# Patient Record
Sex: FEMALE | Race: WHITE | NOT HISPANIC OR LATINO | Employment: OTHER | URBAN - METROPOLITAN AREA
[De-identification: names, ages, dates, MRNs, and addresses within clinical notes are randomized per-mention and may not be internally consistent; named-entity substitution may affect disease eponyms.]

---

## 2017-06-20 ENCOUNTER — HOSPITAL ENCOUNTER (OUTPATIENT)
Dept: GENERAL RADIOLOGY | Facility: HOSPITAL | Age: 77
Discharge: HOME | End: 2017-06-20
Attending: INTERNAL MEDICINE | Admitting: INTERNAL MEDICINE

## 2017-06-20 LAB
ANION GAP SERPL CALC-SCNC: 11.9 MMOL/L (ref 10–20)
BASOPHILS # BLD AUTO: 0.1 10*3/UL (ref 0–0.2)
BASOPHILS NFR BLD AUTO: 1 % (ref 0–2)
BUN SERPL-MCNC: 7 MG/DL (ref 8–20)
BUN/CREAT SERPL: 8.8 (ref 5.4–26.2)
CALCIUM SERPL-MCNC: 8.9 MG/DL (ref 8.9–10.3)
CHLORIDE SERPL-SCNC: 108 MMOL/L (ref 101–111)
CONV CO2: 29 MMOL/L (ref 22–32)
CREAT UR-MCNC: 0.8 MG/DL (ref 0.4–1)
DIFFERENTIAL METHOD BLD: (no result)
EOSINOPHIL # BLD AUTO: 0.3 10*3/UL (ref 0–0.3)
EOSINOPHIL # BLD AUTO: 4 % (ref 0–3)
ERYTHROCYTE [DISTWIDTH] IN BLOOD BY AUTOMATED COUNT: 13.6 % (ref 11.5–14.5)
GLUCOSE SERPL-MCNC: 100 MG/DL (ref 65–99)
HCT VFR BLD AUTO: 42.7 % (ref 35–49)
HGB BLD-MCNC: 14.3 G/DL (ref 12–15)
INR PPP: 0.9
LYMPHOCYTES # BLD AUTO: 2 10*3/UL (ref 0.8–4.8)
LYMPHOCYTES NFR BLD AUTO: 28 % (ref 18–42)
MCH RBC QN AUTO: 31.1 PG (ref 26–32)
MCHC RBC AUTO-ENTMCNC: 33.5 G/DL (ref 32–36)
MCV RBC AUTO: 92.9 FL (ref 80–94)
MONOCYTES # BLD AUTO: 0.7 10*3/UL (ref 0.1–1.3)
MONOCYTES NFR BLD AUTO: 10 % (ref 2–11)
NEUTROPHILS # BLD AUTO: 4.2 10*3/UL (ref 2.3–8.6)
NEUTROPHILS NFR BLD AUTO: 57 % (ref 50–75)
NRBC BLD AUTO-RTO: 0 /100{WBCS}
NRBC/RBC NFR BLD MANUAL: 0 10*3/UL
PLATELET # BLD AUTO: 273 10*3/UL (ref 150–450)
PMV BLD AUTO: 7.9 FL (ref 7.4–10.4)
POTASSIUM SERPL-SCNC: 3.9 MMOL/L (ref 3.6–5.1)
PROTHROMBIN TIME: 10.6 SEC (ref 9.6–11.7)
RBC # BLD AUTO: 4.59 10*6/UL (ref 4–5.4)
SODIUM SERPL-SCNC: 145 MMOL/L (ref 136–144)
WBC # BLD AUTO: 7.3 10*3/UL (ref 4.5–11.5)

## 2017-06-21 LAB
CHOLEST SERPL-MCNC: 197 MG/DL
CHOLEST/HDLC SERPL: 5.3 {RATIO}
CONV LDL CHOLESTEROL DIRECT: 132 MG/DL (ref 0–100)
HDLC SERPL-MCNC: 37 MG/DL
LDLC/HDLC SERPL: 3.5 {RATIO}
LIPID INTERPRETATION: ABNORMAL
TRIGL SERPL-MCNC: 175 MG/DL
VLDLC SERPL CALC-MCNC: 28 MG/DL

## 2017-12-06 ENCOUNTER — HOSPITAL ENCOUNTER (OUTPATIENT)
Dept: MAMMOGRAPHY | Facility: HOSPITAL | Age: 77
Discharge: HOME | End: 2017-12-06
Attending: NURSE PRACTITIONER | Admitting: NURSE PRACTITIONER

## 2017-12-15 ENCOUNTER — HOSPITAL ENCOUNTER (OUTPATIENT)
Dept: URGENT CARE | Facility: CLINIC | Age: 77
Discharge: HOME | End: 2017-12-15
Attending: FAMILY MEDICINE | Admitting: FAMILY MEDICINE

## 2018-01-29 ENCOUNTER — HOSPITAL ENCOUNTER (OUTPATIENT)
Dept: LAB | Facility: HOSPITAL | Age: 78
Discharge: HOME | End: 2018-01-29
Attending: INTERNAL MEDICINE | Admitting: INTERNAL MEDICINE

## 2018-01-29 LAB
ALBUMIN SERPL-MCNC: 3.6 G/DL (ref 3.5–4.8)
ALBUMIN/GLOB SERPL: 1.2 {RATIO} (ref 1–1.7)
ALP SERPL-CCNC: 64 IU/L (ref 32–91)
ALT SERPL-CCNC: 26 IU/L (ref 14–54)
ANION GAP SERPL CALC-SCNC: 9.7 MMOL/L (ref 10–20)
AST SERPL-CCNC: 30 IU/L (ref 15–41)
BILIRUB SERPL-MCNC: 1.3 MG/DL (ref 0.3–1.2)
BUN SERPL-MCNC: 9 MG/DL (ref 8–20)
BUN/CREAT SERPL: 10 (ref 5.4–26.2)
CALCIUM SERPL-MCNC: 9.1 MG/DL (ref 8.9–10.3)
CHLORIDE SERPL-SCNC: 107 MMOL/L (ref 101–111)
CHOLEST SERPL-MCNC: 210 MG/DL
CHOLEST/HDLC SERPL: 5.6 {RATIO}
CK SERPL-CCNC: 92 IU/L (ref 38–234)
CONV CO2: 30 MMOL/L (ref 22–32)
CONV LDL CHOLESTEROL DIRECT: 134 MG/DL (ref 0–100)
CONV TOTAL PROTEIN: 6.7 G/DL (ref 6.1–7.9)
CREAT UR-MCNC: 0.9 MG/DL (ref 0.4–1)
GLOBULIN UR ELPH-MCNC: 3.1 G/DL (ref 2.5–3.8)
GLUCOSE SERPL-MCNC: 89 MG/DL (ref 65–99)
HDLC SERPL-MCNC: 38 MG/DL
LDLC/HDLC SERPL: 3.5 {RATIO}
LIPID INTERPRETATION: ABNORMAL
POTASSIUM SERPL-SCNC: 3.7 MMOL/L (ref 3.6–5.1)
SODIUM SERPL-SCNC: 143 MMOL/L (ref 136–144)
TRIGL SERPL-MCNC: 230 MG/DL
VLDLC SERPL CALC-MCNC: 38.7 MG/DL

## 2018-04-16 ENCOUNTER — HOSPITAL ENCOUNTER (OUTPATIENT)
Dept: URGENT CARE | Facility: CLINIC | Age: 78
Discharge: HOME | End: 2018-04-16
Attending: FAMILY MEDICINE | Admitting: FAMILY MEDICINE

## 2019-01-15 ENCOUNTER — HOSPITAL ENCOUNTER (OUTPATIENT)
Dept: LAB | Facility: HOSPITAL | Age: 79
Discharge: HOME | End: 2019-01-15
Attending: INTERNAL MEDICINE | Admitting: INTERNAL MEDICINE

## 2019-01-15 LAB
ANION GAP SERPL CALC-SCNC: 13.8 MMOL/L (ref 10–20)
BUN SERPL-MCNC: 8 MG/DL (ref 8–20)
BUN/CREAT SERPL: 8 (ref 5.4–26.2)
CALCIUM SERPL-MCNC: 9 MG/DL (ref 8.9–10.3)
CHLORIDE SERPL-SCNC: 100 MMOL/L (ref 101–111)
CONV CO2: 29 MMOL/L (ref 22–32)
CREAT UR-MCNC: 1 MG/DL (ref 0.4–1)
GLUCOSE SERPL-MCNC: 79 MG/DL (ref 65–99)
POTASSIUM SERPL-SCNC: 3.8 MMOL/L (ref 3.6–5.1)
SODIUM SERPL-SCNC: 139 MMOL/L (ref 136–144)

## 2019-01-21 ENCOUNTER — HOSPITAL ENCOUNTER (OUTPATIENT)
Dept: PHYSICAL THERAPY | Facility: HOSPITAL | Age: 79
Setting detail: RECURRING SERIES
Discharge: HOME | End: 2019-03-05
Attending: ORTHOPAEDIC SURGERY | Admitting: ORTHOPAEDIC SURGERY

## 2019-02-08 ENCOUNTER — HOSPITAL ENCOUNTER (OUTPATIENT)
Dept: FAMILY MEDICINE CLINIC | Facility: CLINIC | Age: 79
Setting detail: SPECIMEN
Discharge: HOME | End: 2019-02-08
Attending: INTERNAL MEDICINE | Admitting: INTERNAL MEDICINE

## 2019-02-08 LAB
ALBUMIN SERPL-MCNC: 3.9 G/DL (ref 3.5–4.8)
ALBUMIN/GLOB SERPL: 1.3 {RATIO} (ref 1–1.7)
ALP SERPL-CCNC: 74 IU/L (ref 32–91)
ALT SERPL-CCNC: 18 IU/L (ref 14–54)
ANION GAP SERPL CALC-SCNC: 12.6 MMOL/L (ref 10–20)
AST SERPL-CCNC: 26 IU/L (ref 15–41)
BASOPHILS # BLD AUTO: 0.1 10*3/UL (ref 0–0.2)
BASOPHILS NFR BLD AUTO: 1 % (ref 0–2)
BILIRUB SERPL-MCNC: 1.4 MG/DL (ref 0.3–1.2)
BUN SERPL-MCNC: 9 MG/DL (ref 8–20)
BUN/CREAT SERPL: 11.3 (ref 5.4–26.2)
CALCIUM SERPL-MCNC: 9.5 MG/DL (ref 8.9–10.3)
CHLORIDE SERPL-SCNC: 104 MMOL/L (ref 101–111)
CHOLEST SERPL-MCNC: 143 MG/DL
CHOLEST/HDLC SERPL: 4 {RATIO}
CONV CO2: 30 MMOL/L (ref 22–32)
CONV LDL CHOLESTEROL DIRECT: 71 MG/DL (ref 0–100)
CONV TOTAL PROTEIN: 6.9 G/DL (ref 6.1–7.9)
CREAT UR-MCNC: 0.8 MG/DL (ref 0.4–1)
DIFFERENTIAL METHOD BLD: (no result)
EOSINOPHIL # BLD AUTO: 0.4 10*3/UL (ref 0–0.3)
EOSINOPHIL # BLD AUTO: 5 % (ref 0–3)
ERYTHROCYTE [DISTWIDTH] IN BLOOD BY AUTOMATED COUNT: 13.8 % (ref 11.5–14.5)
GLOBULIN UR ELPH-MCNC: 3 G/DL (ref 2.5–3.8)
GLUCOSE SERPL-MCNC: 81 MG/DL (ref 65–99)
HCT VFR BLD AUTO: 44.8 % (ref 35–49)
HDLC SERPL-MCNC: 36 MG/DL
HGB BLD-MCNC: 15.3 G/DL (ref 12–15)
LDLC/HDLC SERPL: 2 {RATIO}
LIPID INTERPRETATION: ABNORMAL
LYMPHOCYTES # BLD AUTO: 2.1 10*3/UL (ref 0.8–4.8)
LYMPHOCYTES NFR BLD AUTO: 26 % (ref 18–42)
MCH RBC QN AUTO: 32.2 PG (ref 26–32)
MCHC RBC AUTO-ENTMCNC: 34.2 G/DL (ref 32–36)
MCV RBC AUTO: 94.1 FL (ref 80–94)
MONOCYTES # BLD AUTO: 1 10*3/UL (ref 0.1–1.3)
MONOCYTES NFR BLD AUTO: 13 % (ref 2–11)
NEUTROPHILS # BLD AUTO: 4.6 10*3/UL (ref 2.3–8.6)
NEUTROPHILS NFR BLD AUTO: 55 % (ref 50–75)
NRBC BLD AUTO-RTO: 0 /100{WBCS}
NRBC/RBC NFR BLD MANUAL: 0 10*3/UL
PLATELET # BLD AUTO: 316 10*3/UL (ref 150–450)
PMV BLD AUTO: 8 FL (ref 7.4–10.4)
POTASSIUM SERPL-SCNC: 4.6 MMOL/L (ref 3.6–5.1)
RBC # BLD AUTO: 4.75 10*6/UL (ref 4–5.4)
SODIUM SERPL-SCNC: 142 MMOL/L (ref 136–144)
TRIGL SERPL-MCNC: 291 MG/DL
VLDLC SERPL CALC-MCNC: 36.6 MG/DL
WBC # BLD AUTO: 8.2 10*3/UL (ref 4.5–11.5)

## 2019-03-22 ENCOUNTER — HOSPITAL ENCOUNTER (OUTPATIENT)
Dept: CARDIOLOGY | Facility: HOSPITAL | Age: 79
Discharge: HOME | End: 2019-03-22
Attending: INTERNAL MEDICINE | Admitting: INTERNAL MEDICINE

## 2019-06-07 ENCOUNTER — HOSPITAL ENCOUNTER (OUTPATIENT)
Dept: CARDIOLOGY | Facility: HOSPITAL | Age: 79
Discharge: HOME | End: 2019-06-07
Attending: INTERNAL MEDICINE | Admitting: INTERNAL MEDICINE

## 2019-07-08 RX ORDER — MULTIVIT WITH MINERALS/LUTEIN
500 TABLET ORAL 2 TIMES DAILY
COMMUNITY
Start: 2014-05-14

## 2019-07-08 RX ORDER — ISOSORBIDE MONONITRATE 30 MG/1
TABLET, EXTENDED RELEASE ORAL
COMMUNITY
Start: 2018-08-14 | End: 2020-07-20 | Stop reason: SDUPTHER

## 2019-07-08 RX ORDER — CHOLECALCIFEROL (VITAMIN D3) 50 MCG
1 CAPSULE ORAL DAILY
COMMUNITY
Start: 2019-02-08

## 2019-07-08 RX ORDER — CARVEDILOL 6.25 MG/1
TABLET ORAL EVERY 12 HOURS
COMMUNITY
Start: 2018-04-27 | End: 2020-01-21 | Stop reason: SDUPTHER

## 2019-07-08 RX ORDER — LISINOPRIL 20 MG/1
TABLET ORAL EVERY 12 HOURS
COMMUNITY
Start: 2018-04-27 | End: 2019-12-23

## 2019-07-08 RX ORDER — ATORVASTATIN CALCIUM 20 MG/1
TABLET, FILM COATED ORAL
COMMUNITY
Start: 2018-09-21 | End: 2019-12-30

## 2019-07-08 RX ORDER — ASPIRIN 81 MG/1
81 TABLET ORAL EVERY 24 HOURS
COMMUNITY
Start: 2019-01-14

## 2019-07-15 ENCOUNTER — OFFICE VISIT (OUTPATIENT)
Dept: CARDIOLOGY | Facility: CLINIC | Age: 79
End: 2019-07-15

## 2019-07-15 VITALS
WEIGHT: 146 LBS | HEIGHT: 63 IN | HEART RATE: 68 BPM | SYSTOLIC BLOOD PRESSURE: 161 MMHG | BODY MASS INDEX: 25.87 KG/M2 | DIASTOLIC BLOOD PRESSURE: 93 MMHG | OXYGEN SATURATION: 94 %

## 2019-07-15 DIAGNOSIS — I10 ESSENTIAL HYPERTENSION: ICD-10-CM

## 2019-07-15 DIAGNOSIS — E11.9 TYPE II DIABETES MELLITUS WITH GOAL OF SYMPTOM MANAGEMENT (HCC): ICD-10-CM

## 2019-07-15 DIAGNOSIS — E78.5 DYSLIPIDEMIA: ICD-10-CM

## 2019-07-15 DIAGNOSIS — I25.118 CORONARY ARTERY DISEASE OF NATIVE ARTERY OF NATIVE HEART WITH STABLE ANGINA PECTORIS (HCC): ICD-10-CM

## 2019-07-15 DIAGNOSIS — I20.9 ANGINA PECTORIS (HCC): Primary | ICD-10-CM

## 2019-07-15 PROCEDURE — 99214 OFFICE O/P EST MOD 30 MIN: CPT | Performed by: INTERNAL MEDICINE

## 2019-07-15 PROCEDURE — 93000 ELECTROCARDIOGRAM COMPLETE: CPT | Performed by: INTERNAL MEDICINE

## 2019-07-15 RX ORDER — AMLODIPINE BESYLATE 5 MG/1
5 TABLET ORAL DAILY
Qty: 90 TABLET | Refills: 3 | Status: SHIPPED | OUTPATIENT
Start: 2019-07-15 | End: 2020-07-20

## 2019-07-15 NOTE — PROGRESS NOTES
Subjective:     Encounter Date:07/15/2019      Patient ID: Maria D Fountain is a 79 y.o. female.    Chief Complaint: Follow-up for CAD, hypertension, to sleep team  History of Present Illness: see below      Past Medical History:  Past Medical History:   Diagnosis Date   • Arthritis    • Diabetes mellitus (CMS/HCC)    • DJD (degenerative joint disease)    • Gallstones    • Heart murmur    • History of stress test 07/2011    Stress myoview- neg    • Hypertension    • Lumbar disc disease    • Mitral regurgitation    • Multiple lipomas    • Osteoporosis    • Pleurisy      Past Surgical History:  Past Surgical History:   Procedure Laterality Date   • BREAST BIOPSY Right 1990   • CARDIAC CATHETERIZATION  06/21/2017   • HEMANGIOMA EXCISION Left 2010    left forearm    • HX OVARIAN CYSTECTOMY  1962   • HYSTERECTOMY  2004      Allergies:  Allergies   Allergen Reactions   • Pravastatin Dizziness     Home Meds:  Current Meds:     Current Outpatient Medications:   •  Acetaminophen 325 MG capsule, TYLENOL CAPS, Disp: , Rfl:   •  amLODIPine (NORVASC) 5 MG tablet, Take 1 tablet by mouth Daily., Disp: 90 tablet, Rfl: 3  •  ascorbic acid (VITAMIN C) 1000 MG tablet, VITAMIN C 1000 MG TABS, Disp: , Rfl:   •  aspirin (ASPIR-LOW) 81 MG EC tablet, Daily., Disp: , Rfl:   •  atorvastatin (LIPITOR) 20 MG tablet, ATORVASTATIN CALCIUM 20 MG TABS, Disp: , Rfl:   •  Calcium Carb-Cholecalciferol 1000-800 MG-UNIT tablet, CALCIUM 600-D TABS, Disp: , Rfl:   •  carvedilol (COREG) 6.25 MG tablet, Every 12 (Twelve) Hours., Disp: , Rfl:   •  HM OMEGA-3-6-9 FATTY ACIDS capsule, OMEGA-3 PLUS CAPS, Disp: , Rfl:   •  isosorbide mononitrate (IMDUR) 30 MG 24 hr tablet, ISOSORBIDE MONONITRATE ER 30 MG XR24H-TAB, Disp: , Rfl:   •  lisinopril (PRINIVIL,ZESTRIL) 20 MG tablet, Every 12 (Twelve) Hours., Disp: , Rfl:   •  MISC NATURAL PRODUCTS ER PO, OSTEO BI-FLEX ADV DOUBLE ST ORAL CAPSULE, Disp: , Rfl:   •  multivitamin-minerals (CENTRUM) tablet, CENTRUM  TABS, Disp: , Rfl:   Social History:   Social History     Tobacco Use   • Smoking status: Never Smoker   • Smokeless tobacco: Never Used   Substance Use Topics   • Alcohol use: No     Frequency: Never      Family History:  Family History   Problem Relation Age of Onset   • Heart disease Father         MI   • Arthritis Other    • Hypertension Other    • Colon cancer Other         The following portions of the patient's history were reviewed and updated as appropriate: allergies, current medications, past family history, past medical history, past social history, past surgical history and problem list.    Review of Systems   Constitution: Positive for malaise/fatigue. Negative for fever.   HENT: Negative for congestion and hearing loss.    Eyes: Negative for double vision and visual disturbance.   Cardiovascular: Positive for chest pain (Comes and goes) and palpitations (Sometimes). Negative for claudication, dyspnea on exertion, leg swelling and syncope.   Respiratory: Negative for cough and shortness of breath.    Endocrine: Negative for cold intolerance.   Skin: Negative for color change and rash.   Musculoskeletal: Negative for arthritis and joint pain.   Gastrointestinal: Negative for abdominal pain and heartburn.   Genitourinary: Negative for hematuria.   Neurological: Positive for dizziness (Usually in AM). Negative for excessive daytime sleepiness.   Psychiatric/Behavioral: Negative for depression. The patient is not nervous/anxious.    All other systems reviewed and are negative.        ECG 12 Lead  Date/Time: 7/15/2019 2:08 PM  Performed by: Matti Luna MD  Authorized by: Matti Luna MD   Comparison: compared with previous ECG   Comparison to previous ECG: EKG done today reviewed by me shows sinus rhythm at the rate of 65 bpm with nonspecific anterior T wave inversions                 Objective:     Physical Exam   /93 (BP Location: Left arm, Patient Position: Sitting, Cuff  "Size: Adult)   Pulse 68   Ht 160 cm (63\")   Wt 66.2 kg (146 lb)   SpO2 94%   BMI 25.86 kg/m²   General:  Appears in no acute distress  Eyes: Sclera is anicteric,  conjunctiva is clear   HEENT:  No JVD. Thyroid not visibly enlarged. No mucosal pallor or cyanosis  Respiratory: Respirations regular and unlabored at rest.  Bilaterally good breath sounds, with good air entry in all fields. No crackles, rubs or wheezes auscultated  Cardiovascular: S1,S2 Regular rate and rhythm. No murmur, rub or gallop auscultated. No carotid bruits. DP/PT pulses    . No pretibial pitting edema  Gastrointestinal: Abdomen soft, flat, non tender. Bowel sounds present. No hepatosplenomegaly. No ascites  Musculoskeletal:  No abnormal movements  Extremities: No digital clubbing or cyanosis  Skin: Color pink. Skin warm and dry to touch. No rashes  No xanthoma  Neuro: Alert and awake, no lateralizing deficits appreciated    Lab Review:       Assessment:          Diagnosis Plan   1. Angina pectoris (CMS/HCC)  Stress Test With Myocardial Perfusion One Day    ECG 12 Lead   2. Coronary artery disease of native artery of native heart with stable angina pectoris (CMS/HCC)  Stress Test With Myocardial Perfusion One Day    ECG 12 Lead   3. Essential hypertension  Stress Test With Myocardial Perfusion One Day    ECG 12 Lead   4. Type II diabetes mellitus with goal of symptom management (CMS/HCC)  Stress Test With Myocardial Perfusion One Day    ECG 12 Lead   5. Dyslipidemia  Stress Test With Myocardial Perfusion One Day    ECG 12 Lead       This is a 79-year-old  female with PMH of    # CAD cardiac cath 06/21/2017 showing 50% OM1 disease and small-vessel disease distal LAD  #  mitral regurgitation, mild 9/2016  #  palpitations  #  hypertension  # LAE  #  diabetes  #?  ACE-inhibitor cough, now improved  # allergy/intolerance to pravastatin  # ALYSA       patient is here for follow-up.  Patient is complaining of intermittent substernal chest " pain with stressful situation relieved with relaxation.  He is under a lot of stress patient's  passed away recently( lung ca), son is having multiple surgery after motor vehicle accident for orthopedic surgery.  Patient's arterial blood pressure is elevated at 161/93.  denies any , palpitations, loss of consciousness.  Patient's arterial blood pressure is 161/93.  Has dizziness denies any loss of consciousness , headaches.  Patient does not check her sugars regularly. patient's labs from 01/29/2018 revealed cholesterol 210 triglycerides 230 HDL low at 38 LDL high at 134 CMP is okay CK is 92     ASSESSMENT:  #Recurrent chest pain  #. hypertension,   #  dyslipidemia  # CAD  #  mitral regurgitation  #. diabetes     PLAN:  He reviewed EKG with patient  We will add amlodipine for better blood pressure control  Schedule stress test   continue beta-blockers and Lipitor and aspirin   counseled on walking exercise for low HDL  Continue risk factor modification and medical management  Reviewed risk benefits alternatives of different medications.   follow up with PMD for  diabetes   continue medical management and risk factor modification       Plan:

## 2019-07-17 DIAGNOSIS — I10 HYPERTENSION, UNSPECIFIED TYPE: Primary | ICD-10-CM

## 2019-07-17 DIAGNOSIS — E11.9 TYPE 2 DIABETES MELLITUS WITHOUT COMPLICATION, UNSPECIFIED WHETHER LONG TERM INSULIN USE (HCC): ICD-10-CM

## 2019-07-17 DIAGNOSIS — E55.9 VITAMIN D DEFICIENCY: ICD-10-CM

## 2019-07-17 DIAGNOSIS — E78.5 HYPERLIPIDEMIA, UNSPECIFIED HYPERLIPIDEMIA TYPE: ICD-10-CM

## 2019-07-18 ENCOUNTER — HOSPITAL ENCOUNTER (OUTPATIENT)
Dept: CARDIOLOGY | Facility: HOSPITAL | Age: 79
Discharge: HOME | End: 2019-07-18

## 2019-07-18 ENCOUNTER — LAB (OUTPATIENT)
Dept: FAMILY MEDICINE CLINIC | Facility: CLINIC | Age: 79
End: 2019-07-18

## 2019-07-18 ENCOUNTER — OFFICE VISIT (OUTPATIENT)
Dept: CARDIOLOGY | Facility: CLINIC | Age: 79
End: 2019-07-18

## 2019-07-18 VITALS
OXYGEN SATURATION: 95 % | WEIGHT: 146 LBS | BODY MASS INDEX: 25.86 KG/M2 | HEART RATE: 61 BPM | SYSTOLIC BLOOD PRESSURE: 159 MMHG | DIASTOLIC BLOOD PRESSURE: 86 MMHG

## 2019-07-18 DIAGNOSIS — E11.9 TYPE II DIABETES MELLITUS WITH GOAL OF SYMPTOM MANAGEMENT (HCC): ICD-10-CM

## 2019-07-18 DIAGNOSIS — E11.9 TYPE 2 DIABETES MELLITUS WITHOUT COMPLICATION, UNSPECIFIED WHETHER LONG TERM INSULIN USE (HCC): ICD-10-CM

## 2019-07-18 DIAGNOSIS — E78.5 DYSLIPIDEMIA: ICD-10-CM

## 2019-07-18 DIAGNOSIS — R09.89 LABILE HYPERTENSION: Primary | ICD-10-CM

## 2019-07-18 DIAGNOSIS — I10 ESSENTIAL HYPERTENSION: ICD-10-CM

## 2019-07-18 DIAGNOSIS — I25.118 CORONARY ARTERY DISEASE OF NATIVE ARTERY OF NATIVE HEART WITH STABLE ANGINA PECTORIS (HCC): ICD-10-CM

## 2019-07-18 DIAGNOSIS — I20.9 ANGINA PECTORIS (HCC): ICD-10-CM

## 2019-07-18 DIAGNOSIS — E78.5 HYPERLIPIDEMIA, UNSPECIFIED HYPERLIPIDEMIA TYPE: ICD-10-CM

## 2019-07-18 DIAGNOSIS — E55.9 VITAMIN D DEFICIENCY: ICD-10-CM

## 2019-07-18 LAB
25(OH)D3 SERPL-MCNC: 26.9 NG/ML (ref 30–100)
ALBUMIN SERPL-MCNC: 3.6 G/DL (ref 3.5–4.8)
ALBUMIN/GLOB SERPL: 1.4 G/DL (ref 1–1.7)
ALP SERPL-CCNC: 69 U/L (ref 32–91)
ALT SERPL W P-5'-P-CCNC: 17 U/L (ref 14–54)
ANION GAP SERPL CALCULATED.3IONS-SCNC: 13.4 MMOL/L (ref 5–15)
ARTICHOKE IGE QN: 71 MG/DL (ref 0–100)
AST SERPL-CCNC: 23 U/L (ref 15–41)
BH CV STRESS COMMENTS STAGE 1: NORMAL
BH CV STRESS DOSE REGADENOSON STAGE 1: 0.4
BH CV STRESS DURATION MIN STAGE 1: 0
BH CV STRESS DURATION SEC STAGE 1: 10
BH CV STRESS PROTOCOL 1: NORMAL
BH CV STRESS RECOVERY BP: NORMAL MMHG
BH CV STRESS RECOVERY HR: 90 BPM
BH CV STRESS STAGE 1: 1
BILIRUB SERPL-MCNC: 1.1 MG/DL (ref 0.3–1.2)
BUN BLD-MCNC: 9 MG/DL (ref 8–20)
BUN/CREAT SERPL: 10 (ref 5.4–26.2)
CALCIUM SPEC-SCNC: 9.1 MG/DL (ref 8.9–10.3)
CHLORIDE SERPL-SCNC: 106 MMOL/L (ref 101–111)
CHOLEST SERPL-MCNC: 137 MG/DL
CO2 SERPL-SCNC: 27 MMOL/L (ref 22–32)
CREAT BLD-MCNC: 0.9 MG/DL (ref 0.4–1)
GFR SERPL CREATININE-BSD FRML MDRD: 60 ML/MIN/1.73
GLOBULIN UR ELPH-MCNC: 2.5 GM/DL (ref 2.5–3.8)
GLUCOSE BLD-MCNC: 97 MG/DL (ref 65–99)
HBA1C MFR BLD: 5.7 % (ref 3.5–5.6)
HDLC SERPL QL: 3.7
HDLC SERPL-MCNC: 37 MG/DL
LDLC/HDLC SERPL: 1.5 {RATIO}
LV EF NUC BP: 70 %
MAXIMAL PREDICTED HEART RATE: 141 BPM
POTASSIUM BLD-SCNC: 4.4 MMOL/L (ref 3.6–5.1)
PROT SERPL-MCNC: 6.1 G/DL (ref 6.1–7.9)
SODIUM BLD-SCNC: 142 MMOL/L (ref 136–144)
STRESS BASELINE BP: NORMAL MMHG
STRESS BASELINE HR: 57 BPM
STRESS TARGET HR: 120 BPM
TRIGL SERPL-MCNC: 223 MG/DL
VLDLC SERPL-MCNC: 44.6 MG/DL

## 2019-07-18 PROCEDURE — 80061 LIPID PANEL: CPT | Performed by: INTERNAL MEDICINE

## 2019-07-18 PROCEDURE — 99213 OFFICE O/P EST LOW 20 MIN: CPT | Performed by: INTERNAL MEDICINE

## 2019-07-18 PROCEDURE — 93016 CV STRESS TEST SUPVJ ONLY: CPT | Performed by: INTERNAL MEDICINE

## 2019-07-18 PROCEDURE — 82306 VITAMIN D 25 HYDROXY: CPT | Performed by: INTERNAL MEDICINE

## 2019-07-18 PROCEDURE — 78452 HT MUSCLE IMAGE SPECT MULT: CPT

## 2019-07-18 PROCEDURE — 83036 HEMOGLOBIN GLYCOSYLATED A1C: CPT | Performed by: INTERNAL MEDICINE

## 2019-07-18 PROCEDURE — 36415 COLL VENOUS BLD VENIPUNCTURE: CPT | Performed by: INTERNAL MEDICINE

## 2019-07-18 PROCEDURE — 0 TECHNETIUM SESTAMIBI: Performed by: INTERNAL MEDICINE

## 2019-07-18 PROCEDURE — 78452 HT MUSCLE IMAGE SPECT MULT: CPT | Performed by: INTERNAL MEDICINE

## 2019-07-18 PROCEDURE — 25010000002 REGADENOSON 0.4 MG/5ML SOLUTION: Performed by: INTERNAL MEDICINE

## 2019-07-18 PROCEDURE — 93017 CV STRESS TEST TRACING ONLY: CPT

## 2019-07-18 PROCEDURE — A9500 TC99M SESTAMIBI: HCPCS | Performed by: INTERNAL MEDICINE

## 2019-07-18 PROCEDURE — 80053 COMPREHEN METABOLIC PANEL: CPT | Performed by: INTERNAL MEDICINE

## 2019-07-18 PROCEDURE — 93018 CV STRESS TEST I&R ONLY: CPT | Performed by: INTERNAL MEDICINE

## 2019-07-18 RX ADMIN — REGADENOSON 0.4 MG: 0.08 INJECTION, SOLUTION INTRAVENOUS at 15:15

## 2019-07-18 RX ADMIN — TECHNETIUM TC 99M SESTAMIBI 1 DOSE: 1 INJECTION INTRAVENOUS at 15:15

## 2019-07-18 RX ADMIN — TECHNETIUM TC 99M SESTAMIBI 1 DOSE: 1 INJECTION INTRAVENOUS at 14:00

## 2019-07-18 NOTE — PROGRESS NOTES
Subjective:     Encounter Date:07/18/2019      Patient ID: Maria D Fountain is a 79 y.o. female.    Chief Complaint: Lexiscan Cardiolite follow-up  History of Present Illness see below      Past Medical History:  Past Medical History:   Diagnosis Date   • Arthritis    • Diabetes mellitus (CMS/HCC)    • DJD (degenerative joint disease)    • Gallstones    • Heart murmur    • History of stress test 07/2011    Stress myoview- neg    • Hypertension    • Lumbar disc disease    • Mitral regurgitation    • Multiple lipomas    • Osteoporosis    • Pleurisy      Past Surgical History:  Past Surgical History:   Procedure Laterality Date   • BREAST BIOPSY Right 1990   • CARDIAC CATHETERIZATION  06/21/2017   • HEMANGIOMA EXCISION Left 2010    left forearm    • HX OVARIAN CYSTECTOMY  1962   • HYSTERECTOMY  2004      Allergies:  Allergies   Allergen Reactions   • Pravastatin Dizziness     Home Meds:  Current Meds:     Current Outpatient Medications:   •  Acetaminophen 325 MG capsule, TYLENOL CAPS, Disp: , Rfl:   •  amLODIPine (NORVASC) 5 MG tablet, Take 1 tablet by mouth Daily., Disp: 90 tablet, Rfl: 3  •  ascorbic acid (VITAMIN C) 1000 MG tablet, VITAMIN C 1000 MG TABS, Disp: , Rfl:   •  aspirin (ASPIR-LOW) 81 MG EC tablet, Daily., Disp: , Rfl:   •  atorvastatin (LIPITOR) 20 MG tablet, ATORVASTATIN CALCIUM 20 MG TABS, Disp: , Rfl:   •  Calcium Carb-Cholecalciferol 1000-800 MG-UNIT tablet, CALCIUM 600-D TABS, Disp: , Rfl:   •  carvedilol (COREG) 6.25 MG tablet, Every 12 (Twelve) Hours., Disp: , Rfl:   •  HM OMEGA-3-6-9 FATTY ACIDS capsule, OMEGA-3 PLUS CAPS, Disp: , Rfl:   •  isosorbide mononitrate (IMDUR) 30 MG 24 hr tablet, ISOSORBIDE MONONITRATE ER 30 MG XR24H-TAB, Disp: , Rfl:   •  lisinopril (PRINIVIL,ZESTRIL) 20 MG tablet, Every 12 (Twelve) Hours., Disp: , Rfl:   •  MISC NATURAL PRODUCTS ER PO, OSTEO BI-FLEX ADV DOUBLE ST ORAL CAPSULE, Disp: , Rfl:   •  multivitamin-minerals (CENTRUM) tablet, CENTRUM TABS, Disp: , Rfl:    No current facility-administered medications for this visit.   Social History:   Social History     Tobacco Use   • Smoking status: Never Smoker   • Smokeless tobacco: Never Used   Substance Use Topics   • Alcohol use: No     Frequency: Never      Family History:  Family History   Problem Relation Age of Onset   • Heart disease Father         MI   • Arthritis Other    • Hypertension Other    • Colon cancer Other         The following portions of the patient's history were reviewed and updated as appropriate: allergies, current medications, past family history, past medical history, past social history, past surgical history and problem list.    Review of Systems   Cardiovascular: Negative for chest pain, leg swelling and palpitations.   Respiratory: Negative for shortness of breath.    Neurological: Positive for dizziness and light-headedness. Negative for numbness.       Procedures       Objective:     Physical Exam   /86 (BP Location: Left arm, Patient Position: Sitting, Cuff Size: Adult)   Pulse 61   Wt 66.2 kg (146 lb)   SpO2 95%   BMI 25.86 kg/m²   General:  Appears in no acute distress  Eyes: Sclera is anicteric,  conjunctiva is clear   HEENT:  No JVD. Thyroid not visibly enlarged. No mucosal pallor or cyanosis  Respiratory: Respirations regular and unlabored at rest.  Bilaterally good breath sounds, with good air entry in all fields. No crackles, rubs or wheezes auscultated  Cardiovascular: S1,S2 Regular rate and rhythm. No murmur, rub or gallop auscultated. No carotid bruits. DP/PT pulses    . No pretibial pitting edema  Gastrointestinal: Abdomen soft, flat, non tender. Bowel sounds present. No hepatosplenomegaly. No ascites  Musculoskeletal:  No abnormal movements  Extremities: No digital clubbing or cyanosis  Skin: Color pink. Skin warm and dry to touch. No rashes  No xanthoma  Neuro: Alert and awake, no lateralizing deficits appreciated    Lab Review:       Assessment:          Diagnosis Plan    1. Labile hypertension     2. Angina pectoris (CMS/HCC)     3. Type II diabetes mellitus with goal of symptom management (CMS/[facility])       HPI:    This is a 79-year-old  female with PMH of    # CAD cardiac cath 06/21/2017 showing 50% OM1 disease and small-vessel disease distal LAD  #  mitral regurgitation, mild 9/2016  #  palpitations  #  hypertension  # LAE  #  diabetes  #?  ACE-inhibitor cough, now improved  # allergy/intolerance to pravastatin  # ALYSA       patient is here for stress test follow-up.  Patient is complaining of intermittent substernal chest pain with stressful situation relieved with relaxation.  He is under a lot of stress patient's  passed away recently( lung ca), son is having multiple surgery after motor vehicle accident for orthopedic surgery.  Patient's arterial blood pressure is elevated at 159/86.  States that her blood pressure is better on amlodipine that was added recently was 120s in the morning  denies any , palpitations, loss of consciousness.  Patient does not check her sugars regularly. patient's labs from 01/29/2018 revealed cholesterol 210 triglycerides 230 HDL low at 38 LDL high at 134 CMP is okay CK is 92     ASSESSMENT:  #. hypertension,   #  dyslipidemia  # CAD  #  mitral regurgitation  #. diabetes     PLAN:  Advised patient to check blood pressure at home every Sunday morning  he reviewed stress test with patient  We will continue amlodipine for better blood pressure control   continue beta-blockers and Lipitor and aspirin   counseled on walking exercise for low HDL  Continue risk factor modification and medical management  Reviewed risk benefits alternatives of different medications.   follow up with PMD for  diabetes   continue medical management and risk factor modification         Plan:

## 2019-07-25 ENCOUNTER — OFFICE VISIT (OUTPATIENT)
Dept: FAMILY MEDICINE CLINIC | Facility: CLINIC | Age: 79
End: 2019-07-25

## 2019-07-25 VITALS
RESPIRATION RATE: 16 BRPM | BODY MASS INDEX: 25.87 KG/M2 | SYSTOLIC BLOOD PRESSURE: 120 MMHG | TEMPERATURE: 97.8 F | WEIGHT: 146 LBS | HEART RATE: 76 BPM | HEIGHT: 63 IN | DIASTOLIC BLOOD PRESSURE: 70 MMHG

## 2019-07-25 DIAGNOSIS — E78.5 DYSLIPIDEMIA: ICD-10-CM

## 2019-07-25 DIAGNOSIS — I83.812 VARICOSE VEINS OF LEFT LOWER EXTREMITY WITH PAIN: ICD-10-CM

## 2019-07-25 DIAGNOSIS — M54.16 LUMBAR RADICULOPATHY: ICD-10-CM

## 2019-07-25 DIAGNOSIS — L98.9 SKIN LESION: ICD-10-CM

## 2019-07-25 DIAGNOSIS — I10 ESSENTIAL HYPERTENSION: Primary | ICD-10-CM

## 2019-07-25 PROBLEM — I25.10 CHRONIC CORONARY ARTERY DISEASE: Status: ACTIVE | Noted: 2017-08-07

## 2019-07-25 PROBLEM — R07.81 CHEST PAIN, PLEURITIC: Status: ACTIVE | Noted: 2017-12-15

## 2019-07-25 PROBLEM — I20.9 ANGINA PECTORIS: Status: ACTIVE | Noted: 2017-05-08

## 2019-07-25 PROBLEM — M85.80 OSTEOPENIA: Status: ACTIVE | Noted: 2019-02-08

## 2019-07-25 PROBLEM — R73.03 PREDIABETES: Status: ACTIVE | Noted: 2019-02-08

## 2019-07-25 PROCEDURE — 99214 OFFICE O/P EST MOD 30 MIN: CPT | Performed by: INTERNAL MEDICINE

## 2019-07-25 NOTE — PROGRESS NOTES
Subjective   Maria D Fountain is a 79 y.o. female.     Sciatica and left leg pain improved with steroids  But now has pain behind knee - has varicose veins  Not bad enough to want consult with vascular surgery, however    Also here for med check htn, hpld  And f/u lab results  bp is well controlled at home since adding amlodipine  No adverse effects  No myalgias, chest pain, headaches, dizziness    Does have a little swelling at left temple, just above where her glasses rest on L ear  Started hurting about a week ago, noticed redness  Redness better but  and swollen         The following portions of the patient's history were reviewed and updated as appropriate: allergies, current medications, past family history, past medical history, past social history, past surgical history and problem list.    Review of Systems   Constitutional: Negative for fatigue and fever.   HENT: Negative for congestion, ear pain, rhinorrhea and sore throat.    Eyes: Negative for blurred vision and itching.   Respiratory: Negative for cough and shortness of breath.    Cardiovascular: Negative for chest pain and palpitations.   Gastrointestinal: Negative for abdominal pain, diarrhea and vomiting.   Endocrine: Negative for polydipsia and polyuria.   Genitourinary: Negative for dysuria, frequency, hematuria and urgency.   Musculoskeletal: Positive for arthralgias. Negative for joint swelling and myalgias.   Skin: Positive for skin lesions. Negative for rash.   Neurological: Negative for dizziness, numbness and headache.   Psychiatric/Behavioral: Negative for depressed mood. The patient is not nervous/anxious.          Current Outpatient Medications:   •  Acetaminophen 325 MG capsule, TYLENOL CAPS, Disp: , Rfl:   •  amLODIPine (NORVASC) 5 MG tablet, Take 1 tablet by mouth Daily., Disp: 90 tablet, Rfl: 3  •  ascorbic acid (VITAMIN C) 1000 MG tablet, VITAMIN C 1000 MG TABS, Disp: , Rfl:   •  aspirin (ASPIR-LOW) 81 MG EC tablet,  "Daily., Disp: , Rfl:   •  atorvastatin (LIPITOR) 20 MG tablet, ATORVASTATIN CALCIUM 20 MG TABS, Disp: , Rfl:   •  Calcium Carb-Cholecalciferol 1000-800 MG-UNIT tablet, CALCIUM 600-D TABS, Disp: , Rfl:   •  carvedilol (COREG) 6.25 MG tablet, Every 12 (Twelve) Hours., Disp: , Rfl:   •  HM OMEGA-3-6-9 FATTY ACIDS capsule, OMEGA-3 PLUS CAPS, Disp: , Rfl:   •  isosorbide mononitrate (IMDUR) 30 MG 24 hr tablet, ISOSORBIDE MONONITRATE ER 30 MG XR24H-TAB, Disp: , Rfl:   •  lisinopril (PRINIVIL,ZESTRIL) 20 MG tablet, Every 12 (Twelve) Hours., Disp: , Rfl:   •  MISC NATURAL PRODUCTS ER PO, OSTEO BI-FLEX ADV DOUBLE ST ORAL CAPSULE, Disp: , Rfl:   •  multivitamin-minerals (CENTRUM) tablet, CENTRUM TABS, Disp: , Rfl:     Objective   /70 (BP Location: Right arm, Patient Position: Sitting, Cuff Size: Adult)   Pulse 76   Temp 97.8 °F (36.6 °C) (Oral)   Resp 16   Ht 160 cm (63\")   Wt 66.2 kg (146 lb)   BMI 25.86 kg/m²   Physical Exam   Constitutional: She is oriented to person, place, and time. She appears well-developed and well-nourished. No distress.   HENT:   Head: Normocephalic and atraumatic.   Right Ear: External ear normal.   Left Ear: External ear normal.   Mouth/Throat: Oropharynx is clear and moist. No oropharyngeal exudate.   Eyes: Conjunctivae and EOM are normal. Right eye exhibits no discharge. Left eye exhibits no discharge. No scleral icterus.   Neck: Normal range of motion. Neck supple. No thyromegaly present.   Cardiovascular: Normal rate, regular rhythm and normal heart sounds. Exam reveals no gallop and no friction rub.   No murmur heard.  Pulmonary/Chest: Effort normal and breath sounds normal. No respiratory distress. She has no wheezes. She has no rales.   Abdominal: Soft. Bowel sounds are normal. She exhibits no distension. There is no tenderness. There is no guarding.   Musculoskeletal: Normal range of motion. She exhibits no edema or deformity.   Lymphadenopathy:     She has no cervical " adenopathy.   Neurological: She is alert and oriented to person, place, and time. No cranial nerve deficit.   Skin: Skin is warm and dry. No rash noted. She is not diaphoretic. No erythema.   Psychiatric: She has a normal mood and affect. Her behavior is normal. Thought content normal.   Vitals reviewed.        Assessment/Plan   Problems Addressed this Visit        Cardiovascular and Mediastinum    Hypertension - Primary    Varicose veins of left lower extremity with pain       Nervous and Auditory    Lumbar radiculopathy       Other    Dyslipidemia      Other Visit Diagnoses     Skin lesion            bp controlled  Lipid panel acceptable  Advised pt to increase vit d by 2000 I.u daily  Monitor skin lesion - appearance more c/w insect bite - if worse or not better by another week, try abx for infection  If varicose veins worsen, will refer to vascular  Back pain resolved - restart water aerobics at [facility]         Procedures

## 2019-08-14 RX ORDER — CARVEDILOL 12.5 MG/1
TABLET ORAL
Qty: 180 TABLET | Refills: 2 | Status: SHIPPED | OUTPATIENT
Start: 2019-08-14 | End: 2020-05-29

## 2019-09-03 ENCOUNTER — TELEPHONE (OUTPATIENT)
Dept: FAMILY MEDICINE CLINIC | Facility: CLINIC | Age: 79
End: 2019-09-03

## 2019-09-03 NOTE — TELEPHONE ENCOUNTER
Patient called stating that she is having leg issues again, and left knee is swollen. Patient states that she is having a hard time walking. Patient states that she is scheduled to leave town on 9/17 for family vacation and is asking if she can be seen asap. Please advise

## 2019-09-04 ENCOUNTER — LAB (OUTPATIENT)
Dept: LAB | Facility: HOSPITAL | Age: 79
End: 2019-09-04

## 2019-09-04 ENCOUNTER — TELEPHONE (OUTPATIENT)
Dept: FAMILY MEDICINE CLINIC | Facility: CLINIC | Age: 79
End: 2019-09-04

## 2019-09-04 ENCOUNTER — HOSPITAL ENCOUNTER (OUTPATIENT)
Dept: CT IMAGING | Facility: HOSPITAL | Age: 79
Discharge: HOME | End: 2019-09-04
Admitting: INTERNAL MEDICINE

## 2019-09-04 ENCOUNTER — OFFICE VISIT (OUTPATIENT)
Dept: FAMILY MEDICINE CLINIC | Facility: CLINIC | Age: 79
End: 2019-09-04

## 2019-09-04 VITALS
WEIGHT: 146 LBS | SYSTOLIC BLOOD PRESSURE: 112 MMHG | HEART RATE: 76 BPM | BODY MASS INDEX: 25.87 KG/M2 | DIASTOLIC BLOOD PRESSURE: 64 MMHG | HEIGHT: 63 IN | RESPIRATION RATE: 16 BRPM | TEMPERATURE: 98.3 F

## 2019-09-04 DIAGNOSIS — R10.32 ABDOMINAL PAIN, LEFT LOWER QUADRANT: Primary | ICD-10-CM

## 2019-09-04 DIAGNOSIS — R10.32 ABDOMINAL PAIN, LEFT LOWER QUADRANT: ICD-10-CM

## 2019-09-04 LAB
ALBUMIN SERPL-MCNC: 3.8 G/DL (ref 3.5–4.8)
ALBUMIN/GLOB SERPL: 1.2 G/DL (ref 1–1.7)
ALP SERPL-CCNC: 70 U/L (ref 32–91)
ALT SERPL W P-5'-P-CCNC: 19 U/L (ref 14–54)
ANION GAP SERPL CALCULATED.3IONS-SCNC: 16 MMOL/L (ref 5–15)
AST SERPL-CCNC: 23 U/L (ref 15–41)
BASOPHILS # BLD AUTO: 0 10*3/MM3 (ref 0–0.2)
BASOPHILS NFR BLD AUTO: 0.5 % (ref 0–1.5)
BILIRUB SERPL-MCNC: 1.8 MG/DL (ref 0.3–1.2)
BUN BLD-MCNC: 7 MG/DL (ref 8–20)
BUN/CREAT SERPL: 7.8 (ref 5.4–26.2)
CALCIUM SPEC-SCNC: 9.2 MG/DL (ref 8.9–10.3)
CHLORIDE SERPL-SCNC: 104 MMOL/L (ref 101–111)
CO2 SERPL-SCNC: 25 MMOL/L (ref 22–32)
CREAT BLD-MCNC: 0.9 MG/DL (ref 0.4–1)
CRP SERPL-MCNC: 0.68 MG/DL (ref 0–0.7)
D-LACTATE SERPL-SCNC: 0.9 MMOL/L (ref 0.5–2.2)
DEPRECATED RDW RBC AUTO: 45.5 FL (ref 37–54)
EOSINOPHIL # BLD AUTO: 0.2 10*3/MM3 (ref 0–0.4)
EOSINOPHIL NFR BLD AUTO: 2.5 % (ref 0.3–6.2)
ERYTHROCYTE [DISTWIDTH] IN BLOOD BY AUTOMATED COUNT: 13.8 % (ref 12.3–15.4)
ERYTHROCYTE [SEDIMENTATION RATE] IN BLOOD: 12 MM/HR (ref 0–30)
GFR SERPL CREATININE-BSD FRML MDRD: 60 ML/MIN/1.73
GLOBULIN UR ELPH-MCNC: 3.1 GM/DL (ref 2.5–3.8)
GLUCOSE BLD-MCNC: 98 MG/DL (ref 65–99)
HCT VFR BLD AUTO: 43.4 % (ref 34–46.6)
HGB BLD-MCNC: 14.5 G/DL (ref 12–15.9)
LYMPHOCYTES # BLD AUTO: 2.2 10*3/MM3 (ref 0.7–3.1)
LYMPHOCYTES NFR BLD AUTO: 29.8 % (ref 19.6–45.3)
MCH RBC QN AUTO: 31.7 PG (ref 26.6–33)
MCHC RBC AUTO-ENTMCNC: 33.5 G/DL (ref 31.5–35.7)
MCV RBC AUTO: 94.8 FL (ref 79–97)
MONOCYTES # BLD AUTO: 0.8 10*3/MM3 (ref 0.1–0.9)
MONOCYTES NFR BLD AUTO: 10.6 % (ref 5–12)
NEUTROPHILS # BLD AUTO: 4.2 10*3/MM3 (ref 1.7–7)
NEUTROPHILS NFR BLD AUTO: 56.6 % (ref 42.7–76)
NRBC BLD AUTO-RTO: 0 /100 WBC (ref 0–0.2)
PLATELET # BLD AUTO: 294 10*3/MM3 (ref 140–450)
PMV BLD AUTO: 7.8 FL (ref 6–12)
POTASSIUM BLD-SCNC: 4 MMOL/L (ref 3.6–5.1)
PROT SERPL-MCNC: 6.9 G/DL (ref 6.1–7.9)
RBC # BLD AUTO: 4.58 10*6/MM3 (ref 3.77–5.28)
SODIUM BLD-SCNC: 141 MMOL/L (ref 136–144)
WBC NRBC COR # BLD: 7.4 10*3/MM3 (ref 3.4–10.8)

## 2019-09-04 PROCEDURE — 74177 CT ABD & PELVIS W/CONTRAST: CPT

## 2019-09-04 PROCEDURE — 85652 RBC SED RATE AUTOMATED: CPT

## 2019-09-04 PROCEDURE — 86140 C-REACTIVE PROTEIN: CPT

## 2019-09-04 PROCEDURE — 83605 ASSAY OF LACTIC ACID: CPT

## 2019-09-04 PROCEDURE — 99213 OFFICE O/P EST LOW 20 MIN: CPT | Performed by: INTERNAL MEDICINE

## 2019-09-04 PROCEDURE — 80053 COMPREHEN METABOLIC PANEL: CPT

## 2019-09-04 PROCEDURE — 85025 COMPLETE CBC W/AUTO DIFF WBC: CPT

## 2019-09-04 PROCEDURE — 36415 COLL VENOUS BLD VENIPUNCTURE: CPT

## 2019-09-04 PROCEDURE — 0 IOPAMIDOL PER 1 ML: Performed by: INTERNAL MEDICINE

## 2019-09-04 RX ORDER — METRONIDAZOLE 500 MG/1
500 TABLET ORAL 3 TIMES DAILY
Qty: 30 TABLET | Refills: 0 | Status: SHIPPED | OUTPATIENT
Start: 2019-09-04 | End: 2020-01-21

## 2019-09-04 RX ORDER — CIPROFLOXACIN 500 MG/1
500 TABLET, FILM COATED ORAL 2 TIMES DAILY
Qty: 20 TABLET | Refills: 0 | Status: SHIPPED | OUTPATIENT
Start: 2019-09-04 | End: 2020-01-21

## 2019-09-04 RX ADMIN — IOPAMIDOL 100 ML: 755 INJECTION, SOLUTION INTRAVENOUS at 17:00

## 2019-09-04 NOTE — TELEPHONE ENCOUNTER
MARTY CALLED WITH STAT CT RESULTS. I PAGED DR. POE TO GIVE RESULTS AND, AS OF THIS WRITING, SHE HAS NOT CALLED. I S/W TIM, DR. POE'S MEDICAL ASSISTANT, AND GAVE HER RESULTS AND SHE AUTHORIZED PATIENT TO BE RELEASED HOME. I CALLED [phone] AND COULD NOT GET A LIVE PERSON BUT LEFT MESSAGE THAT PATIENT COULD BE RELEASED HOME.    CT RESULTS WERE:    1) CHOLELITHIASIS    2) UTERUS SURGICALLY ABSENT. APPENDIX NOT VISUALIZED, PRESUMED TO BE SURGICALLY ABSENT.    3)  FATTY INFILTRATION OF LIVER    4) A FEW GAS BUBBLES ARE SEEN IN URINARY BLADDER PRESUMABLY SECONDARY TO CATHETERIZATION PROCEDURE    5) NO ACUTE FINDINGS

## 2019-09-05 ENCOUNTER — TELEPHONE (OUTPATIENT)
Dept: FAMILY MEDICINE CLINIC | Facility: CLINIC | Age: 79
End: 2019-09-05

## 2019-09-05 NOTE — TELEPHONE ENCOUNTER
Rickie manzanares, do you remember what number you used to try to reach me?  I didn't receive a text message about this until Gina sent me one

## 2019-09-11 NOTE — PROGRESS NOTES
Subjective   Maria D Fountain is a 79 y.o. female.     Pt c/o left hip and lower left abd pain for several days  Ignored it, thinking it would go away  But has only gotten worse  Ibuprofen and tylenol help make pain tolerable  Pain is worse with walking  No fever  A little nausea, no vomiting  No diarrhea but has been having more frequent BMs than usual  Has a trip coming up, and so worried about going on it  Non refundable  And is sure to have a lot of walking         The following portions of the patient's history were reviewed and updated as appropriate: allergies, current medications, past family history, past medical history, past social history, past surgical history and problem list.    Review of Systems   Constitutional: Negative for fatigue and fever.   HENT: Negative for congestion, ear pain, rhinorrhea and sore throat.    Eyes: Negative for blurred vision and itching.   Respiratory: Negative for cough and shortness of breath.    Cardiovascular: Negative for chest pain and palpitations.   Gastrointestinal: Positive for abdominal pain and nausea. Negative for diarrhea and vomiting.   Endocrine: Negative for polydipsia and polyuria.   Genitourinary: Negative for dysuria, frequency, hematuria and urgency.   Musculoskeletal: Negative for joint swelling and myalgias.   Skin: Negative for rash and skin lesions.   Neurological: Negative for dizziness, numbness and headache.   Psychiatric/Behavioral: Negative for depressed mood. The patient is not nervous/anxious.          Current Outpatient Medications:   •  Acetaminophen 325 MG capsule, TYLENOL CAPS, Disp: , Rfl:   •  amLODIPine (NORVASC) 5 MG tablet, Take 1 tablet by mouth Daily., Disp: 90 tablet, Rfl: 3  •  ascorbic acid (VITAMIN C) 1000 MG tablet, VITAMIN C 1000 MG TABS, Disp: , Rfl:   •  aspirin (ASPIR-LOW) 81 MG EC tablet, Daily., Disp: , Rfl:   •  atorvastatin (LIPITOR) 20 MG tablet, ATORVASTATIN CALCIUM 20 MG TABS, Disp: , Rfl:   •  Calcium  "Carb-Cholecalciferol 1000-800 MG-UNIT tablet, CALCIUM 600-D TABS, Disp: , Rfl:   •  carvedilol (COREG) 12.5 MG tablet, TAKE ONE TABLET BY MOUTH TWICE A DAY, Disp: 180 tablet, Rfl: 2  •  carvedilol (COREG) 6.25 MG tablet, Every 12 (Twelve) Hours., Disp: , Rfl:   •  HM OMEGA-3-6-9 FATTY ACIDS capsule, OMEGA-3 PLUS CAPS, Disp: , Rfl:   •  isosorbide mononitrate (IMDUR) 30 MG 24 hr tablet, ISOSORBIDE MONONITRATE ER 30 MG XR24H-TAB, Disp: , Rfl:   •  lisinopril (PRINIVIL,ZESTRIL) 20 MG tablet, Every 12 (Twelve) Hours., Disp: , Rfl:   •  MISC NATURAL PRODUCTS ER PO, OSTEO BI-FLEX ADV DOUBLE ST ORAL CAPSULE, Disp: , Rfl:   •  multivitamin-minerals (CENTRUM) tablet, CENTRUM TABS, Disp: , Rfl:   •  ciprofloxacin (CIPRO) 500 MG tablet, Take 1 tablet by mouth 2 (Two) Times a Day., Disp: 20 tablet, Rfl: 0  •  metroNIDAZOLE (FLAGYL) 500 MG tablet, Take 1 tablet by mouth 3 (Three) Times a Day., Disp: 30 tablet, Rfl: 0    Objective   /64 (BP Location: Left arm, Patient Position: Sitting, Cuff Size: Adult)   Pulse 76   Temp 98.3 °F (36.8 °C) (Oral)   Resp 16   Ht 160 cm (63\")   Wt 66.2 kg (146 lb)   BMI 25.86 kg/m²   Physical Exam   Constitutional: She is oriented to person, place, and time. She appears well-developed and well-nourished. No distress.   HENT:   Head: Normocephalic and atraumatic.   Right Ear: External ear normal.   Left Ear: External ear normal.   Mouth/Throat: Oropharynx is clear and moist. No oropharyngeal exudate.   Eyes: Conjunctivae and EOM are normal. Right eye exhibits no discharge. Left eye exhibits no discharge. No scleral icterus.   Neck: Normal range of motion. Neck supple. No thyromegaly present.   Cardiovascular: Normal rate, regular rhythm and normal heart sounds. Exam reveals no gallop and no friction rub.   No murmur heard.  Pulmonary/Chest: Effort normal and breath sounds normal. No respiratory distress. She has no wheezes. She has no rales.   Abdominal: Soft. Bowel sounds are normal. " She exhibits no distension. There is tenderness. There is no guarding.   LLQ pain   Musculoskeletal: Normal range of motion. She exhibits no edema or deformity.   Lymphadenopathy:     She has no cervical adenopathy.   Neurological: She is alert and oriented to person, place, and time. No cranial nerve deficit.   Skin: Skin is warm and dry. No rash noted. She is not diaphoretic. No erythema.   Psychiatric: She has a normal mood and affect. Her behavior is normal. Thought content normal.   Vitals reviewed.        Assessment/Plan   Problems Addressed this Visit     None      Visit Diagnoses     Abdominal pain, left lower quadrant    -  Primary    Relevant Orders    CT Abdomen Pelvis With Contrast (Completed)    CBC w AUTO Differential (Completed)    Comprehensive metabolic panel (Completed)    Sedimentation rate, automated (Completed)    C-reactive protein (Completed)    Lactic Acid, Plasma (Completed)        Based on location of pain, suspect she has diverticulitis or colitis  Will check ct abd/pelvis  Will start pt on abx  Will check labs today         Procedures

## 2019-12-23 RX ORDER — LISINOPRIL 20 MG/1
TABLET ORAL
Qty: 180 TABLET | Refills: 1 | Status: SHIPPED | OUTPATIENT
Start: 2019-12-23 | End: 2020-06-29

## 2019-12-30 RX ORDER — ATORVASTATIN CALCIUM 20 MG/1
TABLET, FILM COATED ORAL
Qty: 90 TABLET | Refills: 1 | Status: SHIPPED | OUTPATIENT
Start: 2019-12-30 | End: 2020-07-06

## 2020-01-14 DIAGNOSIS — E55.9 VITAMIN D DEFICIENCY: ICD-10-CM

## 2020-01-14 DIAGNOSIS — E78.5 DYSLIPIDEMIA: Primary | ICD-10-CM

## 2020-01-14 DIAGNOSIS — E11.9 TYPE 2 DIABETES MELLITUS WITHOUT COMPLICATION, UNSPECIFIED WHETHER LONG TERM INSULIN USE (HCC): ICD-10-CM

## 2020-01-14 DIAGNOSIS — I10 HYPERTENSION, UNSPECIFIED TYPE: ICD-10-CM

## 2020-01-14 LAB
25(OH)D3 SERPL-MCNC: 41 NG/ML (ref 30–100)
ALBUMIN SERPL-MCNC: 3.9 G/DL (ref 3.5–5.2)
ALBUMIN/GLOB SERPL: 1.4 G/DL
ALP SERPL-CCNC: 76 U/L (ref 39–117)
ALT SERPL W P-5'-P-CCNC: 15 U/L (ref 1–33)
ANION GAP SERPL CALCULATED.3IONS-SCNC: 8.8 MMOL/L (ref 5–15)
AST SERPL-CCNC: 21 U/L (ref 1–32)
BASOPHILS # BLD AUTO: 0.06 10*3/MM3 (ref 0–0.2)
BASOPHILS NFR BLD AUTO: 0.9 % (ref 0–1.5)
BILIRUB SERPL-MCNC: 1.4 MG/DL (ref 0.2–1.2)
BUN BLD-MCNC: 11 MG/DL (ref 8–23)
BUN/CREAT SERPL: 11.6 (ref 7–25)
CALCIUM SPEC-SCNC: 9.4 MG/DL (ref 8.6–10.5)
CHLORIDE SERPL-SCNC: 105 MMOL/L (ref 98–107)
CHOLEST SERPL-MCNC: 113 MG/DL (ref 0–200)
CO2 SERPL-SCNC: 29.2 MMOL/L (ref 22–29)
CREAT BLD-MCNC: 0.95 MG/DL (ref 0.57–1)
DEPRECATED RDW RBC AUTO: 44.9 FL (ref 37–54)
EOSINOPHIL # BLD AUTO: 0.5 10*3/MM3 (ref 0–0.4)
EOSINOPHIL NFR BLD AUTO: 7.1 % (ref 0.3–6.2)
ERYTHROCYTE [DISTWIDTH] IN BLOOD BY AUTOMATED COUNT: 13.2 % (ref 12.3–15.4)
GFR SERPL CREATININE-BSD FRML MDRD: 57 ML/MIN/1.73
GLOBULIN UR ELPH-MCNC: 2.8 GM/DL
GLUCOSE BLD-MCNC: 102 MG/DL (ref 65–99)
HBA1C MFR BLD: 5.6 % (ref 3.5–5.6)
HCT VFR BLD AUTO: 43.7 % (ref 34–46.6)
HDLC SERPL-MCNC: 38 MG/DL (ref 40–60)
HGB BLD-MCNC: 15 G/DL (ref 12–15.9)
IMM GRANULOCYTES # BLD AUTO: 0.01 10*3/MM3 (ref 0–0.05)
IMM GRANULOCYTES NFR BLD AUTO: 0.1 % (ref 0–0.5)
LDLC SERPL CALC-MCNC: 47 MG/DL (ref 0–100)
LDLC/HDLC SERPL: 1.24 {RATIO}
LYMPHOCYTES # BLD AUTO: 2.18 10*3/MM3 (ref 0.7–3.1)
LYMPHOCYTES NFR BLD AUTO: 31.1 % (ref 19.6–45.3)
MCH RBC QN AUTO: 31.3 PG (ref 26.6–33)
MCHC RBC AUTO-ENTMCNC: 34.3 G/DL (ref 31.5–35.7)
MCV RBC AUTO: 91 FL (ref 79–97)
MONOCYTES # BLD AUTO: 0.78 10*3/MM3 (ref 0.1–0.9)
MONOCYTES NFR BLD AUTO: 11.1 % (ref 5–12)
NEUTROPHILS # BLD AUTO: 3.49 10*3/MM3 (ref 1.7–7)
NEUTROPHILS NFR BLD AUTO: 49.7 % (ref 42.7–76)
NRBC BLD AUTO-RTO: 0 /100 WBC (ref 0–0.2)
PLATELET # BLD AUTO: 277 10*3/MM3 (ref 140–450)
PMV BLD AUTO: 10.6 FL (ref 6–12)
POTASSIUM BLD-SCNC: 4.5 MMOL/L (ref 3.5–5.2)
PROT SERPL-MCNC: 6.7 G/DL (ref 6–8.5)
RBC # BLD AUTO: 4.8 10*6/MM3 (ref 3.77–5.28)
SODIUM BLD-SCNC: 143 MMOL/L (ref 136–145)
TRIGL SERPL-MCNC: 139 MG/DL (ref 0–150)
TSH SERPL DL<=0.05 MIU/L-ACNC: 2.88 UIU/ML (ref 0.27–4.2)
VLDLC SERPL-MCNC: 27.8 MG/DL (ref 5–40)
WBC NRBC COR # BLD: 7.02 10*3/MM3 (ref 3.4–10.8)

## 2020-01-14 PROCEDURE — 80061 LIPID PANEL: CPT | Performed by: INTERNAL MEDICINE

## 2020-01-14 PROCEDURE — 82306 VITAMIN D 25 HYDROXY: CPT | Performed by: INTERNAL MEDICINE

## 2020-01-14 PROCEDURE — 83036 HEMOGLOBIN GLYCOSYLATED A1C: CPT | Performed by: INTERNAL MEDICINE

## 2020-01-14 PROCEDURE — 84443 ASSAY THYROID STIM HORMONE: CPT | Performed by: INTERNAL MEDICINE

## 2020-01-14 PROCEDURE — 36415 COLL VENOUS BLD VENIPUNCTURE: CPT

## 2020-01-14 PROCEDURE — 85025 COMPLETE CBC W/AUTO DIFF WBC: CPT | Performed by: INTERNAL MEDICINE

## 2020-01-14 PROCEDURE — 80053 COMPREHEN METABOLIC PANEL: CPT | Performed by: INTERNAL MEDICINE

## 2020-01-21 ENCOUNTER — OFFICE VISIT (OUTPATIENT)
Dept: FAMILY MEDICINE CLINIC | Facility: CLINIC | Age: 80
End: 2020-01-21

## 2020-01-21 VITALS
DIASTOLIC BLOOD PRESSURE: 64 MMHG | WEIGHT: 147.8 LBS | HEART RATE: 75 BPM | RESPIRATION RATE: 16 BRPM | SYSTOLIC BLOOD PRESSURE: 110 MMHG | BODY MASS INDEX: 26.19 KG/M2 | TEMPERATURE: 97.6 F | OXYGEN SATURATION: 95 % | HEIGHT: 63 IN

## 2020-01-21 DIAGNOSIS — M25.552 LEFT HIP PAIN: ICD-10-CM

## 2020-01-21 DIAGNOSIS — Z00.00 PREVENTATIVE HEALTH CARE: Primary | ICD-10-CM

## 2020-01-21 DIAGNOSIS — R10.32 LEFT INGUINAL PAIN: ICD-10-CM

## 2020-01-21 PROCEDURE — G0439 PPPS, SUBSEQ VISIT: HCPCS | Performed by: INTERNAL MEDICINE

## 2020-01-22 DIAGNOSIS — R10.32 LEFT INGUINAL PAIN: ICD-10-CM

## 2020-01-22 DIAGNOSIS — M25.552 LEFT HIP PAIN: ICD-10-CM

## 2020-02-03 ENCOUNTER — TREATMENT (OUTPATIENT)
Dept: PHYSICAL THERAPY | Facility: CLINIC | Age: 80
End: 2020-02-03

## 2020-02-03 DIAGNOSIS — M25.552 LEFT HIP PAIN: ICD-10-CM

## 2020-02-03 DIAGNOSIS — R10.32 LEFT INGUINAL PAIN: Primary | ICD-10-CM

## 2020-02-03 PROCEDURE — 97162 PT EVAL MOD COMPLEX 30 MIN: CPT | Performed by: PHYSICAL THERAPIST

## 2020-02-03 PROCEDURE — 97110 THERAPEUTIC EXERCISES: CPT | Performed by: PHYSICAL THERAPIST

## 2020-02-03 NOTE — PROGRESS NOTES
Physical Therapy Initial Evaluation and Plan of Care    Patient: Maria D Fountain   : 1940  Diagnosis/ICD-10 Code:  Left inguinal pain [R10.32]  Referring practitioner: Neena Garza MD  Date of Initial Visit: 2/3/2020  Today's Date: 2020  Patient seen for 1 sessions           Subjective Questionnaire: oxford hip     Subjective Evaluation    History of Present Illness  Mechanism of injury: Patient complains of pain at multiple joints back, hips, L knee. MD sent her with orders for L hip pain. Pain in groin, also lateral hip and ITB. Insidious issue over many years, intermittent. Had aquatic PT previously, continued at [facility] for a while, then insurance changed. She also stopped doing her HEP exercises.  She reports worst with initial standing after prolonged sitting. Worse in AM and in evening by end of the day after activities. She is independent with driving and ADLs and shopping with leaning on cart. She walks about 1/4 mile in neighborhood a few times a week; tired but tolerates well. She has difficulty with don/dof shoes on L side. Some household chores bother her, prolonged standing such as dishes. Prolonged sitting, especially in recliner bothers her. She uses lumbar support in car. No issues sleeping. Heat helps, rest helps, warm bath. Wears brace. Denies N/T. Back gets stiff with activity and cold weather. She reports her L knee is worse recently; she has bakers cyst (took steroid pack last summer helped knee and hip, has since returned) and considering following up with MD. Also had difficulty kneeling on L knee at [facility]. Pt reports being fearful she has arthritis. Lives alone, no stairs. She would like to start volunteer work.     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  Standing: L knee mildly flexed mild thoracic kyphosis, mild posterior lean    Ambulation: independent without AD. Genu varum L>R, lack of hip ext terminal stance, limited hip ext and push off, no significant antalgia.  Bed mobility: independent supine/sit. Rolling/scooting in bed with difficulty, independent weak bridge. Able to tolerate supine and sidelying. Pt is back sleeper.    Palpation   Left   Tenderness of the iliopsoas, piriformis and TFL.     Additional Palpation Details  L supralateral knee and posterior knee, bakers cyst present bilat. Mild at patella L      Tenderness     Left Hip   Tenderness in the greater trochanter.     Lumbar Screen  Lumbar range of motion within normal limits with the following exceptions:Stiff but no reproduction of LE symptoms.    Neurological Testing     Sensation     Hip   Left Hip   Intact: light touch    Right Hip   Intact: light touch    Active Range of Motion     Additional Active Range of Motion Details  Functional hip ER sitting: L ER is tight and with discomfort, independent bilat, painfree R.    Passive Range of Motion   Left Hip   Flexion: 105 degrees with pain  External rotation (90/90): 25 degrees with pain  Internal rotation (90/90): 15 degrees     Right Hip   Flexion: 105 degrees with pain  External rotation (90/90): 20 degrees with pain  Internal rotation  (90/90): 25 degrees     Additional Passive Range of Motion Details  L knee AROM: Knee 135 flex painful, lacking 2 ext pulling. Mild crepitus with LAQ, no significant patellar alignment issue. Gastroc with mild restriction, soleus not tested.    Hip PROM: pain groin and lateral hip. Hip ext lacking from neutral bilat.         Joint Play     Additional Joint Play Details  Not tested this date.    Strength/Myotome Testing     Left Hip   Planes of Motion   Flexion: 4  Left hip extension strength: weak bridge.  Abduction: 4- (sidelying and sitting, discomfort lateral hip)  Adduction: 4- (discomfort lateral hip)  External rotation: 4-  Internal rotation: 4-    Right Hip   Planes of Motion   Flexion: 4  Right hip extension strength: weak bridge.  Abduction: 4 (sidelying and sitting)  Adduction: 4-  External rotation: 4-  Internal rotation: 4-    Additional Strength Details  LE strength sitting: knee flex L 4-/5, R 4/5, knee ext 4/5 bilat. Ankles strong painfree all directions.            Tests     Left Hip   Positive MARIANA, Mundo and scour.   SLR: Negative.     Additional Tests Details  Distraction helps decrease L hip pain, + quadrant L hip.         Assessment & Plan     Assessment  Impairments: abnormal gait, abnormal or restricted ROM, activity intolerance, impaired physical strength, lacks appropriate home exercise program and pain with function  Assessment details: Pt with L hip ROM, strength, joint mobility, flexibility restrictions and pain that interfere with her daily function and positional tolerance. She would benefit from skilled PT combination of land and aquatic based treatments to address impairments and maximize function.   Prognosis: good  Functional Limitations: lifting, walking, pushing, uncomfortable because of pain, moving in bed, standing and stooping  Goals  Plan Goals: ST. Pt independent with HEP in 3 visits.  2. Pt to demonstrate L hip PROM extension 5 degrees in 3 weeks  3. Pt to demonstrate  improve LE flexibility, especially L LE, in 3 weeks  4. Pt with increased hip abd and ext strength in 3 weeks  5. Pt to report 25% less hip discomfort with initial standing after sitting 30 mins.    LT. Improved oxford hip score for increased functional tolerance by D/C  2. Pt to demonstrate increase L hip AROM for don/dof shoes with less pain by D/C  3. Pt to report decreased L hip pain 50% with ADLs.  4. Pt to voice readiness for D/C to independent HEP and self management of symptoms.    Plan  Therapy options: will be seen for skilled physical therapy services  Planned modality interventions: ultrasound, thermotherapy (hydrocollator packs), cryotherapy and electrical stimulation/Russian stimulation  Planned therapy interventions: abdominal trunk stabilization, balance/weight-bearing training, body mechanics training, flexibility, functional ROM exercises, gait training, home exercise program, joint mobilization, manual therapy, postural training, soft tissue mobilization, strengthening and stretching  Other planned therapy interventions: aquatic therapy as needed  Frequency: 2x week  Duration in visits: 8  Treatment plan discussed with: patient          History # of Personal Factors and/or Comorbidities: MODERATE (1-2)  Examination of Body System(s): # of elements: MODERATE (3)  Clinical Presentation: EVOLVING  Clinical Decision Making: MODERATE    Timed:         Manual Therapy:         mins  32495;     Therapeutic Exercise:    12     mins  36339;     Neuromuscular Helen:        mins  08676;    Therapeutic Activity:          mins  70746;     Gait Training:           mins  54751;     Ultrasound:          mins  45985;    Ionto                                   mins   76760  Self Care                            mins   91799  Aquatic                               mins 04017      Un-Timed:  Electrical Stimulation:         mins  29011 ( );  Dry Needling          mins self-pay  Traction          mins  07096  Low Eval          Mins  09763  Mod Eval     28     Mins  60498  High Eval                            Mins  99072  Re-Eval                               mins  96913        Timed Treatment:  12    mins   Total Treatment:     40   mins    PT SIGNATURE: Shweta Hagan, PT   DATE TREATMENT INITIATED: 2/5/2020    Medicare Initial Certification  Certification Period: 5/5/2020  I certify that the therapy services are furnished while this patient is under my care.  The services outlined above are required by this patient, and will be reviewed every 90 days.     PHYSICIAN: Neena Garza MD      DATE:     Please sign and return via fax to  .. Thank you, [facility].

## 2020-02-10 ENCOUNTER — TREATMENT (OUTPATIENT)
Dept: PHYSICAL THERAPY | Facility: CLINIC | Age: 80
End: 2020-02-10

## 2020-02-10 DIAGNOSIS — M25.552 LEFT HIP PAIN: ICD-10-CM

## 2020-02-10 DIAGNOSIS — R10.32 LEFT INGUINAL PAIN: Primary | ICD-10-CM

## 2020-02-10 PROCEDURE — 97140 MANUAL THERAPY 1/> REGIONS: CPT | Performed by: PHYSICAL THERAPIST

## 2020-02-10 PROCEDURE — 97110 THERAPEUTIC EXERCISES: CPT | Performed by: PHYSICAL THERAPIST

## 2020-02-10 NOTE — PROGRESS NOTES
Physical Therapy Daily Progress Note    VISIT#: 2/8    Subjective   Maria D Fountain reports: HEP makes sore the next day (mostly calves), but doing more and stretching more intensely than she should. Pillow between knees helps when in bed.     Objective     See Exercise, Manual, and Modality Logs for complete treatment.     Patient Education: reviewed HEP.    Assessment/Plan Pt with less antalgia today. Pt reports hip looser post treatment.      Progress per Plan of Care Monitor and progress as tolerated.            Timed:         Manual Therapy:   10      mins  48140;     Therapeutic Exercise:     20    mins  81895;     Neuromuscular Helen:        mins  83914;    Therapeutic Activity:          mins  63573;     Gait Training:           mins  79558;     Ultrasound:          mins  59798;    Ionto                                   mins   67267  Self Care                            mins   50362  Canalith Repos                   mins  4209  Aquatic                               mins 52743    Un-Timed:  Electrical Stimulation:         mins  30011 ( );  Dry Needling          mins self-pay  Traction          mins 78986  Low Eval          Mins  82346  Mod Eval          Mins  52134  High Eval                            Mins  93921  Re-Eval                               mins  35057    Timed Treatment:   30   mins   Total Treatment:     30   mins    Shweta Hagan, PT

## 2020-02-12 ENCOUNTER — TREATMENT (OUTPATIENT)
Dept: PHYSICAL THERAPY | Facility: CLINIC | Age: 80
End: 2020-02-12

## 2020-02-12 DIAGNOSIS — R10.32 LEFT INGUINAL PAIN: Primary | ICD-10-CM

## 2020-02-12 DIAGNOSIS — M25.552 LEFT HIP PAIN: ICD-10-CM

## 2020-02-12 PROCEDURE — 97140 MANUAL THERAPY 1/> REGIONS: CPT | Performed by: PHYSICAL THERAPIST

## 2020-02-12 PROCEDURE — 97110 THERAPEUTIC EXERCISES: CPT | Performed by: PHYSICAL THERAPIST

## 2020-02-12 NOTE — PROGRESS NOTES
Physical Therapy Daily Progress Note    VISIT#: 3    Subjective   Maria D Fountain reports: last visit helped, still sore first 30 mins in AM, cold/rainy weather today bothering her some.    Objective     See Exercise, Manual, and Modality Logs for complete treatment.     Patient Education: progressed HEP    Assessment/Plan Pt continues to have pain relief with long axis distraction. Able to tolerate mild progression of strengthening, mild hip discomfort with 1st rep of bridge but resolved, will monitor. Pt reports looser post treatment      Progress per Plan of Care Consider belt hip mobs. Monitor and review HEP as needed            Timed:         Manual Therapy:    15     mins  65636;     Therapeutic Exercise:    15     mins  30655;     Neuromuscular Helen:        mins  17182;    Therapeutic Activity:          mins  61764;     Gait Training:           mins  16977;     Ultrasound:          mins  95048;    Ionto                                   mins   74957  Self Care                            mins   69793  Canalith Repos                   mins  4209  Aquatic                               mins 68113    Un-Timed:  Electrical Stimulation:         mins  91818 ( );  Dry Needling          mins self-pay  Traction          mins 63384  Low Eval          Mins  80720  Mod Eval          Mins  76943  High Eval                            Mins  33556  Re-Eval                               mins  10806    Timed Treatment:   30   mins   Total Treatment:     30   mins    Shweta Hagan, PT

## 2020-02-17 ENCOUNTER — TREATMENT (OUTPATIENT)
Dept: PHYSICAL THERAPY | Facility: CLINIC | Age: 80
End: 2020-02-17

## 2020-02-17 DIAGNOSIS — M25.552 LEFT HIP PAIN: ICD-10-CM

## 2020-02-17 DIAGNOSIS — R10.32 LEFT INGUINAL PAIN: Primary | ICD-10-CM

## 2020-02-17 PROCEDURE — 97140 MANUAL THERAPY 1/> REGIONS: CPT | Performed by: PHYSICAL THERAPIST

## 2020-02-17 PROCEDURE — 97110 THERAPEUTIC EXERCISES: CPT | Performed by: PHYSICAL THERAPIST

## 2020-02-17 NOTE — PROGRESS NOTES
Physical Therapy Daily Progress Note    VISIT#: 4    Subjective   Maria D Fountain reports: therapy helping. Walked 3 laps around neighborhood with friend, longer and faster than she is used to. Back and knee sore today not sure due to walking or sweeping      Objective     See Exercise, Manual, and Modality Logs for complete treatment.     Assessment/Plan Hip ROM continues to slowly improve, good tolerance to exercise and manual.       Progress per Plan of Care pt would benefit from body mechanics training.             Timed:         Manual Therapy:    15     mins  48662;     Therapeutic Exercise:    15     mins  25983;     Neuromuscular Helen:        mins  14679;    Therapeutic Activity:          mins  36925;     Gait Training:           mins  43933;     Ultrasound:          mins  32820;    Ionto                                   mins   21552  Self Care                            mins   59977  Canalith Repos                   mins  4209  Aquatic                               mins 85877    Un-Timed:  Electrical Stimulation:         mins  10977 ( );  Dry Needling          mins self-pay  Traction          mins 11826  Low Eval          Mins  04204  Mod Eval          Mins  45167  High Eval                            Mins  26579  Re-Eval                               mins  95761    Timed Treatment:   30   mins   Total Treatment:     30   mins    Shweta Hagan, PT

## 2020-02-19 ENCOUNTER — TREATMENT (OUTPATIENT)
Dept: PHYSICAL THERAPY | Facility: CLINIC | Age: 80
End: 2020-02-19

## 2020-02-19 DIAGNOSIS — M25.552 LEFT HIP PAIN: ICD-10-CM

## 2020-02-19 DIAGNOSIS — R10.32 LEFT INGUINAL PAIN: Primary | ICD-10-CM

## 2020-02-19 PROCEDURE — 97110 THERAPEUTIC EXERCISES: CPT | Performed by: PHYSICAL THERAPIST

## 2020-02-19 PROCEDURE — 97140 MANUAL THERAPY 1/> REGIONS: CPT | Performed by: PHYSICAL THERAPIST

## 2020-02-19 NOTE — PROGRESS NOTES
Physical Therapy Daily Progress Note    VISIT#: 5    Subjective   Maria D Foutnain reports: feeling pretty good today. Less hip pain when she gets up now.      Objective     See Exercise, Manual, and Modality Logs for complete treatment.     Assessment/Plan Good tolerance to exercise with fatigue but all painfree. Continues to benefit from manual therapy. No groin pain.      Progress per Plan of Care Resume nustep next visit.            Timed:         Manual Therapy:    15     mins  35662;     Therapeutic Exercise:    17     mins  82384;     Neuromuscular Helen:        mins  16068;    Therapeutic Activity:          mins  76141;     Gait Training:           mins  69773;     Ultrasound:          mins  94921;    Ionto                                   mins   39679  Self Care                            mins   44939  Canalith Repos                   mins  4209  Aquatic                               mins 47377    Un-Timed:  Electrical Stimulation:         mins  09761 ( );  Dry Needling          mins self-pay  Traction          mins 36598  Low Eval          Mins  44637  Mod Eval          Mins  28392  High Eval                            Mins  71480  Re-Eval                               mins  03475    Timed Treatment:   32   mins   Total Treatment:     32   mins    Shweta Hagan, PT

## 2020-02-24 ENCOUNTER — TREATMENT (OUTPATIENT)
Dept: PHYSICAL THERAPY | Facility: CLINIC | Age: 80
End: 2020-02-24

## 2020-02-24 DIAGNOSIS — M25.552 LEFT HIP PAIN: ICD-10-CM

## 2020-02-24 DIAGNOSIS — R10.32 LEFT INGUINAL PAIN: Primary | ICD-10-CM

## 2020-02-24 PROCEDURE — 97140 MANUAL THERAPY 1/> REGIONS: CPT | Performed by: PHYSICAL THERAPIST

## 2020-02-24 PROCEDURE — 97110 THERAPEUTIC EXERCISES: CPT | Performed by: PHYSICAL THERAPIST

## 2020-02-24 NOTE — PROGRESS NOTES
Physical Therapy Daily Progress Note    VISIT#: 6    Subjective   Maria D Fountain reports: didn't do exercises much this weekend and went to  this AM, so pretty stiff.       Objective     See Exercise, Manual, and Modality Logs for complete treatment.     Assessment/Plan Pt reports tired but less stiffness post treatment. Able to tolerate light ER stretching without groin pain today.       Progress per Plan of Care Monitor and progress as tolerated.            Timed:         Manual Therapy:    15     mins  55731;     Therapeutic Exercise:    15     mins  84791;     Neuromuscular Helen:        mins  52365;    Therapeutic Activity:          mins  25697;     Gait Training:           mins  90956;     Ultrasound:          mins  48607;    Ionto                                   mins   39786  Self Care                            mins   18266  Canalith Repos                   mins  4209  Aquatic                               mins 05214    Un-Timed:  Electrical Stimulation:         mins  80135 ( );  Dry Needling          mins self-pay  Traction          mins 51288  Low Eval          Mins  47130  Mod Eval          Mins  13379  High Eval                            Mins  22965  Re-Eval                               mins  87342    Timed Treatment:   30   mins   Total Treatment:     30   mins    Shweta Hagan, PT

## 2020-02-26 ENCOUNTER — TREATMENT (OUTPATIENT)
Dept: PHYSICAL THERAPY | Facility: CLINIC | Age: 80
End: 2020-02-26

## 2020-02-26 DIAGNOSIS — R10.32 LEFT INGUINAL PAIN: ICD-10-CM

## 2020-02-26 DIAGNOSIS — M25.552 LEFT HIP PAIN: Primary | ICD-10-CM

## 2020-02-26 PROCEDURE — 97140 MANUAL THERAPY 1/> REGIONS: CPT | Performed by: PHYSICAL THERAPIST

## 2020-02-26 PROCEDURE — 97110 THERAPEUTIC EXERCISES: CPT | Performed by: PHYSICAL THERAPIST

## 2020-02-26 NOTE — PROGRESS NOTES
Physical Therapy Daily Progress Note    VISIT#: 7    Subjective   Maria D Fountain reports: felt a lot better after last visit. Back a little sore today due to weather. Stretches seem to help knee too.    Objective     See Exercise, Manual, and Modality Logs for complete treatment.       Assessment/Plan  Modified long axis distraction due to disomfort ankle. Good tolerance to treatment without aggravation of symptoms.     Progress per Plan of Care reassess next visit.            Timed:         Manual Therapy:    15     mins  74996;     Therapeutic Exercise:    15     mins  98054;     Neuromuscular Helen:        mins  25677;    Therapeutic Activity:          mins  89285;     Gait Training:           mins  08865;     Ultrasound:          mins  87699;    Ionto                                   mins   85334  Self Care                            mins   32755  Canalith Repos                   mins  4209  Aquatic                               mins 32117    Un-Timed:  Electrical Stimulation:         mins  08521 ( );  Dry Needling          mins self-pay  Traction          mins 28919  Low Eval          Mins  38113  Mod Eval          Mins  31467  High Eval                            Mins  13123  Re-Eval                               mins  37117    Timed Treatment:   30   mins   Total Treatment:     30   mins    Shweta Hagan, PT

## 2020-03-02 ENCOUNTER — TREATMENT (OUTPATIENT)
Dept: PHYSICAL THERAPY | Facility: CLINIC | Age: 80
End: 2020-03-02

## 2020-03-02 DIAGNOSIS — M25.552 LEFT HIP PAIN: Primary | ICD-10-CM

## 2020-03-02 DIAGNOSIS — R10.32 LEFT INGUINAL PAIN: ICD-10-CM

## 2020-03-02 PROCEDURE — 97110 THERAPEUTIC EXERCISES: CPT | Performed by: PHYSICAL THERAPIST

## 2020-03-02 NOTE — PROGRESS NOTES
Discharge      Patient: Maria D Fountain   : 1940  Diagnosis/ICD-10 Code:  Left hip pain [M25.552]  Referring practitioner: Neena Garza MD  Date of Initial Visit: Type: THERAPY  Noted: 2/3/2020  Today's Date: 3/2/2020  Patient seen for 8 sessions      Subjective:   Maria D Fountain reports: pt walked 2 laps in neighborhood yesterday. She reports less L sided groin pain overall, still some stiffness with cold rainy weather and first getting up in AM, same as back. She continues to have issues with bakers cyst and arthritis L knee. She feels therapy has helped a lot and feels that her motion in her hip is better and more limber. She will continue exercises at home and follow up with MD re: knee. Asks to be instructed in quad/hip flexor stretch, tried to do on own over the weekend, but pulled rib muscle due to twisting, better today.   Subjective Questionnaire: Oxford  Hip 34/48  Clinical Progress: improved  Home Program Compliance: Yes  Treatment has included: therapeutic exercise, manual therapy and cryotherapy    Subjective   Objective       Observations     Additional Observation Details  Sit to stand: independent without UE assist.  Standing: = WB, mild thoracic kyphosis, mild posterior lean    Ambulation: independent without AD. Genu varum L>R, improved hip ext terminal stance, no significant antalgia, equal stride length.  Bed mobility: independent supine/sit. Rolling/scooting independent independent painfree bridge. Able to tolerate supine and sidelying. Pt is back sleeper.    Palpation   Left   Tenderness of the piriformis and TFL.     Additional Palpation Details  L posterior knee, bakers cyst present. Mild at patella L      Tenderness     Left Hip   Tenderness in the greater trochanter.     Additional Tenderness Details  Pt still with tenderness L hip bursa and ITB, but reports significantly less than initial.    Lumbar Screen  Lumbar range of motion within normal limits with the following  exceptions:Stiff but no reproduction of LE symptoms.    Neurological Testing     Sensation     Hip   Left Hip   Intact: light touch    Right Hip   Intact: light touch    Active Range of Motion     Additional Active Range of Motion Details  Functional hip ER sitting: L ER is tight and with discomfort lateral knee, independent bilat, painfree R.    Passive Range of Motion   Left Hip   Flexion: 115 (pulling lateral hip ) degrees   Extension: 5 degrees   External rotation (90/90): 45 degrees   Internal rotation (90/90): 15 degrees     Right Hip   Flexion: 105 degrees with pain  External rotation (90/90): 20 degrees with pain  Internal rotation (90/90): 25 degrees     Additional Passive Range of Motion Details  L knee AROM: Knee 135 flex painful, lacking 1 ext pulling. Mild crepitus with LAQ, no significant patellar alignment issue. Gastroc with mild restriction, soleus not tested.    Hip PROM:no groin pain, mild pulling lateral hip noted. Hip ext to neutral L.        Joint Play     Additional Joint Play Details  Not tested this date.    Strength/Myotome Testing     Left Hip   Planes of Motion   Flexion: 4  Left hip extension strength: indpendent bridge.  Abduction: 4 (sidelying and sitting, painfree)  Adduction: 4  External rotation: 4  Internal rotation: 4    Right Hip   Planes of Motion   Flexion: 4  Right hip extension strength: independent  bridge.  Abduction: 4 (sidelying and sitting)  Adduction: 4  External rotation: 4  Internal rotation: 4    Additional Strength Details  LE strength sitting: knee flex L 4-/5, R 4/5, knee ext 4/5 bilat. Ankles strong painfree all directions.            Tests     Left Hip   Negative Mundo.   SLR: Negative.     Additional Tests Details  Distraction helps decrease L hip pain, MARIANA and quadrant and scour not tested this date.     Assessment & Plan     Assessment  Assessment details: Pt has improved ROM and accessory motion L hip, less groin pain. She is independent with [facility] to address  remaining flexibility, strength, and balance impairments. Recommend MD follow up re: knee, which is her main functional complaint at this time.     Goals  Plan Goals: ST. Pt independent with HEP in 3 visits. - MET  2. Pt to demonstrate L hip PROM extension 5 degrees in 3 weeks- MET  3. Pt to demonstrate improve LE flexibility, especially L LE, in 3 weeks -PARTIALLY MET  4. Pt with increased hip abd and ext strength in 3 weeks - MET  5. Pt to report 25% less hip discomfort with initial standing after sitting 30 mins. - MET    LT. Improved oxford hip score for increased functional tolerance by D/C - PARTIALLY MET  2. Pt to demonstrate increase L hip AROM for don/dof shoes with less pain by D/C - MET  3. Pt to report decreased L hip pain 50% with ADLs. - PARTIALLY MET  4. Pt to voice readiness for D/C to independent HEP and self management of symptoms. - MET      Progress toward previous goals: Partially Met      PT Signature: Shweta Hagan, PT      Based upon review of the patient's progress and continued therapy plan, it is my medical opinion that Maria D Fountain should continue physical therapy treatment at [facility]  [address] [place] [address] [phone].    Signature: __________________________________  Neena Garza MD    Timed:         Manual Therapy:    5     mins  93277;     Therapeutic Exercise:    30     mins  76681;     Neuromuscular Helen:        mins  02096;    Therapeutic Activity:          mins  45209;     Gait Training:           mins  22596;     Ultrasound:          mins  54186;    Ionto                                   mins   21197  Self Care                            mins   42650  Aquatic                               mins 55883      Un-Timed:  Electrical Stimulation:         mins  43729 ( );  Dry Needling          mins self-pay  Traction          mins 19260  Low Eval          Mins  68962  Mod Eval           Mins  48040  High Eval                            Mins  57969  Re-Eval                               mins  17977      Timed Treatment:   35   mins   Total Treatment:     35   mins

## 2020-03-23 ENCOUNTER — TELEPHONE (OUTPATIENT)
Dept: FAMILY MEDICINE CLINIC | Facility: CLINIC | Age: 80
End: 2020-03-23

## 2020-03-23 DIAGNOSIS — R07.9 CHEST PAIN, UNSPECIFIED TYPE: Primary | ICD-10-CM

## 2020-03-23 NOTE — TELEPHONE ENCOUNTER
PATIENT CALLED IN STATED SHE IS HAVING PAIN THAT STARTED ON  03/01/2020. PATIENT STATED THE PAIN IS A SHOOTING PAIN ON HER LEFT SIDE UNDER HER BREAST AND IT HURTS TO TALK . PATIENT STATED SHE WAS DOING PT AT [facility] AND THOUGHT SHE PULLED A MUSCLE AND WAS REASSURED SHE DID NOT . SHE SUSPECTS SHE MIGHT NEED AN X-RAY OF HER LUNGS. [facility] [address] [phone]. PLEASE CALL PATIENT BACK AND ADVISE   .   PATIENT CALL BACK   [phone].

## 2020-03-23 NOTE — TELEPHONE ENCOUNTER
Chest XR sent to priority per pts request. She is getting ready now and will be there in an hour-hour and a half.

## 2020-03-24 DIAGNOSIS — R07.9 CHEST PAIN, UNSPECIFIED TYPE: ICD-10-CM

## 2020-05-11 ENCOUNTER — TELEPHONE (OUTPATIENT)
Dept: FAMILY MEDICINE CLINIC | Facility: CLINIC | Age: 80
End: 2020-05-11

## 2020-05-11 PROCEDURE — U0003 INFECTIOUS AGENT DETECTION BY NUCLEIC ACID (DNA OR RNA); SEVERE ACUTE RESPIRATORY SYNDROME CORONAVIRUS 2 (SARS-COV-2) (CORONAVIRUS DISEASE [COVID-19]), AMPLIFIED PROBE TECHNIQUE, MAKING USE OF HIGH THROUGHPUT TECHNOLOGIES AS DESCRIBED BY CMS-2020-01-R: HCPCS | Performed by: NURSE PRACTITIONER

## 2020-05-11 NOTE — TELEPHONE ENCOUNTER
PATIENT CALLED STATING HAS BEEN FEELING THIS PAIN WHILE TRYING TO TAKE DEEP BREATHS, ALSO A PRODUCTIVE COUGH, NO FEVER.  PATIENT REQUESTED A SAME DAY APPT OR SOMETHING SOONER, BUT I COULD NOT FIND ANYTHING UNTIL 05/14  PLEASE ADVISE.    CELL: [phone]    HOME: [phone]

## 2020-05-11 NOTE — TELEPHONE ENCOUNTER
[place] [facility] [place] coronavirus [place].

## 2020-05-13 ENCOUNTER — TELEPHONE (OUTPATIENT)
Dept: FAMILY MEDICINE CLINIC | Facility: CLINIC | Age: 80
End: 2020-05-13

## 2020-05-13 ENCOUNTER — TELEPHONE (OUTPATIENT)
Dept: URGENT CARE | Facility: CLINIC | Age: 80
End: 2020-05-13

## 2020-05-13 NOTE — TELEPHONE ENCOUNTER
PATIENT CALLED IN AND STATED SHE WAS TESTED FOR COVID-19 AND WENT TO THE URGENT CARE AND GOT RESTED AND STILL HASEN'T GOTTEN THE RESULTS AND WAS TOLD THE RESULTS WOULD BE AVAILABLE ON Wednesday . PLEASE CALL PATIENT AND ADVISE [phone].

## 2020-05-13 NOTE — TELEPHONE ENCOUNTER
Patient called for Covid and xray report. Informed her Covid was negative and xray was also negative. Patient states still with pain in her ribs, concerned about her blood pressure and concerned with losing weight. I informed her that she would need to follow up with Dr. Garza about these complaints because they could be caused by any number of things and we don't do any testing here.  Patient was instructed to go to ER if she is concerned about rising BP and having dizziness, headache etc. Patient verbalized understanding and stated she will call Dr. Garza tomorrow morning.

## 2020-05-14 NOTE — TELEPHONE ENCOUNTER
It was negative. I believe she got a call from [facility] yesterday about the result

## 2020-05-21 ENCOUNTER — TELEPHONE (OUTPATIENT)
Dept: FAMILY MEDICINE CLINIC | Facility: CLINIC | Age: 80
End: 2020-05-21

## 2020-05-21 NOTE — TELEPHONE ENCOUNTER
PATIENT CALLED STATING THAT SHE IS HAVING A LOT OF PAIN ON HER SIDE AND BACK. SHE STATES THAT SHE WAS SEEN AT THE URGENT CARE FOR A SORE THROAT, SLIGHT FEVER, AND HEADACHE AND WAS TOLD TO F/U WITH HER PCP IN ABOUT A WEEK AND TO ALSO ASK ABOUT GETTING AN XRAY DONE. I INFORMED PATIENT OF NEXT AVAILABLE AND SHE IS REQUESTING A SOONER APPT DUE TO THE PAIN AND EXCESSIVE WEIGHT LOSS THAT SHE HAS BEEN EXPERIENCING. PATIENT CAN BE REACHED [phone].

## 2020-05-21 NOTE — TELEPHONE ENCOUNTER
Gave message to patient at 3:35pm and scheduled an telephone visit with Dr Barrett tomorrow at 2pm.

## 2020-05-21 NOTE — TELEPHONE ENCOUNTER
Please see if she would make a telehealth appt with dr mabry tomorrow  He can at least get started on some orders and work up for her

## 2020-05-22 ENCOUNTER — OFFICE VISIT (OUTPATIENT)
Dept: FAMILY MEDICINE CLINIC | Facility: CLINIC | Age: 80
End: 2020-05-22

## 2020-05-22 DIAGNOSIS — R63.4 WEIGHT LOSS: ICD-10-CM
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
## 2020-05-22 DIAGNOSIS — R07.81 CHEST PAIN, PLEURITIC: ICD-10-CM

## 2020-05-22 DIAGNOSIS — I10 ESSENTIAL HYPERTENSION: Primary | ICD-10-CM

## 2020-05-22 PROCEDURE — 99443 PR PHYS/QHP TELEPHONE EVALUATION 21-30 MIN: CPT | Performed by: FAMILY MEDICINE

## 2020-05-22 RX ORDER — METHYLPREDNISOLONE 4 MG/1
TABLET ORAL
Qty: 21 TABLET | Refills: 0 | Status: SHIPPED | OUTPATIENT
Start: 2020-05-22 | End: 2020-06-03

## 2020-05-22 NOTE — PROGRESS NOTES
CC: back pain and weight loss    HPI  Maria D Fountain is a 80 y.o. female that presents for back pain and weight loss    Patient reports that she was ill back in February. She was having back/sciatica pain in the L back/leg. XRs revealed degenerative changes of lumbar spine. Patient went for rehab at that time. She had 8 sessions and was improving. In March, patient developed dry cough, nose bleeds, and pain in her side. Pain was in L ribs, worse w/ deep breathing. She did not seek medical attention. Patient stayed at home and took ibuprofen/ASA. Patient further reports 12lbs weight loss during this time. Chart review w/ only 5-6lbs weight loss. Patient had had enough of these persistent symptoms and reported to urgent care in early May. XR was negative. She received azithro and flonase, which helped her symptoms markedly. Today she wants to check in on these symptoms and ensure there is nothing else she needs to do. She is also concerned because she was told that she has diabetes. Today she states that symptoms have essentially resolved except w/ some mild rib pain w/ deep breathing but even this is improving.     HTN: BP generally runs 130s/70s but has jumped up to 180/100 at times. Taking lisinopril 20 BID and amlodipine 5 daily.    Tick bite: dime sized. Occurred a few weeks ago. No redness or drainage. Improving. Evaluated by derm and they ordered labs. Results will be back tomorrow    Review of Systems   Constitutional: Positive for unexpected weight loss. Negative for chills and fever.   HENT: Negative for congestion and rhinorrhea.    Eyes: Negative for visual disturbance.   Respiratory: Positive for cough and shortness of breath.    Cardiovascular: Positive for chest pain.   Gastrointestinal: Negative for abdominal pain and vomiting.   Genitourinary: Negative for dysuria.   Musculoskeletal: Positive for arthralgias.   Skin: Positive for skin lesions. Negative for rash.   Neurological: Negative for  confusion.   Psychiatric/Behavioral: Positive for stress. Negative for depressed mood.     The following portions of the patient's history were reviewed and updated as appropriate: problem list, past medical history, past surgical history, allergies, current medications    Problem List Tab  Patient History Tab  Immunizations Tab  Medications Tab  Chart Review Tab  Care Everywhere Tab  Synopsis Tab    PE  **Exam limited by telephone visit  There were no vitals filed for this visit.  There is no height or weight on file to calculate BMI.  General: Well nourished, NAD  Resp: No dyspnea  Neuro: Alert and oriented. No focal deficits  Psych: Appropriate mood and affect    Imaging  Xr Chest 2 View    Result Date: 5/11/2020  Stable chest, no active disease.  Electronically Signed By-Jayce Del Valle On:5/11/2020 3:28 PM This report was finalized on [id number] by  Jayce Del Valle, .      Assessment/Plan   Maria D Fountain is a 80 y.o. female that presents for No chief complaint on file.    Diagnoses and all orders for this visit:    Essential hypertension: pt reports BP usually 130s but has jumped up to 180-190s at time   - Increase amlodipine 5mg to BID   - Cont lisinopril 20mg BID, Coreg 12.5mg BID, Imdure 30mg daily    Chest pain, pleuritic: likely 2/2 viral illness based on history. Pleurisy is improving but will provide short course of steroids in attempt to improve symtpoms   - Medrol dose pack ordered    Tick bite, subsequent encounter: described as benign tick bite. Counseled regarding concerning features/development   - Monitor    Weight loss: I am concerned about the reported 12lbs weight loss. Only 5-6lb weight loss supported in EMR. This could certainly be due to the viral illness she suffered earlier this year and feeling poorly. However, she should not have continued weight loss now that symptoms improving/resolved. I counseled her to increase PO intake and call if weight loss continues over the coming weeks. Low  threshold to image if weight loss continues. Patient did have reassuring CXR and labs on 5/11 that were reviewed today    F/U PRN      You have chosen to receive care through a telephone visit. Do you consent to use a telephone visit for your medical care today? Yes    This visit has been rescheduled as a phone visit to comply with patient safety concerns in accordance with CDC recommendations. Total time of discussion was 28:14 minutes.

## 2020-05-29 RX ORDER — CARVEDILOL 12.5 MG/1
TABLET ORAL
Qty: 180 TABLET | Refills: 1 | Status: SHIPPED | OUTPATIENT
Start: 2020-05-29 | End: 2020-07-20 | Stop reason: SDUPTHER

## 2020-06-01 ENCOUNTER — TELEPHONE (OUTPATIENT)
Dept: FAMILY MEDICINE CLINIC | Facility: CLINIC | Age: 80
End: 2020-06-01

## 2020-06-01 NOTE — TELEPHONE ENCOUNTER
PT ADVISED THAT SHE HAD AN APPOINTMENT WITH DR. OTERO ON 05/22 AND HE TOLD THE PATIENT TO CALL BACK AND SCHEDULE A FOLLOW UP VISIT WITH DR. POE IF SHE WAS NOT FEELING BETTER. DR. POE HAS NOTHING AVAILABLE UNTIL 06/11 AND PATIENT ALSO BELIEVES SHE HAS THE CORONAVIRUS.     PT HAS PAIN IN SIDE AND BACK, HURTS TO BREATH IN.     PT ALSO STATES THAT ON HER CHART IT STATES THAT SHE HAS DIABETES AND SHE DID NOT KNOW SHE HAD IT AND WAS NEVER INFORMED.

## 2020-06-02 NOTE — TELEPHONE ENCOUNTER
Spoke with patient at 9:38am and she said she doesn't think she has the Coronavirus now.  She thinks she had it in March.  What she is more concerned with is her weight loss and side pain.  She has scheduled a telephone visit with Dr Garza tomorrow at 10:30am to discuss.

## 2020-06-02 NOTE — TELEPHONE ENCOUNTER
We can either 1) do a video/telephone visit this Wednesday, and then I can order a coronovirus test to be done at an [facility], or 2) she can go to [facility]

## 2020-06-03 ENCOUNTER — OFFICE VISIT (OUTPATIENT)
Dept: FAMILY MEDICINE CLINIC | Facility: CLINIC | Age: 80
End: 2020-06-03

## 2020-06-03 DIAGNOSIS — M53.3 PAIN IN THE COCCYX: ICD-10-CM

## 2020-06-03 DIAGNOSIS — R63.4 UNINTENTIONAL WEIGHT LOSS: ICD-10-CM

## 2020-06-03 DIAGNOSIS — I10 ESSENTIAL HYPERTENSION: ICD-10-CM

## 2020-06-03 DIAGNOSIS — R52 PAIN: Primary | ICD-10-CM

## 2020-06-03 DIAGNOSIS — M54.9 UPPER BACK PAIN: ICD-10-CM

## 2020-06-03 PROCEDURE — 99443 PR PHYS/QHP TELEPHONE EVALUATION 21-30 MIN: CPT | Performed by: INTERNAL MEDICINE

## 2020-06-03 NOTE — PROGRESS NOTES
"Subjective   Maria D Fountain is a 80 y.o. female.     Pt has several concerns for today:  1) htn - [place] [facility] [place], so was advised to increase to bid at last visit. However, sbp dropped down \"low,\" <120. She went back to daily and sbp has been mostly 140s.  2) chest pain with breathing - last visit, was given steroids, and that greatly improved. Still has a little discomfort with deep breaths  3) upper back pain, tailbone pain - this started around the time that she was ill. While the rest of her symptoms are improving, and this did improve with steroids, she is still in quite a bit of discomfort. Has bruising over her sacrum, but doesn't recall any kind of fall/trauma  4) weight loss - while she was ill with URI sxs, weight dropped around 10 or so pounds. At her lowest, was 139 lbs. Has since regained weight, but has been worried about etiology. She says she ate pretty well during the time she was ill, despite not having much of an appetite.        The following portions of the patient's history were reviewed and updated as appropriate: allergies, current medications, past family history, past medical history, past social history, past surgical history and problem list.    Review of Systems   Constitutional: Positive for unexpected weight loss. Negative for fatigue and fever.   HENT: Negative for congestion, ear pain, rhinorrhea and sore throat.    Eyes: Negative for blurred vision and itching.   Respiratory: Negative for cough and shortness of breath.    Cardiovascular: Positive for chest pain. Negative for palpitations.   Gastrointestinal: Negative for abdominal pain, diarrhea and vomiting.   Endocrine: Negative for polydipsia and polyuria.   Genitourinary: Negative for dysuria, frequency, hematuria and urgency.   Musculoskeletal: Positive for back pain. Negative for joint swelling and myalgias.   Skin: Positive for bruise. Negative for rash and skin lesions.   Neurological: " Negative for dizziness, numbness and headache.   Psychiatric/Behavioral: Negative for depressed mood. The patient is not nervous/anxious.          Current Outpatient Medications:   •  Acetaminophen 325 MG capsule, TYLENOL CAPS, Disp: , Rfl:   •  amLODIPine (NORVASC) 5 MG tablet, Take 1 tablet by mouth Daily., Disp: 90 tablet, Rfl: 3  •  ascorbic acid (VITAMIN C) 1000 MG tablet, VITAMIN C 1000 MG TABS, Disp: , Rfl:   •  aspirin (ASPIR-LOW) 81 MG EC tablet, Daily., Disp: , Rfl:   •  atorvastatin (LIPITOR) 20 MG tablet, TAKE ONE TABLET BY MOUTH EVERY NIGHT AT BEDTIME, Disp: 90 tablet, Rfl: 1  •  Calcium Carb-Cholecalciferol 1000-800 MG-UNIT tablet, CALCIUM 600-D TABS, Disp: , Rfl:   •  carvedilol (COREG) 12.5 MG tablet, TAKE ONE TABLET BY MOUTH TWICE A DAY, Disp: 180 tablet, Rfl: 1  •  fluticasone (FLONASE) 50 MCG/ACT nasal spray, 2 sprays into the nostril(s) as directed by provider Daily for 30 days., Disp: 1 bottle, Rfl: 0  •  HM OMEGA-3-6-9 FATTY ACIDS capsule, OMEGA-3 PLUS CAPS, Disp: , Rfl:   •  isosorbide mononitrate (IMDUR) 30 MG 24 hr tablet, ISOSORBIDE MONONITRATE ER 30 MG XR24H-TAB, Disp: , Rfl:   •  lisinopril (PRINIVIL,ZESTRIL) 20 MG tablet, TAKE ONE TABLET BY MOUTH TWICE A DAY, Disp: 180 tablet, Rfl: 1  •  methylPREDNISolone (MEDROL, JARROD,) 4 MG tablet, Take as directed on package instructions. Take 6 tablets on first day and one fewer tablet on each subsequent day, Disp: 21 tablet, Rfl: 0  •  MISC NATURAL PRODUCTS ER PO, OSTEO BI-FLEX ADV DOUBLE ST ORAL CAPSULE, Disp: , Rfl:   •  montelukast (SINGULAIR) 10 MG tablet, Take 1 tablet by mouth Every Night., Disp: 15 tablet, Rfl: 0  •  multivitamin-minerals (CENTRUM) tablet, CENTRUM TABS, Disp: , Rfl:     Objective   There were no vitals taken for this visit.  Physical Exam   Constitutional: She is oriented to person, place, and time.   Neurological: She is alert and oriented to person, place, and time.   Psychiatric: She has a normal mood and affect. Her  behavior is normal. Thought content normal.         Assessment/Plan       Monitor weight - suspect weight loss was 2/2 acute illness since pt is regaining weight, but if pt does not continue to regain weight or continues to lose, will re-address issue  Check xrays  Pt would rather wait and monitor upper back and tailbone pain vs trying PT again  Ok to continue norvasc 5 daily instead of bid    You have chosen to receive care through a telephone visit. Do you consent to use a telephone visit for your medical care today? Yes    I spent 30 total minutes caring for Maria D today.  Greater than 50% of this time involved counseling and/or coordination of care as documented in the note above         Procedures

## 2020-06-08 DIAGNOSIS — R52 PAIN: ICD-10-CM

## 2020-06-09 RX ORDER — METHYLPREDNISOLONE 4 MG/1
TABLET ORAL
Qty: 21 EACH | Refills: 0 | Status: SHIPPED | OUTPATIENT
Start: 2020-06-09 | End: 2020-07-20

## 2020-06-09 NOTE — PROGRESS NOTES
Pt given results. She states she got a call from Dr. Barrett recently about her pain in her chest/collar bone. She states he said it could be from pleurisy but to see what these images showed. She would like to know if she can be put on steroids to treat the pain or if she needs another chest x-ray.

## 2020-06-29 RX ORDER — LISINOPRIL 20 MG/1
TABLET ORAL
Qty: 180 TABLET | Refills: 0 | Status: SHIPPED | OUTPATIENT
Start: 2020-06-29 | End: 2020-07-20 | Stop reason: SDUPTHER

## 2020-07-06 RX ORDER — ATORVASTATIN CALCIUM 20 MG/1
TABLET, FILM COATED ORAL
Qty: 90 TABLET | Refills: 0 | Status: SHIPPED | OUTPATIENT
Start: 2020-07-06 | End: 2020-07-20 | Stop reason: SDUPTHER

## 2020-07-20 ENCOUNTER — OFFICE VISIT (OUTPATIENT)
Dept: CARDIOLOGY | Facility: CLINIC | Age: 80
End: 2020-07-20

## 2020-07-20 VITALS
WEIGHT: 146 LBS | SYSTOLIC BLOOD PRESSURE: 154 MMHG | DIASTOLIC BLOOD PRESSURE: 81 MMHG | BODY MASS INDEX: 25.87 KG/M2 | HEIGHT: 63 IN

## 2020-07-20 DIAGNOSIS — E78.5 DYSLIPIDEMIA: ICD-10-CM

## 2020-07-20 DIAGNOSIS — R09.89 LABILE HYPERTENSION: Primary | ICD-10-CM

## 2020-07-20 DIAGNOSIS — I20.9 ANGINA PECTORIS (HCC): ICD-10-CM

## 2020-07-20 DIAGNOSIS — E11.9 TYPE II DIABETES MELLITUS WITH GOAL OF SYMPTOM MANAGEMENT (HCC): ICD-10-CM

## 2020-07-20 DIAGNOSIS — I25.118 CORONARY ARTERY DISEASE OF NATIVE ARTERY OF NATIVE HEART WITH STABLE ANGINA PECTORIS (HCC): ICD-10-CM

## 2020-07-20 PROCEDURE — 93000 ELECTROCARDIOGRAM COMPLETE: CPT | Performed by: INTERNAL MEDICINE

## 2020-07-20 PROCEDURE — 99214 OFFICE O/P EST MOD 30 MIN: CPT | Performed by: INTERNAL MEDICINE

## 2020-07-20 RX ORDER — ATORVASTATIN CALCIUM 20 MG/1
20 TABLET, FILM COATED ORAL
Qty: 90 TABLET | Refills: 3 | Status: SHIPPED | OUTPATIENT
Start: 2020-07-20 | End: 2020-09-29 | Stop reason: SDUPTHER

## 2020-07-20 RX ORDER — ISOSORBIDE MONONITRATE 30 MG/1
30 TABLET, EXTENDED RELEASE ORAL DAILY
Qty: 90 TABLET | Refills: 3 | Status: SHIPPED | OUTPATIENT
Start: 2020-07-20 | End: 2020-10-14 | Stop reason: SINTOL

## 2020-07-20 RX ORDER — CARVEDILOL 12.5 MG/1
12.5 TABLET ORAL 2 TIMES DAILY
Qty: 180 TABLET | Refills: 3 | Status: SHIPPED | OUTPATIENT
Start: 2020-07-20 | End: 2021-09-08

## 2020-07-20 RX ORDER — LISINOPRIL 20 MG/1
20 TABLET ORAL 2 TIMES DAILY
Qty: 180 TABLET | Refills: 3 | Status: SHIPPED | OUTPATIENT
Start: 2020-07-20 | End: 2020-09-29 | Stop reason: SDUPTHER

## 2020-07-20 NOTE — PROGRESS NOTES
Subjective:     Encounter Date:07/20/2020      Patient ID: Maria D Fountain is a 80 y.o. female.    Chief Complaint : Complaining of left shoulder pain back pain shortness of breath palpitations.  Here for follow-up for labile hypertension, CAD  History of Present Illness       This is a 80-year-old  female with PMH of     # CAD cardiac cath 06/21/2017 showing 50% OM1 disease and small-vessel disease distal LAD  #  mitral regurgitation, mild 9/2016  #  palpitations  #  hypertension  # LAE  #  diabetes  #?  ACE-inhibitor cough, now improved  # allergy/intolerance to pravastatin  # ALYSA         patient is here for follow-up.  Patient is complaining of intermittent substernal chest pain with stressful situation relieved with relaxation.  He is under a lot of stress patient's  passed away recently( lung ca), son is having multiple surgery after motor vehicle accident for orthopedic surgery.    Patient was started on amlodipine and reportedly developed low blood pressure and stopped it on her own.  Patient did not take her lisinopril this morning yet.  Patient's arterial blood pressure is elevated at 154/81.  Patient says her blood pressure at home is doing better and actually developed hypotension with amlodipine.  denies any , palpitations, loss of consciousness.  Patient does not check her sugars regularly. patient's labs from 01/29/2018 revealed cholesterol 210 triglycerides 230 HDL low at 38 LDL high at 134 CMP is okay CK is 92.  Labs from 1/14/2020 reveal cholesterol 113, triglycerides 139 HDL 38 LDL 47, A1c of 5.6, CMP with total bilirubin 1.4, normal CBC and TSH.      ASSESSMENT:  #Chronic stable angina  #. hypertension,   #  dyslipidemia  # CAD  #  mitral regurgitation  #. diabetes      PLAN:  Reviewed EKG results with patient  Advised patient to check blood pressure at home   We will continue  medical management with beta-blockers aspirin amlodipine isosorbide lisinopril    counseled on  walking exercise for low HDL  Continue risk factor modification and medical management  Reviewed risk benefits alternatives of different medications.   follow up with PMD for  diabetes   continue medical management and risk factor modification  We will check lipid profile CMP and CK before next visit.    Assessment:          Diagnosis Plan   1. Labile hypertension  CK    Comprehensive Metabolic Panel    Lipid Panel   2. Angina pectoris (CMS/HCC)  CK    Comprehensive Metabolic Panel    Lipid Panel   3. Type II diabetes mellitus with goal of symptom management (CMS/HCC)  CK    Comprehensive Metabolic Panel    Lipid Panel   4. Coronary artery disease of native artery of native heart with stable angina pectoris (CMS/HCC)  CK    Comprehensive Metabolic Panel    Lipid Panel   5. Dyslipidemia  CK    Comprehensive Metabolic Panel    Lipid Panel          Plan:         Past Medical History:  Past Medical History:   Diagnosis Date   • Arthritis    • Diabetes mellitus (CMS/HCC)    • DJD (degenerative joint disease)    • Gallstones    • Heart murmur    • History of stress test 07/2011    Stress myoview- neg    • Hypertension    • Lumbar disc disease    • Mitral regurgitation    • Multiple lipomas    • Osteoporosis    • Pleurisy      Past Surgical History:  Past Surgical History:   Procedure Laterality Date   • BREAST BIOPSY Right 1990   • CARDIAC CATHETERIZATION  06/21/2017   • HEMANGIOMA EXCISION Left 2010    left forearm    • HX OVARIAN CYSTECTOMY  1962   • HYSTERECTOMY  2004      Allergies:  Allergies   Allergen Reactions   • Pravastatin Dizziness     Home Meds:  Current Meds:     Current Outpatient Medications:   •  ascorbic acid (VITAMIN C) 1000 MG tablet, VITAMIN C 1000 MG TABS, Disp: , Rfl:   •  aspirin (ASPIR-LOW) 81 MG EC tablet, Daily., Disp: , Rfl:   •  atorvastatin (LIPITOR) 20 MG tablet, Take 1 tablet by mouth every night at bedtime., Disp: 90 tablet, Rfl: 3  •  Calcium Carb-Cholecalciferol 1000-800 MG-UNIT tablet,  CALCIUM 600-D TABS, Disp: , Rfl:   •  carvedilol (COREG) 12.5 MG tablet, Take 1 tablet by mouth 2 (Two) Times a Day., Disp: 180 tablet, Rfl: 3  •  HM OMEGA-3-6-9 FATTY ACIDS capsule, OMEGA-3 PLUS CAPS, Disp: , Rfl:   •  isosorbide mononitrate (IMDUR) 30 MG 24 hr tablet, Take 1 tablet by mouth Daily., Disp: 90 tablet, Rfl: 3  •  lisinopril (PRINIVIL,ZESTRIL) 20 MG tablet, Take 1 tablet by mouth 2 (Two) Times a Day., Disp: 180 tablet, Rfl: 3  •  montelukast (SINGULAIR) 10 MG tablet, Take 1 tablet by mouth Every Night., Disp: 15 tablet, Rfl: 0  •  multivitamin-minerals (CENTRUM) tablet, CENTRUM TABS, Disp: , Rfl:   Social History:   Social History     Tobacco Use   • Smoking status: Never Smoker   • Smokeless tobacco: Never Used   Substance Use Topics   • Alcohol use: No     Frequency: Never      Family History:  Family History   Problem Relation Age of Onset   • Heart disease Father         MI   • Arthritis Other    • Hypertension Other    • Colon cancer Other         The following portions of the patient's history were reviewed and updated as appropriate: allergies, current medications, past family history, past medical history, past social history, past surgical history and problem list.      Review of Systems   Cardiovascular: Positive for palpitations. Negative for chest pain and leg swelling.   Respiratory: Negative for shortness of breath.    Neurological: Negative for dizziness and numbness.     Comprehensive review of systems were reviewed and all others review of systems were found to be negative other than HPI      ECG 12 Lead  Date/Time: 7/20/2020 4:08 PM  Performed by: Matti Luna MD  Authorized by: Matti Luna MD   Comparison: compared with previous ECG from 7/16/2019  Comparison to previous ECG: EKG done today reviewed by me shows sinus rhythm at the rate of 66 bpm, no acute ST-T changes.,  No new change compared to EKG from 7/16/2019                 Objective:     Physical  "Exam  /81 (BP Location: Left arm, Patient Position: Sitting, Cuff Size: Large Adult)   Ht 160 cm (63\")   Wt 66.2 kg (146 lb)   BMI 25.86 kg/m²   General:  Appears in no acute distress  Eyes: Sclera is anicteric,  conjunctiva is clear   HEENT:  No JVD. Thyroid not visibly enlarged. No mucosal pallor or cyanosis  Respiratory: Respirations regular and unlabored at rest.  Bilaterally good breath sounds, with good air entry in all fields. No crackles, rubs or wheezes auscultated  Cardiovascular: S1,S2 Regular rate and rhythm. No murmur, rub or gallop auscultated. No pretibial pitting edema  Gastrointestinal: Abdomen soft, flat, non tender. Bowel sounds present.   Musculoskeletal:  No abnormal movements  Extremities: No digital clubbing or cyanosis  Skin: Color pink. Skin warm and dry to touch. No rashes  No xanthoma  Neuro: Alert and awake, no lateralizing deficits appreciated    Lab Reviewed:                  "

## 2020-09-29 ENCOUNTER — TELEPHONE (OUTPATIENT)
Dept: CARDIOLOGY | Facility: CLINIC | Age: 80
End: 2020-09-29

## 2020-09-29 RX ORDER — ATORVASTATIN CALCIUM 20 MG/1
20 TABLET, FILM COATED ORAL
Qty: 90 TABLET | Refills: 1 | Status: SHIPPED | OUTPATIENT
Start: 2020-09-29 | End: 2022-01-03

## 2020-09-29 RX ORDER — LISINOPRIL 20 MG/1
20 TABLET ORAL 2 TIMES DAILY
Qty: 180 TABLET | Refills: 1 | Status: SHIPPED | OUTPATIENT
Start: 2020-09-29 | End: 2021-10-13

## 2020-09-29 NOTE — TELEPHONE ENCOUNTER
PATIENT IS OUT OF REFILLS. PHARMACY SAID THEY WILL SEND A REQUEST, BUT PATIENT IS NEEDING THEM ASAP. SHE SAID THAT SHE WILL BE OUT AFTER TOMORROW.     LISINOPRIL 20MG AND ATORVASTATIN 20MG    90 DAY SUPPLY    [facility]

## 2020-10-14 ENCOUNTER — APPOINTMENT (OUTPATIENT)
Dept: GENERAL RADIOLOGY | Facility: HOSPITAL | Age: 80
End: 2020-10-14

## 2020-10-14 ENCOUNTER — HOSPITAL ENCOUNTER (OUTPATIENT)
Facility: HOSPITAL | Age: 80
Setting detail: OBSERVATION
Discharge: HOME | End: 2020-10-15
Attending: EMERGENCY MEDICINE | Admitting: HOSPITALIST

## 2020-10-14 ENCOUNTER — TELEPHONE (OUTPATIENT)
Dept: CARDIOLOGY | Facility: CLINIC | Age: 80
End: 2020-10-14

## 2020-10-14 DIAGNOSIS — J06.9 UPPER RESPIRATORY TRACT INFECTION, UNSPECIFIED TYPE: ICD-10-CM

## 2020-10-14 DIAGNOSIS — R05.9 COUGH: ICD-10-CM

## 2020-10-14 DIAGNOSIS — Z20.822 SUSPECTED COVID-19 VIRUS INFECTION: ICD-10-CM

## 2020-10-14 DIAGNOSIS — R07.81 RIB PAIN ON LEFT SIDE: ICD-10-CM

## 2020-10-14 DIAGNOSIS — R07.9 CHEST PAIN, UNSPECIFIED TYPE: Primary | ICD-10-CM

## 2020-10-14 DIAGNOSIS — I10 UNCONTROLLED HYPERTENSION: ICD-10-CM

## 2020-10-14 LAB
ALBUMIN SERPL-MCNC: 4 G/DL (ref 3.5–5.2)
ALBUMIN/GLOB SERPL: 1.5 G/DL
ALP SERPL-CCNC: 84 U/L (ref 39–117)
ALT SERPL W P-5'-P-CCNC: 15 U/L (ref 1–33)
ANION GAP SERPL CALCULATED.3IONS-SCNC: 9 MMOL/L (ref 5–15)
ANION GAP SERPL CALCULATED.3IONS-SCNC: 9 MMOL/L (ref 5–15)
AST SERPL-CCNC: 21 U/L (ref 1–32)
BASOPHILS # BLD AUTO: 0.1 10*3/MM3 (ref 0–0.2)
BASOPHILS NFR BLD AUTO: 0.9 % (ref 0–1.5)
BILIRUB SERPL-MCNC: 1.2 MG/DL (ref 0–1.2)
BUN SERPL-MCNC: 8 MG/DL (ref 8–23)
BUN SERPL-MCNC: ABNORMAL MG/DL
BUN SERPL-MCNC: ABNORMAL MG/DL
BUN/CREAT SERPL: ABNORMAL
BUN/CREAT SERPL: ABNORMAL
CALCIUM SPEC-SCNC: 8.9 MG/DL (ref 8.6–10.5)
CALCIUM SPEC-SCNC: 9.2 MG/DL (ref 8.6–10.5)
CHLORIDE SERPL-SCNC: 106 MMOL/L (ref 98–107)
CHLORIDE SERPL-SCNC: 106 MMOL/L (ref 98–107)
CO2 SERPL-SCNC: 28 MMOL/L (ref 22–29)
CO2 SERPL-SCNC: 30 MMOL/L (ref 22–29)
CREAT SERPL-MCNC: 0.83 MG/DL (ref 0.57–1)
CREAT SERPL-MCNC: 1.15 MG/DL (ref 0.57–1)
DEPRECATED RDW RBC AUTO: 40.7 FL (ref 37–54)
EOSINOPHIL # BLD AUTO: 0.2 10*3/MM3 (ref 0–0.4)
EOSINOPHIL NFR BLD AUTO: 3.1 % (ref 0.3–6.2)
ERYTHROCYTE [DISTWIDTH] IN BLOOD BY AUTOMATED COUNT: 12.6 % (ref 12.3–15.4)
GFR SERPL CREATININE-BSD FRML MDRD: 45 ML/MIN/1.73
GFR SERPL CREATININE-BSD FRML MDRD: 66 ML/MIN/1.73
GLOBULIN UR ELPH-MCNC: 2.6 GM/DL
GLUCOSE SERPL-MCNC: 122 MG/DL (ref 65–99)
GLUCOSE SERPL-MCNC: 97 MG/DL (ref 65–99)
HCT VFR BLD AUTO: 44.9 % (ref 34–46.6)
HGB BLD-MCNC: 14.9 G/DL (ref 12–15.9)
HOLD SPECIMEN: NORMAL
HOLD SPECIMEN: NORMAL
LYMPHOCYTES # BLD AUTO: 2 10*3/MM3 (ref 0.7–3.1)
LYMPHOCYTES NFR BLD AUTO: 27.1 % (ref 19.6–45.3)
MCH RBC QN AUTO: 30.8 PG (ref 26.6–33)
MCHC RBC AUTO-ENTMCNC: 33.2 G/DL (ref 31.5–35.7)
MCV RBC AUTO: 92.8 FL (ref 79–97)
MONOCYTES # BLD AUTO: 0.7 10*3/MM3 (ref 0.1–0.9)
MONOCYTES NFR BLD AUTO: 10.4 % (ref 5–12)
NEUTROPHILS NFR BLD AUTO: 4.2 10*3/MM3 (ref 1.7–7)
NEUTROPHILS NFR BLD AUTO: 58.5 % (ref 42.7–76)
NRBC BLD AUTO-RTO: 0 /100 WBC (ref 0–0.2)
PLATELET # BLD AUTO: 272 10*3/MM3 (ref 140–450)
PMV BLD AUTO: 7.7 FL (ref 6–12)
POTASSIUM SERPL-SCNC: 3.8 MMOL/L (ref 3.5–5.2)
POTASSIUM SERPL-SCNC: 3.9 MMOL/L (ref 3.5–5.2)
PROT SERPL-MCNC: 6.6 G/DL (ref 6–8.5)
RBC # BLD AUTO: 4.84 10*6/MM3 (ref 3.77–5.28)
SODIUM SERPL-SCNC: 143 MMOL/L (ref 136–145)
SODIUM SERPL-SCNC: 145 MMOL/L (ref 136–145)
TROPONIN T SERPL-MCNC: <0.01 NG/ML (ref 0–0.03)
TROPONIN T SERPL-MCNC: <0.01 NG/ML (ref 0–0.03)
WBC # BLD AUTO: 7.2 10*3/MM3 (ref 3.4–10.8)
WHOLE BLOOD HOLD SPECIMEN: NORMAL

## 2020-10-14 PROCEDURE — 84484 ASSAY OF TROPONIN QUANT: CPT | Performed by: EMERGENCY MEDICINE

## 2020-10-14 PROCEDURE — 96372 THER/PROPH/DIAG INJ SC/IM: CPT

## 2020-10-14 PROCEDURE — 71045 X-RAY EXAM CHEST 1 VIEW: CPT

## 2020-10-14 PROCEDURE — 99219 PR INITIAL OBSERVATION CARE/DAY 50 MINUTES: CPT | Performed by: HOSPITALIST

## 2020-10-14 PROCEDURE — 25010000002 HEPARIN (PORCINE) PER 1000 UNITS: Performed by: HOSPITALIST

## 2020-10-14 PROCEDURE — 80053 COMPREHEN METABOLIC PANEL: CPT | Performed by: EMERGENCY MEDICINE

## 2020-10-14 PROCEDURE — 84484 ASSAY OF TROPONIN QUANT: CPT | Performed by: HOSPITALIST

## 2020-10-14 PROCEDURE — 93005 ELECTROCARDIOGRAM TRACING: CPT | Performed by: EMERGENCY MEDICINE

## 2020-10-14 PROCEDURE — G0378 HOSPITAL OBSERVATION PER HR: HCPCS

## 2020-10-14 PROCEDURE — 99285 EMERGENCY DEPT VISIT HI MDM: CPT

## 2020-10-14 PROCEDURE — 85025 COMPLETE CBC W/AUTO DIFF WBC: CPT | Performed by: EMERGENCY MEDICINE

## 2020-10-14 PROCEDURE — 84520 ASSAY OF UREA NITROGEN: CPT | Performed by: HOSPITALIST

## 2020-10-14 RX ORDER — DOCUSATE SODIUM 100 MG/1
100 CAPSULE, LIQUID FILLED ORAL 2 TIMES DAILY PRN
Status: DISCONTINUED | OUTPATIENT
Start: 2020-10-14 | End: 2020-10-15 | Stop reason: HOSPADM

## 2020-10-14 RX ORDER — MORPHINE SULFATE 4 MG/ML
2 INJECTION, SOLUTION INTRAMUSCULAR; INTRAVENOUS
Status: DISCONTINUED | OUTPATIENT
Start: 2020-10-14 | End: 2020-10-15 | Stop reason: HOSPADM

## 2020-10-14 RX ORDER — ASPIRIN 81 MG/1
81 TABLET ORAL DAILY
Status: DISCONTINUED | OUTPATIENT
Start: 2020-10-14 | End: 2020-10-15 | Stop reason: HOSPADM

## 2020-10-14 RX ORDER — ONDANSETRON 2 MG/ML
4 INJECTION INTRAMUSCULAR; INTRAVENOUS EVERY 6 HOURS PRN
Status: DISCONTINUED | OUTPATIENT
Start: 2020-10-14 | End: 2020-10-15 | Stop reason: HOSPADM

## 2020-10-14 RX ORDER — ISOSORBIDE MONONITRATE 30 MG/1
30 TABLET, EXTENDED RELEASE ORAL DAILY
Status: DISCONTINUED | OUTPATIENT
Start: 2020-10-14 | End: 2020-10-15 | Stop reason: HOSPADM

## 2020-10-14 RX ORDER — HEPARIN SODIUM 5000 [USP'U]/ML
5000 INJECTION, SOLUTION INTRAVENOUS; SUBCUTANEOUS EVERY 8 HOURS SCHEDULED
Status: DISCONTINUED | OUTPATIENT
Start: 2020-10-14 | End: 2020-10-15 | Stop reason: HOSPADM

## 2020-10-14 RX ORDER — LISINOPRIL 20 MG/1
20 TABLET ORAL 2 TIMES DAILY
Status: DISCONTINUED | OUTPATIENT
Start: 2020-10-14 | End: 2020-10-15 | Stop reason: HOSPADM

## 2020-10-14 RX ORDER — CARVEDILOL 6.25 MG/1
12.5 TABLET ORAL 2 TIMES DAILY
Status: DISCONTINUED | OUTPATIENT
Start: 2020-10-14 | End: 2020-10-15 | Stop reason: HOSPADM

## 2020-10-14 RX ORDER — LISINOPRIL 20 MG/1
20 TABLET ORAL ONCE
Status: COMPLETED | OUTPATIENT
Start: 2020-10-14 | End: 2020-10-14

## 2020-10-14 RX ORDER — ASPIRIN 81 MG/1
324 TABLET, CHEWABLE ORAL ONCE
Status: COMPLETED | OUTPATIENT
Start: 2020-10-14 | End: 2020-10-14

## 2020-10-14 RX ORDER — LANOLIN ALCOHOL/MO/W.PET/CERES
1000 CREAM (GRAM) TOPICAL DAILY
COMMUNITY

## 2020-10-14 RX ORDER — SODIUM CHLORIDE 0.9 % (FLUSH) 0.9 %
10 SYRINGE (ML) INJECTION AS NEEDED
Status: DISCONTINUED | OUTPATIENT
Start: 2020-10-14 | End: 2020-10-15 | Stop reason: HOSPADM

## 2020-10-14 RX ORDER — SODIUM CHLORIDE 0.9 % (FLUSH) 0.9 %
10 SYRINGE (ML) INJECTION EVERY 12 HOURS SCHEDULED
Status: DISCONTINUED | OUTPATIENT
Start: 2020-10-14 | End: 2020-10-15 | Stop reason: HOSPADM

## 2020-10-14 RX ORDER — MONTELUKAST SODIUM 10 MG/1
10 TABLET ORAL NIGHTLY
Status: DISCONTINUED | OUTPATIENT
Start: 2020-10-14 | End: 2020-10-15 | Stop reason: HOSPADM

## 2020-10-14 RX ORDER — ACETAMINOPHEN 500 MG
1000 TABLET ORAL ONCE
Status: COMPLETED | OUTPATIENT
Start: 2020-10-14 | End: 2020-10-14

## 2020-10-14 RX ORDER — ONDANSETRON 4 MG/1
4 TABLET, FILM COATED ORAL EVERY 6 HOURS PRN
Status: DISCONTINUED | OUTPATIENT
Start: 2020-10-14 | End: 2020-10-15 | Stop reason: HOSPADM

## 2020-10-14 RX ORDER — ATORVASTATIN CALCIUM 20 MG/1
20 TABLET, FILM COATED ORAL DAILY
Status: DISCONTINUED | OUTPATIENT
Start: 2020-10-14 | End: 2020-10-15 | Stop reason: HOSPADM

## 2020-10-14 RX ORDER — ACETAMINOPHEN 325 MG/1
650 TABLET ORAL EVERY 4 HOURS PRN
Status: DISCONTINUED | OUTPATIENT
Start: 2020-10-14 | End: 2020-10-15 | Stop reason: HOSPADM

## 2020-10-14 RX ORDER — CALCIUM CARBONATE 200(500)MG
1 TABLET,CHEWABLE ORAL 2 TIMES DAILY PRN
Status: DISCONTINUED | OUTPATIENT
Start: 2020-10-14 | End: 2020-10-15 | Stop reason: HOSPADM

## 2020-10-14 RX ORDER — LABETALOL HYDROCHLORIDE 5 MG/ML
20 INJECTION, SOLUTION INTRAVENOUS EVERY 4 HOURS PRN
Status: DISCONTINUED | OUTPATIENT
Start: 2020-10-14 | End: 2020-10-15 | Stop reason: HOSPADM

## 2020-10-14 RX ORDER — HYDROCODONE BITARTRATE AND ACETAMINOPHEN 5; 325 MG/1; MG/1
1 TABLET ORAL EVERY 4 HOURS PRN
Status: DISCONTINUED | OUTPATIENT
Start: 2020-10-14 | End: 2020-10-15 | Stop reason: HOSPADM

## 2020-10-14 RX ADMIN — ACETAMINOPHEN 1000 MG: 500 TABLET, FILM COATED ORAL at 17:15

## 2020-10-14 RX ADMIN — LISINOPRIL 20 MG: 20 TABLET ORAL at 21:19

## 2020-10-14 RX ADMIN — ATORVASTATIN CALCIUM 20 MG: 20 TABLET, FILM COATED ORAL at 21:19

## 2020-10-14 RX ADMIN — ASPIRIN 324 MG: 81 TABLET, CHEWABLE ORAL at 14:54

## 2020-10-14 RX ADMIN — CARVEDILOL 12.5 MG: 6.25 TABLET, FILM COATED ORAL at 21:19

## 2020-10-14 RX ADMIN — NITROGLYCERIN 1 INCH: 20 OINTMENT TOPICAL at 14:54

## 2020-10-14 RX ADMIN — HEPARIN SODIUM 5000 UNITS: 5000 INJECTION INTRAVENOUS; SUBCUTANEOUS at 21:21

## 2020-10-14 RX ADMIN — LISINOPRIL 20 MG: 20 TABLET ORAL at 17:15

## 2020-10-14 RX ADMIN — Medication 10 ML: at 21:22

## 2020-10-14 RX ADMIN — ASPIRIN 81 MG: 81 TABLET, COATED ORAL at 21:19

## 2020-10-14 RX ADMIN — ISOSORBIDE MONONITRATE 30 MG: 30 TABLET, EXTENDED RELEASE ORAL at 21:19

## 2020-10-14 NOTE — H&P
[facility] HOSPITALIST     Neena Garza MD    CHIEF COMPLAINT: Chest pain    HISTORY OF PRESENT ILLNESS:    Patient is a 80-year-old female with past medical history as outlined below.  She is presenting to the ER today for evaluation due to chest pain.  She said she had an episode of chest pain last night about 10 PM while watching TV.  She said it was a left-sided chest pain radiated to her left shoulder.  Lasted for a few seconds and went away.  She then went to sleep woke up this morning and while eating breakfast again started feeling same type of left-sided chest pain.  She describes it as a sharp feeling rating to her left shoulder.  Associated with lightheadedness mild shortness of breath and diaphoresis.  She says she took her 's nitroglycerin x2 and the chest pain subsided.  Otherwise she has been kind of feeling malaise lately and weak.  This lasted for the past few days.  She is had no fevers no chills no cough no dyspnea on exertion no PND no orthopnea no lower extremity edema no headache no focal weakness no focal numbness no palpitations no rashes.    ER: Chest x-ray negative, heart rate with sinus bradycardia no ST-T changes, troponin 0 0.01 blood pressure elevated 171/75 with pulse of 62 and pulse ox of 94 on room air 98.1 temperature otherwise WBC count 7.2 hemoglobin 14.9 and platelet count 272 creatinine 0.83 sodium 145 potassium 3.8 LFTs normal.    Review of systems negative apart for HPI      Past Medical History:   Diagnosis Date   • Arthritis    • Diabetes mellitus (CMS/HCC)    • DJD (degenerative joint disease)    • Gallstones    • Heart murmur    • History of stress test 07/2011    Stress myoview- neg    • Hypertension    • Lumbar disc disease    • Mitral regurgitation    • Multiple lipomas    • Osteoporosis    • Pleurisy      Past Surgical History:   Procedure Laterality Date   • BREAST BIOPSY Right 1990   • CARDIAC CATHETERIZATION  06/21/2017   •  "HEMANGIOMA EXCISION Left 2010    left forearm    • HX OVARIAN CYSTECTOMY  1962   • HYSTERECTOMY  2004     Family History   Problem Relation Age of Onset   • Heart disease Father         MI   • Arthritis Other    • Hypertension Other    • Colon cancer Other      Social History     Tobacco Use   • Smoking status: Never Smoker   • Smokeless tobacco: Never Used   Substance Use Topics   • Alcohol use: No     Frequency: Never   • Drug use: No     (Not in a hospital admission)    Allergies:  Pravastatin      There is no immunization history on file for this patient.        REVIEW OF SYSTEMS:  Please see the above history of present illness for pertinent positives and negatives.  The remainder of the patient's systems have been reviewed and are negative.    Vital Signs  Visit Vitals  /75   Pulse 62   Temp 98.1 °F (36.7 °C) (Oral)   Resp 18   Ht 157.5 cm (62\")   Wt 64.8 kg (142 lb 13.7 oz)   SpO2 94%   BMI 26.13 kg/m²       Physical Exam  Physical Exam   Constitutional:  oriented to person, place, and time. No distress.   HENT:   Head: Normocephalic and atraumatic.   Eyes: Conjunctivae and EOM are normal. Pupils are equal, round, and reactive to light.   Neck: No JVD present. No thyromegaly present.   Cardiovascular: Normal rate, regular rhythm, normal heart sounds and intact distal pulses. Exam reveals no gallop and no friction rub.   No murmur heard.  Pulmonary/Chest: Effort normal and breath sounds normal. No stridor. No respiratory distress.  has no wheezes.  has no rales.  exhibits no tenderness.   Abdominal: Soft. Bowel sounds are normal.  no distension and no mass. There is no tenderness. There is no rebound and no guarding. No hernia.   Musculoskeletal: Normal range of motion.   Lymphadenopathy:     no cervical adenopathy.   Neurological:  alert and oriented to person, place, and time. No cranial nerve deficit or sensory deficit. exhibits normal muscle tone.   Skin: No rash noted.  not diaphoretic.   "   Psychiatric:  normal mood and affect.   Vitals reviewed.         Results Review:    I reviewed the patient's new clinical results.  Lab Results (last 24 hours)     Procedure Component Value Units Date/Time    [facility] Draw [[id number]] Collected: 10/14/20 1454    Specimen: Blood Updated: 10/14/20 1600    Narrative:      The following orders were created for panel order [facility] Draw.  Procedure                               Abnormality         Status                     ---------                               -----------         ------                     Light Blue Top[[id number]]                                   Final result               Green Top (Gel)[[id number]]                                  Final result               Lavender Top[[id number]]                                                                Gold Top - SST[[id number]]                                   Final result                 Please view results for these tests on the individual orders.    Light Blue Top [[id number]] Collected: 10/14/20 1454    Specimen: Blood Updated: 10/14/20 1600     Extra Tube hold for add-on     Comment: Auto resulted       Gold Top - SST [[id number]] Collected: 10/14/20 1454    Specimen: Blood Updated: 10/14/20 1600     Extra Tube Hold for add-ons.     Comment: Auto resulted.       Green Top (Gel) [[id number]] Collected: 10/14/20 1454    Specimen: Blood Updated: 10/14/20 1538     Extra Tube HOLD    Comprehensive Metabolic Panel [[id number]]  (Abnormal) Collected: 10/14/20 1454    Specimen: Blood Updated: 10/14/20 1529     Glucose 97 mg/dL      BUN --     Comment: Testing performed by alternate method        Creatinine 0.83 mg/dL      Sodium 145 mmol/L      Potassium 3.8 mmol/L      Chloride 106 mmol/L      CO2 30.0 mmol/L      Calcium 9.2 mg/dL      Total Protein 6.6 g/dL      Albumin 4.00 g/dL      ALT (SGPT) 15 U/L      AST (SGOT) 21 U/L      Alkaline Phosphatase 84 U/L      Total Bilirubin 1.2 mg/dL      eGFR Non   Amer 66 mL/min/1.73      Globulin 2.6 gm/dL      A/G Ratio 1.5 g/dL      BUN/Creatinine Ratio --     Comment: Testing not performed        Anion Gap 9.0 mmol/L     Narrative:      GFR Normal >60  Chronic Kidney Disease <60  Kidney Failure <15      Troponin [[id number]]  (Normal) Collected: 10/14/20 1454    Specimen: Blood Updated: 10/14/20 1529     Troponin T <0.010 ng/mL     Narrative:      Troponin T Reference Range:  <= 0.03 ng/mL-   Negative for AMI  >0.03 ng/mL-     Abnormal for myocardial necrosis.  Clinicians would have to utilize clinical acumen, EKG, Troponin and serial changes to determine if it is an Acute Myocardial Infarction or myocardial injury due to an underlying chronic condition.       Results may be falsely decreased if patient taking Biotin.      BUN [[id number]]  (Normal) Collected: 10/14/20 1454    Specimen: Blood Updated: 10/14/20 1529     BUN 8 mg/dL     CBC & Differential [[id number]]  (Normal) Collected: 10/14/20 1454    Specimen: Blood Updated: 10/14/20 1507    Narrative:      The following orders were created for panel order CBC & Differential.  Procedure                               Abnormality         Status                     ---------                               -----------         ------                     CBC Auto Differential[[id number]]        Normal              Final result                 Please view results for these tests on the individual orders.    CBC Auto Differential [[id number]]  (Normal) Collected: 10/14/20 1454    Specimen: Blood Updated: 10/14/20 1507     WBC 7.20 10*3/mm3      RBC 4.84 10*6/mm3      Hemoglobin 14.9 g/dL      Hematocrit 44.9 %      MCV 92.8 fL      MCH 30.8 pg      MCHC 33.2 g/dL      RDW 12.6 %      RDW-SD 40.7 fl      MPV 7.7 fL      Platelets 272 10*3/mm3      Neutrophil % 58.5 %      Lymphocyte % 27.1 %      Monocyte % 10.4 %      Eosinophil % 3.1 %      Basophil % 0.9 %      Neutrophils, Absolute 4.20 10*3/mm3      Lymphocytes, Absolute 2.00  10*3/mm3      Monocytes, Absolute 0.70 10*3/mm3      Eosinophils, Absolute 0.20 10*3/mm3      Basophils, Absolute 0.10 10*3/mm3      nRBC 0.0 /100 WBC               Assessment/Plan   Chest pain  Rule out ACS  Consult cardiology   Aspirin, nitro, morphine, O2  Beta-blocker  Chest x-ray negative    Hypertension  Uncontrolled  Restart home meds lisinopril, carvedilol,  As needed's    CAD  Restart Imdur, restart carvedilol aspirin statin    ckd   Stage III  Stable     DVT PUD prophylaxis    Plan as above    Scotty Araya MD  10/14/20  16:15 EDT

## 2020-10-14 NOTE — ED PROVIDER NOTES
Subjective   History of Present Illness  Chest pain  80-year-old female complains of a moderate intensity substernal chest tightness rating to her bilateral arms and posterior neck describes a pressure sensation that started this morning with associated some lightheadedness.  She reports no focal numbness or weakness.  She reports no diaphoresis or palpitation she did get relief with nitroglycerin prior to arrival.  Review of Systems    Past Medical History:   Diagnosis Date   • Arthritis    • Diabetes mellitus (CMS/HCC)    • DJD (degenerative joint disease)    • Gallstones    • Heart murmur    • History of stress test 07/2011    Stress myoview- neg    • Hypertension    • Lumbar disc disease    • Mitral regurgitation    • Multiple lipomas    • Osteoporosis    • Pleurisy        Allergies   Allergen Reactions   • Pravastatin Dizziness       Past Surgical History:   Procedure Laterality Date   • BREAST BIOPSY Right 1990   • CARDIAC CATHETERIZATION  06/21/2017   • HEMANGIOMA EXCISION Left 2010    left forearm    • HX OVARIAN CYSTECTOMY  1962   • HYSTERECTOMY  2004       Family History   Problem Relation Age of Onset   • Heart disease Father         MI   • Arthritis Other    • Hypertension Other    • Colon cancer Other        Social History     Socioeconomic History   • Marital status:      Spouse name: Not on file   • Number of children: Not on file   • Years of education: Not on file   • Highest education level: Not on file   Tobacco Use   • Smoking status: Never Smoker   • Smokeless tobacco: Never Used   Substance and Sexual Activity   • Alcohol use: No     Frequency: Never   • Drug use: No   • Sexual activity: Defer     Prior to Admission medications    Medication Sig Start Date End Date Taking? Authorizing Provider   ascorbic acid (VITAMIN C) 1000 MG tablet VITAMIN C 1000 MG TABS 5/14/14   Shimon Carrillo MD   aspirin (ASPIR-LOW) 81 MG EC tablet Daily. 1/14/19   Shimon Carrillo MD   atorvastatin  "(LIPITOR) 20 MG tablet Take 1 tablet by mouth every night at bedtime. 9/29/20   Matti Luna MD   Calcium Carb-Cholecalciferol 1000-800 MG-UNIT tablet CALCIUM 600-D TABS 2/8/19   Shimon Carrillo MD   carvedilol (COREG) 12.5 MG tablet Take 1 tablet by mouth 2 (Two) Times a Day. 7/20/20   Matti Luna MD   HM OMEGA-3-6-9 FATTY ACIDS capsule OMEGA-3 PLUS CAPS 2/8/19   Shimon Carrillo MD   isosorbide mononitrate (IMDUR) 30 MG 24 hr tablet Take 1 tablet by mouth Daily. 7/20/20   Matti Luna MD   lisinopril (PRINIVIL,ZESTRIL) 20 MG tablet Take 1 tablet by mouth 2 (Two) Times a Day. 9/29/20   Matti Luna MD   montelukast (SINGULAIR) 10 MG tablet Take 1 tablet by mouth Every Night. 5/11/20   Matt Rocha APRN   multivitamin-minerals (CENTRUM) tablet CENTRUM TABS 5/14/14   Provider, MD Shimon     /75   Pulse 62   Temp 98.1 °F (36.7 °C) (Oral)   Resp 18   Ht 157.5 cm (62\")   Wt 64.8 kg (142 lb 13.7 oz)   SpO2 94%   BMI 26.13 kg/m²   I examined the patient using the appropriate personal protective equipment.          Objective   Physical Exam  General: Well-developed well-appearing, no acute distress, alert and appropriate  Eyes: Pupils round and equal, sclera nonicteric  HEENT: Mucous membranes moist, no mucosal swelling  Neck: Supple, no nuchal rigidity, no lymphadenopathy  Respirations: Respirations nonlabored, equal breath sounds bilaterally, clear lungs  Heart regular rate and rhythm, no murmurs rubs or gallops,   Abdomen soft nontender nondistended, no hepatosplenomegaly, no hernia, no mass, normal bowel sounds, no CVA tenderness  Extremities no clubbing cyanosis or edema, calves are symmetric and nontender  Neuro cranial nerves grossly intact, no focal limb deficits  Psych oriented, pleasant affect  Skin no rash, brisk cap refill  Procedures           ED Course      My EKG interpretation sinus rhythm rate of 59, no acute ST or T " wave abnormalities     Results for orders placed or performed during the hospital encounter of 10/14/20   Comprehensive Metabolic Panel    Specimen: Blood   Result Value Ref Range    Glucose 97 65 - 99 mg/dL    BUN      Creatinine 0.83 0.57 - 1.00 mg/dL    Sodium 145 136 - 145 mmol/L    Potassium 3.8 3.5 - 5.2 mmol/L    Chloride 106 98 - 107 mmol/L    CO2 30.0 (H) 22.0 - 29.0 mmol/L    Calcium 9.2 8.6 - 10.5 mg/dL    Total Protein 6.6 6.0 - 8.5 g/dL    Albumin 4.00 3.50 - 5.20 g/dL    ALT (SGPT) 15 1 - 33 U/L    AST (SGOT) 21 1 - 32 U/L    Alkaline Phosphatase 84 39 - 117 U/L    Total Bilirubin 1.2 0.0 - 1.2 mg/dL    eGFR Non African Amer 66 >60 mL/min/1.73    Globulin 2.6 gm/dL    A/G Ratio 1.5 g/dL    BUN/Creatinine Ratio      Anion Gap 9.0 5.0 - 15.0 mmol/L   Troponin    Specimen: Blood   Result Value Ref Range    Troponin T <0.010 0.000 - 0.030 ng/mL   CBC Auto Differential    Specimen: Blood   Result Value Ref Range    WBC 7.20 3.40 - 10.80 10*3/mm3    RBC 4.84 3.77 - 5.28 10*6/mm3    Hemoglobin 14.9 12.0 - 15.9 g/dL    Hematocrit 44.9 34.0 - 46.6 %    MCV 92.8 79.0 - 97.0 fL    MCH 30.8 26.6 - 33.0 pg    MCHC 33.2 31.5 - 35.7 g/dL    RDW 12.6 12.3 - 15.4 %    RDW-SD 40.7 37.0 - 54.0 fl    MPV 7.7 6.0 - 12.0 fL    Platelets 272 140 - 450 10*3/mm3    Neutrophil % 58.5 42.7 - 76.0 %    Lymphocyte % 27.1 19.6 - 45.3 %    Monocyte % 10.4 5.0 - 12.0 %    Eosinophil % 3.1 0.3 - 6.2 %    Basophil % 0.9 0.0 - 1.5 %    Neutrophils, Absolute 4.20 1.70 - 7.00 10*3/mm3    Lymphocytes, Absolute 2.00 0.70 - 3.10 10*3/mm3    Monocytes, Absolute 0.70 0.10 - 0.90 10*3/mm3    Eosinophils, Absolute 0.20 0.00 - 0.40 10*3/mm3    Basophils, Absolute 0.10 0.00 - 0.20 10*3/mm3    nRBC 0.0 0.0 - 0.2 /100 WBC   BUN    Specimen: Blood   Result Value Ref Range    BUN 8 8 - 23 mg/dL   Green Top (Gel)   Result Value Ref Range    Extra Tube HOLD      Xr Chest 1 View    Result Date: 10/14/2020  No acute cardiopulmonary abnormality.   Electronically Signed By-Shalini Goodwin MD On:10/14/2020 1:49 PM This report was finalized on [id number] by  Shalini Goodwin MD.                                    MDM  Patient presents with chest pain.  She has been getting relief with nitroglycerin.  She was resting comfortably on reexamination.  She was given nitroglycerin paste she does have some uncontrolled hypertension and states she did not take her morning lisinopril.  Her lisinopril 20 mg oral dose was ordered.  The hospital service was paged and patient will be placed asp observation for further chest pain evaluation.  She does have risk factors for coronary artery disease.  She is not scribed symptoms of dissection or pneumonia or DVT.  Patient was agreeable to plan  Final diagnoses:   Chest pain, unspecified type   Uncontrolled hypertension            John Culver MD  10/14/20 [id number]

## 2020-10-14 NOTE — ED NOTES
Pt c/o sharp pain in her upper back between her shoulder blades since last night and pain down her right arm, strong family history of heart disease     Jeanne Hancock, RN  10/14/20 [id number]

## 2020-10-15 ENCOUNTER — READMISSION MANAGEMENT (OUTPATIENT)
Dept: CALL CENTER | Facility: HOSPITAL | Age: 80
End: 2020-10-15

## 2020-10-15 ENCOUNTER — APPOINTMENT (OUTPATIENT)
Dept: NUCLEAR MEDICINE | Facility: HOSPITAL | Age: 80
End: 2020-10-15

## 2020-10-15 ENCOUNTER — APPOINTMENT (OUTPATIENT)
Dept: CARDIOLOGY | Facility: HOSPITAL | Age: 80
End: 2020-10-15

## 2020-10-15 VITALS
WEIGHT: 148.9 LBS | TEMPERATURE: 96.6 F | HEART RATE: 65 BPM | SYSTOLIC BLOOD PRESSURE: 147 MMHG | OXYGEN SATURATION: 93 % | RESPIRATION RATE: 16 BRPM | HEIGHT: 62 IN | DIASTOLIC BLOOD PRESSURE: 72 MMHG | BODY MASS INDEX: 27.4 KG/M2

## 2020-10-15 LAB
ANION GAP SERPL CALCULATED.3IONS-SCNC: 10 MMOL/L (ref 5–15)
BASOPHILS # BLD AUTO: 0.1 10*3/MM3 (ref 0–0.2)
BASOPHILS NFR BLD AUTO: 0.8 % (ref 0–1.5)
BH CV ECHO MEAS - % IVS THICK: 39.1 %
BH CV ECHO MEAS - % LVPW THICK: 23.6 %
BH CV ECHO MEAS - ACS: 1.8 CM
BH CV ECHO MEAS - AO MAX PG (FULL): -1.2 MMHG
BH CV ECHO MEAS - AO MAX PG: 3.9 MMHG
BH CV ECHO MEAS - AO MEAN PG (FULL): -0.26 MMHG
BH CV ECHO MEAS - AO MEAN PG: 2.3 MMHG
BH CV ECHO MEAS - AO ROOT AREA (BSA CORRECTED): 1.6
BH CV ECHO MEAS - AO ROOT AREA: 5.5 CM^2
BH CV ECHO MEAS - AO ROOT DIAM: 2.6 CM
BH CV ECHO MEAS - AO V2 MAX: 99.1 CM/SEC
BH CV ECHO MEAS - AO V2 MEAN: 72.1 CM/SEC
BH CV ECHO MEAS - AO V2 VTI: 19.5 CM
BH CV ECHO MEAS - AVA(I,A): 2.8 CM^2
BH CV ECHO MEAS - AVA(I,D): 2.8 CM^2
BH CV ECHO MEAS - AVA(V,A): 2.7 CM^2
BH CV ECHO MEAS - AVA(V,D): 2.7 CM^2
BH CV ECHO MEAS - BSA(HAYCOCK): 1.7 M^2
BH CV ECHO MEAS - BSA: 1.7 M^2
BH CV ECHO MEAS - BZI_BMI: 27.1 KILOGRAMS/M^2
BH CV ECHO MEAS - BZI_METRIC_HEIGHT: 157.5 CM
BH CV ECHO MEAS - BZI_METRIC_WEIGHT: 67.1 KG
BH CV ECHO MEAS - EDV(CUBED): 52.8 ML
BH CV ECHO MEAS - EDV(MOD-SP4): 36.1 ML
BH CV ECHO MEAS - EDV(TEICH): 60 ML
BH CV ECHO MEAS - EF(CUBED): 81.5 %
BH CV ECHO MEAS - EF(MOD-BP): 75 %
BH CV ECHO MEAS - EF(MOD-SP4): 75.4 %
BH CV ECHO MEAS - EF(TEICH): 74.9 %
BH CV ECHO MEAS - ESV(CUBED): 9.7 ML
BH CV ECHO MEAS - ESV(MOD-SP4): 8.9 ML
BH CV ECHO MEAS - ESV(TEICH): 15 ML
BH CV ECHO MEAS - FS: 43 %
BH CV ECHO MEAS - IVS/LVPW: 1.2
BH CV ECHO MEAS - IVSD: 1.2 CM
BH CV ECHO MEAS - IVSS: 1.6 CM
BH CV ECHO MEAS - LA DIMENSION(2D): 2.7 CM
BH CV ECHO MEAS - LV DIASTOLIC VOL/BSA (35-75): 21.4 ML/M^2
BH CV ECHO MEAS - LV MASS(C)D: 128.7 GRAMS
BH CV ECHO MEAS - LV MASS(C)DI: 76.5 GRAMS/M^2
BH CV ECHO MEAS - LV MASS(C)S: 95.8 GRAMS
BH CV ECHO MEAS - LV MASS(C)SI: 57 GRAMS/M^2
BH CV ECHO MEAS - LV MAX PG: 5.2 MMHG
BH CV ECHO MEAS - LV MEAN PG: 2.5 MMHG
BH CV ECHO MEAS - LV SYSTOLIC VOL/BSA (12-30): 5.3 ML/M^2
BH CV ECHO MEAS - LV V1 MAX: 113.7 CM/SEC
BH CV ECHO MEAS - LV V1 MEAN: 74.4 CM/SEC
BH CV ECHO MEAS - LV V1 VTI: 22.9 CM
BH CV ECHO MEAS - LVIDD: 3.8 CM
BH CV ECHO MEAS - LVIDS: 2.1 CM
BH CV ECHO MEAS - LVOT AREA: 2.4 CM^2
BH CV ECHO MEAS - LVOT DIAM: 1.7 CM
BH CV ECHO MEAS - LVPWD: 0.99 CM
BH CV ECHO MEAS - LVPWS: 1.2 CM
BH CV ECHO MEAS - MV A MAX VEL: 102.5 CM/SEC
BH CV ECHO MEAS - MV DEC SLOPE: 201.3 CM/SEC^2
BH CV ECHO MEAS - MV DEC TIME: 0.31 SEC
BH CV ECHO MEAS - MV E MAX VEL: 62.1 CM/SEC
BH CV ECHO MEAS - MV E/A: 0.61
BH CV ECHO MEAS - MV MAX PG: 4.4 MMHG
BH CV ECHO MEAS - MV MEAN PG: 1.1 MMHG
BH CV ECHO MEAS - MV V2 MAX: 105.4 CM/SEC
BH CV ECHO MEAS - MV V2 MEAN: 47.2 CM/SEC
BH CV ECHO MEAS - MV V2 VTI: 21.2 CM
BH CV ECHO MEAS - MVA(VTI): 2.5 CM^2
BH CV ECHO MEAS - PA ACC TIME: 0.07 SEC
BH CV ECHO MEAS - PA MAX PG (FULL): 0.63 MMHG
BH CV ECHO MEAS - PA MAX PG: 2.3 MMHG
BH CV ECHO MEAS - PA MEAN PG (FULL): 0.38 MMHG
BH CV ECHO MEAS - PA MEAN PG: 1.2 MMHG
BH CV ECHO MEAS - PA PR(ACCEL): 49.4 MMHG
BH CV ECHO MEAS - PA V2 MAX: 75.8 CM/SEC
BH CV ECHO MEAS - PA V2 MEAN: 52.9 CM/SEC
BH CV ECHO MEAS - PA V2 VTI: 12.1 CM
BH CV ECHO MEAS - PULM A REVS DUR: 0.1 SEC
BH CV ECHO MEAS - PULM A REVS VEL: 34.4 CM/SEC
BH CV ECHO MEAS - PULM DIAS VEL: 24.6 CM/SEC
BH CV ECHO MEAS - PULM S/D: 2.3
BH CV ECHO MEAS - PULM SYS VEL: 56.6 CM/SEC
BH CV ECHO MEAS - RAP SYSTOLE: 3 MMHG
BH CV ECHO MEAS - RV MAX PG: 1.7 MMHG
BH CV ECHO MEAS - RV MEAN PG: 0.86 MMHG
BH CV ECHO MEAS - RV V1 MAX: 64.6 CM/SEC
BH CV ECHO MEAS - RV V1 MEAN: 43 CM/SEC
BH CV ECHO MEAS - RV V1 VTI: 9.2 CM
BH CV ECHO MEAS - RVDD: 2.3 CM
BH CV ECHO MEAS - RVSP: 17.6 MMHG
BH CV ECHO MEAS - SI(AO): 63.3 ML/M^2
BH CV ECHO MEAS - SI(CUBED): 25.6 ML/M^2
BH CV ECHO MEAS - SI(LVOT): 32.1 ML/M^2
BH CV ECHO MEAS - SI(MOD-SP4): 16.2 ML/M^2
BH CV ECHO MEAS - SI(TEICH): 26.8 ML/M^2
BH CV ECHO MEAS - SV(AO): 106.4 ML
BH CV ECHO MEAS - SV(CUBED): 43 ML
BH CV ECHO MEAS - SV(LVOT): 53.9 ML
BH CV ECHO MEAS - SV(MOD-SP4): 27.2 ML
BH CV ECHO MEAS - SV(TEICH): 45 ML
BH CV ECHO MEAS - TR MAX VEL: 191.4 CM/SEC
BH CV NUCLEAR PRIOR STUDY: 3
BH CV STRESS BP STAGE 1: NORMAL
BH CV STRESS BP STAGE 2: NORMAL
BH CV STRESS BP STAGE 3: NORMAL
BH CV STRESS COMMENTS STAGE 1: NORMAL
BH CV STRESS COMMENTS STAGE 2: NORMAL
BH CV STRESS DOSE REGADENOSON STAGE 1: 0.4
BH CV STRESS DURATION MIN STAGE 1: 0
BH CV STRESS DURATION MIN STAGE 2: 4
BH CV STRESS DURATION SEC STAGE 1: 10
BH CV STRESS DURATION SEC STAGE 2: 0
BH CV STRESS GRADE STAGE 1: 10
BH CV STRESS HR STAGE 1: 65
BH CV STRESS HR STAGE 2: 87
BH CV STRESS HR STAGE 3: 82
BH CV STRESS METS STAGE 1: 5
BH CV STRESS PROTOCOL 1: NORMAL
BH CV STRESS RECOVERY BP: NORMAL MMHG
BH CV STRESS RECOVERY HR: 82 BPM
BH CV STRESS SPEED STAGE 1: 1.7
BH CV STRESS STAGE 1: 1
BH CV STRESS STAGE 2: 2
BH CV STRESS STAGE 3: 3
BUN SERPL-MCNC: 10 MG/DL (ref 8–23)
BUN SERPL-MCNC: 15 MG/DL (ref 8–23)
BUN SERPL-MCNC: ABNORMAL MG/DL
BUN/CREAT SERPL: ABNORMAL
CALCIUM SPEC-SCNC: 8.5 MG/DL (ref 8.6–10.5)
CHLORIDE SERPL-SCNC: 103 MMOL/L (ref 98–107)
CO2 SERPL-SCNC: 29 MMOL/L (ref 22–29)
CREAT SERPL-MCNC: 1.16 MG/DL (ref 0.57–1)
DEPRECATED RDW RBC AUTO: 40.7 FL (ref 37–54)
EOSINOPHIL # BLD AUTO: 0.3 10*3/MM3 (ref 0–0.4)
EOSINOPHIL NFR BLD AUTO: 4 % (ref 0.3–6.2)
ERYTHROCYTE [DISTWIDTH] IN BLOOD BY AUTOMATED COUNT: 12.6 % (ref 12.3–15.4)
GFR SERPL CREATININE-BSD FRML MDRD: 45 ML/MIN/1.73
GLUCOSE SERPL-MCNC: 98 MG/DL (ref 65–99)
HCT VFR BLD AUTO: 41 % (ref 34–46.6)
HGB BLD-MCNC: 13.8 G/DL (ref 12–15.9)
LV EF 2D ECHO EST: 85 %
LYMPHOCYTES # BLD AUTO: 3.1 10*3/MM3 (ref 0.7–3.1)
LYMPHOCYTES NFR BLD AUTO: 36.7 % (ref 19.6–45.3)
MAXIMAL PREDICTED HEART RATE: 140 BPM
MCH RBC QN AUTO: 30.8 PG (ref 26.6–33)
MCHC RBC AUTO-ENTMCNC: 33.6 G/DL (ref 31.5–35.7)
MCV RBC AUTO: 91.6 FL (ref 79–97)
MONOCYTES # BLD AUTO: 1 10*3/MM3 (ref 0.1–0.9)
MONOCYTES NFR BLD AUTO: 11.4 % (ref 5–12)
NEUTROPHILS NFR BLD AUTO: 3.9 10*3/MM3 (ref 1.7–7)
NEUTROPHILS NFR BLD AUTO: 47.1 % (ref 42.7–76)
NRBC BLD AUTO-RTO: 0.1 /100 WBC (ref 0–0.2)
PERCENT MAX PREDICTED HR: 62.14 %
PLATELET # BLD AUTO: 265 10*3/MM3 (ref 140–450)
PMV BLD AUTO: 7.9 FL (ref 6–12)
POTASSIUM SERPL-SCNC: 3.8 MMOL/L (ref 3.5–5.2)
RBC # BLD AUTO: 4.48 10*6/MM3 (ref 3.77–5.28)
SODIUM SERPL-SCNC: 142 MMOL/L (ref 136–145)
STRESS BASELINE BP: NORMAL MMHG
STRESS BASELINE HR: 60 BPM
STRESS PERCENT HR: 73 %
STRESS POST PEAK BP: NORMAL MMHG
STRESS POST PEAK HR: 87 BPM
STRESS TARGET HR: 119 BPM
WBC # BLD AUTO: 8.4 10*3/MM3 (ref 3.4–10.8)

## 2020-10-15 PROCEDURE — 78452 HT MUSCLE IMAGE SPECT MULT: CPT | Performed by: INTERNAL MEDICINE

## 2020-10-15 PROCEDURE — 78452 HT MUSCLE IMAGE SPECT MULT: CPT

## 2020-10-15 PROCEDURE — 80048 BASIC METABOLIC PNL TOTAL CA: CPT | Performed by: HOSPITALIST

## 2020-10-15 PROCEDURE — 93306 TTE W/DOPPLER COMPLETE: CPT | Performed by: INTERNAL MEDICINE

## 2020-10-15 PROCEDURE — 99214 OFFICE O/P EST MOD 30 MIN: CPT | Performed by: INTERNAL MEDICINE

## 2020-10-15 PROCEDURE — 25010000002 REGADENOSON 0.4 MG/5ML SOLUTION: Performed by: HOSPITALIST

## 2020-10-15 PROCEDURE — A9500 TC99M SESTAMIBI: HCPCS | Performed by: HOSPITALIST

## 2020-10-15 PROCEDURE — G0378 HOSPITAL OBSERVATION PER HR: HCPCS

## 2020-10-15 PROCEDURE — 0 TECHNETIUM SESTAMIBI: Performed by: HOSPITALIST

## 2020-10-15 PROCEDURE — 93306 TTE W/DOPPLER COMPLETE: CPT

## 2020-10-15 PROCEDURE — 93017 CV STRESS TEST TRACING ONLY: CPT

## 2020-10-15 PROCEDURE — 93018 CV STRESS TEST I&R ONLY: CPT | Performed by: INTERNAL MEDICINE

## 2020-10-15 PROCEDURE — 96372 THER/PROPH/DIAG INJ SC/IM: CPT

## 2020-10-15 PROCEDURE — 93016 CV STRESS TEST SUPVJ ONLY: CPT | Performed by: INTERNAL MEDICINE

## 2020-10-15 PROCEDURE — 25010000002 HEPARIN (PORCINE) PER 1000 UNITS: Performed by: HOSPITALIST

## 2020-10-15 PROCEDURE — 99217 PR OBSERVATION CARE DISCHARGE MANAGEMENT: CPT | Performed by: HOSPITALIST

## 2020-10-15 PROCEDURE — 85025 COMPLETE CBC W/AUTO DIFF WBC: CPT | Performed by: HOSPITALIST

## 2020-10-15 RX ORDER — MONTELUKAST SODIUM 10 MG/1
10 TABLET ORAL NIGHTLY
Qty: 15 TABLET | Refills: 0 | OUTPATIENT
Start: 2020-10-15 | End: 2021-01-18

## 2020-10-15 RX ORDER — ISOSORBIDE MONONITRATE 30 MG/1
30 TABLET, EXTENDED RELEASE ORAL DAILY
Qty: 90 TABLET | Refills: 3 | Status: SHIPPED | OUTPATIENT
Start: 2020-10-15 | End: 2020-10-30

## 2020-10-15 RX ADMIN — HEPARIN SODIUM 5000 UNITS: 5000 INJECTION INTRAVENOUS; SUBCUTANEOUS at 14:47

## 2020-10-15 RX ADMIN — ASPIRIN 81 MG: 81 TABLET, COATED ORAL at 09:09

## 2020-10-15 RX ADMIN — Medication 10 ML: at 09:10

## 2020-10-15 RX ADMIN — SODIUM CHLORIDE 1000 ML: 9 INJECTION, SOLUTION INTRAVENOUS at 11:08

## 2020-10-15 RX ADMIN — ISOSORBIDE MONONITRATE 30 MG: 30 TABLET, EXTENDED RELEASE ORAL at 09:09

## 2020-10-15 RX ADMIN — ACETAMINOPHEN 650 MG: 325 TABLET, FILM COATED ORAL at 00:12

## 2020-10-15 RX ADMIN — TECHNETIUM TC 99M SESTAMIBI 1 DOSE: 1 INJECTION INTRAVENOUS at 12:15

## 2020-10-15 RX ADMIN — ATORVASTATIN CALCIUM 20 MG: 20 TABLET, FILM COATED ORAL at 09:10

## 2020-10-15 RX ADMIN — LISINOPRIL 20 MG: 20 TABLET ORAL at 09:10

## 2020-10-15 RX ADMIN — HEPARIN SODIUM 5000 UNITS: 5000 INJECTION INTRAVENOUS; SUBCUTANEOUS at 05:30

## 2020-10-15 RX ADMIN — ACETAMINOPHEN 650 MG: 325 TABLET, FILM COATED ORAL at 09:20

## 2020-10-15 RX ADMIN — Medication 10 ML: at 09:20

## 2020-10-15 RX ADMIN — REGADENOSON 0.4 MG: 0.08 INJECTION, SOLUTION INTRAVENOUS at 12:15

## 2020-10-15 RX ADMIN — CARVEDILOL 12.5 MG: 6.25 TABLET, FILM COATED ORAL at 09:10

## 2020-10-15 RX ADMIN — TECHNETIUM TC 99M SESTAMIBI 1 DOSE: 1 INJECTION INTRAVENOUS at 11:10

## 2020-10-15 RX ADMIN — ACETAMINOPHEN 650 MG: 325 TABLET, FILM COATED ORAL at 14:49

## 2020-10-15 NOTE — DISCHARGE SUMMARY
Date of Admission: 10/14/2020    Date of Discharge:  10/15/2020    Length of stay:  LOS: 0 days   Hospital Course  CHIEF COMPLAINT: Chest pain     HISTORY OF PRESENT ILLNESS:     Patient is a 80-year-old female with past medical history as outlined below.  She is presenting to the ER today for evaluation due to chest pain.  She said she had an episode of chest pain last night about 10 PM while watching TV.  She said it was a left-sided chest pain radiated to her left shoulder.  Lasted for a few seconds and went away.  She then went to sleep woke up this morning and while eating breakfast again started feeling same type of left-sided chest pain.  She describes it as a sharp feeling rating to her left shoulder.  Associated with lightheadedness mild shortness of breath and diaphoresis.  She says she took her 's nitroglycerin x2 and the chest pain subsided.  Otherwise she has been kind of feeling malaise lately and weak.  This lasted for the past few days.  She is had no fevers no chills no cough no dyspnea on exertion no PND no orthopnea no lower extremity edema no headache no focal weakness no focal numbness no palpitations no rashes.     ER: Chest x-ray negative, heart rate with sinus bradycardia no ST-T changes, troponin 0 0.01 blood pressure elevated 171/75 with pulse of 62 and pulse ox of 94 on room air 98.1 temperature otherwise WBC count 7.2 hemoglobin 14.9 and platelet count 272 creatinine 0.83 sodium 145 potassium 3.8 LFTs normal.     Review of systems negative apart for HPI    Physical Exam   Constitutional:  oriented to person, place, and time. No distress.   HENT:   Head: Normocephalic and atraumatic.   Eyes: Conjunctivae and EOM are normal. Pupils are equal, round, and reactive to light.   Neck: No JVD present. No thyromegaly present.   Cardiovascular: Normal rate, regular rhythm, normal heart sounds and intact distal pulses. Exam reveals no gallop and no friction rub.   No murmur  heard.  Pulmonary/Chest: Effort normal and breath sounds normal. No stridor. No respiratory distress.  has no wheezes.  has no rales.  exhibits no tenderness.   Abdominal: Soft. Bowel sounds are normal.  no distension and no mass. There is no tenderness. There is no rebound and no guarding. No hernia.   Musculoskeletal: Normal range of motion.   Lymphadenopathy:     no cervical adenopathy.   Neurological:  alert and oriented to person, place, and time. No cranial nerve deficit or sensory deficit. exhibits normal muscle tone.   Skin: No rash noted.  not diaphoretic.   Psychiatric:  normal mood and affect.   Vitals reviewed.      Hospital course and problem list  Chest pain, resolved  Ruled out ACS  Consult cardiology  bending  Aspirin, nitro, morphine, O2  Beta-blocker  Chest x-ray negative     Hypertension  Better controlled now  Started home meds lisinopril carvedilol     CAD  Restarted Imdur, restart carvedilol aspirin statin     ckd   Stage III  Creatinine 1.16 we will give her IV fluids today.  Bolus for 1 L.  She will increase her p.o. intake of water outpatient.  Follow-up with nephrology as an outpatient     DVT PUD prophylaxis     Plan possible discharge later today depending on cardiology's plan.    Condition stable        Pertinent Test Results:     Lab Results (last 48 hours)     Procedure Component Value Units Date/Time    BUN [[id number]]  (Normal) Collected: 10/15/20 0250    Specimen: Blood Updated: 10/15/20 0918     BUN 15 mg/dL     Basic Metabolic Panel [[id number]]  (Abnormal) Collected: 10/15/20 0250    Specimen: Blood Updated: 10/15/20 0407     Glucose 98 mg/dL      BUN --     Comment: Testing performed by alternate method        Creatinine 1.16 mg/dL      Sodium 142 mmol/L      Potassium 3.8 mmol/L      Comment: Slight hemolysis detected by analyzer. Results may be affected.        Chloride 103 mmol/L      CO2 29.0 mmol/L      Calcium 8.5 mg/dL      eGFR Non African Amer 45 mL/min/1.73       BUN/Creatinine Ratio --     Comment: Testing not performed        Anion Gap 10.0 mmol/L     Narrative:      GFR Normal >60  Chronic Kidney Disease <60  Kidney Failure <15      CBC & Differential [[id number]]  (Abnormal) Collected: 10/15/20 0250    Specimen: Blood Updated: 10/15/20 0351    Narrative:      The following orders were created for panel order CBC & Differential.  Procedure                               Abnormality         Status                     ---------                               -----------         ------                     CBC Auto Differential[[id number]]        Abnormal            Final result                 Please view results for these tests on the individual orders.    CBC Auto Differential [[id number]]  (Abnormal) Collected: 10/15/20 0250    Specimen: Blood Updated: 10/15/20 0351     WBC 8.40 10*3/mm3      RBC 4.48 10*6/mm3      Hemoglobin 13.8 g/dL      Hematocrit 41.0 %      MCV 91.6 fL      MCH 30.8 pg      MCHC 33.6 g/dL      RDW 12.6 %      RDW-SD 40.7 fl      MPV 7.9 fL      Platelets 265 10*3/mm3      Neutrophil % 47.1 %      Lymphocyte % 36.7 %      Monocyte % 11.4 %      Eosinophil % 4.0 %      Basophil % 0.8 %      Neutrophils, Absolute 3.90 10*3/mm3      Lymphocytes, Absolute 3.10 10*3/mm3      Monocytes, Absolute 1.00 10*3/mm3      Eosinophils, Absolute 0.30 10*3/mm3      Basophils, Absolute 0.10 10*3/mm3      nRBC 0.1 /100 WBC     BUN [[id number]]  (Normal) Collected: 10/14/20 2033    Specimen: Blood Updated: 10/15/20 0157     BUN 10 mg/dL     Basic Metabolic Panel [[id number]]  (Abnormal) Collected: 10/14/20 2033    Specimen: Blood Updated: 10/14/20 2106     Glucose 122 mg/dL      BUN --     Comment: Testing performed by alternate method        Creatinine 1.15 mg/dL      Sodium 143 mmol/L      Potassium 3.9 mmol/L      Chloride 106 mmol/L      CO2 28.0 mmol/L      Calcium 8.9 mg/dL      eGFR Non African Amer 45 mL/min/1.73      BUN/Creatinine Ratio --     Comment: Testing not  performed        Anion Gap 9.0 mmol/L     Narrative:      GFR Normal >60  Chronic Kidney Disease <60  Kidney Failure <15      Troponin [[id number]]  (Normal) Collected: 10/14/20 2033    Specimen: Blood Updated: 10/14/20 2106     Troponin T <0.010 ng/mL     Narrative:      Troponin T Reference Range:  <= 0.03 ng/mL-   Negative for AMI  >0.03 ng/mL-     Abnormal for myocardial necrosis.  Clinicians would have to utilize clinical acumen, EKG, Troponin and serial changes to determine if it is an Acute Myocardial Infarction or myocardial injury due to an underlying chronic condition.       Results may be falsely decreased if patient taking Biotin.      [place] Draw [[id number]] Collected: 10/14/20 1454    Specimen: Blood Updated: 10/14/20 1600    Narrative:      The following orders were created for panel order [place] Draw.  Procedure                               Abnormality         Status                     ---------                               -----------         ------                     Light Blue Top[[id number]]                                   Final result               Green Top (Gel)[[id number]]                                  Final result               Lavender Top[[id number]]                                                                Gold Top - SST[[id number]]                                   Final result                 Please view results for these tests on the individual orders.    Light Blue Top [[id number]] Collected: 10/14/20 1454    Specimen: Blood Updated: 10/14/20 1600     Extra Tube hold for add-on     Comment: Auto resulted       Gold Top - SST [[id number]] Collected: 10/14/20 1454    Specimen: Blood Updated: 10/14/20 1600     Extra Tube Hold for add-ons.     Comment: Auto resulted.       Green Top (Gel) [[id number]] Collected: 10/14/20 1454    Specimen: Blood Updated: 10/14/20 1538     Extra Tube HOLD    Comprehensive Metabolic Panel [[id number]]  (Abnormal) Collected: 10/14/20 1454    Specimen:  Blood Updated: 10/14/20 1529     Glucose 97 mg/dL      BUN --     Comment: Testing performed by alternate method        Creatinine 0.83 mg/dL      Sodium 145 mmol/L      Potassium 3.8 mmol/L      Chloride 106 mmol/L      CO2 30.0 mmol/L      Calcium 9.2 mg/dL      Total Protein 6.6 g/dL      Albumin 4.00 g/dL      ALT (SGPT) 15 U/L      AST (SGOT) 21 U/L      Alkaline Phosphatase 84 U/L      Total Bilirubin 1.2 mg/dL      eGFR Non African Amer 66 mL/min/1.73      Globulin 2.6 gm/dL      A/G Ratio 1.5 g/dL      BUN/Creatinine Ratio --     Comment: Testing not performed        Anion Gap 9.0 mmol/L     Narrative:      GFR Normal >60  Chronic Kidney Disease <60  Kidney Failure <15      Troponin [[id number]]  (Normal) Collected: 10/14/20 1454    Specimen: Blood Updated: 10/14/20 1529     Troponin T <0.010 ng/mL     Narrative:      Troponin T Reference Range:  <= 0.03 ng/mL-   Negative for AMI  >0.03 ng/mL-     Abnormal for myocardial necrosis.  Clinicians would have to utilize clinical acumen, EKG, Troponin and serial changes to determine if it is an Acute Myocardial Infarction or myocardial injury due to an underlying chronic condition.       Results may be falsely decreased if patient taking Biotin.      BUN [[id number]]  (Normal) Collected: 10/14/20 1454    Specimen: Blood Updated: 10/14/20 1529     BUN 8 mg/dL     CBC & Differential [[id number]]  (Normal) Collected: 10/14/20 1454    Specimen: Blood Updated: 10/14/20 1507    Narrative:      The following orders were created for panel order CBC & Differential.  Procedure                               Abnormality         Status                     ---------                               -----------         ------                     CBC Auto Differential[[id number]]        Normal              Final result                 Please view results for these tests on the individual orders.    CBC Auto Differential [[id number]]  (Normal) Collected: 10/14/20 1454    Specimen: Blood  "Updated: 10/14/20 1507     WBC 7.20 10*3/mm3      RBC 4.84 10*6/mm3      Hemoglobin 14.9 g/dL      Hematocrit 44.9 %      MCV 92.8 fL      MCH 30.8 pg      MCHC 33.2 g/dL      RDW 12.6 %      RDW-SD 40.7 fl      MPV 7.7 fL      Platelets 272 10*3/mm3      Neutrophil % 58.5 %      Lymphocyte % 27.1 %      Monocyte % 10.4 %      Eosinophil % 3.1 %      Basophil % 0.9 %      Neutrophils, Absolute 4.20 10*3/mm3      Lymphocytes, Absolute 2.00 10*3/mm3      Monocytes, Absolute 0.70 10*3/mm3      Eosinophils, Absolute 0.20 10*3/mm3      Basophils, Absolute 0.10 10*3/mm3      nRBC 0.0 /100 WBC                  Imaging Results (All)     Procedure Component Value Units Date/Time    XR Chest 1 View [[id number]] Collected: 10/14/20 1348     Updated: 10/14/20 1351    Narrative:      DATE OF EXAM:  10/14/2020 1:45 PM     PROCEDURE:  XR CHEST 1 VW-     INDICATIONS:  Chest pain protocol     COMPARISON:  05/11/2020     TECHNIQUE:   Single radiographic view of the chest was obtained.     FINDINGS:  Lungs are normally expanded. Heart size is normal. No pneumothorax or  pleural effusion or focal pulmonary parenchymal opacity. Pulmonary  vessels are distinct. Bones and soft tissues are normal.       Impression:      No acute cardiopulmonary abnormality.     Electronically Signed By-Shalini Goodwin MD On:10/14/2020 1:49 PM  This report was finalized on [id number] by  Shalini Goodwin MD.            Vital Signs  Visit Vitals  /75 (BP Location: Right arm, Patient Position: Lying)   Pulse 67   Temp 97.9 °F (36.6 °C) (Oral)   Resp 16   Ht 157.5 cm (62\")   Wt 67.5 kg (148 lb 14.4 oz)   SpO2 93%   BMI 27.23 kg/m²       Physical Exam:  Physical Exam      Discharge Medications     Discharge Medications      Continue These Medications      Instructions Start Date   ascorbic acid 1000 MG tablet  Commonly known as: VITAMIN C   500 mg, Oral, 2 times daily      Aspir-Low 81 MG EC tablet  Generic drug: aspirin   81 mg, Oral, Every 24 Hours    "   atorvastatin 20 MG tablet  Commonly known as: LIPITOR   20 mg, Oral, Every Night at Bedtime      Calcium Carb-Cholecalciferol 1000-800 MG-UNIT tablet   1 tablet, Oral, Daily      carvedilol 12.5 MG tablet  Commonly known as: COREG   12.5 mg, Oral, 2 Times Daily      HM Omega-3-6-9 Fatty Acids capsule   1 capsule, Oral, Daily      isosorbide mononitrate 30 MG 24 hr tablet  Commonly known as: IMDUR   30 mg, Oral, Daily      lisinopril 20 MG tablet  Commonly known as: PRINIVIL,ZESTRIL   20 mg, Oral, 2 Times Daily      montelukast 10 MG tablet  Commonly known as: SINGULAIR   10 mg, Oral, Nightly      multivitamin-minerals tablet   1 tablet, Oral, Daily      vitamin B-12 1000 MCG tablet  Commonly known as: CYANOCOBALAMIN   1,000 mcg, Oral, Daily             Discharge Diet:   Diet Instructions     Diet: Cardiac      Discharge Diet: Cardiac          Activity at Discharge:   Activity Instructions     Activity as Tolerated            Follow-up Appointments  Future Appointments   Date Time Provider Department [place]   7/20/2021  1:50 PM Matti Luna MD [facility]     Additional Instructions for the Follow-ups that You Need to Schedule     Discharge Follow-up with PCP   As directed       Currently Documented PCP:    Neena Garza MD    PCP Phone Number:    [phone]     Follow Up Details: one week         Discharge Follow-up with Specialty: cardiology one week   As directed      Specialty: cardiology one week                   Scotty Araya MD  10/15/20  10:01 EDT    Time: Discharge 38 min

## 2020-10-15 NOTE — CONSULTS
"  Referring Provider: Scotty Araya MD  Reason for Consultation:  Chest discomfort    Patient Care Team:  Neena Garza MD as PCP - General  Neena Garza MD as PCP - Family Medicine  Neena Garza MD as PCP - Claims Attributed  Neena Garza MD as Consulting Physician (Internal Medicine)  Mera Boothe MD as Surgeon (Orthopedic Surgery)  Matti Luna MD as Consulting Physician (Cardiology)    Chief complaint chest discomfort    Subjective .     History of present illness:  Maria D Fountain is a 80 y.o. female who presents with history of chest discomfort she started having the chest discomfort yesterday while she was watching TV and it is left-sided with radiation into the left shoulder sharp in nature lasting for a few seconds.  She went to sleep and this morning while she was eating breakfast she had similar symptoms sharp in nature.  No other associated aggravating or elevating factors.  Patient apparently took her 's nitroglycerin and chest discomfort was relieved.  Patient has a lot of anxiety recently her  passed away of lung cancer and having social legal problems with her son recently.  Patient denies having any fever cough chills.  No other associated aggravating or alleviating factors..      In the ER \" Chest x-ray negative, heart rate with sinus bradycardia no ST-T changes, troponin 0 0.01 blood pressure elevated 171/75 with pulse of 62 and pulse ox of 94 on room air 98.1 temperature otherwise WBC count 7.2 hemoglobin 14.9 and platelet count 272 creatinine 0.83 sodium 145 potassium 3.8 LFTs normal.\"        ROS      Patient is not having any shortness of breath, palpitations, dizziness or syncope.  Denies having any headache ,abdominal pain ,nausea, vomiting , diarrhea constipation, loss of weight or loss of appetite.  Denies having any excessive bruising ,hematuria or blood in the stool.    Review of all systems negative except as " indicated      History  Past Medical History:   Diagnosis Date   • Arthritis    • Diabetes mellitus (CMS/HCC)    • DJD (degenerative joint disease)    • Gallstones    • Heart murmur    • History of stress test 07/2011    Stress myoview- neg    • Hypertension    • Lumbar disc disease    • Mitral regurgitation    • Multiple lipomas    • Osteoporosis    • Pleurisy        Past Surgical History:   Procedure Laterality Date   • BREAST BIOPSY Right 1990   • CARDIAC CATHETERIZATION  06/21/2017   • HEMANGIOMA EXCISION Left 2010    left forearm    • HX OVARIAN CYSTECTOMY  1962   • HYSTERECTOMY  2004       Family History   Problem Relation Age of Onset   • Heart disease Father         MI   • Arthritis Other    • Hypertension Other    • Colon cancer Other        Social History     Tobacco Use   • Smoking status: Never Smoker   • Smokeless tobacco: Never Used   Substance Use Topics   • Alcohol use: No     Frequency: Never   • Drug use: No        Medications Prior to Admission   Medication Sig Dispense Refill Last Dose   • ascorbic acid (VITAMIN C) 1000 MG tablet Take 500 mg by mouth 2 (two) times a day.   10/13/2020 at Unknown time   • aspirin (ASPIR-LOW) 81 MG EC tablet Take 81 mg by mouth Daily.   10/13/2020 at Unknown time   • atorvastatin (LIPITOR) 20 MG tablet Take 1 tablet by mouth every night at bedtime. 90 tablet 1 10/13/2020 at Unknown time   • Calcium Carb-Cholecalciferol 1000-800 MG-UNIT tablet Take 1 tablet by mouth Daily.   10/13/2020 at Unknown time   • carvedilol (COREG) 12.5 MG tablet Take 1 tablet by mouth 2 (Two) Times a Day. 180 tablet 3 10/13/2020 at Unknown time   • HM OMEGA-3-6-9 FATTY ACIDS capsule Take 1 capsule by mouth Daily.   10/13/2020 at Unknown time   • lisinopril (PRINIVIL,ZESTRIL) 20 MG tablet Take 1 tablet by mouth 2 (Two) Times a Day. 180 tablet 1 10/13/2020 at Unknown time   • multivitamin-minerals (CENTRUM) tablet Take 1 tablet by mouth Daily.   10/13/2020 at Unknown time   • vitamin B-12  "(CYANOCOBALAMIN) 1000 MCG tablet Take 1,000 mcg by mouth Daily.   10/13/2020 at Unknown time         Pravastatin    Scheduled Meds:aspirin, 81 mg, Oral, Daily  atorvastatin, 20 mg, Oral, Daily  carvedilol, 12.5 mg, Oral, BID  heparin (porcine), 5,000 Units, Subcutaneous, Q8H  isosorbide mononitrate, 30 mg, Oral, Daily  lisinopril, 20 mg, Oral, BID  montelukast, 10 mg, Oral, Nightly  sodium chloride, 10 mL, Intravenous, Q12H      Continuous Infusions:   PRN Meds:.•  acetaminophen  •  calcium carbonate  •  docusate sodium  •  HYDROcodone-acetaminophen  •  influenza vaccine  •  labetalol  •  Morphine  •  ondansetron **OR** ondansetron  •  sodium chloride  •  sodium chloride    Objective     VITAL SIGNS  Vitals:    10/14/20 1601 10/14/20 1616 10/14/20 1948 10/15/20 0338   BP: 157/79 158/74 151/79 109/65   BP Location:   Left arm Right arm   Patient Position:   Lying Lying   Pulse: 64 64 75 62   Resp:   16 16   Temp:   97.8 °F (36.6 °C) 97.9 °F (36.6 °C)   TempSrc:   Oral Oral   SpO2: 96% 91% 94% 92%   Weight:    67.5 kg (148 lb 14.4 oz)   Height:           Flowsheet Rows      First Filed Value   Admission Height  157.5 cm (62\") Documented at 10/14/2020 1327   Admission Weight  64.8 kg (142 lb 13.7 oz) Documented at 10/14/2020 1327            Intake/Output Summary (Last 24 hours) at 10/15/2020 0841  Last data filed at 10/15/2020 0050  Gross per 24 hour   Intake 240 ml   Output --   Net 240 ml        TELEMETRY: Sinus rhythm    Physical Exam:  The patient is alert, oriented and in no distress.  Vital signs as noted above.  Head and neck revealed no carotid bruits or jugular venous distention.  No thyromegaly or lymph adenopathy is present  Lungs clear.  No wheezing.  Breath sounds are normal bilaterally.  Heart normal first and second heart sounds.No murmur.  No precordial rub is present.  No gallop is present.  Abdomen soft and nontender.  No organomegaly is present.  Extremities with good peripheral pulses without any " pedal edema.  Skin warm and dry.  Musculoskeletal system is grossly normal  CNS grossly normal      Results Review:   I reviewed the patient's new clinical results.  Lab Results (last 24 hours)     Procedure Component Value Units Date/Time    Basic Metabolic Panel [[id number]]  (Abnormal) Collected: 10/15/20 0250    Specimen: Blood Updated: 10/15/20 0407     Glucose 98 mg/dL      BUN --     Comment: Testing performed by alternate method        Creatinine 1.16 mg/dL      Sodium 142 mmol/L      Potassium 3.8 mmol/L      Comment: Slight hemolysis detected by analyzer. Results may be affected.        Chloride 103 mmol/L      CO2 29.0 mmol/L      Calcium 8.5 mg/dL      eGFR Non African Amer 45 mL/min/1.73      BUN/Creatinine Ratio --     Comment: Testing not performed        Anion Gap 10.0 mmol/L     Narrative:      GFR Normal >60  Chronic Kidney Disease <60  Kidney Failure <15      BUN [[id number]] Collected: 10/15/20 0250    Specimen: Blood Updated: 10/15/20 0407    CBC & Differential [[id number]]  (Abnormal) Collected: 10/15/20 0250    Specimen: Blood Updated: 10/15/20 0351    Narrative:      The following orders were created for panel order CBC & Differential.  Procedure                               Abnormality         Status                     ---------                               -----------         ------                     CBC Auto Differential[[id number]]        Abnormal            Final result                 Please view results for these tests on the individual orders.    CBC Auto Differential [[id number]]  (Abnormal) Collected: 10/15/20 0250    Specimen: Blood Updated: 10/15/20 0351     WBC 8.40 10*3/mm3      RBC 4.48 10*6/mm3      Hemoglobin 13.8 g/dL      Hematocrit 41.0 %      MCV 91.6 fL      MCH 30.8 pg      MCHC 33.6 g/dL      RDW 12.6 %      RDW-SD 40.7 fl      MPV 7.9 fL      Platelets 265 10*3/mm3      Neutrophil % 47.1 %      Lymphocyte % 36.7 %      Monocyte % 11.4 %      Eosinophil % 4.0 %       Basophil % 0.8 %      Neutrophils, Absolute 3.90 10*3/mm3      Lymphocytes, Absolute 3.10 10*3/mm3      Monocytes, Absolute 1.00 10*3/mm3      Eosinophils, Absolute 0.30 10*3/mm3      Basophils, Absolute 0.10 10*3/mm3      nRBC 0.1 /100 WBC     BUN [[id number]]  (Normal) Collected: 10/14/20 2033    Specimen: Blood Updated: 10/15/20 0157     BUN 10 mg/dL     Basic Metabolic Panel [[id number]]  (Abnormal) Collected: 10/14/20 2033    Specimen: Blood Updated: 10/14/20 2106     Glucose 122 mg/dL      BUN --     Comment: Testing performed by alternate method        Creatinine 1.15 mg/dL      Sodium 143 mmol/L      Potassium 3.9 mmol/L      Chloride 106 mmol/L      CO2 28.0 mmol/L      Calcium 8.9 mg/dL      eGFR Non African Amer 45 mL/min/1.73      BUN/Creatinine Ratio --     Comment: Testing not performed        Anion Gap 9.0 mmol/L     Narrative:      GFR Normal >60  Chronic Kidney Disease <60  Kidney Failure <15      Troponin [[id number]]  (Normal) Collected: 10/14/20 2033    Specimen: Blood Updated: 10/14/20 2106     Troponin T <0.010 ng/mL     Narrative:      Troponin T Reference Range:  <= 0.03 ng/mL-   Negative for AMI  >0.03 ng/mL-     Abnormal for myocardial necrosis.  Clinicians would have to utilize clinical acumen, EKG, Troponin and serial changes to determine if it is an Acute Myocardial Infarction or myocardial injury due to an underlying chronic condition.       Results may be falsely decreased if patient taking Biotin.      Steen Draw [[id number]] Collected: 10/14/20 1454    Specimen: Blood Updated: 10/14/20 1600    Narrative:      The following orders were created for panel order Steen Draw.  Procedure                               Abnormality         Status                     ---------                               -----------         ------                     Light Blue Top[[id number]]                                   Final result               Green Top (Gel)[[id number]]                                   Final result               Lavender Top[[id number]]                                                                Gold Top - SST[[id number]]                                   Final result                 Please view results for these tests on the individual orders.    Light Blue Top [[id number]] Collected: 10/14/20 1454    Specimen: Blood Updated: 10/14/20 1600     Extra Tube hold for add-on     Comment: Auto resulted       Gold Top - SST [[id number]] Collected: 10/14/20 1454    Specimen: Blood Updated: 10/14/20 1600     Extra Tube Hold for add-ons.     Comment: Auto resulted.       Green Top (Gel) [[id number]] Collected: 10/14/20 1454    Specimen: Blood Updated: 10/14/20 1538     Extra Tube HOLD    Comprehensive Metabolic Panel [[id number]]  (Abnormal) Collected: 10/14/20 1454    Specimen: Blood Updated: 10/14/20 1529     Glucose 97 mg/dL      BUN --     Comment: Testing performed by alternate method        Creatinine 0.83 mg/dL      Sodium 145 mmol/L      Potassium 3.8 mmol/L      Chloride 106 mmol/L      CO2 30.0 mmol/L      Calcium 9.2 mg/dL      Total Protein 6.6 g/dL      Albumin 4.00 g/dL      ALT (SGPT) 15 U/L      AST (SGOT) 21 U/L      Alkaline Phosphatase 84 U/L      Total Bilirubin 1.2 mg/dL      eGFR Non African Amer 66 mL/min/1.73      Globulin 2.6 gm/dL      A/G Ratio 1.5 g/dL      BUN/Creatinine Ratio --     Comment: Testing not performed        Anion Gap 9.0 mmol/L     Narrative:      GFR Normal >60  Chronic Kidney Disease <60  Kidney Failure <15      Troponin [[id number]]  (Normal) Collected: 10/14/20 1454    Specimen: Blood Updated: 10/14/20 1529     Troponin T <0.010 ng/mL     Narrative:      Troponin T Reference Range:  <= 0.03 ng/mL-   Negative for AMI  >0.03 ng/mL-     Abnormal for myocardial necrosis.  Clinicians would have to utilize clinical acumen, EKG, Troponin and serial changes to determine if it is an Acute Myocardial Infarction or myocardial injury due to an underlying chronic condition.        Results may be falsely decreased if patient taking Biotin.      BUN [[id number]]  (Normal) Collected: 10/14/20 1454    Specimen: Blood Updated: 10/14/20 1529     BUN 8 mg/dL     CBC & Differential [[id number]]  (Normal) Collected: 10/14/20 1454    Specimen: Blood Updated: 10/14/20 1507    Narrative:      The following orders were created for panel order CBC & Differential.  Procedure                               Abnormality         Status                     ---------                               -----------         ------                     CBC Auto Differential[[id number]]        Normal              Final result                 Please view results for these tests on the individual orders.    CBC Auto Differential [[id number]]  (Normal) Collected: 10/14/20 1454    Specimen: Blood Updated: 10/14/20 1507     WBC 7.20 10*3/mm3      RBC 4.84 10*6/mm3      Hemoglobin 14.9 g/dL      Hematocrit 44.9 %      MCV 92.8 fL      MCH 30.8 pg      MCHC 33.2 g/dL      RDW 12.6 %      RDW-SD 40.7 fl      MPV 7.7 fL      Platelets 272 10*3/mm3      Neutrophil % 58.5 %      Lymphocyte % 27.1 %      Monocyte % 10.4 %      Eosinophil % 3.1 %      Basophil % 0.9 %      Neutrophils, Absolute 4.20 10*3/mm3      Lymphocytes, Absolute 2.00 10*3/mm3      Monocytes, Absolute 0.70 10*3/mm3      Eosinophils, Absolute 0.20 10*3/mm3      Basophils, Absolute 0.10 10*3/mm3      nRBC 0.0 /100 WBC           Imaging Results (Last 24 Hours)     Procedure Component Value Units Date/Time    XR Chest 1 View [[id number]] Collected: 10/14/20 1348     Updated: 10/14/20 1351    Narrative:      DATE OF EXAM:  10/14/2020 1:45 PM     PROCEDURE:  XR CHEST 1 VW-     INDICATIONS:  Chest pain protocol     COMPARISON:  05/11/2020     TECHNIQUE:   Single radiographic view of the chest was obtained.     FINDINGS:  Lungs are normally expanded. Heart size is normal. No pneumothorax or  pleural effusion or focal pulmonary parenchymal opacity. Pulmonary  vessels are  distinct. Bones and soft tissues are normal.       Impression:      No acute cardiopulmonary abnormality.     Electronically Signed By-Shalini Goodwin MD On:10/14/2020 1:49 PM  This report was finalized on [id number] by  Shalini Goodwin MD.      LAB RESULTS (LAST 7 DAYS)    CBC  Results from last 7 days   Lab Units 10/15/20  0250 10/14/20  1454   WBC 10*3/mm3 8.40 7.20   RBC 10*6/mm3 4.48 4.84   HEMOGLOBIN g/dL 13.8 14.9   HEMATOCRIT % 41.0 44.9   MCV fL 91.6 92.8   PLATELETS 10*3/mm3 265 272       BMP  Results from last 7 days   Lab Units 10/15/20  0250 10/14/20  2033 10/14/20  1454   SODIUM mmol/L 142 143 145   POTASSIUM mmol/L 3.8 3.9 3.8   CHLORIDE mmol/L 103 106 106   CO2 mmol/L 29.0 28.0 30.0*   BUN   --  10 8   CREATININE mg/dL 1.16* 1.15* 0.83   GLUCOSE mg/dL 98 122* 97       CMP   Results from last 7 days   Lab Units 10/15/20  0250 10/14/20  2033 10/14/20  1454   SODIUM mmol/L 142 143 145   POTASSIUM mmol/L 3.8 3.9 3.8   CHLORIDE mmol/L 103 106 106   CO2 mmol/L 29.0 28.0 30.0*   BUN   --  10 8   CREATININE mg/dL 1.16* 1.15* 0.83   GLUCOSE mg/dL 98 122* 97   ALBUMIN g/dL  --   --  4.00   BILIRUBIN mg/dL  --   --  1.2   ALK PHOS U/L  --   --  84   AST (SGOT) U/L  --   --  21   ALT (SGPT) U/L  --   --  15         BNP        TROPONIN  Results from last 7 days   Lab Units 10/14/20  2033   TROPONIN T ng/mL <0.010       CoAg        Creatinine Clearance  Estimated Creatinine Clearance: 34.9 mL/min (A) (by C-G formula based on SCr of 1.16 mg/dL (H)).    ABG        Radiology  Xr Chest 1 View    Result Date: 10/14/2020  No acute cardiopulmonary abnormality.  Electronically Signed By-Shalini Goodwin MD On:10/14/2020 1:49 PM This report was finalized on [id number] by  Shalini Goodwin MD.              EKG          I personally viewed and interpreted the patient's EKG/Telemetry data: Normal sinus rhythm normal ECG    ECHOCARDIOGRAM:                 Cardiolite (Tc-99m Sestamibi) stress test      OTHER:     Assessment/Plan      Active Problems:    Chest pain    ]]]]]]]]]]]]]]]]]]  Impression  ==========  -Chest discomfort somewhat atypical for angina pectoris     Cardiac cath 06/21/2017 showing 50% OM1 disease and small-vessel disease distal LAD    -History of mild mitral regurgitation.    -Hypertension diabetes dyslipidemia    -Status post total abdominal hysterectomy    -History of intolerance to ACE inhibitor's due to cough and allergy to pravastatin    -Family history is negative for coronary artery disease    ==============  Plan  ==========  Patient has chest pain which is somewhat atypical.  Patient is under a lot of anxiety and stress recently due to her  passing away and son having social legal issues.  EKG showed no acute changes.  Troponin levels are negative.  Patient to have stress Cardiolite test and echocardiogram.  Further plan will depend on patient's progress.  ]]]]]]]]]]]]]]]]]]]  Echocardiogram 10/15/2020 revealed hypertrophic hypercontractile left ventricle with ejection fraction of 75%.  Cardiac valves are normal.    Lexiscan Cardiolite test is normal-10/15/2020.    Okay with discharge plans.    Follow-up with Dr. Luna primary cardiologist in 2 weeks.      Mel Castillo MD  10/15/20  08:41 EDT

## 2020-10-15 NOTE — PLAN OF CARE
Goal Outcome Evaluation:  Plan of Care Reviewed With: patient  Progress: improving   Patient has reported no chest pain throughout shift. Had a mild headache, but was resolved with tylenol. Pending results of testing, pending D/C as long as test results are normal. Will continue to monitor.

## 2020-10-15 NOTE — PROGRESS NOTES
Discharge Planning Assessment   [facility]     Patient Name: Maria D Fountain  MRN: [id number]  Today's Date: 10/15/2020    Admit Date: 10/14/2020    Discharge Needs Assessment     Row Name 10/15/20 1510       Living Environment    Lives With  alone    Current Living Arrangements  home/apartment/condo    Potentially Unsafe Housing Conditions  unable to assess    Primary Care Provided by  self    Provides Primary Care For  no one    Family Caregiver if Needed  child(raven), adult son helps her    Quality of Family Relationships  unable to assess    Able to Return to Prior Arrangements  yes       Resource/Environmental Concerns    Resource/Environmental Concerns  none    Transportation Concerns  car, none       Transition Planning    Patient/Family Anticipates Transition to  home    Patient/Family Anticipated Services at Transition  none    Transportation Anticipated  car, drives self       Discharge Needs Assessment    Readmission Within the Last 30 Days  no previous admission in last 30 days    Provided Post Acute Provider List?  N/A    N/A Provider List Comment  patient denies any needs for discharge        Discharge Plan     Row Name 10/15/20 1514       Plan    Plan  anticipate return home    Patient/Family in Agreement with Plan  yes    Plan Comments  talked to patient in room with mask and goggles; she is independent at home and follows with dr betancourt;       Carol naegele rn  Case management  Office number [phone]  Cell phone [phone]

## 2020-10-16 ENCOUNTER — TRANSITIONAL CARE MANAGEMENT TELEPHONE ENCOUNTER (OUTPATIENT)
Dept: CALL CENTER | Facility: HOSPITAL | Age: 80
End: 2020-10-16

## 2020-10-16 NOTE — PROGRESS NOTES
Discharge Planning Assessment   [facility]     Patient Name: Maria D Fountain  MRN: [id number]  Today's Date: 10/16/2020    Admit Date: 10/14/2020          Plan    Final Discharge Disposition Code  01 - home or self-care    Final Note  return home       Carol naegele rn  Case management  Office number [phone]  Cell phone [phone]

## 2020-10-16 NOTE — OUTREACH NOTE
Prep Survey      Responses   [facility] discharged from?  [facility]   Is LACE score < 7 ?  Yes   Eligibility  [facility]  [facility]   Date of Admission  10/14/20   Date of Discharge  10/15/20   Discharge Disposition  Home or Self Care   Discharge diagnosis  Chest pain   Does the patient have one of the following disease processes/diagnoses(primary or secondary)?  Other   Does the patient have Home health ordered?  No   Is there a DME ordered?  No   Prep survey completed?  Yes          Sharon Caldera RN

## 2020-10-16 NOTE — OUTREACH NOTE
Call Center TCM Note      Responses   [facility] patient discharged from?  [facility]   Does the patient have one of the following disease processes/diagnoses(primary or secondary)?  Other   TCM attempt successful?  No   Unsuccessful attempts  Attempt 1   Revoked Reason  Phone Issues          Gypsy Hickman LPN    10/16/2020, 09:18 EDT

## 2020-10-19 ENCOUNTER — TELEPHONE (OUTPATIENT)
Dept: FAMILY MEDICINE CLINIC | Facility: CLINIC | Age: 80
End: 2020-10-19

## 2020-10-19 NOTE — TELEPHONE ENCOUNTER
PT NEEDS A HOSPITAL FOLLOWUP AS SHE WENT IN THE HOSPITAL FOR A HEART ATTACK, BUT IT WAS ACTUALLY JUST A HIGH BLOOD PRESSURE ISSUE.     PT ALSO NEEDS FLU AND PNEUMONIA SHOT.

## 2020-10-19 NOTE — TELEPHONE ENCOUNTER
Reschedule 10/22 new pt appt with another provider, and schedule mrs. mansfield there instead. thanks

## 2020-10-30 ENCOUNTER — OFFICE VISIT (OUTPATIENT)
Dept: FAMILY MEDICINE CLINIC | Facility: CLINIC | Age: 80
End: 2020-10-30

## 2020-10-30 VITALS
WEIGHT: 146 LBS | OXYGEN SATURATION: 96 % | BODY MASS INDEX: 26.87 KG/M2 | DIASTOLIC BLOOD PRESSURE: 77 MMHG | HEIGHT: 62 IN | TEMPERATURE: 97.1 F | SYSTOLIC BLOOD PRESSURE: 145 MMHG | RESPIRATION RATE: 16 BRPM | HEART RATE: 70 BPM

## 2020-10-30 DIAGNOSIS — I10 ESSENTIAL HYPERTENSION: ICD-10-CM

## 2020-10-30 DIAGNOSIS — R73.03 PREDIABETES: ICD-10-CM

## 2020-10-30 DIAGNOSIS — E78.5 DYSLIPIDEMIA: ICD-10-CM

## 2020-10-30 DIAGNOSIS — R07.9 CHEST PAIN, UNSPECIFIED TYPE: Primary | ICD-10-CM

## 2020-10-30 PROCEDURE — 99215 OFFICE O/P EST HI 40 MIN: CPT | Performed by: INTERNAL MEDICINE

## 2020-10-30 RX ORDER — ISOSORBIDE MONONITRATE 30 MG/1
30 TABLET, EXTENDED RELEASE ORAL DAILY
Qty: 90 TABLET | Refills: 3
Start: 2020-10-30 | End: 2021-12-06

## 2020-10-30 RX ORDER — MECLIZINE HYDROCHLORIDE 25 MG/1
25 TABLET ORAL 3 TIMES DAILY PRN
Qty: 30 TABLET | Refills: 1 | Status: SHIPPED | OUTPATIENT
Start: 2020-10-30 | End: 2023-01-05 | Stop reason: SINTOL

## 2020-11-04 ENCOUNTER — LAB (OUTPATIENT)
Dept: FAMILY MEDICINE CLINIC | Facility: CLINIC | Age: 80
End: 2020-11-04

## 2020-11-04 DIAGNOSIS — R73.03 PREDIABETES: ICD-10-CM

## 2020-11-04 DIAGNOSIS — I10 ESSENTIAL HYPERTENSION: ICD-10-CM

## 2020-11-04 DIAGNOSIS — E78.5 DYSLIPIDEMIA: ICD-10-CM

## 2020-11-04 LAB
ALBUMIN SERPL-MCNC: 3.9 G/DL (ref 3.5–5.2)
ALBUMIN/GLOB SERPL: 1.4 G/DL
ALP SERPL-CCNC: 73 U/L (ref 39–117)
ALT SERPL W P-5'-P-CCNC: 18 U/L (ref 1–33)
ANION GAP SERPL CALCULATED.3IONS-SCNC: 3.6 MMOL/L (ref 5–15)
AST SERPL-CCNC: 22 U/L (ref 1–32)
BASOPHILS # BLD AUTO: 0.06 10*3/MM3 (ref 0–0.2)
BASOPHILS NFR BLD AUTO: 0.9 % (ref 0–1.5)
BILIRUB SERPL-MCNC: 0.9 MG/DL (ref 0–1.2)
BUN SERPL-MCNC: 11 MG/DL (ref 8–23)
BUN/CREAT SERPL: 12.4 (ref 7–25)
CALCIUM SPEC-SCNC: 9.4 MG/DL (ref 8.6–10.5)
CHLORIDE SERPL-SCNC: 106 MMOL/L (ref 98–107)
CHOLEST SERPL-MCNC: 105 MG/DL (ref 0–200)
CO2 SERPL-SCNC: 34.4 MMOL/L (ref 22–29)
CREAT SERPL-MCNC: 0.89 MG/DL (ref 0.57–1)
DEPRECATED RDW RBC AUTO: 43.9 FL (ref 37–54)
EOSINOPHIL # BLD AUTO: 0.34 10*3/MM3 (ref 0–0.4)
EOSINOPHIL NFR BLD AUTO: 5 % (ref 0.3–6.2)
ERYTHROCYTE [DISTWIDTH] IN BLOOD BY AUTOMATED COUNT: 12.6 % (ref 12.3–15.4)
GFR SERPL CREATININE-BSD FRML MDRD: 61 ML/MIN/1.73
GLOBULIN UR ELPH-MCNC: 2.7 GM/DL
GLUCOSE SERPL-MCNC: 85 MG/DL (ref 65–99)
HBA1C MFR BLD: 5.9 % (ref 3.5–5.6)
HCT VFR BLD AUTO: 42.5 % (ref 34–46.6)
HDLC SERPL-MCNC: 41 MG/DL (ref 40–60)
HGB BLD-MCNC: 13.7 G/DL (ref 12–15.9)
IMM GRANULOCYTES # BLD AUTO: 0.02 10*3/MM3 (ref 0–0.05)
IMM GRANULOCYTES NFR BLD AUTO: 0.3 % (ref 0–0.5)
LDLC SERPL CALC-MCNC: 36 MG/DL (ref 0–100)
LDLC/HDLC SERPL: 0.73 {RATIO}
LYMPHOCYTES # BLD AUTO: 1.79 10*3/MM3 (ref 0.7–3.1)
LYMPHOCYTES NFR BLD AUTO: 26.3 % (ref 19.6–45.3)
MCH RBC QN AUTO: 30.4 PG (ref 26.6–33)
MCHC RBC AUTO-ENTMCNC: 32.2 G/DL (ref 31.5–35.7)
MCV RBC AUTO: 94.4 FL (ref 79–97)
MONOCYTES # BLD AUTO: 0.93 10*3/MM3 (ref 0.1–0.9)
MONOCYTES NFR BLD AUTO: 13.7 % (ref 5–12)
NEUTROPHILS NFR BLD AUTO: 3.67 10*3/MM3 (ref 1.7–7)
NEUTROPHILS NFR BLD AUTO: 53.8 % (ref 42.7–76)
NRBC BLD AUTO-RTO: 0 /100 WBC (ref 0–0.2)
PLATELET # BLD AUTO: 287 10*3/MM3 (ref 140–450)
PMV BLD AUTO: 10.2 FL (ref 6–12)
POTASSIUM SERPL-SCNC: 4.6 MMOL/L (ref 3.5–5.2)
PROT SERPL-MCNC: 6.6 G/DL (ref 6–8.5)
RBC # BLD AUTO: 4.5 10*6/MM3 (ref 3.77–5.28)
SODIUM SERPL-SCNC: 144 MMOL/L (ref 136–145)
TRIGL SERPL-MCNC: 170 MG/DL (ref 0–150)
TSH SERPL DL<=0.05 MIU/L-ACNC: 1.51 UIU/ML (ref 0.27–4.2)
VLDLC SERPL-MCNC: 28 MG/DL (ref 5–40)
WBC # BLD AUTO: 6.81 10*3/MM3 (ref 3.4–10.8)

## 2020-11-04 PROCEDURE — 36415 COLL VENOUS BLD VENIPUNCTURE: CPT

## 2020-11-04 PROCEDURE — 80061 LIPID PANEL: CPT | Performed by: INTERNAL MEDICINE

## 2020-11-04 PROCEDURE — 85025 COMPLETE CBC W/AUTO DIFF WBC: CPT | Performed by: INTERNAL MEDICINE

## 2020-11-04 PROCEDURE — 83036 HEMOGLOBIN GLYCOSYLATED A1C: CPT | Performed by: INTERNAL MEDICINE

## 2020-11-04 PROCEDURE — 84443 ASSAY THYROID STIM HORMONE: CPT | Performed by: INTERNAL MEDICINE

## 2020-11-04 PROCEDURE — 80053 COMPREHEN METABOLIC PANEL: CPT | Performed by: INTERNAL MEDICINE

## 2020-12-22 ENCOUNTER — OFFICE VISIT (OUTPATIENT)
Dept: FAMILY MEDICINE CLINIC | Facility: CLINIC | Age: 80
End: 2020-12-22

## 2020-12-22 VITALS
TEMPERATURE: 97.1 F | OXYGEN SATURATION: 94 % | SYSTOLIC BLOOD PRESSURE: 145 MMHG | HEART RATE: 66 BPM | BODY MASS INDEX: 27.05 KG/M2 | HEIGHT: 62 IN | DIASTOLIC BLOOD PRESSURE: 82 MMHG | WEIGHT: 147 LBS

## 2020-12-22 DIAGNOSIS — I20.9 ANGINA PECTORIS (HCC): ICD-10-CM

## 2020-12-22 DIAGNOSIS — I10 ESSENTIAL HYPERTENSION: Primary | ICD-10-CM

## 2020-12-22 PROCEDURE — 99213 OFFICE O/P EST LOW 20 MIN: CPT | Performed by: FAMILY MEDICINE

## 2020-12-22 RX ORDER — NITROGLYCERIN 0.4 MG/1
0.4 TABLET SUBLINGUAL
Qty: 20 TABLET | Refills: 5 | Status: SHIPPED | OUTPATIENT
Start: 2020-12-22 | End: 2023-02-02 | Stop reason: SDUPTHER

## 2021-01-18 PROCEDURE — U0003 INFECTIOUS AGENT DETECTION BY NUCLEIC ACID (DNA OR RNA); SEVERE ACUTE RESPIRATORY SYNDROME CORONAVIRUS 2 (SARS-COV-2) (CORONAVIRUS DISEASE [COVID-19]), AMPLIFIED PROBE TECHNIQUE, MAKING USE OF HIGH THROUGHPUT TECHNOLOGIES AS DESCRIBED BY CMS-2020-01-R: HCPCS | Performed by: NURSE PRACTITIONER

## 2021-02-08 ENCOUNTER — TELEPHONE (OUTPATIENT)
Dept: FAMILY MEDICINE CLINIC | Facility: CLINIC | Age: 81
End: 2021-02-08

## 2021-02-08 NOTE — TELEPHONE ENCOUNTER
Caller: Maria D Fountain    Relationship to patient: Self    Best call back number: [phone]    Concerns or Questions if Applicable: SHOULD PATIENT TAKE 20MG PREDIZONE 2 PER DAY WITHOUT SEEING ACTUAL DOCTOR. PATIENT WAS PRESCRIBED MEDICATION BY SOMEONE AT THE [facility].     SYMPTOMS : FATIQUE, VERY SORE, EYES ARE VERY TIRED, NOSE IS VERY DRY , NO APETITE    PLEASE ADVISE. PATIENT HAD COVID TEST TWO WEEKS AGO IT CAME BACK NEGATIVE. PATIENT JUST UNSURE WHAT TO DO.

## 2021-02-08 NOTE — TELEPHONE ENCOUNTER
Please call patient and let her know that she can start the steroid prescription. If she is not feeling better after her steroid pack, I would recommend making an appointment to be seen.

## 2021-03-01 ENCOUNTER — TELEPHONE (OUTPATIENT)
Dept: FAMILY MEDICINE CLINIC | Facility: CLINIC | Age: 81
End: 2021-03-01

## 2021-03-01 NOTE — TELEPHONE ENCOUNTER
Caller: Maria D Fountain    Relationship to patient: Self    Best call back number: [phone]    Concerns or Questions if Applicable: PATIENT STATES THAT SHE WAS COVID POSITIVE IN January.  SHE FEELS SHE IS STILL HAVING SOME EFFECTS OF THIS WITH SORE MUSCLES IN SHOULDERS AND HEADACHES.  LATEST COVID TESTS HAVE BEEN NEGATIVE.  PATIENT WOULD LIKE TO KNOW IF SHE SHOULD SIGN UP FOR THE COVID VACCINE AND IF THERE IS ANYTHING ELSE SHE NEEDS TO DO BEFORE GETTING THE VACCINE. PATIENT WANTS TO KNOW IF IT IS OKAY TO WAIT FOR THE GREG AND GREG VACCINE.

## 2021-03-01 NOTE — TELEPHONE ENCOUNTER
Please call patient and let her know that I would recommend waiting 90 days after her infection before getting vaccinated.    Because the vaccine is administered by the local health department, it is hard to pick or choose which vaccine once can receive.    I would recommend no more than 3000 mg acetaminophen/Tylenol per day as needed for aches and pains.

## 2021-06-02 ENCOUNTER — OFFICE VISIT (OUTPATIENT)
Dept: FAMILY MEDICINE CLINIC | Facility: CLINIC | Age: 81
End: 2021-06-02

## 2021-06-02 VITALS
HEART RATE: 72 BPM | HEIGHT: 61 IN | OXYGEN SATURATION: 95 % | SYSTOLIC BLOOD PRESSURE: 153 MMHG | TEMPERATURE: 97.5 F | DIASTOLIC BLOOD PRESSURE: 74 MMHG | BODY MASS INDEX: 27.94 KG/M2 | WEIGHT: 148 LBS

## 2021-06-02 DIAGNOSIS — M47.816 LUMBAR SPONDYLOSIS: ICD-10-CM

## 2021-06-02 DIAGNOSIS — Z78.0 ENCOUNTER FOR OSTEOPOROSIS SCREENING IN ASYMPTOMATIC POSTMENOPAUSAL PATIENT: ICD-10-CM

## 2021-06-02 DIAGNOSIS — R10.30 LOWER ABDOMINAL PAIN: ICD-10-CM

## 2021-06-02 DIAGNOSIS — Z13.820 ENCOUNTER FOR OSTEOPOROSIS SCREENING IN ASYMPTOMATIC POSTMENOPAUSAL PATIENT: ICD-10-CM

## 2021-06-02 DIAGNOSIS — I10 ESSENTIAL HYPERTENSION: Primary | ICD-10-CM

## 2021-06-02 DIAGNOSIS — E78.2 MIXED HYPERLIPIDEMIA: ICD-10-CM

## 2021-06-02 DIAGNOSIS — R82.81 PYURIA: ICD-10-CM

## 2021-06-02 DIAGNOSIS — Z86.16 HISTORY OF COVID-19: ICD-10-CM

## 2021-06-02 DIAGNOSIS — Z00.00 ENCOUNTER FOR MEDICARE ANNUAL WELLNESS EXAM: ICD-10-CM

## 2021-06-02 LAB
BILIRUB BLD-MCNC: NEGATIVE MG/DL
CLARITY, POC: CLEAR
COLOR UR: YELLOW
GLUCOSE UR STRIP-MCNC: NEGATIVE MG/DL
KETONES UR QL: NEGATIVE
LEUKOCYTE EST, POC: ABNORMAL
NITRITE UR-MCNC: NEGATIVE MG/ML
PH UR: 5.5 [PH] (ref 5–8)
PROT UR STRIP-MCNC: NEGATIVE MG/DL
RBC # UR STRIP: NEGATIVE /UL
SP GR UR: 1.02 (ref 1–1.03)
UROBILINOGEN UR QL: NORMAL

## 2021-06-02 PROCEDURE — 81003 URINALYSIS AUTO W/O SCOPE: CPT | Performed by: FAMILY MEDICINE

## 2021-06-02 PROCEDURE — 87086 URINE CULTURE/COLONY COUNT: CPT | Performed by: FAMILY MEDICINE

## 2021-06-02 PROCEDURE — G0439 PPPS, SUBSEQ VISIT: HCPCS | Performed by: FAMILY MEDICINE

## 2021-06-02 PROCEDURE — 99213 OFFICE O/P EST LOW 20 MIN: CPT | Performed by: FAMILY MEDICINE

## 2021-06-02 NOTE — PATIENT INSTRUCTIONS
Back Exercises  The following exercises strengthen the muscles that help to support the trunk and back. They also help to keep the lower back flexible. Doing these exercises can help to prevent back pain or lessen existing pain.  · If you have back pain or discomfort, try doing these exercises 2-3 times each day or as told by your health care provider.  · As your pain improves, do them once each day, but increase the number of times that you repeat the steps for each exercise (do more repetitions).  · To prevent the recurrence of back pain, continue to do these exercises once each day or as told by your health care provider.  Do exercises exactly as told by your health care provider and adjust them as directed. It is normal to feel mild stretching, pulling, tightness, or discomfort as you do these exercises, but you should stop right away if you feel sudden pain or your pain gets worse.  Exercises  Single knee to chest  Repeat these steps 3-5 times for each le. Lie on your back on a firm bed or the floor with your legs extended.  2. Bring one knee to your chest. Your other leg should stay extended and in contact with the floor.  3. Hold your knee in place by grabbing your knee or thigh with both hands and hold.  4. Pull on your knee until you feel a gentle stretch in your lower back or buttocks.  5. Hold the stretch for 10-30 seconds.  6. Slowly release and straighten your leg.  Pelvic tilt  Repeat these steps 5-10 times:  1. Lie on your back on a firm bed or the floor with your legs extended.  2. Bend your knees so they are pointing toward the ceiling and your feet are flat on the floor.  3. Tighten your lower abdominal muscles to press your lower back against the floor. This motion will tilt your pelvis so your tailbone points up toward the ceiling instead of pointing to your feet or the floor.  4. With gentle tension and even breathing, hold this position for 5-10 seconds.  Cat-cow  Repeat these steps until  your lower back becomes more flexible:  1. Get into a hands-and-knees position on a firm surface. Keep your hands under your shoulders, and keep your knees under your hips. You may place padding under your knees for comfort.  2. Let your head hang down toward your chest. Contract your abdominal muscles and point your tailbone toward the floor so your lower back becomes rounded like the back of a cat.  3. Hold this position for 5 seconds.  4. Slowly lift your head, let your abdominal muscles relax and point your tailbone up toward the ceiling so your back forms a sagging arch like the back of a cow.  5. Hold this position for 5 seconds.    Press-ups  Repeat these steps 5-10 times:  1. Lie on your abdomen (face-down) on the floor.  2. Place your palms near your head, about shoulder-width apart.  3. Keeping your back as relaxed as possible and keeping your hips on the floor, slowly straighten your arms to raise the top half of your body and lift your shoulders. Do not use your back muscles to raise your upper torso. You may adjust the placement of your hands to make yourself more comfortable.  4. Hold this position for 5 seconds while you keep your back relaxed.  5. Slowly return to lying flat on the floor.    Bridges  Repeat these steps 10 times:  1. Lie on your back on a firm surface.  2. Bend your knees so they are pointing toward the ceiling and your feet are flat on the floor. Your arms should be flat at your sides, next to your body.  3. Tighten your buttocks muscles and lift your buttocks off the floor until your waist is at almost the same height as your knees. You should feel the muscles working in your buttocks and the back of your thighs. If you do not feel these muscles, slide your feet 1-2 inches farther away from your buttocks.  4. Hold this position for 3-5 seconds.  5. Slowly lower your hips to the starting position, and allow your buttocks muscles to relax completely.  If this exercise is too easy, try  doing it with your arms crossed over your chest.  Abdominal crunches  Repeat these steps 5-10 times:  1. Lie on your back on a firm bed or the floor with your legs extended.  2. Bend your knees so they are pointing toward the ceiling and your feet are flat on the floor.  3. Cross your arms over your chest.  4. Tip your chin slightly toward your chest without bending your neck.  5. Tighten your abdominal muscles and slowly raise your trunk (torso) high enough to lift your shoulder blades a tiny bit off the floor. Avoid raising your torso higher than that because it can put too much stress on your low back and does not help to strengthen your abdominal muscles.  6. Slowly return to your starting position.  Back lifts  Repeat these steps 5-10 times:  1. Lie on your abdomen (face-down) with your arms at your sides, and rest your forehead on the floor.  2. Tighten the muscles in your legs and your buttocks.  3. Slowly lift your chest off the floor while you keep your hips pressed to the floor. Keep the back of your head in line with the curve in your back. Your eyes should be looking at the floor.  4. Hold this position for 3-5 seconds.  5. Slowly return to your starting position.  Contact a health care provider if:  · Your back pain or discomfort gets much worse when you do an exercise.  · Your worsening back pain or discomfort does not lessen within 2 hours after you exercise.  If you have any of these problems, stop doing these exercises right away. Do not do them again unless your health care provider says that you can.  Get help right away if:  · You develop sudden, severe back pain. If this happens, stop doing the exercises right away. Do not do them again unless your health care provider says that you can.  This information is not intended to replace advice given to you by your health care provider. Make sure you discuss any questions you have with your health care provider.  Document Revised: 04/23/2020 Document  Reviewed: 09/19/2019  Elsevier Patient Education © 2021 [employer]      Mediterranean Diet  A Mediterranean diet refers to food and lifestyle choices that are based on the traditions of countries located on the [place]. This way of eating has been shown to help prevent certain conditions and improve outcomes for people who have chronic diseases, like kidney disease and heart disease.  What are tips for following this plan?  Lifestyle  · Cook and eat meals together with your family, when possible.  · Drink enough fluid to keep your urine clear or pale yellow.  · Be physically active every day. This includes:  ? Aerobic exercise like running or swimming.  ? Leisure activities like gardening, walking, or housework.  · Get 7-8 hours of sleep each night.  · If recommended by your health care provider, drink red wine in moderation. This means 1 glass a day for nonpregnant women and 2 glasses a day for men. A glass of wine equals 5 oz (150 mL).  Reading food labels    · Check the serving size of packaged foods. For foods such as rice and pasta, the serving size refers to the amount of cooked product, not dry.  · Check the total fat in packaged foods. Avoid foods that have saturated fat or trans fats.  · Check the ingredients list for added sugars, such as corn syrup.  Shopping  · At the grocery store, buy most of your food from the areas near the walls of the store. This includes:  ? Fresh fruits and vegetables (produce).  ? Grains, beans, nuts, and seeds. Some of these may be available in unpackaged forms or large amounts (in bulk).  ? Fresh seafood.  ? Poultry and eggs.  ? Low-fat dairy products.  · Buy whole ingredients instead of prepackaged foods.  · Buy fresh fruits and vegetables in-season from local farmers markets.  · Buy frozen fruits and vegetables in resealable bags.  · If you do not have access to quality fresh seafood, buy precooked frozen shrimp or canned fish, such as tuna, salmon, or  sardines.  · Buy small amounts of raw or cooked vegetables, salads, or olives from the deli or salad bar at your store.  · Stock your pantry so you always have certain foods on hand, such as olive oil, canned tuna, canned tomatoes, rice, pasta, and beans.  Cooking  · Cook foods with extra-virgin olive oil instead of using butter or other vegetable oils.  · Have meat as a side dish, and have vegetables or grains as your main dish. This means having meat in small portions or adding small amounts of meat to foods like pasta or stew.  · Use beans or vegetables instead of meat in common dishes like chili or lasagna.  · Swede Heaven with different cooking methods. Try roasting or broiling vegetables instead of steaming or sautéeing them.  · Add frozen vegetables to soups, stews, pasta, or rice.  · Add nuts or seeds for added healthy fat at each meal. You can add these to yogurt, salads, or vegetable dishes.  · Marinate fish or vegetables using olive oil, lemon juice, garlic, and fresh herbs.  Meal planning    · Plan to eat 1 vegetarian meal one day each week. Try to work up to 2 vegetarian meals, if possible.  · Eat seafood 2 or more times a week.  · Have healthy snacks readily available, such as:  ? Vegetable sticks with hummus.  ? Greek yogurt.  ? Fruit and nut trail mix.  · Eat balanced meals throughout the week. This includes:  ? Fruit: 2-3 servings a day  ? Vegetables: 4-5 servings a day  ? Low-fat dairy: 2 servings a day  ? Fish, poultry, or lean meat: 1 serving a day  ? Beans and legumes: 2 or more servings a week  ? Nuts and seeds: 1-2 servings a day  ? Whole grains: 6-8 servings a day  ? Extra-virgin olive oil: 3-4 servings a day  · Limit red meat and sweets to only a few servings a month  What are my food choices?  · Mediterranean diet  ? Recommended  § Grains: Whole-grain pasta. Brown rice. Bulgar wheat. Polenta. Couscous. Whole-wheat bread. Oatmeal. Quinoa.  § Vegetables: Artichokes. Beets. Broccoli. Cabbage.  Carrots. Eggplant. Green beans. Chard. Kale. Spinach. Onions. Leeks. Peas. Squash. Tomatoes. Peppers. Radishes.  § Fruits: Apples. Apricots. Avocado. Berries. Bananas. Cherries. Dates. Figs. Grapes. Rebel. Melon. Oranges. Peaches. Plums. Pomegranate.  § Meats and other protein foods: Beans. Almonds. Sunflower seeds. Pine nuts. Peanuts. Cod. [place]. Scallops. Shrimp. Tuna. Tilapia. Clams. Oysters. Eggs.  § Dairy: Low-fat milk. Cheese. Greek yogurt.  § Beverages: Water. Red wine. Herbal tea.  § Fats and oils: Extra virgin olive oil. Avocado oil. Grape seed oil.  § Sweets and desserts: Greek yogurt with honey. Baked apples. Poached pears. Trail mix.  § Seasoning and other foods: Basil. Cilantro. Coriander. Cumin. Mint. Parsley. Huber. Rosemary. Tarragon. Garlic. Oregano. Thyme. Pepper. Balsalmic vinegar. Tahini. Hummus. Tomato sauce. Olives. Mushrooms.  ? Limit these  § Grains: Prepackaged pasta or rice dishes. Prepackaged cereal with added sugar.  § Vegetables: Deep fried potatoes (french fries).  § Fruits: Fruit canned in syrup.  § Meats and other protein foods: Beef. Pork. Lamb. Poultry with skin. Hot dogs. Dumont.  § Dairy: Ice cream. Sour cream. Whole milk.  § Beverages: Juice. Sugar-sweetened soft drinks. Beer. Liquor and spirits.  § Fats and oils: Butter. Canola oil. Vegetable oil. Beef fat (tallow). Lard.  § Sweets and desserts: Cookies. Cakes. Pies. Candy.  § Seasoning and other foods: Mayonnaise. Premade sauces and marinades.  The items listed may not be a complete list. Talk with your dietitian about what dietary choices are right for you.  Summary  · The Mediterranean diet includes both food and lifestyle choices.  · Eat a variety of fresh fruits and vegetables, beans, nuts, seeds, and whole grains.  · Limit the amount of red meat and sweets that you eat.  · Talk with your health care provider about whether it is safe for you to drink red wine in moderation. This means 1 glass a day for nonpregnant women and  2 glasses a day for men. A glass of wine equals 5 oz (150 mL).  This information is not intended to replace advice given to you by your health care provider. Make sure you discuss any questions you have with your health care provider.  Document Revised: 08/17/2017 Document Reviewed: 08/10/2017  [employer] Patient Education © 2020 [employer]      Exercising to Stay Healthy  To become healthy and stay healthy, it is recommended that you do moderate-intensity and vigorous-intensity exercise. You can tell that you are exercising at a moderate intensity if your heart starts beating faster and you start breathing faster but can still hold a conversation. You can tell that you are exercising at a vigorous intensity if you are breathing much harder and faster and cannot hold a conversation while exercising.  Exercising regularly is important. It has many health benefits, such as:  · Improving overall fitness, flexibility, and endurance.  · Increasing bone density.  · Helping with weight control.  · Decreasing body fat.  · Increasing muscle strength.  · Reducing stress and tension.  · Improving overall health.  How often should I exercise?  Choose an activity that you enjoy, and set realistic goals. Your health care provider can help you make an activity plan that works for you.  Exercise regularly as told by your health care provider. This may include:  · Doing strength training two times a week, such as:  ? Lifting weights.  ? Using resistance bands.  ? Push-ups.  ? Sit-ups.  ? Yoga.  · Doing a certain intensity of exercise for a given amount of time. Choose from these options:  ? A total of 150 minutes of moderate-intensity exercise every week.  ? A total of 75 minutes of vigorous-intensity exercise every week.  ? A mix of moderate-intensity and vigorous-intensity exercise every week.  Children, pregnant women, people who have not exercised regularly, people who are overweight, and older adults may need to talk with a  health care provider about what activities are safe to do. If you have a medical condition, be sure to talk with your health care provider before you start a new exercise program.  What are some exercise ideas?  Moderate-intensity exercise ideas include:  · Walking 1 mile (1.6 km) in about 15 minutes.  · Biking.  · Hiking.  · Golfing.  · Dancing.  · Water aerobics.  Vigorous-intensity exercise ideas include:  · Walking 4.5 miles (7.2 km) or more in about 1 hour.  · Jogging or running 5 miles (8 km) in about 1 hour.  · Biking 10 miles (16.1 km) or more in about 1 hour.  · Lap swimming.  · Roller-skating or in-line skating.  · Cross-country skiing.  · Vigorous competitive sports, such as football, basketball, and soccer.  · Jumping rope.  · Aerobic dancing.  What are some everyday activities that can help me to get exercise?  · Yard work, such as:  ? Pushing a .  ? Raking and bagging leaves.  · Washing your car.  · Pushing a stroller.  · Shoveling snow.  · Gardening.  · Washing windows or floors.  How can I be more active in my day-to-day activities?  · Use stairs instead of an elevator.  · Take a walk during your lunch break.  · If you drive, park your car farther away from your work or school.  · If you take public transportation, get off one stop early and walk the rest of the way.  · Stand up or walk around during all of your indoor phone calls.  · Get up, stretch, and walk around every 30 minutes throughout the day.  · Enjoy exercise with a friend. Support to continue exercising will help you keep a regular routine of activity.  What guidelines can I follow while exercising?  · Before you start a new exercise program, talk with your health care provider.  · Do not exercise so much that you hurt yourself, feel dizzy, or get very short of breath.  · Wear comfortable clothes and wear shoes with good support.  · Drink plenty of water while you exercise to prevent dehydration or heat stroke.  · Work out  until your breathing and your heartbeat get faster.  Where to find more information  · [place] Department of Health and Human Services: www.hhs.gov  · Centers for Disease Control and Prevention (CDC): www.cdc.gov  Summary  · Exercising regularly is important. It will improve your overall fitness, flexibility, and endurance.  · Regular exercise also will improve your overall health. It can help you control your weight, reduce stress, and improve your bone density.  · Do not exercise so much that you hurt yourself, feel dizzy, or get very short of breath.  · Before you start a new exercise program, talk with your health care provider.  This information is not intended to replace advice given to you by your health care provider. Make sure you discuss any questions you have with your health care provider.  Document Revised: 11/30/2018 Document Reviewed: 11/08/2018  [employer]vier Patient Education © 2021 [employer]       ADVANCE CARE PLANNING  Conversations that Matter  PLANNING GUIDE    LOOKING BACK ...  Who we are, what we believe, and what we value are all shaped by experiences we have had. Ep[employer], family traditions, jobs and friends affect us deeply.    Has anything happened in your past that shaped your feelings about medical treatment?    Think about an experience you may have had with a family member or friend who was faced with a decision about medical care near the end of life. What was positive about that experience? What do you wish would have been done differently?    HERE AND NOW ...  Do you have any significant health problems now? What kinds of things bring you such uli that, should a health problem prevent you from doing them any more, life would have little meaning? What short- or long-term goals do you have? How might medical treatment help you or hinder you in accomplishing those goals?    WHAT ABOUT TOMORROW?  What significant health problems do you fear may affect you in the future? How do you feel  "about the possibility of having to go to a nursing home? How would decisions be made if you could not make them?    WHO SHOULD MAKE DECISIONS?  An important part of planning is to consider whether or not you could appoint someone to make your healthcare decisions if you could not make them yourself. Many people select a close family member, but you are free to pick anyone you think could best represent you. Whoever you appoint should have all of the following qualifications:    • Can be trusted.  • Is willing to accept this responsibility.  • Is willing to follow the values and instructions you have discussed.  • Is able to make complex, difficult decisions.    It is helpful, but not required, to appoint one or more alternate persons in case your first choice becomes unable or unwilling to represent you. It is best if only one person has authority at a time, but you can instruct your representatives to discuss decisions together if time permits.    WHAT FUTURE DECISIONS NEED TO BE CONSIDERED?  Providing instructions for future healthcare decisions may seem like an impossible task. How can anyone plan for all the possibilities? You cannot … but you do not have to.    You need to plan for situations where you:  1. Become unexpectedly incapable of making your own decisions  2. It is clear you will have little or no recovery, and  3. The injury or loss of function is significant.    Such a situation might arise because of an injury to the brain from an accident, a stroke, or a slowly progressive disease such as Alzheimer's.    To plan for this type of situation, many people state, \"If I'm going to be a vegetable, let me go,\" or \"No heroics,\" or \"Don't keep me alive on machines.\" While these remarks are a beginning, they simply are too vague to guide decision-making.    You need to completely describe under what circumstances your goals for medical care should be changed from attempting to prolong life to being allowed " to die. In some situations, certain treatments may not make sense because they will not help, but other treatments will be of important benefit.    Consider these three questions:  1. When would it make sense to continue certain treatments in an effort to prolong life and seek recovery?  2. When would it make sense to stop or withhold certain treatments and accept death when it comes?  3. Under any circumstance, what kind of comfort care would you want, including medication, spiritual and environmental options?    Making these choices requires understanding the information, weighing the benefits and burdens from your perspective, and then discussing your choices with those closest to you.    WHAT'S NEXT?  How do you make sure that your choices are respected? First talk, about them with your family, friends, clergy and physician, then put your choices in writing. Information about putting your plans into writing -- in an advance directive -- is available from your healthcare organization or .    Do you have any significant health problems? What health problems do you fear in the future?     Consider what frightens you most about medical treatment. What role does Mandaeism, manju or spirituality play in how you live your life? How does cost influence your decisions about medical care?   In terms of future medical care, under what circumstances would you want the goals of medical treatment to switch from attempting to prolong life to focusing on comfort?     Describe these circumstances in as much detail as possible.   Ask yourself, what will most help me live well at this point in my life?     Share your views with the person or people who would make your medical decisions if you could not make them.     THERE'S NO EASY WAY TO PLAN FOR FUTURE HEALTHCARE CHOICES.  It's a process that involves thinking and talking about complex and sensitive issues.    The questions that follow will help in the advance care  planning process. This is a guide for your own benefit; it's not a test, and there are no right or wrong answers. It does not need to be completed all at once. You may use it to share your feelings with healthcare providers, your family and your friends. The answers to these questions will help those you love make choices for you when you cannot make them yourself.    These are things I need to tell my loved ones:  What is your idea of comfort care? Describe how you would want medications to be used to provide comfort. What type of spiritual care would you want?     I need to learn about: ____________________________  I need to ask my healthcare provider: ________________

## 2021-06-02 NOTE — PROGRESS NOTES
The ABCs of the Annual Wellness Visit  Subsequent Medicare Wellness Visit    Chief Complaint   Patient presents with   • Medicare Wellness-subsequent       Subjective   History of Present Illness:  Maria D Fountain is a 81 y.o. female who presents for a Subsequent Medicare Wellness Visit.    Postmenopausal osteopenia  female with a concurrent medical history of lumbar spondylosis presents with complaints of recurrent bilateral groin pain that is described as a stabbing sensation.  Patient believes that symptoms are related to a prior infection with SARS-CoV-2.  Reports that she was not adequately tested for COVID-19 in the past though empirically treated with steroids.  Denies any associated urinary or bowel symptoms.  No therapy has been initiated at home.    HEALTH RISK ASSESSMENT    Recent Hospitalizations:  Recently treated at the following:  [facility]     Current Medical Providers:  Patient Care Team:  Neelam Carr MD as PCP - General (Family Medicine)  Mera Boothe MD as Surgeon (Orthopedic Surgery)  Matti Luna MD as Consulting Physician (Cardiology)    Smoking Status:  Social History     Tobacco Use   Smoking Status Never Smoker   Smokeless Tobacco Never Used       Alcohol Consumption:  Social History     Substance and Sexual Activity   Alcohol Use No       Depression Screen:   PHQ-2/PHQ-9 Depression Screening 6/2/2021   Little interest or pleasure in doing things 0   Feeling down, depressed, or hopeless 0   Total Score 0       Fall Risk Screen:  STEADI Fall Risk Assessment was completed, and patient is at LOW risk for falls.Assessment completed on:6/2/2021    Health Habits and Functional and Cognitive Screening:  Functional & Cognitive Status 6/2/2021   Do you have difficulty preparing food and eating? No   Do you have difficulty bathing yourself, getting dressed or grooming yourself? No   Do you have difficulty using the toilet? No   Do you have difficulty  moving around from place to place? No   Do you have trouble with steps or getting out of a bed or a chair? No   Current Diet Unhealthy Diet        Current Diet Comment -   Dental Exam Up to date   Eye Exam Up to date   Exercise (times per week) 0 times per week   Current Exercise Activities Include None   Do you need help using the phone?  No   Are you deaf or do you have serious difficulty hearing?  No   Do you need help with transportation? No   Do you need help shopping? No   Do you need help preparing meals?  No   Do you need help with housework?  No   Do you need help with laundry? No   Do you need help taking your medications? No   Do you need help managing money? No   Do you ever drive or ride in a car without wearing a seat belt? No   Have you felt unusual stress, anger or loneliness in the last month? Yes   Who do you live with? Alone   If you need help, do you have trouble finding someone available to you? No   Have you been bothered in the last four weeks by sexual problems? No   Do you have difficulty concentrating, remembering or making decisions? No         Does the patient have evidence of cognitive impairment? No    Asprin use counseling:Taking ASA appropriately as indicated    Age-appropriate Screening Schedule:  Refer to the list below for future screening recommendations based on patient's age, sex and/or medical conditions. Orders for these recommended tests are listed in the plan section. The patient has been provided with a written plan.    Health Maintenance   Topic Date Due   • TDAP/TD VACCINES (1 - Tdap) Never done   • ZOSTER VACCINE (1 of 2) Never done   • DXA SCAN  12/18/2019   • HEMOGLOBIN A1C  05/04/2021   • INFLUENZA VACCINE  08/01/2021   • LIPID PANEL  11/04/2021   • DIABETIC EYE EXAM  Discontinued   • URINE MICROALBUMIN  Discontinued          The following portions of the patient's history were reviewed and updated as appropriate: allergies, current medications, past family history,  past medical history, past social history, past surgical history and problem list.    Outpatient Medications Prior to Visit   Medication Sig Dispense Refill   • ascorbic acid (VITAMIN C) 1000 MG tablet Take 500 mg by mouth 2 (two) times a day.     • aspirin (ASPIR-LOW) 81 MG EC tablet Take 81 mg by mouth Daily.     • atorvastatin (LIPITOR) 20 MG tablet Take 1 tablet by mouth every night at bedtime. 90 tablet 1   • Calcium Carb-Cholecalciferol 1000-800 MG-UNIT tablet Take 1 tablet by mouth Daily.     • carvedilol (COREG) 12.5 MG tablet Take 1 tablet by mouth 2 (Two) Times a Day. 180 tablet 3   • HM OMEGA-3-6-9 FATTY ACIDS capsule Take 1 capsule by mouth Daily.     • isosorbide mononitrate (IMDUR) 30 MG 24 hr tablet Take 1 tablet by mouth Daily. 90 tablet 3   • lisinopril (PRINIVIL,ZESTRIL) 20 MG tablet Take 1 tablet by mouth 2 (Two) Times a Day. 180 tablet 1   • meclizine (ANTIVERT) 25 MG tablet Take 1 tablet by mouth 3 (Three) Times a Day As Needed for Dizziness. 30 tablet 1   • multivitamin-minerals (CENTRUM) tablet Take 1 tablet by mouth Daily.     • nitroglycerin (Nitrostat) 0.4 MG SL tablet Place 1 tablet under the tongue Every 5 (Five) Minutes As Needed for Chest Pain. Take no more than 3 doses in 15 minutes. 20 tablet 5   • vitamin B-12 (CYANOCOBALAMIN) 1000 MCG tablet Take 1,000 mcg by mouth Daily.       No facility-administered medications prior to visit.       Patient Active Problem List   Diagnosis   • Coronary artery disease of native artery of native heart with stable angina pectoris (CMS/HCC)   • Degeneration of lumbar or lumbosacral intervertebral disc   • Dyslipidemia   • Hypertension   • Angina pectoris (CMS/HCC)   • Chest pain, pleuritic   • Low back pain   • Lumbar radiculopathy   • Spinal stenosis of lumbar region   • Mitral regurgitation   • Osteopenia   • Prediabetes   • Chronic coronary artery disease   • Varicose veins of left lower extremity with pain   • Chest pain       Advanced Care  "Planning:  ACP discussion was held with the patient during this visit. Patient does not have an advance directive, information provided.    Review of Systems   Constitutional: Positive for fatigue.   Gastrointestinal: Positive for abdominal pain (groin; bilateral).   Musculoskeletal: Positive for back pain.       Compared to one year ago, the patient feels her physical health is worse.  Compared to one year ago, the patient feels her mental health is worse.    Reviewed chart for potential of high risk medication in the elderly: yes  Reviewed chart for potential of harmful drug interactions in the elderly:yes    Objective         Vitals:    06/02/21 1459   BP: 153/74   BP Location: Left arm   Patient Position: Sitting   Cuff Size: Small Adult   Pulse: 72   Temp: 97.5 °F (36.4 °C)   TempSrc: Infrared   SpO2: 95%   Weight: 67.1 kg (148 lb)   Height: 154.9 cm (61\")       Body mass index is 27.96 kg/m².  Discussed the patient's BMI with her. The BMI is above average; BMI management plan is completed.    Physical Exam  Vitals reviewed.   Constitutional:       General: She is not in acute distress.     Appearance: She is well-developed and overweight. She is not diaphoretic.   HENT:      Head: Normocephalic and atraumatic.   Cardiovascular:      Rate and Rhythm: Normal rate and regular rhythm.   Pulmonary:      Effort: Pulmonary effort is normal.      Breath sounds: Normal breath sounds.   Abdominal:      General: Bowel sounds are normal.      Palpations: Abdomen is soft.      Tenderness: There is abdominal tenderness in the suprapubic area.   Musculoskeletal:      Right hip: Normal range of motion. Normal strength.      Left hip: Normal range of motion. Normal strength.   Skin:     General: Skin is warm and dry.   Neurological:      Mental Status: She is alert and oriented to person, place, and time.   Psychiatric:         Mood and Affect: Mood normal.         Behavior: Behavior normal.             Lab Results   Component " Value Date    WBC 6.81 11/04/2020    HGB 13.7 11/04/2020    HCT 42.5 11/04/2020    MCV 94.4 11/04/2020     11/04/2020     Lab Results   Component Value Date    GLUCOSE 85 11/04/2020    BUN 11 11/04/2020    CREATININE 0.89 11/04/2020    EGFRIFNONA 61 11/04/2020    BCR 12.4 11/04/2020    K 4.6 11/04/2020    CO2 34.4 (H) 11/04/2020    CALCIUM 9.4 11/04/2020    ALBUMIN 3.90 11/04/2020    LABIL2 1.3 02/08/2019    AST 22 11/04/2020    ALT 18 11/04/2020     Lab Results   Component Value Date    HGBA1C 5.9 (H) 11/04/2020     Lab Results   Component Value Date    CHOL 105 11/04/2020    TRIG 170 (H) 11/04/2020    HDL 41 11/04/2020    LDL 36 11/04/2020     The ASCVD Risk score (Celio HUTCHISON Jr., et al., 2013) failed to calculate for the following reasons:    The 2013 ASCVD risk score is only valid for ages 40 to 79    Brief Urine Lab Results  (Last result in the past 365 days)      Color   Clarity   Blood   Leuk Est   Nitrite   Protein   CREAT   Urine HCG        06/02/21 1556 Yellow Clear Negative Trace Negative Negative                 Assessment/Plan   Medicare Risks and Personalized Health Plan  CMS Preventative Services Quick Reference  Advance Directive Discussion  Breast Cancer/Mammogram Screening  Cardiovascular risk  Colon Cancer Screening  Dementia/Memory   Depression/Dysphoria  Diabetic Lab Screening   Fall Risk  Immunizations Discussed/Encouraged (specific immunizations; Tdap, Shingrix and COVID19 )  Inactivity/Sedentary  Obesity/Overweight   Osteoporosis Risk    The above risks/problems have been discussed with the patient.  Pertinent information has been shared with the patient in the After Visit Summary.  Follow up plans and orders are seen below in the Assessment/Plan Section.    Problem List Items Addressed This Visit        Cardiac and Vasculature    Essential hypertension - Primary    Overview     BP not at goal, <140/90.  Encouraged low-Na+ diet & 150 min exercise/week.   Continue lisinopril 40 mg,  carvedilol 12.5 mg twice daily, and Imdur 30 mg daily.   Patient to monitor ambulatory BP.  Monitoring renal function.         Mixed hyperlipidemia    Overview     Reviewed lipid panel & LDL goal.  Encouraged a diet rich in vegetables & 150 minutes of exercise weekly.  Continue atorvastatin 20 mg nightly.            Neuro    Lumbar spondylosis    Overview     Advised less than 3 g acetaminophen per 24 hours as needed.  Encouraged regular exercise and stretching.  Patient agreeable to PT.         Relevant Orders    Ambulatory Referral to Physical Therapy Aquatics      Other Visit Diagnoses     Lower abdominal pain        Will obtain labs and imaging.  Advised less than 3 g acetaminophen per day as needed.  RTO if symptoms worsen.    Relevant Orders    CT Abdomen Pelvis Without Contrast    POC Urinalysis Dipstick, Automated (Completed)    Pyuria        Relevant Orders    Urine Culture - Urine, Urine, Clean Catch (Completed)    History of COVID-19        Relevant Orders    SARS-CoV-2 Antibodies (Roche) (Completed)    Encounter for osteoporosis screening in asymptomatic postmenopausal patient        Relevant Orders    DEXA Bone Density Axial    Encounter for Medicare annual wellness exam              Follow Up:  Return in about 6 months (around 12/2/2021) for Recheck BP.     An After Visit Summary and PPPS were given to the patient.     Signature    Neelam Carr MD  Family Medicine  [facility]

## 2021-06-03 ENCOUNTER — LAB (OUTPATIENT)
Dept: FAMILY MEDICINE CLINIC | Facility: CLINIC | Age: 81
End: 2021-06-03

## 2021-06-04 ENCOUNTER — TELEPHONE (OUTPATIENT)
Dept: FAMILY MEDICINE CLINIC | Facility: CLINIC | Age: 81
End: 2021-06-04

## 2021-06-04 ENCOUNTER — LAB (OUTPATIENT)
Dept: FAMILY MEDICINE CLINIC | Facility: CLINIC | Age: 81
End: 2021-06-04

## 2021-06-04 LAB — BACTERIA SPEC AEROBE CULT: NO GROWTH

## 2021-06-04 PROCEDURE — 86769 SARS-COV-2 COVID-19 ANTIBODY: CPT | Performed by: FAMILY MEDICINE

## 2021-06-04 PROCEDURE — 36415 COLL VENOUS BLD VENIPUNCTURE: CPT | Performed by: FAMILY MEDICINE

## 2021-06-05 LAB — SARS-COV-2 AB SERPL QL IA: NEGATIVE

## 2021-06-22 ENCOUNTER — TELEPHONE (OUTPATIENT)
Dept: FAMILY MEDICINE CLINIC | Facility: CLINIC | Age: 81
End: 2021-06-22

## 2021-06-22 NOTE — TELEPHONE ENCOUNTER
Please call patient with results.    Bone density scan revealed osteopenia, pre-osteoporotic bones.  This can increase patient's risk of fracture.  At this time I would recommend continuing over-the-counter calcium 1200 mg and vitamin D 800 international units daily.  We will repeat bone scan in 2 years.

## 2021-06-22 NOTE — TELEPHONE ENCOUNTER
Pt states she missed call this am.  Thinks it is test results from dexa scan she had done.   Please call

## 2021-07-14 DIAGNOSIS — E11.9 TYPE II DIABETES MELLITUS WITH GOAL OF SYMPTOM MANAGEMENT (HCC): ICD-10-CM

## 2021-07-14 DIAGNOSIS — E78.5 DYSLIPIDEMIA: ICD-10-CM

## 2021-07-14 DIAGNOSIS — R09.89 LABILE HYPERTENSION: ICD-10-CM

## 2021-07-14 DIAGNOSIS — I20.9 ANGINA PECTORIS (HCC): ICD-10-CM

## 2021-07-14 DIAGNOSIS — I25.118 CORONARY ARTERY DISEASE OF NATIVE ARTERY OF NATIVE HEART WITH STABLE ANGINA PECTORIS (HCC): ICD-10-CM

## 2021-07-15 ENCOUNTER — LAB (OUTPATIENT)
Dept: LAB | Facility: HOSPITAL | Age: 81
End: 2021-07-15

## 2021-07-15 LAB
ALBUMIN SERPL-MCNC: 3.8 G/DL (ref 3.5–5.2)
ALBUMIN/GLOB SERPL: 1.5 G/DL
ALP SERPL-CCNC: 73 U/L (ref 39–117)
ALT SERPL W P-5'-P-CCNC: 17 U/L (ref 1–33)
ANION GAP SERPL CALCULATED.3IONS-SCNC: 10.3 MMOL/L (ref 5–15)
AST SERPL-CCNC: 20 U/L (ref 1–32)
BILIRUB SERPL-MCNC: 0.9 MG/DL (ref 0–1.2)
BUN SERPL-MCNC: 12 MG/DL (ref 8–23)
BUN/CREAT SERPL: 14.3 (ref 7–25)
CALCIUM SPEC-SCNC: 9.2 MG/DL (ref 8.6–10.5)
CHLORIDE SERPL-SCNC: 105 MMOL/L (ref 98–107)
CHOLEST SERPL-MCNC: 110 MG/DL (ref 0–200)
CK SERPL-CCNC: 81 U/L (ref 20–180)
CO2 SERPL-SCNC: 27.7 MMOL/L (ref 22–29)
CREAT SERPL-MCNC: 0.84 MG/DL (ref 0.57–1)
GFR SERPL CREATININE-BSD FRML MDRD: 65 ML/MIN/1.73
GLOBULIN UR ELPH-MCNC: 2.6 GM/DL
GLUCOSE SERPL-MCNC: 88 MG/DL (ref 65–99)
HDLC SERPL-MCNC: 38 MG/DL (ref 40–60)
LDLC SERPL CALC-MCNC: 49 MG/DL (ref 0–100)
LDLC/HDLC SERPL: 1.22 {RATIO}
POTASSIUM SERPL-SCNC: 4.2 MMOL/L (ref 3.5–5.2)
PROT SERPL-MCNC: 6.4 G/DL (ref 6–8.5)
SODIUM SERPL-SCNC: 143 MMOL/L (ref 136–145)
TRIGL SERPL-MCNC: 128 MG/DL (ref 0–150)
VLDLC SERPL-MCNC: 23 MG/DL (ref 5–40)

## 2021-07-15 PROCEDURE — 82550 ASSAY OF CK (CPK): CPT | Performed by: INTERNAL MEDICINE

## 2021-07-15 PROCEDURE — 80061 LIPID PANEL: CPT | Performed by: INTERNAL MEDICINE

## 2021-07-15 PROCEDURE — 80053 COMPREHEN METABOLIC PANEL: CPT | Performed by: INTERNAL MEDICINE

## 2021-07-15 PROCEDURE — 36415 COLL VENOUS BLD VENIPUNCTURE: CPT

## 2021-07-20 ENCOUNTER — OFFICE VISIT (OUTPATIENT)
Dept: CARDIOLOGY | Facility: CLINIC | Age: 81
End: 2021-07-20

## 2021-07-20 VITALS
DIASTOLIC BLOOD PRESSURE: 91 MMHG | SYSTOLIC BLOOD PRESSURE: 166 MMHG | WEIGHT: 148 LBS | OXYGEN SATURATION: 92 % | HEART RATE: 67 BPM | HEIGHT: 61 IN | BODY MASS INDEX: 27.94 KG/M2

## 2021-07-20 DIAGNOSIS — I25.118 CORONARY ARTERY DISEASE OF NATIVE ARTERY OF NATIVE HEART WITH STABLE ANGINA PECTORIS (HCC): ICD-10-CM

## 2021-07-20 DIAGNOSIS — E11.9 TYPE II DIABETES MELLITUS WITH GOAL OF SYMPTOM MANAGEMENT (HCC): ICD-10-CM

## 2021-07-20 DIAGNOSIS — E78.5 DYSLIPIDEMIA: ICD-10-CM

## 2021-07-20 DIAGNOSIS — I20.9 ANGINA PECTORIS (HCC): Primary | ICD-10-CM

## 2021-07-20 DIAGNOSIS — R09.89 LABILE HYPERTENSION: ICD-10-CM

## 2021-07-20 PROCEDURE — 99214 OFFICE O/P EST MOD 30 MIN: CPT | Performed by: INTERNAL MEDICINE

## 2021-07-26 ENCOUNTER — HOSPITAL ENCOUNTER (OUTPATIENT)
Dept: CARDIOLOGY | Facility: HOSPITAL | Age: 81
Discharge: HOME | End: 2021-07-26

## 2021-07-26 DIAGNOSIS — E78.5 DYSLIPIDEMIA: ICD-10-CM

## 2021-07-26 DIAGNOSIS — E11.9 TYPE II DIABETES MELLITUS WITH GOAL OF SYMPTOM MANAGEMENT (HCC): ICD-10-CM

## 2021-07-26 DIAGNOSIS — R09.89 LABILE HYPERTENSION: ICD-10-CM

## 2021-07-26 DIAGNOSIS — I25.118 CORONARY ARTERY DISEASE OF NATIVE ARTERY OF NATIVE HEART WITH STABLE ANGINA PECTORIS (HCC): ICD-10-CM

## 2021-07-26 DIAGNOSIS — I20.9 ANGINA PECTORIS (HCC): ICD-10-CM

## 2021-07-26 PROCEDURE — 0 TECHNETIUM SESTAMIBI: Performed by: INTERNAL MEDICINE

## 2021-07-26 PROCEDURE — 78452 HT MUSCLE IMAGE SPECT MULT: CPT | Performed by: INTERNAL MEDICINE

## 2021-07-26 PROCEDURE — 93018 CV STRESS TEST I&R ONLY: CPT | Performed by: INTERNAL MEDICINE

## 2021-07-26 PROCEDURE — A9500 TC99M SESTAMIBI: HCPCS | Performed by: INTERNAL MEDICINE

## 2021-07-26 PROCEDURE — 93017 CV STRESS TEST TRACING ONLY: CPT

## 2021-07-26 PROCEDURE — 78452 HT MUSCLE IMAGE SPECT MULT: CPT

## 2021-07-26 PROCEDURE — 93016 CV STRESS TEST SUPVJ ONLY: CPT | Performed by: INTERNAL MEDICINE

## 2021-07-26 RX ADMIN — TECHNETIUM TC 99M SESTAMIBI 1 DOSE: 1 INJECTION INTRAVENOUS at 13:58

## 2021-07-26 RX ADMIN — TECHNETIUM TC 99M SESTAMIBI 1 DOSE: 1 INJECTION INTRAVENOUS at 12:30

## 2021-07-28 LAB
BH CV REST NUCLEAR ISOTOPE DOSE: 10.6 MCI
BH CV STRESS BP STAGE 1: NORMAL
BH CV STRESS BP STAGE 2: NORMAL
BH CV STRESS DURATION MIN STAGE 1: 3
BH CV STRESS DURATION MIN STAGE 2: 3
BH CV STRESS DURATION SEC STAGE 1: 0
BH CV STRESS DURATION SEC STAGE 2: 0
BH CV STRESS GRADE STAGE 1: 10
BH CV STRESS GRADE STAGE 2: 12
BH CV STRESS HR STAGE 1: 83
BH CV STRESS HR STAGE 2: 99
BH CV STRESS METS STAGE 1: 5
BH CV STRESS METS STAGE 2: 7.5
BH CV STRESS NUCLEAR ISOTOPE DOSE: 33.8 MCI
BH CV STRESS PROTOCOL 1: NORMAL
BH CV STRESS RECOVERY BP: NORMAL MMHG
BH CV STRESS RECOVERY HR: 71 BPM
BH CV STRESS SPEED STAGE 1: 1.7
BH CV STRESS SPEED STAGE 2: 2.5
BH CV STRESS STAGE 1: 1
BH CV STRESS STAGE 2: 2
MAXIMAL PREDICTED HEART RATE: 139 BPM
PERCENT MAX PREDICTED HR: 71.22 %
STRESS BASELINE BP: NORMAL MMHG
STRESS BASELINE HR: 60 BPM
STRESS PERCENT HR: 84 %
STRESS POST ESTIMATED WORKLOAD: 7 METS
STRESS POST EXERCISE DUR MIN: 6 MIN
STRESS POST PEAK BP: NORMAL MMHG
STRESS POST PEAK HR: 99 BPM
STRESS TARGET HR: 118 BPM

## 2021-09-08 RX ORDER — CARVEDILOL 12.5 MG/1
TABLET ORAL
Qty: 180 TABLET | Refills: 3 | Status: SHIPPED | OUTPATIENT
Start: 2021-09-08 | End: 2021-12-06 | Stop reason: SDUPTHER

## 2021-09-08 NOTE — TELEPHONE ENCOUNTER
Rx Refill Note  Requested Prescriptions     Pending Prescriptions Disp Refills   • carvedilol (COREG) 12.5 MG tablet [Pharmacy Med Name: CARVEDILOL 12.5 MG TABLET] 180 tablet 3     Sig: TAKE ONE TABLET BY MOUTH TWICE A DAY      Last office visit with prescribing clinician: 7/20/2021      Next office visit with prescribing clinician: Visit date not found            Bia Coello MA  09/08/21, 11:02 EDT

## 2021-10-01 PROCEDURE — U0004 COV-19 TEST NON-CDC HGH THRU: HCPCS | Performed by: NURSE PRACTITIONER

## 2021-10-01 PROCEDURE — U0005 INFEC AGEN DETEC AMPLI PROBE: HCPCS | Performed by: NURSE PRACTITIONER

## 2021-10-04 ENCOUNTER — TELEPHONE (OUTPATIENT)
Dept: FAMILY MEDICINE CLINIC | Facility: CLINIC | Age: 81
End: 2021-10-04

## 2021-10-04 DIAGNOSIS — J02.9 PHARYNGITIS, UNSPECIFIED ETIOLOGY: Primary | ICD-10-CM

## 2021-10-04 RX ORDER — AMOXICILLIN 500 MG/1
500 CAPSULE ORAL 2 TIMES DAILY
Qty: 20 CAPSULE | Refills: 0 | Status: SHIPPED | OUTPATIENT
Start: 2021-10-04 | End: 2021-10-14

## 2021-10-04 NOTE — TELEPHONE ENCOUNTER
Caller: Maria D Fountain    Relationship: Self    Best call back number: [phone]    What medication are you requesting: SOMETHING FOR STREP     What are your current symptoms: SORE THROAT WITH WHITE SPOTS.     How long have you been experiencing symptoms: NEGATIVE FOR FLU AND COVID-TESTED AT [facility], WAS TOLD THERE IF SYMPTOMS DIDN'T GET BETTER TO CALL PCP    Have you had these symptoms before:    [] Yes  [x] No    Have you been treated for these symptoms before:   [] Yes  [x] No    If a prescription is needed, what is your preferred pharmacy and phone number:      [facility] - [place], [place] - [address] - [phone]  - [phone]   [phone]    Additional notes: DOESN'T WANT AN APPOINTMENT WANTS SOMETHING TO BE CALLED IN.

## 2021-10-04 NOTE — TELEPHONE ENCOUNTER
Please call patient and let her know that I've sent a prescription for amoxicillin to her pharmacy.   Finish the antibiotics.  Take antibiotics with yogurt.  If symptoms do not resolve with antibiotics, her symptoms may be related to tonsil stones.

## 2021-10-13 RX ORDER — LISINOPRIL 20 MG/1
TABLET ORAL
Qty: 180 TABLET | Refills: 2 | Status: SHIPPED | OUTPATIENT
Start: 2021-10-13 | End: 2021-12-06 | Stop reason: SINTOL

## 2021-10-13 NOTE — TELEPHONE ENCOUNTER
Rx Refill Note  Requested Prescriptions     Pending Prescriptions Disp Refills   • lisinopril (PRINIVIL,ZESTRIL) 20 MG tablet [Pharmacy Med Name: LISINOPRIL 20 MG TABLET] 180 tablet 1     Sig: TAKE ONE TABLET BY MOUTH TWICE A DAY      Last office visit with prescribing clinician: 7/20/2021      Next office visit with prescribing clinician: Visit date not found            Bia Coello MA  10/13/21, 08:51 EDT

## 2021-10-15 ENCOUNTER — TELEPHONE (OUTPATIENT)
Dept: FAMILY MEDICINE CLINIC | Facility: CLINIC | Age: 81
End: 2021-10-15

## 2021-10-15 NOTE — TELEPHONE ENCOUNTER
Called and spoke with Nida Castillo and she took the information and will get her scheduled for the vaccine Caller: Maria D Fountain    Relationship to patient: Self    Best call back number: [phone]    Patient is needing: PATIENT STATES THAT A FEW DAYS AGO SHE DROPPED A CAN  OF PAINT ON HER TOES. PATIENT STATES THAT THEY HAS BEEN IN PAIN SINCE THIS AND IS PAINFUL TO WALK. PATIENT IS REQUESTING TO HAVE IMAGING       What orders are you requesting (i.e. lab or imaging): IMAGING OF FOOT     In what timeframe would the patient need to come in: ASAP     Where will you receive your lab/imaging services: [facility]

## 2021-10-15 NOTE — TELEPHONE ENCOUNTER
I tried calling the patient because the message does not tell us which foot  I got patient's voicemail  I let her know that I would be leaving about 2:00 and that she would have to go to urgent care if she could not return the call before I left

## 2021-11-18 ENCOUNTER — OFFICE VISIT (OUTPATIENT)
Dept: FAMILY MEDICINE CLINIC | Facility: CLINIC | Age: 81
End: 2021-11-18

## 2021-11-18 VITALS
HEART RATE: 64 BPM | OXYGEN SATURATION: 94 % | BODY MASS INDEX: 29.1 KG/M2 | DIASTOLIC BLOOD PRESSURE: 84 MMHG | TEMPERATURE: 98.2 F | WEIGHT: 149 LBS | SYSTOLIC BLOOD PRESSURE: 155 MMHG

## 2021-11-18 DIAGNOSIS — M25.512 ACUTE PAIN OF LEFT SHOULDER: Primary | ICD-10-CM

## 2021-11-18 PROCEDURE — 99213 OFFICE O/P EST LOW 20 MIN: CPT | Performed by: FAMILY MEDICINE

## 2021-11-18 NOTE — PROGRESS NOTES
Subjective   Maria D Fountain is a 81 y.o. female.     Maria D Fountain is in for some lingering left arm pain. She has pain in her left elbow and shoulder that is just not getting better. She has not had any trauma of note.  There is no history of chest pain or dyspnea. There is no history of issue with bowel or bladder dysfunction. There is no history of dizziness or lightheadedness. There is no history of issue with sleep or mood. There is no history of issue with present medication.            /84 (BP Location: Right arm, Patient Position: Sitting, Cuff Size: Large Adult)   Pulse 64   Temp 98.2 °F (36.8 °C) (Temporal)   Wt 67.6 kg (149 lb)   LMP  (LMP Unknown)   SpO2 94%   BMI 29.10 kg/m²       Chief Complaint   Patient presents with   • Muscle Pain     left arm - 6 weeks now           Current Outpatient Medications:   •  ascorbic acid (VITAMIN C) 1000 MG tablet, Take 500 mg by mouth 2 (two) times a day., Disp: , Rfl:   •  aspirin (ASPIR-LOW) 81 MG EC tablet, Take 81 mg by mouth Daily., Disp: , Rfl:   •  atorvastatin (LIPITOR) 20 MG tablet, Take 1 tablet by mouth every night at bedtime., Disp: 90 tablet, Rfl: 1  •  Calcium Carb-Cholecalciferol 1000-800 MG-UNIT tablet, Take 4 tablets by mouth Daily., Disp: , Rfl:   •  carvedilol (COREG) 12.5 MG tablet, TAKE ONE TABLET BY MOUTH TWICE A DAY, Disp: 180 tablet, Rfl: 3  •  HM OMEGA-3-6-9 FATTY ACIDS capsule, Take 1 capsule by mouth Daily., Disp: , Rfl:   •  isosorbide mononitrate (IMDUR) 30 MG 24 hr tablet, Take 1 tablet by mouth Daily., Disp: 90 tablet, Rfl: 3  •  lisinopril (PRINIVIL,ZESTRIL) 20 MG tablet, TAKE ONE TABLET BY MOUTH TWICE A DAY, Disp: 180 tablet, Rfl: 2  •  multivitamin-minerals (CENTRUM) tablet, Take 1 tablet by mouth Daily., Disp: , Rfl:   •  nitroglycerin (Nitrostat) 0.4 MG SL tablet, Place 1 tablet under the tongue Every 5 (Five) Minutes As Needed for Chest Pain. Take no more than 3 doses in 15 minutes., Disp: 20 tablet, Rfl: 5  •   vitamin B-12 (CYANOCOBALAMIN) 1000 MCG tablet, Take 1,000 mcg by mouth Daily., Disp: , Rfl:   •  cetirizine-pseudoephedrine (ZyrTEC-D) 5-120 MG per 12 hr tablet, Take 1 tablet by mouth 2 (Two) Times a Day for 30 days., Disp: 60 tablet, Rfl: 0  •  diclofenac (VOLTAREN) 50 MG EC tablet, Take 1 tablet by mouth 2 (Two) Times a Day As Needed (shoulder pain)., Disp: 60 tablet, Rfl: 0  •  meclizine (ANTIVERT) 25 MG tablet, Take 1 tablet by mouth 3 (Three) Times a Day As Needed for Dizziness., Disp: 30 tablet, Rfl: 1  •  predniSONE (DELTASONE) 10 MG (21) dose pack, Take  by mouth Daily. Use as directed on package, Disp: 21 each, Rfl: 0        The following portions of the patient's history were reviewed and updated as appropriate: allergies, current medications, past family history, past medical history, past social history, past surgical history, and problem list.    Review of Systems   Unable to perform ROS: Acuity of condition       Objective   Physical Exam  Vitals and nursing note reviewed.   Constitutional:       Appearance: Normal appearance.   Cardiovascular:      Rate and Rhythm: Normal rate and regular rhythm.   Musculoskeletal:      Cervical back: Neck supple.      Right lower leg: No edema.      Left lower leg: No edema.      Comments: Discomfort in the left shoulder , anteriorly, in the area of the rotator cuff   Lymphadenopathy:      Cervical: No cervical adenopathy.   Skin:     Findings: No rash.   Neurological:      Mental Status: She is alert.           Assessment/Plan   Problems Addressed this Visit     None      Visit Diagnoses     Acute pain of left shoulder    -  Primary    Relevant Orders    Ambulatory Referral to Physical Therapy Evaluate and treat (Completed)      Diagnoses       Codes Comments    Acute pain of left shoulder    -  Primary ICD-10-CM: M25.512  ICD-9-CM: 719.41           I will refer to PT  I will send out diclofenac for the pain and inflammation  I will have her call us if not  improving and we can refer to ortho if needed  She would prefer Dr Nixon Elder in [place] if she has to see an ortho

## 2021-12-06 ENCOUNTER — OFFICE VISIT (OUTPATIENT)
Dept: FAMILY MEDICINE CLINIC | Facility: CLINIC | Age: 81
End: 2021-12-06

## 2021-12-06 ENCOUNTER — LAB (OUTPATIENT)
Dept: FAMILY MEDICINE CLINIC | Facility: CLINIC | Age: 81
End: 2021-12-06

## 2021-12-06 VITALS
DIASTOLIC BLOOD PRESSURE: 73 MMHG | SYSTOLIC BLOOD PRESSURE: 116 MMHG | HEIGHT: 62 IN | BODY MASS INDEX: 27.6 KG/M2 | OXYGEN SATURATION: 95 % | HEART RATE: 71 BPM | TEMPERATURE: 97.6 F | WEIGHT: 150 LBS

## 2021-12-06 DIAGNOSIS — M12.812 ROTATOR CUFF ARTHROPATHY OF LEFT SHOULDER: ICD-10-CM

## 2021-12-06 DIAGNOSIS — T46.4X5A COUGH DUE TO ACE INHIBITOR: ICD-10-CM

## 2021-12-06 DIAGNOSIS — E78.2 MIXED HYPERLIPIDEMIA: ICD-10-CM

## 2021-12-06 DIAGNOSIS — R05.8 COUGH DUE TO ACE INHIBITOR: ICD-10-CM

## 2021-12-06 DIAGNOSIS — Z86.19 HISTORY OF VIRAL INFECTION: ICD-10-CM

## 2021-12-06 DIAGNOSIS — I10 ESSENTIAL HYPERTENSION: Primary | ICD-10-CM

## 2021-12-06 PROBLEM — Z78.0 OSTEOPENIA AFTER MENOPAUSE: Status: ACTIVE | Noted: 2021-12-06

## 2021-12-06 PROBLEM — M85.80 OSTEOPENIA AFTER MENOPAUSE: Status: ACTIVE | Noted: 2021-12-06

## 2021-12-06 LAB
DEPRECATED RDW RBC AUTO: 44.3 FL (ref 37–54)
ERYTHROCYTE [DISTWIDTH] IN BLOOD BY AUTOMATED COUNT: 13.1 % (ref 12.3–15.4)
HCT VFR BLD AUTO: 39.8 % (ref 34–46.6)
HGB BLD-MCNC: 13.4 G/DL (ref 12–15.9)
MCH RBC QN AUTO: 31.8 PG (ref 26.6–33)
MCHC RBC AUTO-ENTMCNC: 33.7 G/DL (ref 31.5–35.7)
MCV RBC AUTO: 94.3 FL (ref 79–97)
PLATELET # BLD AUTO: 274 10*3/MM3 (ref 140–450)
PMV BLD AUTO: 10.2 FL (ref 6–12)
RBC # BLD AUTO: 4.22 10*6/MM3 (ref 3.77–5.28)
WBC NRBC COR # BLD: 6.5 10*3/MM3 (ref 3.4–10.8)

## 2021-12-06 PROCEDURE — 85027 COMPLETE CBC AUTOMATED: CPT | Performed by: FAMILY MEDICINE

## 2021-12-06 PROCEDURE — 99214 OFFICE O/P EST MOD 30 MIN: CPT | Performed by: FAMILY MEDICINE

## 2021-12-06 PROCEDURE — 36415 COLL VENOUS BLD VENIPUNCTURE: CPT | Performed by: FAMILY MEDICINE

## 2021-12-06 PROCEDURE — 86769 SARS-COV-2 COVID-19 ANTIBODY: CPT | Performed by: FAMILY MEDICINE

## 2021-12-06 RX ORDER — CARVEDILOL 12.5 MG/1
12.5 TABLET ORAL 2 TIMES DAILY WITH MEALS
Qty: 180 TABLET | Refills: 1 | Status: SHIPPED | OUTPATIENT
Start: 2021-12-06 | End: 2022-02-25 | Stop reason: SDUPTHER

## 2021-12-06 RX ORDER — LISINOPRIL 20 MG/1
20 TABLET ORAL 2 TIMES DAILY
Qty: 180 TABLET | Refills: 2 | Status: CANCELLED | OUTPATIENT
Start: 2021-12-06

## 2021-12-06 RX ORDER — LOSARTAN POTASSIUM 50 MG/1
50 TABLET ORAL 2 TIMES DAILY
Qty: 180 TABLET | Refills: 1 | Status: SHIPPED | OUTPATIENT
Start: 2021-12-06 | End: 2022-01-31 | Stop reason: ALTCHOICE

## 2021-12-06 NOTE — PROGRESS NOTES
Assessment and Plan     Problem List Items Addressed This Visit        Cardiac and Vasculature    Essential hypertension - Primary    Overview     BP at goal, <140/90.  Encouraged low-Na+ diet & 150 min exercise/week.   Continue carvedilol 12.5 mg twice daily.  Switch lisinopril to losartan due to cough.   Patient to monitor ambulatory BP.  Monitoring renal function.         Relevant Medications    carvedilol (COREG) 12.5 MG tablet    losartan (Cozaar) 50 MG tablet    Mixed hyperlipidemia    Overview     Reviewed lipid panel & LDL goal.  Encouraged a diet rich in vegetables & 150 minutes of exercise weekly.  Continue atorvastatin 20 mg nightly.            Musculoskeletal and Injuries    Rotator cuff arthropathy of left shoulder    Overview     Discussed pathophysiology & a similar outcomes of partial tendon tears with conservative therapy versus surgical intervention.  Advised < 3g acetaminophen/day hours PRN.  Continue diclofenac as tolerated by BP, renal function, and GI.   Encouraged regular exercise and stretching.  Patient to start physical therapy tomorrow.  Consider imaging and referral to orthopedics if symptoms persist.         Relevant Medications    diclofenac (VOLTAREN) 50 MG EC tablet      Other Visit Diagnoses     Cough due to ACE inhibitor        Switching lisinopril to losartan.    Relevant Medications    losartan (Cozaar) 50 MG tablet    Other Relevant Orders    CBC No Differential (Completed)    History of viral infection        Patient requesting SARS-CoV-2 antibody testing.    Relevant Orders    SARS-CoV-2 Antibodies (Roche)        Return in about 6 months (around 6/6/2022) for Medicare Wellness.        Patient was given instructions and counseling regarding her condition or for health maintenance advice. Please see specific information pulled into the AVS if appropriate.        Maria D is a 81 y.o. being seen today for  Hypertension (does not know what the isosorbide is for. she is not taking  "it. ), Hyperlipidemia, and Shoulder Pain (left shoulder still having pain. taking diclofinac. also having pain in right shoulder. starts in arm and goes up to the shoulders. does not want rehab. )   Subjective   History of the Present Illness     Osteopenic post menopausal white female with CMHx of HTN & HLD presents for follow up.    Normotensive in office. Has only been taking lisinopril & Coreg. Reports normotensive ambulatory BP. Reports compliance with statin.    Presents with complaints of left shoulder pain, located over deltoid, radiating to elbow dating back to late November 2021. Has not been vaccinated in recent days. Does not sleep on her sides.  Denies any trauma to date though reports that she has been moving heavy pots and statues from her back porch into her garage for the winter season.  Reports some benefit of pain with diclofenac.  Patient was referred to PT and will start tomorrow.    Social History  She  reports that she has never smoked. She has never used smokeless tobacco. She reports that she does not drink alcohol and does not use drugs.  Objective   Vital Signs          Vitals:    12/06/21 1404   BP: 116/73   BP Location: Right arm   Patient Position: Sitting   Cuff Size: Large Adult   Pulse: 71   Temp: 97.6 °F (36.4 °C)   TempSrc: Temporal   SpO2: 95%   Weight: 68 kg (150 lb)   Height: 156.2 cm (61.5\")     BP Readings from Last 1 Encounters:   12/06/21 116/73     Wt Readings from Last 3 Encounters:   12/06/21 68 kg (150 lb)   11/18/21 67.6 kg (149 lb)   10/17/21 68 kg (150 lb)   Body mass index is 27.88 kg/m².     Physical Exam  Vitals reviewed.   Constitutional:       General: She is not in acute distress.     Appearance: She is well-developed. She is not diaphoretic.   HENT:      Head: Normocephalic and atraumatic.   Cardiovascular:      Rate and Rhythm: Normal rate and regular rhythm.   Pulmonary:      Effort: Pulmonary effort is normal.      Breath sounds: Normal breath sounds. "   Musculoskeletal:      Right shoulder: Normal range of motion. Normal strength.      Left shoulder: Tenderness (with internal and external rotation) present. Normal range of motion. Decreased strength (positive empty can test).      Comments: -Lift Off & Belly Press test   Skin:     General: Skin is warm and dry.   Neurological:      Mental Status: She is alert and oriented to person, place, and time.   Psychiatric:         Mood and Affect: Mood normal.         Behavior: Behavior normal.               Lipid Panel (07/15/2021 10:03)  Comprehensive Metabolic Panel (07/15/2021 10:03)  CBC Auto Differential (11/04/2020 11:52)

## 2021-12-06 NOTE — PATIENT INSTRUCTIONS
Managing Your Hypertension  Hypertension, also called high blood pressure, is when the force of the blood pressing against the walls of the arteries is too strong. Arteries are blood vessels that carry blood from your heart throughout your body. Hypertension forces the heart to work harder to pump blood and may cause the arteries to become narrow or stiff.  Understanding blood pressure readings  Your personal target blood pressure may vary depending on your medical conditions, your age, and other factors. A blood pressure reading includes a higher number over a lower number. Ideally, your blood pressure should be below 120/80. You should know that:  · The first, or top, number is called the systolic pressure. It is a measure of the pressure in your arteries as your heart beats.  · The second, or bottom number, is called the diastolic pressure. It is a measure of the pressure in your arteries as the heart relaxes.  Blood pressure is classified into four stages. Based on your blood pressure reading, your health care provider may use the following stages to determine what type of treatment you need, if any. Systolic pressure and diastolic pressure are measured in a unit called mmHg.  Normal  · Systolic pressure: below 120.  · Diastolic pressure: below 80.  Elevated  · Systolic pressure: 120-129.  · Diastolic pressure: below 80.  Hypertension stage 1  · Systolic pressure: 130-139.  · Diastolic pressure: 80-89.  Hypertension stage 2  · Systolic pressure: 140 or above.  · Diastolic pressure: 90 or above.  How can this condition affect me?  Managing your hypertension is an important responsibility. Over time, hypertension can damage the arteries and decrease blood flow to important parts of the body, including the brain, heart, and kidneys. Having untreated or uncontrolled hypertension can lead to:  · A heart attack.  · A stroke.  · A weakened blood vessel (aneurysm).  · Heart failure.  · Kidney damage.  · Eye  damage.  · Metabolic syndrome.  · Memory and concentration problems.  · Vascular dementia.  What actions can I take to manage this condition?  Hypertension can be managed by making lifestyle changes and possibly by taking medicines. Your health care provider will help you make a plan to bring your blood pressure within a normal range.  Nutrition    · Eat a diet that is high in fiber and potassium, and low in salt (sodium), added sugar, and fat. An example eating plan is called the Dietary Approaches to Stop Hypertension (DASH) diet. To eat this way:  ? Eat plenty of fresh fruits and vegetables. Try to fill one-half of your plate at each meal with fruits and vegetables.  ? Eat whole grains, such as whole-wheat pasta, brown rice, or whole-grain bread. Fill about one-fourth of your plate with whole grains.  ? Eat low-fat dairy products.  ? Avoid fatty cuts of meat, processed or cured meats, and poultry with skin. Fill about one-fourth of your plate with lean proteins such as fish, chicken without skin, beans, eggs, and tofu.  ? Avoid pre-made and processed foods. These tend to be higher in sodium, added sugar, and fat.  · Reduce your daily sodium intake. Most people with hypertension should eat less than 1,500 mg of sodium a day.    Lifestyle    · Work with your health care provider to maintain a healthy body weight or to lose weight. Ask what an ideal weight is for you.  · Get at least 30 minutes of exercise that causes your heart to beat faster (aerobic exercise) most days of the week. Activities may include walking, swimming, or biking.  · Include exercise to strengthen your muscles (resistance exercise), such as weight lifting, as part of your weekly exercise routine. Try to do these types of exercises for 30 minutes at least 3 days a week.  · Do not use any products that contain nicotine or tobacco, such as cigarettes, e-cigarettes, and chewing tobacco. If you need help quitting, ask your health care  provider.  · Control any long-term (chronic) conditions you have, such as high cholesterol or diabetes.  · Identify your sources of stress and find ways to manage stress. This may include meditation, deep breathing, or making time for fun activities.    Alcohol use  · Do not drink alcohol if:  ? Your health care provider tells you not to drink.  ? You are pregnant, may be pregnant, or are planning to become pregnant.  · If you drink alcohol:  ? Limit how much you use to:  § 0-1 drink a day for women.  § 0-2 drinks a day for men.  ? Be aware of how much alcohol is in your drink. In the [place] one drink equals one 12 oz bottle of beer (355 mL), one 5 oz glass of wine (148 mL), or one 1½ oz glass of hard liquor (44 mL).  Medicines  Your health care provider may prescribe medicine if lifestyle changes are not enough to get your blood pressure under control and if:  · Your systolic blood pressure is 130 or higher.  · Your diastolic blood pressure is 80 or higher.  Take medicines only as told by your health care provider. Follow the directions carefully. Blood pressure medicines must be taken as told by your health care provider. The medicine does not work as well when you skip doses. Skipping doses also puts you at risk for problems.  Monitoring  Before you monitor your blood pressure:  · Do not smoke, drink caffeinated beverages, or exercise within 30 minutes before taking a measurement.  · Use the bathroom and empty your bladder (urinate).  · Sit quietly for at least 5 minutes before taking measurements.  Monitor your blood pressure at home as told by your health care provider. To do this:  · Sit with your back straight and supported.  · Place your feet flat on the floor. Do not cross your legs.  · Support your arm on a flat surface, such as a table. Make sure your upper arm is at heart level.  · Each time you measure, take two or three readings one minute apart and record the results.  You may also need to have your  blood pressure checked regularly by your health care provider.  General information  · Talk with your health care provider about your diet, exercise habits, and other lifestyle factors that may be contributing to hypertension.  · Review all the medicines you take with your health care provider because there may be side effects or interactions.  · Keep all visits as told by your health care provider. Your health care provider can help you create and adjust your plan for managing your high blood pressure.  Where to find more information  · National Heart, Lung, and Blood Point [place]: www.nhlbi.nih.gov  · American Heart Association: www.heart.org  Contact a health care provider if:  · You think you are having a reaction to medicines you have taken.  · You have repeated (recurrent) headaches.  · You feel dizzy.  · You have swelling in your ankles.  · You have trouble with your vision.  Get help right away if:  · You develop a severe headache or confusion.  · You have unusual weakness or numbness, or you feel faint.  · You have severe pain in your chest or abdomen.  · You vomit repeatedly.  · You have trouble breathing.  These symptoms may represent a serious problem that is an emergency. Do not wait to see if the symptoms will go away. Get medical help right away. Call your local emergency services (911 in the [place] Do not drive yourself to the hospital.  Summary  · Hypertension is when the force of blood pumping through your arteries is too strong. If this condition is not controlled, it may put you at risk for serious complications.  · Your personal target blood pressure may vary depending on your medical conditions, your age, and other factors. For most people, a normal blood pressure is less than 120/80.  · Hypertension is managed by lifestyle changes, medicines, or both.  · Lifestyle changes to help manage hypertension include losing weight, eating a healthy, low-sodium diet, exercising more, stopping smoking, and  limiting alcohol.  This information is not intended to replace advice given to you by your health care provider. Make sure you discuss any questions you have with your health care provider.  Document Revised: 2021 Document Reviewed: 2020  Elsevier Patient Education ©  [employer]    Shoulder Range of Motion Exercises  Shoulder range of motion (ROM) exercises are done to keep the shoulder moving freely or to increase movement. They are often recommended for people who have shoulder pain or stiffness or who are recovering from a shoulder surgery.  Phase 1 exercises  When you are able, do this exercise 1-2 times per day for 30-60 seconds in each direction, or as directed by your health care provider.  Pendulum exercise  To do this exercise while sittin. Sit in a chair or at the edge of your bed with your feet flat on the floor.  2. Let your affected arm hang down in front of you over the edge of the bed or chair.  3. Relax your shoulder, arm, and hand.  4. Rock your body so your arm gently swings in small circles. You can also use your unaffected arm to start the motion.  5. Repeat changing the direction of the circles, swinging your arm left and right, and swinging your arm forward and back.  To do this exercise while standin. Stand next to a sturdy chair or table, and hold on to it with your hand on your unaffected side.  2. Bend forward at the waist.  3. Bend your knees slightly.  4. Relax your shoulder, arm, and hand.  5. While keeping your shoulder relaxed, use body motion to swing your arm in small circles.  6. Repeat changing the direction of the circles, swinging your arm left and right, and swinging your arm forward and back.  7. Between exercises, stand up tall and take a short break to relax your lower back.    Phase 2 exercises  Do these exercises 1-2 times per day or as told by your health care provider. Hold each stretch for 30 seconds, and repeat 3 times. Do the exercises with  one or both arms as instructed by your health care provider.  For these exercises, sit at a table with your hand and arm supported by the table. A chair that slides easily or has wheels can be helpful.  External rotation  1. Turn your chair so that your affected side is nearest to the table.  2. Place your forearm on the table to your side. Bend your elbow about 90° at the elbow (right angle) and place your hand palm facing down on the table. Your elbow should be about 6 inches away from your side.  3. Keeping your arm on the table, lean your body forward.  Abduction  1. Turn your chair so that your affected side is nearest to the table.  2. Place your forearm and hand on the table so that your thumb points toward the ceiling and your arm is straight out to your side.  3. Slide your hand out to the side and away from you, using your unaffected arm to do the work.  4. To increase the stretch, you can slide your chair away from the table.  Flexion: forward stretch  1. Sit facing the table. Place your hand and elbow on the table in front of you.  2. Slide your hand forward and away from you, using your unaffected arm to do the work.  3. To increase the stretch, you can slide your chair backward.  Phase 3 exercises  Do these exercises 1-2 times per day or as told by your health care provider. Hold each stretch for 30 seconds, and repeat 3 times. Do the exercises with one or both arms as instructed by your health care provider.  Cross-body stretch: posterior capsule stretch  1. Lift your arm straight out in front of you.  2. Bend your arm 90° at the elbow (right angle) so your forearm moves across your body.  3. Use your other arm to gently pull the elbow across your body, toward your other shoulder.  Wall climbs  1. Stand with your affected arm extended out to the side with your hand resting on a door frame.  2. Slide your hand slowly up the door frame.  3. To increase the stretch, step through the door frame. Keep your  body upright and do not lean.  Wand exercises  You will need a cane, a piece of PVC pipe, or a sturdy wooden dowel for wand exercises.  Flexion  To do this exercise while standin. Hold the wand with both of your hands, palms down.  2. Using the other arm to help, lift your arms up and over your head, if able.  3. Push upward with your other arm to gently increase the stretch.  To do this exercise while lying down:  1. Lie on your back with your elbows resting on the floor and the wand in both your hands. Your hands will be palm down, or pointing toward your feet.  2. Lift your hands toward the ceiling, using your unaffected arm to help if needed.  3. Bring your arms overhead as able, using your unaffected arm to help if needed.  Internal rotation  1. Stand while holding the wand behind you with both hands. Your unaffected arm should be extended above your head with the arm of the affected side extended behind you at the level of your waist. The wand should be pointing straight up and down as you hold it.  2. Slowly pull the wand up behind your back by straightening the elbow of your unaffected arm and bending the elbow of your affected arm.  External rotation  1. Lie on your back with your affected upper arm supported on a small pillow or rolled towel. When you first do this exercise, keep your upper arm close to your body. Over time, bring your arm up to a 90° angle out to the side.  2. Hold the wand across your stomach and with both hands palm up. Your elbow on your affected side should be bent at a 90° angle.  3. Use your unaffected side to help push your forearm away from you and toward the floor. Keep your elbow on your affected side bent at a 90° angle.  Contact a health care provider if you have:  · New or increasing pain.  · New numbness, tingling, weakness, or discoloration in your arm or hand.  This information is not intended to replace advice given to you by your health care provider. Make sure you  discuss any questions you have with your health care provider.  Document Revised: 01/30/2019 Document Reviewed: 01/30/2019  Elsevier Patient Education © 2021 [employer] Inc.

## 2021-12-07 ENCOUNTER — TELEPHONE (OUTPATIENT)
Dept: FAMILY MEDICINE CLINIC | Facility: CLINIC | Age: 81
End: 2021-12-07

## 2021-12-07 PROBLEM — M12.812 ROTATOR CUFF ARTHROPATHY OF LEFT SHOULDER: Status: ACTIVE | Noted: 2021-12-07

## 2021-12-07 LAB — SARS-COV-2 AB SERPL QL IA: NEGATIVE

## 2021-12-07 NOTE — TELEPHONE ENCOUNTER
Hub staff attempted to follow warm transfer process and was unsuccessful     Caller: Maria D Fountain    Relationship to patient: Self    Best call back number: [phone]    Patient is needing: THE PATIENT CALLED BACK IN FOR LAB RESULTS. SHE MISSED AN EARLIER CALL FROM THE OFFICE AS WELL. PLEASE ADVISE.

## 2022-01-03 RX ORDER — ATORVASTATIN CALCIUM 20 MG/1
TABLET, FILM COATED ORAL
Qty: 90 TABLET | Refills: 0 | Status: SHIPPED | OUTPATIENT
Start: 2022-01-03 | End: 2022-06-14

## 2022-01-03 NOTE — TELEPHONE ENCOUNTER
Rx Refill Note  Requested Prescriptions     Pending Prescriptions Disp Refills   • atorvastatin (LIPITOR) 20 MG tablet [Pharmacy Med Name: ATORVASTATIN 20 MG TABLET] 90 tablet 1     Sig: TAKE ONE TABLET BY MOUTH EVERY NIGHT AT BEDTIME      Last office visit with prescribing clinician: 7/20/2021      Next office visit with prescribing clinician: DIEGO for pt to schedule f/u appt.             Doretha Mcnulty MA  01/03/22, 15:26 EST

## 2022-01-19 ENCOUNTER — CLINICAL SUPPORT (OUTPATIENT)
Dept: FAMILY MEDICINE CLINIC | Facility: CLINIC | Age: 82
End: 2022-01-19

## 2022-01-19 VITALS — SYSTOLIC BLOOD PRESSURE: 126 MMHG | DIASTOLIC BLOOD PRESSURE: 72 MMHG

## 2022-01-20 ENCOUNTER — TELEPHONE (OUTPATIENT)
Dept: FAMILY MEDICINE CLINIC | Facility: CLINIC | Age: 82
End: 2022-01-20

## 2022-01-20 NOTE — TELEPHONE ENCOUNTER
[place] notified [place] [facility] [place]

## 2022-01-20 NOTE — TELEPHONE ENCOUNTER
Please call patient and let her know that she'll need to make an appointment regarding her foot.    Because her blood pressure is well controlled, I recommend continuing carvedilol and lisinopril if she can tolerate the cough.

## 2022-01-31 ENCOUNTER — LAB (OUTPATIENT)
Dept: FAMILY MEDICINE CLINIC | Facility: CLINIC | Age: 82
End: 2022-01-31

## 2022-01-31 ENCOUNTER — OFFICE VISIT (OUTPATIENT)
Dept: FAMILY MEDICINE CLINIC | Facility: CLINIC | Age: 82
End: 2022-01-31

## 2022-01-31 VITALS
HEART RATE: 77 BPM | DIASTOLIC BLOOD PRESSURE: 84 MMHG | OXYGEN SATURATION: 94 % | HEIGHT: 62 IN | BODY MASS INDEX: 26.87 KG/M2 | WEIGHT: 146 LBS | TEMPERATURE: 98.2 F | SYSTOLIC BLOOD PRESSURE: 152 MMHG

## 2022-01-31 DIAGNOSIS — M47.816 LUMBAR SPONDYLOSIS: ICD-10-CM

## 2022-01-31 DIAGNOSIS — R10.32 LEFT LOWER QUADRANT ABDOMINAL PAIN: Primary | ICD-10-CM

## 2022-01-31 DIAGNOSIS — M79.671 RIGHT FOOT PAIN: ICD-10-CM

## 2022-01-31 PROCEDURE — 81001 URINALYSIS AUTO W/SCOPE: CPT | Performed by: FAMILY MEDICINE

## 2022-01-31 PROCEDURE — 86140 C-REACTIVE PROTEIN: CPT | Performed by: FAMILY MEDICINE

## 2022-01-31 PROCEDURE — 36415 COLL VENOUS BLD VENIPUNCTURE: CPT | Performed by: FAMILY MEDICINE

## 2022-01-31 PROCEDURE — 99214 OFFICE O/P EST MOD 30 MIN: CPT | Performed by: FAMILY MEDICINE

## 2022-01-31 PROCEDURE — 85025 COMPLETE CBC W/AUTO DIFF WBC: CPT | Performed by: FAMILY MEDICINE

## 2022-01-31 PROCEDURE — 80053 COMPREHEN METABOLIC PANEL: CPT | Performed by: FAMILY MEDICINE

## 2022-01-31 RX ORDER — MELOXICAM 15 MG/1
15 TABLET ORAL DAILY PRN
Qty: 90 TABLET | Refills: 1 | Status: SHIPPED | OUTPATIENT
Start: 2022-01-31 | End: 2022-06-14 | Stop reason: SDUPTHER

## 2022-01-31 RX ORDER — LISINOPRIL 20 MG/1
20 TABLET ORAL 2 TIMES DAILY
COMMUNITY
End: 2022-02-25 | Stop reason: SDUPTHER

## 2022-02-01 LAB
ALBUMIN SERPL-MCNC: 4.1 G/DL (ref 3.5–5.2)
ALBUMIN/GLOB SERPL: 1.4 G/DL
ALP SERPL-CCNC: 94 U/L (ref 39–117)
ALT SERPL W P-5'-P-CCNC: 17 U/L (ref 1–33)
ANION GAP SERPL CALCULATED.3IONS-SCNC: 8.4 MMOL/L (ref 5–15)
AST SERPL-CCNC: 23 U/L (ref 1–32)
BACTERIA UR QL AUTO: ABNORMAL /HPF
BASOPHILS # BLD AUTO: 0.05 10*3/MM3 (ref 0–0.2)
BASOPHILS NFR BLD AUTO: 0.6 % (ref 0–1.5)
BILIRUB SERPL-MCNC: 1.2 MG/DL (ref 0–1.2)
BILIRUB UR QL STRIP: NEGATIVE
BUN SERPL-MCNC: 11 MG/DL (ref 8–23)
BUN/CREAT SERPL: 12.4 (ref 7–25)
CALCIUM SPEC-SCNC: 10.2 MG/DL (ref 8.6–10.5)
CHLORIDE SERPL-SCNC: 105 MMOL/L (ref 98–107)
CLARITY UR: CLEAR
CO2 SERPL-SCNC: 29.6 MMOL/L (ref 22–29)
COLOR UR: YELLOW
CREAT SERPL-MCNC: 0.89 MG/DL (ref 0.57–1)
CRP SERPL-MCNC: <0.3 MG/DL (ref 0–0.5)
DEPRECATED RDW RBC AUTO: 42.7 FL (ref 37–54)
EOSINOPHIL # BLD AUTO: 0.56 10*3/MM3 (ref 0–0.4)
EOSINOPHIL NFR BLD AUTO: 6.5 % (ref 0.3–6.2)
ERYTHROCYTE [DISTWIDTH] IN BLOOD BY AUTOMATED COUNT: 12.6 % (ref 12.3–15.4)
GFR SERPL CREATININE-BSD FRML MDRD: 61 ML/MIN/1.73
GLOBULIN UR ELPH-MCNC: 2.9 GM/DL
GLUCOSE SERPL-MCNC: 84 MG/DL (ref 65–99)
GLUCOSE UR STRIP-MCNC: NEGATIVE MG/DL
HCT VFR BLD AUTO: 44.4 % (ref 34–46.6)
HGB BLD-MCNC: 14.9 G/DL (ref 12–15.9)
HGB UR QL STRIP.AUTO: NEGATIVE
HYALINE CASTS UR QL AUTO: ABNORMAL /LPF
IMM GRANULOCYTES # BLD AUTO: 0.02 10*3/MM3 (ref 0–0.05)
IMM GRANULOCYTES NFR BLD AUTO: 0.2 % (ref 0–0.5)
KETONES UR QL STRIP: NEGATIVE
LEUKOCYTE ESTERASE UR QL STRIP.AUTO: ABNORMAL
LYMPHOCYTES # BLD AUTO: 2.28 10*3/MM3 (ref 0.7–3.1)
LYMPHOCYTES NFR BLD AUTO: 26.5 % (ref 19.6–45.3)
MCH RBC QN AUTO: 31.2 PG (ref 26.6–33)
MCHC RBC AUTO-ENTMCNC: 33.6 G/DL (ref 31.5–35.7)
MCV RBC AUTO: 92.9 FL (ref 79–97)
MONOCYTES # BLD AUTO: 0.97 10*3/MM3 (ref 0.1–0.9)
MONOCYTES NFR BLD AUTO: 11.3 % (ref 5–12)
NEUTROPHILS NFR BLD AUTO: 4.71 10*3/MM3 (ref 1.7–7)
NEUTROPHILS NFR BLD AUTO: 54.9 % (ref 42.7–76)
NITRITE UR QL STRIP: NEGATIVE
NRBC BLD AUTO-RTO: 0 /100 WBC (ref 0–0.2)
PH UR STRIP.AUTO: 7 [PH] (ref 5–8)
PLATELET # BLD AUTO: 300 10*3/MM3 (ref 140–450)
PMV BLD AUTO: 10.1 FL (ref 6–12)
POTASSIUM SERPL-SCNC: 4.4 MMOL/L (ref 3.5–5.2)
PROT SERPL-MCNC: 7 G/DL (ref 6–8.5)
PROT UR QL STRIP: NEGATIVE
RBC # BLD AUTO: 4.78 10*6/MM3 (ref 3.77–5.28)
RBC # UR STRIP: ABNORMAL /HPF
REF LAB TEST METHOD: ABNORMAL
SODIUM SERPL-SCNC: 143 MMOL/L (ref 136–145)
SP GR UR STRIP: 1.02 (ref 1–1.03)
SQUAMOUS #/AREA URNS HPF: ABNORMAL /HPF
UROBILINOGEN UR QL STRIP: ABNORMAL
WBC # UR STRIP: ABNORMAL /HPF
WBC NRBC COR # BLD: 8.59 10*3/MM3 (ref 3.4–10.8)

## 2022-02-07 ENCOUNTER — TELEPHONE (OUTPATIENT)
Dept: FAMILY MEDICINE CLINIC | Facility: CLINIC | Age: 82
End: 2022-02-07

## 2022-02-07 DIAGNOSIS — Z01.89 PATIENT REQUEST FOR DIAGNOSTIC TESTING: ICD-10-CM

## 2022-02-07 DIAGNOSIS — R10.32 LEFT LOWER QUADRANT ABDOMINAL PAIN: Primary | ICD-10-CM

## 2022-02-07 NOTE — TELEPHONE ENCOUNTER
Spoke with patient. She would like to have CT with contrast. she has spoke with multiple family members nurses/NP who have recommended she have CT with contrast. She has lost another 2 lbs and continues to have lower abdominal pain and groin pain.

## 2022-02-07 NOTE — TELEPHONE ENCOUNTER
Caller: АлександрMaria D    Relationship: Self    Best call back number: [phone]    What is the best time to reach you: ANY     Who are you requesting to speak with (clinical staff, provider,  specific staff member): NURSE/DR TRIVEDI    Do you know the name of the person who called: NURSE    What was the call regarding: PATIENT CALLING ABOUT HER CT SCAN WITH CONTRAST ORDERS, NEEDS TO TALK TO EITHER DR TRIVEDI OR HER NURSE.     Do you require a callback: YES

## 2022-02-08 NOTE — TELEPHONE ENCOUNTER
Beatriz - Please call patient and let her know that imaging study has been ordered. Once approved through insurance, she can get it done at [facility].    Jeri - Please cancel order for CT abdomen and pelvis without contrast at patient's request. Patient would like a contrast study.

## 2022-02-09 NOTE — TELEPHONE ENCOUNTER
Patient notified that order has been placed. Informed her we will call once approved through insurance.

## 2022-02-21 ENCOUNTER — HOSPITAL ENCOUNTER (EMERGENCY)
Facility: HOSPITAL | Age: 82
Discharge: LEFT BEFORE DISCHARGE | End: 2022-02-21

## 2022-02-21 VITALS
DIASTOLIC BLOOD PRESSURE: 82 MMHG | BODY MASS INDEX: 27.72 KG/M2 | HEIGHT: 61 IN | SYSTOLIC BLOOD PRESSURE: 154 MMHG | OXYGEN SATURATION: 95 % | HEART RATE: 66 BPM | WEIGHT: 146.83 LBS | TEMPERATURE: 98 F | RESPIRATION RATE: 16 BRPM

## 2022-02-21 LAB — QT INTERVAL: 411 MS

## 2022-02-21 PROCEDURE — 93005 ELECTROCARDIOGRAM TRACING: CPT

## 2022-02-21 PROCEDURE — 99211 OFF/OP EST MAY X REQ PHY/QHP: CPT

## 2022-02-22 ENCOUNTER — TELEPHONE (OUTPATIENT)
Dept: CARDIOLOGY | Facility: CLINIC | Age: 82
End: 2022-02-22

## 2022-02-22 DIAGNOSIS — E11.9 TYPE II DIABETES MELLITUS WITH GOAL OF SYMPTOM MANAGEMENT: ICD-10-CM

## 2022-02-22 DIAGNOSIS — E78.5 DYSLIPIDEMIA: ICD-10-CM

## 2022-02-22 DIAGNOSIS — R09.89 LABILE HYPERTENSION: ICD-10-CM

## 2022-02-22 DIAGNOSIS — I25.118 CORONARY ARTERY DISEASE OF NATIVE ARTERY OF NATIVE HEART WITH STABLE ANGINA PECTORIS: Primary | ICD-10-CM

## 2022-02-22 DIAGNOSIS — Z01.89 PATIENT REQUEST FOR DIAGNOSTIC TESTING: ICD-10-CM

## 2022-02-22 DIAGNOSIS — I25.118 CORONARY ARTERY DISEASE OF NATIVE ARTERY OF NATIVE HEART WITH STABLE ANGINA PECTORIS: ICD-10-CM

## 2022-02-22 DIAGNOSIS — R10.32 LEFT LOWER QUADRANT ABDOMINAL PAIN: ICD-10-CM

## 2022-02-22 RX ORDER — AMLODIPINE BESYLATE 5 MG/1
TABLET ORAL
Qty: 90 TABLET | Refills: 3 | Status: SHIPPED | OUTPATIENT
Start: 2022-02-22 | End: 2022-02-25 | Stop reason: SDUPTHER

## 2022-02-22 NOTE — TELEPHONE ENCOUNTER
Pt seen in the ER yesterday with elevated BP, top number in the 220's. She left the ER because of crowding. Today her BP is 148/107 hr 71. Pt d/c Amlodipine last July due to low blood pressure. Can she resume? Taking Carvedilol 12.5 BID and Lisinopril 20 BID.   She denies chest pain, SOB, or edema. Appointment scheduled Thursday.

## 2022-02-23 ENCOUNTER — LAB (OUTPATIENT)
Dept: LAB | Facility: HOSPITAL | Age: 82
End: 2022-02-23

## 2022-02-23 LAB
ALBUMIN SERPL-MCNC: 4 G/DL (ref 3.5–5.2)
ALBUMIN/GLOB SERPL: 1.5 G/DL
ALP SERPL-CCNC: 90 U/L (ref 39–117)
ALT SERPL W P-5'-P-CCNC: 18 U/L (ref 1–33)
ANION GAP SERPL CALCULATED.3IONS-SCNC: 9.6 MMOL/L (ref 5–15)
AST SERPL-CCNC: 24 U/L (ref 1–32)
BILIRUB SERPL-MCNC: 1.2 MG/DL (ref 0–1.2)
BUN SERPL-MCNC: 10 MG/DL (ref 8–23)
BUN/CREAT SERPL: 10.9 (ref 7–25)
CALCIUM SPEC-SCNC: 9.4 MG/DL (ref 8.6–10.5)
CHLORIDE SERPL-SCNC: 104 MMOL/L (ref 98–107)
CHOLEST SERPL-MCNC: 109 MG/DL (ref 0–200)
CO2 SERPL-SCNC: 29.4 MMOL/L (ref 22–29)
CREAT SERPL-MCNC: 0.92 MG/DL (ref 0.57–1)
GFR SERPL CREATININE-BSD FRML MDRD: 58 ML/MIN/1.73
GLOBULIN UR ELPH-MCNC: 2.7 GM/DL
GLUCOSE SERPL-MCNC: 110 MG/DL (ref 65–99)
HDLC SERPL-MCNC: 40 MG/DL (ref 40–60)
LDLC SERPL CALC-MCNC: 41 MG/DL (ref 0–100)
LDLC/HDLC SERPL: 0.88 {RATIO}
NT-PROBNP SERPL-MCNC: 121 PG/ML (ref 0–1800)
POTASSIUM SERPL-SCNC: 4.2 MMOL/L (ref 3.5–5.2)
PROT SERPL-MCNC: 6.7 G/DL (ref 6–8.5)
SODIUM SERPL-SCNC: 143 MMOL/L (ref 136–145)
TRIGL SERPL-MCNC: 170 MG/DL (ref 0–150)
VLDLC SERPL-MCNC: 28 MG/DL (ref 5–40)

## 2022-02-23 PROCEDURE — 36415 COLL VENOUS BLD VENIPUNCTURE: CPT | Performed by: INTERNAL MEDICINE

## 2022-02-23 PROCEDURE — 80061 LIPID PANEL: CPT | Performed by: INTERNAL MEDICINE

## 2022-02-23 PROCEDURE — 80053 COMPREHEN METABOLIC PANEL: CPT | Performed by: INTERNAL MEDICINE

## 2022-02-23 PROCEDURE — 83880 ASSAY OF NATRIURETIC PEPTIDE: CPT | Performed by: INTERNAL MEDICINE

## 2022-02-24 ENCOUNTER — TELEPHONE (OUTPATIENT)
Dept: FAMILY MEDICINE CLINIC | Facility: CLINIC | Age: 82
End: 2022-02-24

## 2022-02-24 NOTE — TELEPHONE ENCOUNTER
PATIENT REQUESTING A CALLBACK TO RESCHEDULE HER 1 MONTH F/U TO AN EARLIER DATE. PLEASE ADVISE THANK YOU!

## 2022-02-25 ENCOUNTER — OFFICE VISIT (OUTPATIENT)
Dept: CARDIOLOGY | Facility: CLINIC | Age: 82
End: 2022-02-25

## 2022-02-25 ENCOUNTER — TELEPHONE (OUTPATIENT)
Dept: CARDIOLOGY | Facility: CLINIC | Age: 82
End: 2022-02-25

## 2022-02-25 VITALS
SYSTOLIC BLOOD PRESSURE: 149 MMHG | WEIGHT: 147 LBS | DIASTOLIC BLOOD PRESSURE: 81 MMHG | BODY MASS INDEX: 27.75 KG/M2 | OXYGEN SATURATION: 95 % | HEART RATE: 61 BPM | HEIGHT: 61 IN

## 2022-02-25 DIAGNOSIS — I25.118 CORONARY ARTERY DISEASE OF NATIVE ARTERY OF NATIVE HEART WITH STABLE ANGINA PECTORIS: ICD-10-CM

## 2022-02-25 DIAGNOSIS — E78.5 DYSLIPIDEMIA: ICD-10-CM

## 2022-02-25 DIAGNOSIS — E11.9 TYPE II DIABETES MELLITUS WITH GOAL OF SYMPTOM MANAGEMENT: ICD-10-CM

## 2022-02-25 DIAGNOSIS — R73.03 PREDIABETES: ICD-10-CM

## 2022-02-25 DIAGNOSIS — R07.2 PRECORDIAL PAIN: Primary | ICD-10-CM

## 2022-02-25 DIAGNOSIS — R09.89 LABILE HYPERTENSION: ICD-10-CM

## 2022-02-25 DIAGNOSIS — I10 ESSENTIAL HYPERTENSION: ICD-10-CM

## 2022-02-25 PROCEDURE — 99214 OFFICE O/P EST MOD 30 MIN: CPT | Performed by: INTERNAL MEDICINE

## 2022-02-25 RX ORDER — LISINOPRIL 20 MG/1
20 TABLET ORAL 2 TIMES DAILY
Qty: 180 TABLET | Refills: 3 | Status: SHIPPED | OUTPATIENT
Start: 2022-02-25 | End: 2022-06-14 | Stop reason: SDUPTHER

## 2022-02-25 RX ORDER — AMLODIPINE BESYLATE 10 MG/1
10 TABLET ORAL DAILY
Qty: 90 TABLET | Refills: 3 | Status: SHIPPED | OUTPATIENT
Start: 2022-02-25 | End: 2022-06-14 | Stop reason: SDUPTHER

## 2022-02-25 RX ORDER — CARVEDILOL 12.5 MG/1
12.5 TABLET ORAL 2 TIMES DAILY WITH MEALS
Qty: 180 TABLET | Refills: 3 | Status: SHIPPED | OUTPATIENT
Start: 2022-02-25 | End: 2022-06-14 | Stop reason: SDUPTHER

## 2022-02-25 NOTE — TELEPHONE ENCOUNTER
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 She has a whole bottle of the 5mg she just picked up. Can she go ahead and take those but just take two at a time? Please call her back and let her know if this is ok.

## 2022-02-25 NOTE — PROGRESS NOTES
Subjective:     Encounter Date:02/25/2022      Patient ID: Maria D Fountain is a 82 y.o. female.    Chief Complaint : Acute visit for chest pain and elevated blood pressure.  Follow-up for CAD, MI, diabetes/prediabetes, hypertension  History of Present Illness      Ms. Maria D Fountain has PMH of      # CAD cardiac cath 06/21/2017 showing 50% OM1 disease and small-vessel disease distal LAD  #  mitral regurgitation, mild 9/2016  #  palpitations  #  hypertension  # LAE  #  diabetes  #?  ACE-inhibitor cough, now improved  # allergy/intolerance to pravastatin  # ALYSA        Here for an acute visit..  Patient is complaining of intermittent substernal chest pain with stressful situation relieved with relaxation.  He is under a lot of stress patient's  passed away recently( lung ca), son is having multiple surgery after motor vehicle accident for orthopedic surgery.    Patient was started on amlodipine and reportedly developed low blood pressure and stopped it on her own.  On 2/21/2022 patient had blood pressure of 220/117 went to the emergency room waited for 4 hours could not be seen therefore went back home.  Patient had history of not taking lisinopril in the past also.  Patient is complaining of extreme fatigue she thought she had COVID last summer had 3 rounds of steroids and antibiotics.  Patient has left arm hurting where she cannot lift it over the shoulders.  Went to rehab physical physical therapy and has improvement now she is able to lift it over the shoulder pain.  Patient says she is tired all the time and has right earache pain thinks she is having Covid again.  Patient's arterial blood pressure is elevated at 149/81, heart rate 61 bpm, O2 sat of 95% on room air.    Patient's labs from 01/29/2018 revealed cholesterol 210 triglycerides 230 HDL low at 38 LDL high at 134 CMP is okay CK is 92.  Labs from 1/14/2020 reveal cholesterol 113, triglycerides 139 HDL 38 LDL 47, A1c of 5.6, CMP with total  bilirubin 1.4, normal CBC and TSH.  Labs from 2/23/2022 ordered by me reviewed by me revealed CMP with a glucose of 110, lipid profile with cholesterol 119 triglycerides 170 HDL 40 LDL 41.  proBNP normal at 121.      ASSESSMENT:  -Labile hypertension  -Chest pain/Chronic stable angina  #. hypertension,   #  dyslipidemia  # CAD  #  mitral regurgitation  #. prediabetes      PLAN:  Reviewed EKG results with patient  Advised patient to check blood pressure at home   Will add amlodipine at 10 mg daily.  We will continue  medical management with beta-blockers aspirin , lisinopril to help with angina, hypertension, dyslipidemia, CAD, MR    counseled on walking exercise for low HDL   follow up with PMD for  diabetes/prediabetes.  We will schedule for stress test to evaluate chest pain.  Discussed about COVID-19 vaccine.  Patient did not take because she was allergic to flu vaccine and does not want to take vaccines.  Patient has multiple members in the family who are nurses and nurse practitioner sent does not want to take vaccinations.  Advised patient to follow-up with PMD for concerns about fatigue and earache being repeat Covid infection.      Procedures EKG done 2/21/2022 reviewed/interpreted by me reveals sinus rhythm with rate of 65 bpm    The following portions of the patient's history were reviewed and updated as appropriate: allergies, current medications, past family history, past medical history, past social history, past surgical history and problem list.    Assessment:         MDM     Diagnosis Plan   1. Precordial pain  Stress Test With Myocardial Perfusion One Day   2. Essential hypertension  carvedilol (COREG) 12.5 MG tablet    Stress Test With Myocardial Perfusion One Day   3. Coronary artery disease of native artery of native heart with stable angina pectoris (HCC)  Stress Test With Myocardial Perfusion One Day   4. Labile hypertension  Stress Test With Myocardial Perfusion One Day   5. Type II diabetes  mellitus with goal of symptom management (HCC)  Stress Test With Myocardial Perfusion One Day   6. Dyslipidemia  Stress Test With Myocardial Perfusion One Day   7. Prediabetes  Stress Test With Myocardial Perfusion One Day          Plan:               Past Medical History:  Past Medical History:   Diagnosis Date   • Arthritis    • Diabetes mellitus (HCC)    • DJD (degenerative joint disease)    • Gallstones    • Heart murmur    • History of stress test 07/2011    Stress myoview- neg    • Hypertension    • Lumbar disc disease    • Mitral regurgitation    • Multiple lipomas    • Pleurisy    • Spinal stenosis      Past Surgical History:  Past Surgical History:   Procedure Laterality Date   • BREAST BIOPSY Right 1990   • CARDIAC CATHETERIZATION  06/21/2017   • HEMANGIOMA EXCISION Left 2010    left forearm    • HX OVARIAN CYSTECTOMY  1962   • HYSTERECTOMY  2004      Allergies:  Allergies   Allergen Reactions   • Pravastatin Dizziness     Home Meds:  Current Meds:     Current Outpatient Medications:   •  ascorbic acid (VITAMIN C) 1000 MG tablet, Take 500 mg by mouth 2 (two) times a day., Disp: , Rfl:   •  aspirin (ASPIR-LOW) 81 MG EC tablet, Take 81 mg by mouth Daily., Disp: , Rfl:   •  atorvastatin (LIPITOR) 20 MG tablet, TAKE ONE TABLET BY MOUTH EVERY NIGHT AT BEDTIME, Disp: 90 tablet, Rfl: 0  •  Calcium Carb-Cholecalciferol 1000-800 MG-UNIT tablet, Take 4 tablets by mouth Daily., Disp: , Rfl:   •  carvedilol (COREG) 12.5 MG tablet, Take 1 tablet by mouth 2 (Two) Times a Day With Meals., Disp: 180 tablet, Rfl: 3  •  HM OMEGA-3-6-9 FATTY ACIDS capsule, Take 1 capsule by mouth Daily., Disp: , Rfl:   •  lisinopril (PRINIVIL,ZESTRIL) 20 MG tablet, Take 1 tablet by mouth 2 (Two) Times a Day., Disp: 180 tablet, Rfl: 3  •  meclizine (ANTIVERT) 25 MG tablet, Take 1 tablet by mouth 3 (Three) Times a Day As Needed for Dizziness., Disp: 30 tablet, Rfl: 1  •  meloxicam (MOBIC) 15 MG tablet, Take 1 tablet by mouth Daily As Needed  "for Moderate Pain . Take with food!, Disp: 90 tablet, Rfl: 1  •  multivitamin-minerals (CENTRUM) tablet, Take 1 tablet by mouth Daily., Disp: , Rfl:   •  nitroglycerin (Nitrostat) 0.4 MG SL tablet, Place 1 tablet under the tongue Every 5 (Five) Minutes As Needed for Chest Pain. Take no more than 3 doses in 15 minutes., Disp: 20 tablet, Rfl: 5  •  vitamin B-12 (CYANOCOBALAMIN) 1000 MCG tablet, Take 1,000 mcg by mouth Daily., Disp: , Rfl:   •  amLODIPine (NORVASC) 10 MG tablet, Take 1 tablet by mouth Daily., Disp: 90 tablet, Rfl: 3  Social History:   Social History     Tobacco Use   • Smoking status: Never Smoker   • Smokeless tobacco: Never Used   Substance Use Topics   • Alcohol use: No      Family History:  Family History   Problem Relation Age of Onset   • Heart disease Father         MI   • Arthritis Other    • Hypertension Other    • Colon cancer Other    • No Known Problems Mother    • No Known Problems Sister    • No Known Problems Brother    • No Known Problems Maternal Aunt    • No Known Problems Maternal Uncle    • No Known Problems Paternal Aunt    • No Known Problems Paternal Uncle    • No Known Problems Maternal Grandmother    • No Known Problems Maternal Grandfather    • No Known Problems Paternal Grandmother    • No Known Problems Paternal Grandfather    • Anemia Neg Hx    • Arrhythmia Neg Hx    • Asthma Neg Hx    • Clotting disorder Neg Hx    • Fainting Neg Hx    • Heart attack Neg Hx    • Heart failure Neg Hx    • Hyperlipidemia Neg Hx               Review of Systems   Cardiovascular: Positive for palpitations. Negative for chest pain and leg swelling.   Respiratory: Negative for shortness of breath.    Neurological: Positive for dizziness. Negative for numbness.     All other systems are negative         Objective:     Physical Exam  /81 (BP Location: Left arm, Patient Position: Sitting, Cuff Size: Adult)   Pulse 61   Ht 154.9 cm (61\")   Wt 66.7 kg (147 lb)   LMP  (LMP Unknown)   SpO2 95% "   BMI 27.78 kg/m²   General:  Appears in no acute distress  Eyes: Sclera is anicteric,  conjunctiva is clear   HEENT:  No JVD.  No carotid bruits  Respiratory: Respirations regular and unlabored at rest.  Clear to auscultation  Cardiovascular: S1,S2 Regular rate and rhythm. No murmur, rub or gallop auscultated.   Extremities: No digital clubbing or cyanosis, no edema  Skin: Color pink. Skin warm and dry to touch. No rashes  No xanthoma  Neuro: Alert and awake.    Lab Reviewed:         Matti Luna MD  2/25/2022 11:16 EST      Much of the above report is an electronic transcription/translation of the spoken language to printed text using Dragon Software. As such, the subtleties and finesse of the spoken language may permit erroneous, or at times, nonsensical words or phrases to be inadvertently transcribed; thus changes may be made at a later date to rectify these errors.

## 2022-03-07 ENCOUNTER — TELEPHONE (OUTPATIENT)
Dept: FAMILY MEDICINE CLINIC | Facility: CLINIC | Age: 82
End: 2022-03-07

## 2022-03-07 NOTE — TELEPHONE ENCOUNTER
im not sure if they are even offering the infusions at this time.  Do you mind to call over to ambulatory to see if they are? I know last I spoke with them they were not but they might be now that covid is slowing down. She needs to push her fluid intake. She can hold the mobic and alternate the tylenol and ibuprofen. If she starts having trouble breathing she will need to go to the ER

## 2022-03-07 NOTE — TELEPHONE ENCOUNTER
Pt notified. She takes Tylenol 650 mg 3 times a day. Her body aches are so bad is there anything else you can recommend for her? She was also wanting to know if you think infusion would be a good idea for her?

## 2022-03-07 NOTE — TELEPHONE ENCOUNTER
Rapid test at [facility]- pos for covid. Any recommendations? Scheduled to see you today at 3:15.

## 2022-03-07 NOTE — TELEPHONE ENCOUNTER
Since she knows she is covid positive she does not have to come in unless she absolutely wants to. You need to quarantine for 5 days from the onset of symptoms. You may come out of quarantine once that 5 days is up if you are fever free for 24 hours without medication (tylenol/ibuprofen) AND your symptoms have improved- symptoms such as cough and loss of taste and smell may take longer to resolve. Push fluid intake, take frequent short walks, rest, go to the ER for increased shortness of air. You may supplement with Vitamin C and D and Zinc.

## 2022-03-09 ENCOUNTER — HOSPITAL ENCOUNTER (OUTPATIENT)
Dept: INFUSION THERAPY | Facility: HOSPITAL | Age: 82
Discharge: HOME | End: 2022-03-09
Admitting: NURSE PRACTITIONER

## 2022-03-09 ENCOUNTER — APPOINTMENT (OUTPATIENT)
Dept: INFUSION THERAPY | Facility: HOSPITAL | Age: 82
End: 2022-03-09

## 2022-03-09 VITALS
SYSTOLIC BLOOD PRESSURE: 128 MMHG | DIASTOLIC BLOOD PRESSURE: 58 MMHG | HEART RATE: 75 BPM | OXYGEN SATURATION: 98 % | TEMPERATURE: 99.2 F | RESPIRATION RATE: 21 BRPM

## 2022-03-09 DIAGNOSIS — U07.1 CLINICAL DIAGNOSIS OF SEVERE ACUTE RESPIRATORY SYNDROME CORONAVIRUS 2 (SARS-COV-2) DISEASE: Primary | ICD-10-CM

## 2022-03-09 PROCEDURE — 96360 HYDRATION IV INFUSION INIT: CPT

## 2022-03-09 PROCEDURE — M0247 HC INTRAVENOUS INFUSION SOTROVIMAB: HCPCS | Performed by: NURSE PRACTITIONER

## 2022-03-09 PROCEDURE — 36415 COLL VENOUS BLD VENIPUNCTURE: CPT

## 2022-03-09 PROCEDURE — 25010000002 SOTROVIMAB 500 MG/8ML SOLUTION: Performed by: NURSE PRACTITIONER

## 2022-03-09 PROCEDURE — 96365 THER/PROPH/DIAG IV INF INIT: CPT

## 2022-03-09 RX ORDER — SODIUM CHLORIDE 9 MG/ML
30 INJECTION, SOLUTION INTRAVENOUS ONCE
Status: CANCELLED | OUTPATIENT
Start: 2022-03-09

## 2022-03-09 RX ORDER — SODIUM CHLORIDE 9 MG/ML
30 INJECTION, SOLUTION INTRAVENOUS ONCE
Status: COMPLETED | OUTPATIENT
Start: 2022-03-09 | End: 2022-03-09

## 2022-03-09 RX ORDER — METHYLPREDNISOLONE SODIUM SUCCINATE 125 MG/2ML
125 INJECTION, POWDER, LYOPHILIZED, FOR SOLUTION INTRAMUSCULAR; INTRAVENOUS AS NEEDED
Status: CANCELLED | OUTPATIENT
Start: 2022-03-09

## 2022-03-09 RX ORDER — DIPHENHYDRAMINE HYDROCHLORIDE 50 MG/ML
50 INJECTION INTRAMUSCULAR; INTRAVENOUS ONCE AS NEEDED
OUTPATIENT
Start: 2022-03-09

## 2022-03-09 RX ORDER — METHYLPREDNISOLONE SODIUM SUCCINATE 125 MG/2ML
125 INJECTION, POWDER, LYOPHILIZED, FOR SOLUTION INTRAMUSCULAR; INTRAVENOUS AS NEEDED
OUTPATIENT
Start: 2022-03-09

## 2022-03-09 RX ORDER — DIPHENHYDRAMINE HCL 25 MG
50 CAPSULE ORAL ONCE AS NEEDED
OUTPATIENT
Start: 2022-03-09

## 2022-03-09 RX ORDER — DIPHENHYDRAMINE HCL 25 MG
50 TABLET ORAL ONCE AS NEEDED
Status: CANCELLED | OUTPATIENT
Start: 2022-03-09

## 2022-03-09 RX ORDER — DIPHENHYDRAMINE HYDROCHLORIDE 50 MG/ML
50 INJECTION INTRAMUSCULAR; INTRAVENOUS ONCE AS NEEDED
Status: CANCELLED | OUTPATIENT
Start: 2022-03-09

## 2022-03-09 RX ORDER — EPINEPHRINE 1 MG/ML
0.3 INJECTION, SOLUTION, CONCENTRATE INTRAVENOUS AS NEEDED
OUTPATIENT
Start: 2022-03-09

## 2022-03-09 RX ORDER — EPINEPHRINE 1 MG/ML
0.3 INJECTION, SOLUTION INTRAMUSCULAR; SUBCUTANEOUS AS NEEDED
Status: CANCELLED | OUTPATIENT
Start: 2022-03-09

## 2022-03-09 RX ADMIN — SODIUM CHLORIDE 500 MG: 9 INJECTION, SOLUTION INTRAVENOUS at 10:53

## 2022-03-09 RX ADMIN — SODIUM CHLORIDE 30 ML/HR: 9 INJECTION, SOLUTION INTRAVENOUS at 10:53

## 2022-03-14 ENCOUNTER — TELEPHONE (OUTPATIENT)
Dept: CARDIOLOGY | Facility: CLINIC | Age: 82
End: 2022-03-14

## 2022-03-14 NOTE — TELEPHONE ENCOUNTER
Patient called and LM stating that she recently tested positive for Covid. She is schedule for an infusion this Wednesday and she is very weak. She is cancelling her stress test for the 17th and she will call back to schedule once she is feeling better.

## 2022-03-17 ENCOUNTER — APPOINTMENT (OUTPATIENT)
Dept: CARDIOLOGY | Facility: HOSPITAL | Age: 82
End: 2022-03-17

## 2022-04-12 ENCOUNTER — TELEPHONE (OUTPATIENT)
Dept: CARDIOLOGY | Facility: CLINIC | Age: 82
End: 2022-04-12

## 2022-04-12 ENCOUNTER — HOSPITAL ENCOUNTER (OUTPATIENT)
Dept: CARDIOLOGY | Facility: HOSPITAL | Age: 82
Discharge: HOME | End: 2022-04-12

## 2022-04-12 DIAGNOSIS — I10 ESSENTIAL HYPERTENSION: ICD-10-CM

## 2022-04-12 DIAGNOSIS — R07.2 PRECORDIAL PAIN: ICD-10-CM

## 2022-04-12 DIAGNOSIS — R09.89 LABILE HYPERTENSION: ICD-10-CM

## 2022-04-12 DIAGNOSIS — E78.5 DYSLIPIDEMIA: ICD-10-CM

## 2022-04-12 DIAGNOSIS — I25.118 CORONARY ARTERY DISEASE OF NATIVE ARTERY OF NATIVE HEART WITH STABLE ANGINA PECTORIS: ICD-10-CM

## 2022-04-12 DIAGNOSIS — R73.03 PREDIABETES: ICD-10-CM

## 2022-04-12 DIAGNOSIS — E11.9 TYPE II DIABETES MELLITUS WITH GOAL OF SYMPTOM MANAGEMENT: ICD-10-CM

## 2022-04-12 LAB
BH CV REST NUCLEAR ISOTOPE DOSE: 10.7 MCI
BH CV STRESS BP STAGE 1: NORMAL
BH CV STRESS BP STAGE 2: NORMAL
BH CV STRESS DURATION MIN STAGE 1: 3
BH CV STRESS DURATION MIN STAGE 2: 4
BH CV STRESS DURATION SEC STAGE 1: 0
BH CV STRESS DURATION SEC STAGE 2: 40
BH CV STRESS GRADE STAGE 1: 10
BH CV STRESS GRADE STAGE 2: 12
BH CV STRESS HR STAGE 1: 87
BH CV STRESS HR STAGE 2: 105
BH CV STRESS METS STAGE 1: 5
BH CV STRESS METS STAGE 2: 7.5
BH CV STRESS NUCLEAR ISOTOPE DOSE: 32.4 MCI
BH CV STRESS PROTOCOL 1: NORMAL
BH CV STRESS RECOVERY BP: NORMAL MMHG
BH CV STRESS RECOVERY HR: 73 BPM
BH CV STRESS SPEED STAGE 1: 1.7
BH CV STRESS SPEED STAGE 2: 2.5
BH CV STRESS STAGE 1: 1
BH CV STRESS STAGE 2: 2
MAXIMAL PREDICTED HEART RATE: 138 BPM
PERCENT MAX PREDICTED HR: 80.43 %
STRESS BASELINE BP: NORMAL MMHG
STRESS BASELINE HR: 67 BPM
STRESS PERCENT HR: 95 %
STRESS POST EXERCISE DUR MIN: 7 MIN
STRESS POST EXERCISE DUR SEC: 40 SEC
STRESS POST PEAK BP: NORMAL MMHG
STRESS POST PEAK HR: 111 BPM
STRESS TARGET HR: 117 BPM

## 2022-04-12 PROCEDURE — 0 TECHNETIUM SESTAMIBI: Performed by: INTERNAL MEDICINE

## 2022-04-12 PROCEDURE — A9500 TC99M SESTAMIBI: HCPCS | Performed by: INTERNAL MEDICINE

## 2022-04-12 PROCEDURE — 78452 HT MUSCLE IMAGE SPECT MULT: CPT | Performed by: INTERNAL MEDICINE

## 2022-04-12 PROCEDURE — 93018 CV STRESS TEST I&R ONLY: CPT | Performed by: INTERNAL MEDICINE

## 2022-04-12 PROCEDURE — 93017 CV STRESS TEST TRACING ONLY: CPT

## 2022-04-12 PROCEDURE — 78452 HT MUSCLE IMAGE SPECT MULT: CPT

## 2022-04-12 RX ADMIN — TECHNETIUM TC 99M SESTAMIBI 1 DOSE: 1 INJECTION INTRAVENOUS at 09:29

## 2022-04-12 RX ADMIN — TECHNETIUM TC 99M SESTAMIBI 1 DOSE: 1 INJECTION INTRAVENOUS at 10:33

## 2022-06-01 ENCOUNTER — OFFICE VISIT (OUTPATIENT)
Dept: FAMILY MEDICINE CLINIC | Facility: CLINIC | Age: 82
End: 2022-06-01

## 2022-06-01 ENCOUNTER — HOSPITAL ENCOUNTER (OUTPATIENT)
Dept: GENERAL RADIOLOGY | Facility: HOSPITAL | Age: 82
Discharge: HOME | End: 2022-06-01
Admitting: FAMILY MEDICINE

## 2022-06-01 VITALS
WEIGHT: 145 LBS | DIASTOLIC BLOOD PRESSURE: 79 MMHG | HEART RATE: 70 BPM | TEMPERATURE: 98.4 F | SYSTOLIC BLOOD PRESSURE: 142 MMHG | BODY MASS INDEX: 27.4 KG/M2 | OXYGEN SATURATION: 96 %

## 2022-06-01 DIAGNOSIS — M79.671 FOOT PAIN, RIGHT: ICD-10-CM

## 2022-06-01 DIAGNOSIS — M79.671 FOOT PAIN, RIGHT: Primary | ICD-10-CM

## 2022-06-01 DIAGNOSIS — R22.41 LOCALIZED SWELLING OF RIGHT FOOT: ICD-10-CM

## 2022-06-01 PROCEDURE — 73630 X-RAY EXAM OF FOOT: CPT

## 2022-06-01 PROCEDURE — 99213 OFFICE O/P EST LOW 20 MIN: CPT | Performed by: FAMILY MEDICINE

## 2022-06-01 NOTE — PROGRESS NOTES
"Subjective   Maria D Fountain is a 82 y.o. female.     History of Present Illness     Right foot pain  The patient reports that she injured her right foot 18 days ago. She says she was wearing flip flops and hit her right against the metal bottom of a rocker outside. The patient reports it was sore and red and broke the skin there. She states that on Sunday, she could not see her toes and it was swollen and \"as big as an apple.\" The patient reports that it was hard for her to stand on it. She states that she went to the urgent care and she was told that she had arthritis. The patient reports that she had an x-ray done. She states that she waited 15 to 20 minutes and then they came back and told her that there was nothing wrong with it. The patient reports that she has a nurse in her family and she was told that those x-rays in the urgent care do not go to the bone. She states that she was told that she had a stress fracture. The patient reports that she was told to put ice pack on it 3 times a day. She states that she will stay off of it. The patient reports that if it does not get any better in a few weeks, they will go to her family doctor. The patient reports that she is able to walk on it. She states that it does not hurt when she walks.    The following portions of the patient's history were reviewed and updated as appropriate: allergies, current medications, past family history, past medical history, past social history, past surgical history, and problem list.  Past Medical History:   Diagnosis Date   • Arthritis    • Diabetes mellitus (HCC)    • DJD (degenerative joint disease)    • Gallstones    • Heart murmur    • History of stress test 07/2011    Stress myoview- neg    • Hypertension    • Lumbar disc disease    • Mitral regurgitation    • Multiple lipomas    • Pleurisy    • Spinal stenosis      Past Surgical History:   Procedure Laterality Date   • BREAST BIOPSY Right 1990   • CARDIAC CATHETERIZATION  " 06/21/2017   • HEMANGIOMA EXCISION Left 2010    left forearm    • HX OVARIAN CYSTECTOMY  1962   • HYSTERECTOMY  2004     Family History   Problem Relation Age of Onset   • Heart disease Father         MI   • Arthritis Other    • Hypertension Other    • Colon cancer Other    • No Known Problems Mother    • No Known Problems Sister    • No Known Problems Brother    • No Known Problems Maternal Aunt    • No Known Problems Maternal Uncle    • No Known Problems Paternal Aunt    • No Known Problems Paternal Uncle    • No Known Problems Maternal Grandmother    • No Known Problems Maternal Grandfather    • No Known Problems Paternal Grandmother    • No Known Problems Paternal Grandfather    • Anemia Neg Hx    • Arrhythmia Neg Hx    • Asthma Neg Hx    • Clotting disorder Neg Hx    • Fainting Neg Hx    • Heart attack Neg Hx    • Heart failure Neg Hx    • Hyperlipidemia Neg Hx      Social History     Socioeconomic History   • Marital status:    Tobacco Use   • Smoking status: Never Smoker   • Smokeless tobacco: Never Used   Vaping Use   • Vaping Use: Never used   Substance and Sexual Activity   • Alcohol use: No   • Drug use: No   • Sexual activity: Defer         Current Outpatient Medications:   •  amLODIPine (NORVASC) 10 MG tablet, Take 1 tablet by mouth Daily., Disp: 90 tablet, Rfl: 3  •  ascorbic acid (VITAMIN C) 1000 MG tablet, Take 500 mg by mouth 2 (two) times a day., Disp: , Rfl:   •  aspirin (ASPIR-LOW) 81 MG EC tablet, Take 81 mg by mouth Daily., Disp: , Rfl:   •  atorvastatin (LIPITOR) 20 MG tablet, TAKE ONE TABLET BY MOUTH EVERY NIGHT AT BEDTIME, Disp: 90 tablet, Rfl: 0  •  Calcium Carb-Cholecalciferol 1000-800 MG-UNIT tablet, Take 4 tablets by mouth Daily., Disp: , Rfl:   •  carvedilol (COREG) 12.5 MG tablet, Take 1 tablet by mouth 2 (Two) Times a Day With Meals., Disp: 180 tablet, Rfl: 3  •  HM OMEGA-3-6-9 FATTY ACIDS capsule, Take 1 capsule by mouth Daily., Disp: , Rfl:   •  lisinopril (PRINIVIL,ZESTRIL)  20 MG tablet, Take 1 tablet by mouth 2 (Two) Times a Day., Disp: 180 tablet, Rfl: 3  •  meclizine (ANTIVERT) 25 MG tablet, Take 1 tablet by mouth 3 (Three) Times a Day As Needed for Dizziness., Disp: 30 tablet, Rfl: 1  •  meloxicam (MOBIC) 15 MG tablet, Take 1 tablet by mouth Daily As Needed for Moderate Pain . Take with food!, Disp: 90 tablet, Rfl: 1  •  multivitamin-minerals (CENTRUM) tablet, Take 1 tablet by mouth Daily., Disp: , Rfl:   •  nitroglycerin (Nitrostat) 0.4 MG SL tablet, Place 1 tablet under the tongue Every 5 (Five) Minutes As Needed for Chest Pain. Take no more than 3 doses in 15 minutes., Disp: 20 tablet, Rfl: 5  •  vitamin B-12 (CYANOCOBALAMIN) 1000 MCG tablet, Take 1,000 mcg by mouth Daily., Disp: , Rfl:     Review of Systems  /79 (BP Location: Left arm, Patient Position: Sitting, Cuff Size: Adult)   Pulse 70   Temp 98.4 °F (36.9 °C) (Tympanic)   Wt 65.8 kg (145 lb)   LMP  (LMP Unknown)   SpO2 96%   BMI 27.40 kg/m²   Review of systems was performed, and pertinent findings are noted in the HPI.    Objective   Physical Exam  Vitals and nursing note reviewed.   Constitutional:       Comments: Very well dressed, well groomed, elderly woman in no acute distress.  She is alert and cooperative with exam.  Vitals are all stable and she is afebrile.   Musculoskeletal:      Comments: Right foot has an area of localized swelling across the dorsum that is very tender. There is some mild ecchymosis, but no warmth. There is no significant fluctuance. Full range of motion of the ankle and toes. Pedal pulses are normal.       PATIENT NAME/:  DENISA HUBER    1940   UNIT NUMBER:  [id number]   ACCOUNT NUMBER:  [id number]   ACCESSION NUMBER:  [id number]       LEFT FOOT     EXAM DATE:  May 15, 2022     HISTORY: Pain and swelling     FINDINGS: 3 views of the left foot are provided. No fracture or dislocation is identified. Joint spaces are normal.  No lytic or blastic lesions are present.  Swelling of the dorsum of the foot. No radiopaque foreign body identified.     IMPRESSION:   No acute osseous findings. Swelling of the dorsum of the foot.     Dictated by: Kassy Elder M.D.     X-ray of the right foot obtained at the urgent care on 09/30/2021 was reviewed. This demonstrates arthritis. There are no fractures.    Assessment & Plan   Problems Addressed this Visit    None     Visit Diagnoses     Foot pain, right    -  Primary    Relevant Orders    XR Foot 3+ View Right    Localized swelling of right foot        Relevant Orders    XR Foot 3+ View Right      Diagnoses       Codes Comments    Foot pain, right    -  Primary ICD-10-CM: M79.671  ICD-9-CM: 729.5     Localized swelling of right foot     ICD-10-CM: R22.41  ICD-9-CM: 729.81         1. Right foot pain and swelling  I have reviewed the x-ray done at the urgent care. I recommended that she elevate the foot whenever she gets the chance. I have recommended and counseled her to stop wearing flip flops. I have encouraged her to apply warm compresses to the area. I have sent her for a repeat x-ray as I am concerned that there was so much swelling initially that an occult fracture may have been missed.    Transcribed from ambient dictation for Celi Renner MD by Letitia Pham.  06/01/22   18:50 EDT    Patient verbalized consent to the visit recording.

## 2022-06-14 ENCOUNTER — OFFICE VISIT (OUTPATIENT)
Dept: FAMILY MEDICINE CLINIC | Facility: CLINIC | Age: 82
End: 2022-06-14

## 2022-06-14 VITALS
SYSTOLIC BLOOD PRESSURE: 132 MMHG | DIASTOLIC BLOOD PRESSURE: 79 MMHG | WEIGHT: 146.4 LBS | BODY MASS INDEX: 26.94 KG/M2 | TEMPERATURE: 97.4 F | HEART RATE: 60 BPM | OXYGEN SATURATION: 94 % | HEIGHT: 62 IN

## 2022-06-14 DIAGNOSIS — M47.816 LUMBAR SPONDYLOSIS: ICD-10-CM

## 2022-06-14 DIAGNOSIS — I10 ESSENTIAL HYPERTENSION: ICD-10-CM

## 2022-06-14 PROCEDURE — 99213 OFFICE O/P EST LOW 20 MIN: CPT | Performed by: FAMILY MEDICINE

## 2022-06-14 RX ORDER — LISINOPRIL 20 MG/1
TABLET ORAL EVERY 24 HOURS
COMMUNITY
Start: 2022-02-25 | End: 2022-06-14

## 2022-06-14 RX ORDER — MELOXICAM 15 MG/1
15 TABLET ORAL DAILY PRN
Qty: 90 TABLET | Refills: 1 | Status: SHIPPED | OUTPATIENT
Start: 2022-06-14

## 2022-06-14 RX ORDER — AMLODIPINE BESYLATE 10 MG/1
10 TABLET ORAL DAILY
Qty: 90 TABLET | Refills: 1 | Status: SHIPPED | OUTPATIENT
Start: 2022-06-14 | End: 2023-02-02 | Stop reason: ALTCHOICE

## 2022-06-14 RX ORDER — CARVEDILOL 12.5 MG/1
12.5 TABLET ORAL 2 TIMES DAILY WITH MEALS
Qty: 180 TABLET | Refills: 1 | Status: SHIPPED | OUTPATIENT
Start: 2022-06-14 | End: 2023-01-11 | Stop reason: SDUPTHER

## 2022-06-14 RX ORDER — LISINOPRIL 20 MG/1
20 TABLET ORAL 2 TIMES DAILY
Qty: 180 TABLET | Refills: 1 | Status: SHIPPED | OUTPATIENT
Start: 2022-06-14 | End: 2023-03-24

## 2022-06-14 RX ORDER — ATORVASTATIN CALCIUM 20 MG/1
20 TABLET, FILM COATED ORAL NIGHTLY
COMMUNITY
Start: 2022-01-03 | End: 2023-02-05 | Stop reason: SDUPTHER

## 2022-06-14 RX ORDER — AMLODIPINE BESYLATE 10 MG/1
TABLET ORAL EVERY 24 HOURS
COMMUNITY
Start: 2022-02-25 | End: 2022-06-14

## 2022-06-14 RX ORDER — DICYCLOMINE HYDROCHLORIDE 10 MG/1
10 CAPSULE ORAL 3 TIMES DAILY PRN
Qty: 90 CAPSULE | Refills: 0 | Status: SHIPPED | OUTPATIENT
Start: 2022-06-14 | End: 2023-01-05

## 2022-06-14 NOTE — PROGRESS NOTES
Assessment and Plan     Problem List Items Addressed This Visit        Cardiac and Vasculature    Essential hypertension    Overview     BP at goal, <140/90.  Encouraged low-Na+ diet & 150 min exercise/week.   Continue carvedilol 12.5 mg twice daily, amlodipine 10 mg, and lisinopril 40 mg.  Patient to monitor ambulatory BP.  Monitoring renal function.  Followed by cardiology.           Relevant Medications    carvedilol (COREG) 12.5 MG tablet    lisinopril (PRINIVIL,ZESTRIL) 20 MG tablet    amLODIPine (NORVASC) 10 MG tablet       Neuro    Lumbar spondylosis    Overview     Advised less than 3 g acetaminophen per 24 hours as needed.  Advised taking meloxicam as needed for hip and back pain.  Encouraged regular exercise and stretching.           Relevant Medications    meloxicam (MOBIC) 15 MG tablet        Return in about 4 months (around 10/14/2022) for Medicare Wellness.        Patient was given instructions and counseling regarding her condition or for health maintenance advice. Please see specific information pulled into the AVS if appropriate.        Maria D is a 82 y.o. being seen today for  Hypertension   Subjective   History of the Present Illness     Osteopenic postmenopausal  female with a concurrent medical history of hypertension and hyperlipidemia presents for follow-up.    Normotensive in office with triple antihypertensive therapy.  Reports compliance with statin therapy.    Patient also present presents for follow-up of osteoarthritis of the lumbar spine.  X-ray in 2020 revealed multilevel disc and facet joint degeneration with right convex scoliosis.  Complains of intermittent pain.  Symptoms exacerbated by recent fall and injury to the left foot.  Patient reports that she may not be ambulating appropriately in an effort to avoid exacerbating foot pain.    Social History  She  reports that she has never smoked. She has never used smokeless tobacco. She reports that she does not drink alcohol  "and does not use drugs.  Objective   Vital Signs          Vitals:    06/14/22 1413   BP: 132/79   BP Location: Left arm   Patient Position: Sitting   Cuff Size: Adult   Pulse: 60   Temp: 97.4 °F (36.3 °C)   TempSrc: Oral   SpO2: 94%   Weight: 66.4 kg (146 lb 6.4 oz)   Height: 157.5 cm (62\")     BP Readings from Last 1 Encounters:   06/14/22 132/79     Wt Readings from Last 3 Encounters:   06/14/22 66.4 kg (146 lb 6.4 oz)   06/01/22 65.8 kg (145 lb)   02/25/22 66.7 kg (147 lb)   Body mass index is 26.78 kg/m².     Physical Exam  Vitals reviewed.   Constitutional:       General: She is not in acute distress.     Appearance: Normal appearance.   HENT:      Head: Normocephalic and atraumatic.   Cardiovascular:      Rate and Rhythm: Normal rate.      Heart sounds: Normal heart sounds.   Pulmonary:      Effort: Pulmonary effort is normal.      Breath sounds: Normal breath sounds.   Musculoskeletal:         General: Swelling (left foot; soft tissue) present.      Lumbar back: Negative right straight leg raise test and negative left straight leg raise test.      Right hip: Normal range of motion. Normal strength.      Left hip: Normal range of motion. Normal strength.   Neurological:      Mental Status: She is alert and oriented to person, place, and time.   Psychiatric:         Mood and Affect: Mood normal.         Behavior: Behavior normal.               BNP (02/23/2022 09:39)  Lipid Panel (02/23/2022 09:39)  Comprehensive Metabolic Panel (02/23/2022 09:39)  CBC Auto Differential (01/31/2022 15:52)  SCANNED - DEXA (06/18/2021)  XR Spine Lumbar 4+ View (06/05/2020)  XR Sacrum & Coccyx (06/05/2020)    "

## 2022-07-14 ENCOUNTER — TELEPHONE (OUTPATIENT)
Dept: FAMILY MEDICINE CLINIC | Facility: CLINIC | Age: 82
End: 2022-07-14

## 2022-07-14 NOTE — TELEPHONE ENCOUNTER
Caller: Maria D Fountain    Relationship: Self    Best call back number: [phone]    What is the best time to reach you: ANYTIME    Who are you requesting to speak with (clinical staff, provider,  specific staff member): NURSE    Do you know the name of the person who called:     What was the call regarding: SHE WAS STUNG AND WANTS ADVICE     Do you require a callback: YES

## 2022-07-15 NOTE — TELEPHONE ENCOUNTER
Please call patient and let her know that I recommend applying ice and using Benadryl 25 mg every 8 hours as needed for swelling associated with bee sting.  If she notices any throat closing or trouble swallowing, I recommend going to the ER (not urgent care).  She can use calamine lotion or hydrocortisone ointment for any itching.

## 2022-07-15 NOTE — TELEPHONE ENCOUNTER
Pt informed on instructions. She still has some concerns. Pt has been taking the Benadryl and the swelling and fever have not went down. She got stung near her elbow and she said the swelling has went down into her hand.  She is going to get the calamine lotion today. But she wants you to know that she is very concerned about the swelling that's went down into her hand.

## 2022-10-18 ENCOUNTER — TELEPHONE (OUTPATIENT)
Dept: FAMILY MEDICINE CLINIC | Facility: CLINIC | Age: 82
End: 2022-10-18

## 2022-10-18 NOTE — TELEPHONE ENCOUNTER
Caller: Maria D Fountain    Relationship to patient: Self    Best call back number: [phone]    Patient is needing: PATIENT SAID SHE'S HAD A COUGH FOR ABOUT THREE MONTHS, AND HAS BEEN HAVING BLACK DIARRHEA AND STOOLS     SHE IS CONCERNED ABOUT THIS AND IS NOT SURE WHO SHE CAN SEE    SHE SAID THAT SHE WAS A FORMER PATIENT OF DR. TRIVEDI FORMERLY, AND IS SCHEDULED WITH DR. PUCKETT IN February    SHE SAID THAT SHE THINKS IF SHE GOES TO URGENT CARE THEN THEY WILL REFER HER TO THE EMERGENCY ROOM AND SHE SAID SHE DOES NOT THINK SHE NEEDS THAT, SHE IS THINKING SHE MAY NEED A COLOGARD DONE     REQUESTED CALLBACK

## 2023-01-05 ENCOUNTER — OFFICE VISIT (OUTPATIENT)
Dept: CARDIOLOGY | Facility: CLINIC | Age: 83
End: 2023-01-05
Payer: MEDICARE

## 2023-01-05 VITALS
DIASTOLIC BLOOD PRESSURE: 73 MMHG | HEART RATE: 65 BPM | BODY MASS INDEX: 26.5 KG/M2 | SYSTOLIC BLOOD PRESSURE: 110 MMHG | OXYGEN SATURATION: 94 % | HEIGHT: 62 IN | WEIGHT: 144 LBS

## 2023-01-05 DIAGNOSIS — R73.03 PREDIABETES: ICD-10-CM

## 2023-01-05 DIAGNOSIS — E78.5 DYSLIPIDEMIA: ICD-10-CM

## 2023-01-05 DIAGNOSIS — I10 ESSENTIAL HYPERTENSION: Primary | ICD-10-CM

## 2023-01-05 DIAGNOSIS — I25.118 CORONARY ARTERY DISEASE OF NATIVE ARTERY OF NATIVE HEART WITH STABLE ANGINA PECTORIS: ICD-10-CM

## 2023-01-05 PROCEDURE — 99214 OFFICE O/P EST MOD 30 MIN: CPT | Performed by: INTERNAL MEDICINE

## 2023-01-05 PROCEDURE — 93000 ELECTROCARDIOGRAM COMPLETE: CPT | Performed by: INTERNAL MEDICINE

## 2023-01-05 RX ORDER — CLONIDINE HYDROCHLORIDE 0.2 MG/1
0.2 TABLET ORAL DAILY
Qty: 90 TABLET | Refills: 3 | Status: SHIPPED | OUTPATIENT
Start: 2023-01-05 | End: 2023-02-02 | Stop reason: SINTOL

## 2023-01-05 RX ORDER — MULTIPLE VITAMINS W/ MINERALS TAB 9MG-400MCG
1 TAB ORAL DAILY
COMMUNITY

## 2023-01-05 NOTE — PROGRESS NOTES
Subjective:     Encounter Date:01/05/2023      Patient ID: Maria D Fountain is a 82 y.o. female.    Chief Complaint :Follow-up for CAD, MI, diabetes/prediabetes, hypertension    History of Present Illness      Ms. Maria D Fountain has PMH of        # CAD cardiac cath 06/21/2017 showing 50% OM1 disease and small-vessel disease distal LAD  #  mitral regurgitation, mild 9/2016  #  palpitations  #  hypertension  # LAE  #  diabetes  #?  ACE-inhibitor cough, now improved  # allergy/intolerance to pravastatin  # ALYSA        Here for follow-up.  Patient denies any shortness of breath.  Patient says her home blood pressure has been elevated.  Patient has intermittent chest pain fatigue and dizziness.    Patient's arterial blood pressure is 110/73, heart rate 65, O2 sat of 94% on room air..       Patient was started on amlodipine and reportedly developed low blood pressure and stopped it on her own.  On 2/21/2022 patient had blood pressure of 220/117 went to the emergency room waited for 4 hours could not be seen therefore went back home.  Patient had history of not taking lisinopril in the past also.  Patient is complaining of extreme fatigue she thought she had COVID last summer had 3 rounds of steroids and antibiotics.  Patient has left arm hurting where she cannot lift it over the shoulders.  Went to rehab physical physical therapy and has improvement now she is able to lift it over the shoulder pain.  Patient says she is tired all the time and has right earache pain thinks she is having Covid again.         Patient's labs from 01/29/2018 revealed cholesterol 210 triglycerides 230 HDL low at 38 LDL high at 134 CMP is okay CK is 92.  Labs from 1/14/2020 reveal cholesterol 113, triglycerides 139 HDL 38 LDL 47, A1c of 5.6, CMP with total bilirubin 1.4, normal CBC and TSH.  Labs from 2/23/2022 ordered by me reviewed by me revealed CMP with a glucose of 110, lipid profile with cholesterol 119 triglycerides 170 HDL 40 LDL  41.  proBNP normal at 121.    Stress Cardiolite 4/12/2022: Patient walked 7.40 minutes had normal perfusion EF of 69%.      ASSESSMENT:  -Labile hypertension  -Chest pain/Chronic stable angina  #. hypertension,   #  dyslipidemia  # CAD  #  mitral regurgitation  #. prediabetes      PLAN:    Reviewed EKG results with patient  Advised patient to check blood pressure at home   Will add amlodipine clonidine as needed.  We will continue  medical management with beta-blockers aspirin , lisinopril, amlodipine to help with angina, hypertension, dyslipidemia, CAD, MR    counseled on walking exercise for low HDL   follow up with PMD for  diabetes/prediabetes.  Advised patient to follow-up with PMD for concerns about fatigue and earache being repeat Covid infection.          ECG 12 Lead    Date/Time: 1/5/2023 4:14 PM  Performed by: Matti Luna MD  Authorized by: Matti Luna MD   Comparison: compared with previous ECG from 2/21/2022  Comparison to previous ECG: EKG done today reviewed/interpreted by me reveals sinus rhythm with rate of 65 bpm, no new change compared EKG from 2/21/2022              Copied text in this portion of the note has been reviewed and is accurate as of 1/15/2023  The following portions of the patient's history were reviewed and updated as appropriate: allergies, current medications, past family history, past medical history, past social history, past surgical history and problem list.    Assessment:         MDM     Diagnosis Plan   1. Essential hypertension  ECG 12 Lead      2. Coronary artery disease of native artery of native heart with stable angina pectoris (HCC)  ECG 12 Lead      3. Dyslipidemia  ECG 12 Lead      4. Prediabetes  ECG 12 Lead             Plan:               Past Medical History:  Past Medical History:   Diagnosis Date   • Arthritis    • Diabetes mellitus (HCC)    • DJD (degenerative joint disease)    • Gallstones    • Heart murmur    • History of stress test  07/2011    Stress myoview- neg    • Hypertension    • Lumbar disc disease    • Mitral regurgitation    • Multiple lipomas    • Pleurisy    • Spinal stenosis      Past Surgical History:  Past Surgical History:   Procedure Laterality Date   • BREAST BIOPSY Right 1990   • CARDIAC CATHETERIZATION  06/21/2017   • HEMANGIOMA EXCISION Left 2010    left forearm    • HX OVARIAN CYSTECTOMY  1962   • HYSTERECTOMY  2004      Allergies:  Allergies   Allergen Reactions   • Pravastatin Dizziness     Home Meds:  Current Meds:     Current Outpatient Medications:   •  amLODIPine (NORVASC) 10 MG tablet, Take 1 tablet by mouth Daily., Disp: 90 tablet, Rfl: 1  •  ascorbic acid (VITAMIN C) 1000 MG tablet, Take 500 mg by mouth 2 (two) times a day., Disp: , Rfl:   •  aspirin (aspirin) 81 MG EC tablet, Take 81 mg by mouth Daily., Disp: , Rfl:   •  atorvastatin (LIPITOR) 20 MG tablet, Daily., Disp: , Rfl:   •  Calcium Carb-Cholecalciferol 1000-800 MG-UNIT tablet, Take 4 tablets by mouth Daily., Disp: , Rfl:   •  HM OMEGA-3-6-9 FATTY ACIDS capsule, Take 1 capsule by mouth Daily., Disp: , Rfl:   •  lisinopril (PRINIVIL,ZESTRIL) 20 MG tablet, Take 1 tablet by mouth 2 (Two) Times a Day., Disp: 180 tablet, Rfl: 1  •  meloxicam (MOBIC) 15 MG tablet, Take 1 tablet by mouth Daily As Needed for Moderate Pain . Take with food!, Disp: 90 tablet, Rfl: 1  •  multivitamin with minerals (HAIR/SKIN/NAILS PO), Take 1 tablet by mouth Daily., Disp: , Rfl:   •  multivitamin-minerals (CENTRUM) tablet, Take 1 tablet by mouth Daily., Disp: , Rfl:   •  nitroglycerin (Nitrostat) 0.4 MG SL tablet, Place 1 tablet under the tongue Every 5 (Five) Minutes As Needed for Chest Pain. Take no more than 3 doses in 15 minutes., Disp: 20 tablet, Rfl: 5  •  vitamin B-12 (CYANOCOBALAMIN) 1000 MCG tablet, Take 1,000 mcg by mouth Daily., Disp: , Rfl:   •  carvedilol (COREG) 12.5 MG tablet, Take 1 tablet by mouth 2 (Two) Times a Day With Meals., Disp: 180 tablet, Rfl: 1  •   "cloNIDine (Catapres) 0.2 MG tablet, Take 1 tablet by mouth Daily., Disp: 90 tablet, Rfl: 3  Social History:   Social History     Tobacco Use   • Smoking status: Never   • Smokeless tobacco: Never   Substance Use Topics   • Alcohol use: No      Family History:  Family History   Problem Relation Age of Onset   • Heart disease Father         MI   • Arthritis Other    • Hypertension Other    • Colon cancer Other    • No Known Problems Mother    • No Known Problems Sister    • No Known Problems Brother    • No Known Problems Maternal Aunt    • No Known Problems Maternal Uncle    • No Known Problems Paternal Aunt    • No Known Problems Paternal Uncle    • No Known Problems Maternal Grandmother    • No Known Problems Maternal Grandfather    • No Known Problems Paternal Grandmother    • No Known Problems Paternal Grandfather    • Anemia Neg Hx    • Arrhythmia Neg Hx    • Asthma Neg Hx    • Clotting disorder Neg Hx    • Fainting Neg Hx    • Heart attack Neg Hx    • Heart failure Neg Hx    • Hyperlipidemia Neg Hx               Review of Systems   Constitutional: Positive for malaise/fatigue.   Cardiovascular: Positive for chest pain. Negative for dyspnea on exertion, leg swelling and palpitations.   Respiratory: Negative for cough and shortness of breath.    Gastrointestinal: Negative for abdominal pain, nausea and vomiting.   Neurological: Positive for light-headedness. Negative for dizziness, focal weakness, headaches and numbness.   All other systems reviewed and are negative.    All other systems are negative         Objective:     Physical Exam  /73 (BP Location: Left arm, Patient Position: Lying, Cuff Size: Adult)   Pulse 65   Ht 157.5 cm (62\")   Wt 65.3 kg (144 lb)   LMP  (LMP Unknown)   SpO2 94%   BMI 26.34 kg/m²   General:  Appears in no acute distress  Eyes: Sclera is anicteric,  conjunctiva is clear   HEENT:  No JVD.  No carotid bruits  Respiratory: Respirations regular and unlabored at rest.  Clear to " "auscultation  Cardiovascular: S1,S2 Regular rate and rhythm. No murmur, rub or gallop auscultated.   Extremities: No digital clubbing or cyanosis, no edema  Skin: Color pink. Skin warm and dry to touch. No rashes  No xanthoma  Neuro: Alert and awake.    Lab Reviewed:         Matti Luna MD  1/15/2023 10:49 EST      EMR Dragon/Transcription:   \"Dictated utilizing Dragon dictation\".        "

## 2023-01-11 DIAGNOSIS — I10 ESSENTIAL HYPERTENSION: ICD-10-CM

## 2023-01-11 RX ORDER — CARVEDILOL 12.5 MG/1
12.5 TABLET ORAL 2 TIMES DAILY WITH MEALS
Qty: 180 TABLET | Refills: 1 | Status: SHIPPED | OUTPATIENT
Start: 2023-01-11

## 2023-01-11 NOTE — TELEPHONE ENCOUNTER
Caller: Rebeca Fountaine IVAN    Relationship: Self    Best call back number: [phone]    Requested Prescriptions:   Requested Prescriptions     Pending Prescriptions Disp Refills   • carvedilol (COREG) 12.5 MG tablet 180 tablet 1     Sig: Take 1 tablet by mouth 2 (Two) Times a Day With Meals.        Pharmacy where request should be sent: [facility] [id number] [facility], [place] - [address] [place] [address] - [phone] [facility] [phone] [facility]       Does the patient have less than a 3 day supply:  [x] Yes  [] No    Would you like a call back once the refill request has been completed: [x] Yes [] No    If the office needs to give you a call back, can they leave a voicemail: [x] Yes [] No    Jose Enrique Hodgson Rep   01/11/23 11:19 EST

## 2023-02-02 ENCOUNTER — OFFICE VISIT (OUTPATIENT)
Dept: FAMILY MEDICINE CLINIC | Facility: CLINIC | Age: 83
End: 2023-02-02
Payer: MEDICARE

## 2023-02-02 ENCOUNTER — LAB (OUTPATIENT)
Dept: FAMILY MEDICINE CLINIC | Facility: CLINIC | Age: 83
End: 2023-02-02
Payer: MEDICARE

## 2023-02-02 VITALS
OXYGEN SATURATION: 96 % | HEIGHT: 62 IN | BODY MASS INDEX: 26.68 KG/M2 | DIASTOLIC BLOOD PRESSURE: 85 MMHG | TEMPERATURE: 97.7 F | WEIGHT: 145 LBS | HEART RATE: 71 BPM | SYSTOLIC BLOOD PRESSURE: 122 MMHG

## 2023-02-02 DIAGNOSIS — E78.5 DYSLIPIDEMIA: ICD-10-CM

## 2023-02-02 DIAGNOSIS — R07.9 CHEST PAIN, UNSPECIFIED TYPE: ICD-10-CM

## 2023-02-02 DIAGNOSIS — I10 ESSENTIAL HYPERTENSION: Primary | ICD-10-CM

## 2023-02-02 DIAGNOSIS — R73.03 PREDIABETES: ICD-10-CM

## 2023-02-02 DIAGNOSIS — I20.9 ANGINA PECTORIS: ICD-10-CM

## 2023-02-02 DIAGNOSIS — I10 ESSENTIAL HYPERTENSION: ICD-10-CM

## 2023-02-02 LAB
ALBUMIN SERPL-MCNC: 4.2 G/DL (ref 3.5–5.2)
ALBUMIN/GLOB SERPL: 1.5 G/DL
ALP SERPL-CCNC: 75 U/L (ref 39–117)
ALT SERPL W P-5'-P-CCNC: 19 U/L (ref 1–33)
ANION GAP SERPL CALCULATED.3IONS-SCNC: 6.5 MMOL/L (ref 5–15)
AST SERPL-CCNC: 23 U/L (ref 1–32)
BILIRUB SERPL-MCNC: 0.7 MG/DL (ref 0–1.2)
BUN SERPL-MCNC: 13 MG/DL (ref 8–23)
BUN/CREAT SERPL: 14.3 (ref 7–25)
CALCIUM SPEC-SCNC: 9.5 MG/DL (ref 8.6–10.5)
CHLORIDE SERPL-SCNC: 104 MMOL/L (ref 98–107)
CHOLEST SERPL-MCNC: 197 MG/DL (ref 0–200)
CO2 SERPL-SCNC: 31.5 MMOL/L (ref 22–29)
CREAT SERPL-MCNC: 0.91 MG/DL (ref 0.57–1)
EGFRCR SERPLBLD CKD-EPI 2021: 63.1 ML/MIN/1.73
GLOBULIN UR ELPH-MCNC: 2.8 GM/DL
GLUCOSE SERPL-MCNC: 75 MG/DL (ref 65–99)
HBA1C MFR BLD: 5.5 % (ref 3.5–5.6)
HDLC SERPL-MCNC: 36 MG/DL (ref 40–60)
LDLC SERPL CALC-MCNC: 110 MG/DL (ref 0–100)
LDLC/HDLC SERPL: 2.82 {RATIO}
POTASSIUM SERPL-SCNC: 4.4 MMOL/L (ref 3.5–5.2)
PROT SERPL-MCNC: 7 G/DL (ref 6–8.5)
SODIUM SERPL-SCNC: 142 MMOL/L (ref 136–145)
TRIGL SERPL-MCNC: 298 MG/DL (ref 0–150)
VLDLC SERPL-MCNC: 51 MG/DL (ref 5–40)

## 2023-02-02 PROCEDURE — 80061 LIPID PANEL: CPT | Performed by: FAMILY MEDICINE

## 2023-02-02 PROCEDURE — 36415 COLL VENOUS BLD VENIPUNCTURE: CPT

## 2023-02-02 PROCEDURE — 83036 HEMOGLOBIN GLYCOSYLATED A1C: CPT | Performed by: FAMILY MEDICINE

## 2023-02-02 PROCEDURE — 80053 COMPREHEN METABOLIC PANEL: CPT | Performed by: FAMILY MEDICINE

## 2023-02-02 PROCEDURE — 99214 OFFICE O/P EST MOD 30 MIN: CPT | Performed by: FAMILY MEDICINE

## 2023-02-02 PROCEDURE — 93000 ELECTROCARDIOGRAM COMPLETE: CPT | Performed by: FAMILY MEDICINE

## 2023-02-02 RX ORDER — AMLODIPINE BESYLATE 5 MG/1
5 TABLET ORAL DAILY
COMMUNITY

## 2023-02-02 RX ORDER — NITROGLYCERIN 0.4 MG/1
0.4 TABLET SUBLINGUAL
Qty: 30 TABLET | Refills: 5 | Status: SHIPPED | OUTPATIENT
Start: 2023-02-02

## 2023-02-02 NOTE — PATIENT INSTRUCTIONS
Go to the emergency room immediately if you develop persistent chest pain or chest pain that is worse than what it has been

## 2023-02-02 NOTE — PROGRESS NOTES
Subjective   Maria D Fountain is a 82 y.o. female.     History of Present Illness       The patient presents today to establish with me after Dr.Yusufi grant. She is here for follow-up on her blood pressure, cholesterol, and blood glucose.    Hypertension.  The patient's blood pressure is well controlled. She continues on amlodipine and lisinopril. She was started on carvedilol by cardiology, however, she discontinued this due to side effects including lightheadedness. She mentions she is eating better.     Chest pain.  The patient complains of chest pain, located in the center of her chest, which lasts for a few minutes. She rates the pain as a 5 out of 10. Denies associated diaphoresis or shortness of breath. She attributes her chest pain to stress. She takes a baby aspirin daily and nitroglycerin as needed. She mentions she needs a refill of her nitroglycerin. She admits to nausea. She denies shortness of breath. She notes the pain is in both shoulder blades. She is followed by Dr. Luna and plan to tell him about her pain which she saw him recently but he was in and out of the room too fast she says    The following portions of the patient's history were reviewed and updated as appropriate: allergies, current medications, past family history, past medical history, past social history, past surgical history, and problem list.  Past Medical History:   Diagnosis Date   • Arthritis    • Diabetes mellitus (HCC)    • DJD (degenerative joint disease)    • Gallstones    • Heart murmur    • History of stress test 07/2011    Stress myoview- neg    • Hypertension    • Lumbar disc disease    • Mitral regurgitation    • Multiple lipomas    • Pleurisy    • Spinal stenosis      Past Surgical History:   Procedure Laterality Date   • BREAST BIOPSY Right 1990   • CARDIAC CATHETERIZATION  06/21/2017   • HEMANGIOMA EXCISION Left 2010    left forearm    • HX OVARIAN CYSTECTOMY  1962   • HYSTERECTOMY  2004     Family History    Problem Relation Age of Onset   • Heart disease Father         MI   • Arthritis Other    • Hypertension Other    • Colon cancer Other    • No Known Problems Mother    • No Known Problems Sister    • No Known Problems Brother    • No Known Problems Maternal Aunt    • No Known Problems Maternal Uncle    • No Known Problems Paternal Aunt    • No Known Problems Paternal Uncle    • No Known Problems Maternal Grandmother    • No Known Problems Maternal Grandfather    • No Known Problems Paternal Grandmother    • No Known Problems Paternal Grandfather    • Anemia Neg Hx    • Arrhythmia Neg Hx    • Asthma Neg Hx    • Clotting disorder Neg Hx    • Fainting Neg Hx    • Heart attack Neg Hx    • Heart failure Neg Hx    • Hyperlipidemia Neg Hx      Social History     Socioeconomic History   • Marital status:    Tobacco Use   • Smoking status: Never   • Smokeless tobacco: Never   Vaping Use   • Vaping Use: Never used   Substance and Sexual Activity   • Alcohol use: No   • Drug use: No   • Sexual activity: Defer         Current Outpatient Medications:   •  amLODIPine (NORVASC) 5 MG tablet, Take 5 mg by mouth Daily., Disp: , Rfl:   •  ascorbic acid (VITAMIN C) 1000 MG tablet, Take 500 mg by mouth 2 (two) times a day., Disp: , Rfl:   •  aspirin (aspirin) 81 MG EC tablet, Take 81 mg by mouth Daily., Disp: , Rfl:   •  atorvastatin (LIPITOR) 20 MG tablet, Take 20 mg by mouth Every Night., Disp: , Rfl:   •  Calcium Carb-Cholecalciferol 1000-800 MG-UNIT tablet, Take 4 tablets by mouth Daily., Disp: , Rfl:   •  carvedilol (COREG) 12.5 MG tablet, Take 1 tablet by mouth 2 (Two) Times a Day With Meals., Disp: 180 tablet, Rfl: 1  •  HM OMEGA-3-6-9 FATTY ACIDS capsule, Take 1 capsule by mouth Daily., Disp: , Rfl:   •  lisinopril (PRINIVIL,ZESTRIL) 20 MG tablet, Take 1 tablet by mouth 2 (Two) Times a Day., Disp: 180 tablet, Rfl: 1  •  meloxicam (MOBIC) 15 MG tablet, Take 1 tablet by mouth Daily As Needed for Moderate Pain . Take with  "food!, Disp: 90 tablet, Rfl: 1  •  multivitamin with minerals tablet tablet, Take 1 tablet by mouth Daily., Disp: , Rfl:   •  multivitamin-minerals (CENTRUM) tablet, Take 1 tablet by mouth Daily., Disp: , Rfl:   •  nitroglycerin (Nitrostat) 0.4 MG SL tablet, Place 1 tablet under the tongue Every 5 (Five) Minutes As Needed for Chest Pain. Take no more than 3 doses in 15 minutes., Disp: 30 tablet, Rfl: 5  •  vitamin B-12 (CYANOCOBALAMIN) 1000 MCG tablet, Take 1,000 mcg by mouth Daily., Disp: , Rfl:     Review of Systems   Constitutional: Negative.    Respiratory: Negative.    Cardiovascular: Positive for chest pain.   Gastrointestinal: Negative for nausea and vomiting.   Neurological: Negative for dizziness, syncope and headache.        A review of systems was performed, and the pertinent positives are noted in the HPI.      /85 (BP Location: Left arm, Patient Position: Sitting, Cuff Size: Adult)   Pulse 71   Temp 97.7 °F (36.5 °C) (Temporal)   Ht 156.2 cm (61.5\")   Wt 65.8 kg (145 lb)   LMP  (LMP Unknown)   SpO2 96%   BMI 26.95 kg/m²       Objective     Physical Exam  Vitals and nursing note reviewed.   Constitutional:       Appearance: Normal appearance. She is well-groomed and overweight.   HENT:      Head: Normocephalic and atraumatic.   Cardiovascular:      Rate and Rhythm: Normal rate and regular rhythm.      Heart sounds: Normal heart sounds.      Comments: EKG is unchanged from previous and shows normal sinus rhythm.  Pulmonary:      Effort: Pulmonary effort is normal.      Breath sounds: Normal breath sounds.   Chest:      Chest wall: No tenderness.   Musculoskeletal:      Right lower leg: No edema.      Left lower leg: No edema.   Skin:     General: Skin is warm and dry.   Neurological:      Mental Status: She is alert.   Psychiatric:         Behavior: Behavior is cooperative.         ECG 12 Lead    Date/Time: 2/2/2023 10:52 AM  Performed by: Celi Renner MD  Authorized by: " Celi Renner MD   Comparison: compared with previous ECG from 2/21/2022  Similar to previous ECG  Rhythm: sinus rhythm  Rate: normal  Conduction: conduction normal  ST Segments: ST segments normal  T Waves: T waves normal  QRS axis: normal    Clinical impression: normal ECG            Assessment & Plan   Problems Addressed this Visit        Cardiac and Vasculature    Dyslipidemia    Relevant Orders    Comprehensive Metabolic Panel    Lipid Panel    Angina pectoris (HCC)    Relevant Medications    amLODIPine (NORVASC) 5 MG tablet    nitroglycerin (Nitrostat) 0.4 MG SL tablet    Essential hypertension - Primary    Relevant Medications    amLODIPine (NORVASC) 5 MG tablet    Other Relevant Orders    Comprehensive Metabolic Panel    Lipid Panel       Endocrine and Metabolic    Prediabetes    Relevant Orders    Comprehensive Metabolic Panel    Lipid Panel    Hemoglobin A1c   Other Visit Diagnoses     Chest pain, unspecified type        Relevant Orders    XR Chest 2 View      Diagnoses       Codes Comments    Essential hypertension    -  Primary ICD-10-CM: I10  ICD-9-CM: 401.9     Angina pectoris (HCC)     ICD-10-CM: I20.9  ICD-9-CM: 413.9 BP goal <140/90.  LDL goal <100. Continue atorvastatin 20 mg.  Continue PRN nitroglycerin for CP.    Dyslipidemia     ICD-10-CM: E78.5  ICD-9-CM: 272.4     Prediabetes     ICD-10-CM: R73.03  ICD-9-CM: 790.29     Chest pain, unspecified type     ICD-10-CM: R07.9  ICD-9-CM: 786.50             1. Hypertension  - She will continue the amlodipine and lisinopril.  - Cardiology started on her carvedilol, but she stopped it secondary to side effects including feeling lightheaded.    2. Angina pectoris  - I refilled her nitroglycerin.    3. Dyslipidemia  - She will continue the atorvastatin 20 mg.    4. Prediabetes  - For all the above, she was due a CMP, lipid, A1c.    5. Chest pain  - She is also having chest pain.  - Chest x-ray was ordered.  - EKG is unchanged from previous and  shows normal sinus rhythm. I reassured the patient.  - She plans to take just Tums as I told her I feel like it is coming from reflux more than it is from her heart. She does not want any other medication to take.  - She was given strict instructions and if the symptoms worsen or they would not eat, she was to be in the emergency room immediately.    I will see her back in 6 months for follow-up and Medicare wellness. Her labs and chest x-ray were ordered.                  Transcribed from ambient dictation for Celi Renner MD by Geraldine Aranda.  02/02/23   12:04 EST    Patient or patient representative verbalized consent to the visit recording.  I have personally performed the services described in this document as transcribed by the above individual, and it is both accurate and complete.

## 2023-02-03 ENCOUNTER — TELEPHONE (OUTPATIENT)
Dept: FAMILY MEDICINE CLINIC | Facility: CLINIC | Age: 83
End: 2023-02-03
Payer: MEDICARE

## 2023-02-03 NOTE — TELEPHONE ENCOUNTER
Called patient with results. She said she take her Lipitor sometimes. When she eat healthy she don't take her Lipitor. If she eat bad she take her medication. She will need another Rx for the medication.    Mailed patient a copy of her lab results.

## 2023-02-05 RX ORDER — ATORVASTATIN CALCIUM 20 MG/1
20 TABLET, FILM COATED ORAL NIGHTLY
Qty: 90 TABLET | Refills: 3 | Status: SHIPPED | OUTPATIENT
Start: 2023-02-05

## 2023-02-07 ENCOUNTER — HOSPITAL ENCOUNTER (OUTPATIENT)
Dept: GENERAL RADIOLOGY | Facility: HOSPITAL | Age: 83
Discharge: HOME | End: 2023-02-07
Admitting: FAMILY MEDICINE
Payer: MEDICARE

## 2023-02-07 PROCEDURE — 71046 X-RAY EXAM CHEST 2 VIEWS: CPT

## 2023-02-21 NOTE — TELEPHONE ENCOUNTER
Pt states that she has had upper back pain for a few days. Her blood pressure has been running high. She also has lightheaded and dizziness. She states that when she takes a nitro the back pain does decrease. Pt was advised to go to the ER. She voiced understanding.   
right foot wound/Impaired gait/Other

## 2023-03-24 DIAGNOSIS — I10 ESSENTIAL HYPERTENSION: ICD-10-CM

## 2023-03-24 RX ORDER — LISINOPRIL 20 MG/1
TABLET ORAL
Qty: 180 TABLET | Refills: 1 | Status: SHIPPED | OUTPATIENT
Start: 2023-03-24

## 2023-03-24 NOTE — TELEPHONE ENCOUNTER
Rx Refill Note  Requested Prescriptions     Pending Prescriptions Disp Refills   • lisinopril (PRINIVIL,ZESTRIL) 20 MG tablet [Pharmacy Med Name: LISINOPRIL 20 MG TABLET] 180 tablet 1     Sig: TAKE ONE TABLET BY MOUTH TWICE A DAY      Last office visit with prescribing clinician: 1/5/2023   Next office visit with prescribing clinician: 1/8/2024                     Doretha Mcnulty MA  03/24/23, 12:59 EDT

## 2023-05-19 ENCOUNTER — OFFICE VISIT (OUTPATIENT)
Dept: FAMILY MEDICINE CLINIC | Facility: CLINIC | Age: 83
End: 2023-05-19
Payer: MEDICARE

## 2023-05-19 VITALS
OXYGEN SATURATION: 95 % | HEART RATE: 64 BPM | DIASTOLIC BLOOD PRESSURE: 79 MMHG | BODY MASS INDEX: 26.68 KG/M2 | HEIGHT: 62 IN | SYSTOLIC BLOOD PRESSURE: 153 MMHG | WEIGHT: 145 LBS | TEMPERATURE: 97.6 F

## 2023-05-19 DIAGNOSIS — M54.6 THORACIC BACK PAIN, UNSPECIFIED BACK PAIN LATERALITY, UNSPECIFIED CHRONICITY: ICD-10-CM

## 2023-05-19 DIAGNOSIS — M54.2 CERVICAL SPINE PAIN: ICD-10-CM

## 2023-05-19 DIAGNOSIS — M54.50 RIGHT-SIDED LOW BACK PAIN WITHOUT SCIATICA, UNSPECIFIED CHRONICITY: ICD-10-CM

## 2023-05-19 DIAGNOSIS — M25.551 RIGHT HIP PAIN: Primary | ICD-10-CM

## 2023-05-19 PROCEDURE — 3078F DIAST BP <80 MM HG: CPT | Performed by: FAMILY MEDICINE

## 2023-05-19 PROCEDURE — 3077F SYST BP >= 140 MM HG: CPT | Performed by: FAMILY MEDICINE

## 2023-05-19 PROCEDURE — 99213 OFFICE O/P EST LOW 20 MIN: CPT | Performed by: FAMILY MEDICINE

## 2023-05-19 RX ORDER — METHYLPREDNISOLONE 4 MG/1
TABLET ORAL
Qty: 21 TABLET | Refills: 0 | Status: SHIPPED | OUTPATIENT
Start: 2023-05-19

## 2023-05-19 RX ORDER — IBUPROFEN 600 MG/1
600 TABLET ORAL EVERY 6 HOURS PRN
COMMUNITY

## 2023-05-19 NOTE — PROGRESS NOTES
"Subjective   Maria D Fountain is a 83 y.o. female.     History of Present Illness       The patient comes in with complaints of right hip and low back pain since her [birth year] in 02/2023.    The pain is on both sides, but the right side is causing her a lot of problems. She had to roll out of bed, get down and then push herself up. It has been going on for 3 months and it is getting worse. She denies any injury or fall. She has a family history of arthritis. Her last doctor had her on meloxicam. Her x-ray was terrible. Her lower lumbar has arthritis. Her last x-ray was almost 3 years ago. She was put in water rehab for 6 weeks. She had a hematoma on her ankle. Her ankle is swollen;.. She had a big blood clot on her ankle 1 year ago. She has been going to the doctor ever since. she has been working for 25 years since she was started her on meloxicam. She breaks out with \"little red spots\" all over. She stopped taking it. She states she has been taking Tylenol Arthritis. It takes the edge off, but not in the morning when she gets up. She has been reading a book about this. It is worse in the morning.    She has been experiencing neck pain and mid back pain. She wants all of her spine x-rayed.      The following portions of the patient's history were reviewed and updated as appropriate: allergies, current medications, past family history, past medical history, past social history, past surgical history, and problem list.  Past Medical History:   Diagnosis Date   • Arthritis    • Diabetes mellitus    • DJD (degenerative joint disease)    • Gallstones    • Heart murmur    • History of stress test 07/2011    Stress myoview- neg    • Hypertension    • Lumbar disc disease    • Mitral regurgitation    • Multiple lipomas    • Pleurisy    • Spinal stenosis      Past Surgical History:   Procedure Laterality Date   • BREAST BIOPSY Right 1990   • CARDIAC CATHETERIZATION  06/21/2017   • HEMANGIOMA EXCISION Left 2010    left forearm "    • HX OVARIAN CYSTECTOMY  1962   • HYSTERECTOMY  2004     Family History   Problem Relation Age of Onset   • Heart disease Father         MI   • Arthritis Other    • Hypertension Other    • Colon cancer Other    • No Known Problems Mother    • No Known Problems Sister    • No Known Problems Brother    • No Known Problems Maternal Aunt    • No Known Problems Maternal Uncle    • No Known Problems Paternal Aunt    • No Known Problems Paternal Uncle    • No Known Problems Maternal Grandmother    • No Known Problems Maternal Grandfather    • No Known Problems Paternal Grandmother    • No Known Problems Paternal Grandfather    • Anemia Neg Hx    • Arrhythmia Neg Hx    • Asthma Neg Hx    • Clotting disorder Neg Hx    • Fainting Neg Hx    • Heart attack Neg Hx    • Heart failure Neg Hx    • Hyperlipidemia Neg Hx      Social History     Socioeconomic History   • Marital status:    Tobacco Use   • Smoking status: Never     Passive exposure: Never   • Smokeless tobacco: Never   Vaping Use   • Vaping Use: Never used   Substance and Sexual Activity   • Alcohol use: No   • Drug use: No   • Sexual activity: Defer         Current Outpatient Medications:   •  amLODIPine (NORVASC) 5 MG tablet, Take 1 tablet by mouth Daily., Disp: , Rfl:   •  ascorbic acid (VITAMIN C) 1000 MG tablet, Take 500 mg by mouth 2 (two) times a day., Disp: , Rfl:   •  aspirin (aspirin) 81 MG EC tablet, Take 1 tablet by mouth Daily., Disp: , Rfl:   •  atorvastatin (LIPITOR) 20 MG tablet, Take 1 tablet by mouth Every Night., Disp: 90 tablet, Rfl: 3  •  Calcium Carb-Cholecalciferol 1000-800 MG-UNIT tablet, Take 4 tablets by mouth Daily., Disp: , Rfl:   •  carvedilol (COREG) 12.5 MG tablet, Take 1 tablet by mouth 2 (Two) Times a Day With Meals., Disp: 180 tablet, Rfl: 1  •  HM OMEGA-3-6-9 FATTY ACIDS capsule, Take 1 capsule by mouth Daily., Disp: , Rfl:   •  ibuprofen (ADVIL,MOTRIN) 600 MG tablet, Take 1 tablet by mouth Every 6 (Six) Hours As Needed for  "Mild Pain., Disp: , Rfl:   •  lisinopril (PRINIVIL,ZESTRIL) 20 MG tablet, TAKE ONE TABLET BY MOUTH TWICE A DAY, Disp: 180 tablet, Rfl: 1  •  multivitamin with minerals tablet tablet, Take 1 tablet by mouth Daily., Disp: , Rfl:   •  multivitamin-minerals (CENTRUM) tablet, Take 1 tablet by mouth Daily., Disp: , Rfl:   •  nitroglycerin (Nitrostat) 0.4 MG SL tablet, Place 1 tablet under the tongue Every 5 (Five) Minutes As Needed for Chest Pain. Take no more than 3 doses in 15 minutes., Disp: 30 tablet, Rfl: 5  •  vitamin B-12 (CYANOCOBALAMIN) 1000 MCG tablet, Take 1 tablet by mouth Daily., Disp: , Rfl:   •  methylPREDNISolone (MEDROL) 4 MG dose pack, Take as directed on package instructions., Disp: 21 tablet, Rfl: 0    Review of Systems  /79 (BP Location: Left arm, Patient Position: Sitting, Cuff Size: Adult)   Pulse 64   Temp 97.6 °F (36.4 °C) (Temporal)   Ht 157.5 cm (62\")   Wt 65.8 kg (145 lb)   LMP  (LMP Unknown)   SpO2 95%   BMI 26.52 kg/m²   A review of systems was performed, and the pertinent positives are noted in the HPI.      Objective   Physical Exam  Vitals and nursing note reviewed.   Constitutional:       Appearance: Normal appearance.   Cardiovascular:      Rate and Rhythm: Normal rate and regular rhythm.      Heart sounds: Normal heart sounds.   Pulmonary:      Effort: Pulmonary effort is normal.      Breath sounds: Normal breath sounds.   Musculoskeletal:      Cervical back: Bony tenderness present.      Thoracic back: Bony tenderness present.      Lumbar back: Bony tenderness present. Negative right straight leg raise test and negative left straight leg raise test.      Right hip: Tenderness present. No deformity or crepitus. Decreased range of motion.   Neurological:      Mental Status: She is alert.           Assessment & Plan   Problems Addressed this Visit    None  Visit Diagnoses     Right hip pain    -  Primary    Relevant Orders    XR Hip With or Without Pelvis 2 - 3 View Right    " Right-sided low back pain without sciatica, unspecified chronicity        Relevant Orders    XR Spine Lumbar 2 or 3 View    Thoracic back pain, unspecified back pain laterality, unspecified chronicity        Relevant Orders    XR Spine Thoracic 2 View (In Office)    Cervical spine pain        Relevant Orders    XR Spine Cervical 2 or 3 View      Diagnoses       Codes Comments    Right hip pain    -  Primary ICD-10-CM: M25.551  ICD-9-CM: 719.45     Right-sided low back pain without sciatica, unspecified chronicity     ICD-10-CM: M54.50  ICD-9-CM: 724.2     Thoracic back pain, unspecified back pain laterality, unspecified chronicity     ICD-10-CM: M54.6  ICD-9-CM: 724.1     Cervical spine pain     ICD-10-CM: M54.2  ICD-9-CM: 723.1           1. Right hip pain, right sided low back pain without sciatica, thoracic back pain and cervical spine pain  - Sent her for x-rays of all the above.  - I have put her on a Medrol Dosepak and she will take the extra strength Tylenol three times a day if she needs it for pain.          Transcribed from ambient dictation for Celi Renner MD by Shalini Espinal.  05/19/23   13:12 EDT    Patient or patient representative verbalized consent to the visit recording.  I have personally performed the services described in this document as transcribed by the above individual, and it is both accurate and complete.

## 2023-05-23 DIAGNOSIS — M54.50 RIGHT-SIDED LOW BACK PAIN WITHOUT SCIATICA, UNSPECIFIED CHRONICITY: ICD-10-CM

## 2023-05-23 DIAGNOSIS — M25.551 RIGHT HIP PAIN: Primary | ICD-10-CM

## 2023-07-19 ENCOUNTER — APPOINTMENT (OUTPATIENT)
Dept: GENERAL RADIOLOGY | Facility: HOSPITAL | Age: 83
End: 2023-07-19
Payer: MEDICARE

## 2023-07-19 ENCOUNTER — HOSPITAL ENCOUNTER (EMERGENCY)
Facility: HOSPITAL | Age: 83
Discharge: HOME | End: 2023-07-19
Attending: EMERGENCY MEDICINE | Admitting: EMERGENCY MEDICINE
Payer: MEDICARE

## 2023-07-19 ENCOUNTER — TELEPHONE (OUTPATIENT)
Dept: FAMILY MEDICINE CLINIC | Facility: CLINIC | Age: 83
End: 2023-07-19

## 2023-07-19 ENCOUNTER — APPOINTMENT (OUTPATIENT)
Dept: CARDIOLOGY | Facility: HOSPITAL | Age: 83
End: 2023-07-19
Payer: MEDICARE

## 2023-07-19 VITALS
HEART RATE: 71 BPM | OXYGEN SATURATION: 93 % | SYSTOLIC BLOOD PRESSURE: 161 MMHG | TEMPERATURE: 97.8 F | BODY MASS INDEX: 28.61 KG/M2 | HEIGHT: 60 IN | WEIGHT: 145.72 LBS | DIASTOLIC BLOOD PRESSURE: 67 MMHG | RESPIRATION RATE: 17 BRPM

## 2023-07-19 DIAGNOSIS — M54.42 LEFT-SIDED LOW BACK PAIN WITH LEFT-SIDED SCIATICA, UNSPECIFIED CHRONICITY: Primary | ICD-10-CM

## 2023-07-19 DIAGNOSIS — M79.605 LEFT LEG PAIN: ICD-10-CM

## 2023-07-19 LAB
BH CV LOWER VASCULAR LEFT COMMON FEMORAL AUGMENT: NORMAL
BH CV LOWER VASCULAR LEFT COMMON FEMORAL COMPETENT: NORMAL
BH CV LOWER VASCULAR LEFT COMMON FEMORAL COMPRESS: NORMAL
BH CV LOWER VASCULAR LEFT COMMON FEMORAL PHASIC: NORMAL
BH CV LOWER VASCULAR LEFT COMMON FEMORAL SPONT: NORMAL
BH CV LOWER VASCULAR LEFT DISTAL FEMORAL COMPRESS: NORMAL
BH CV LOWER VASCULAR LEFT GASTRONEMIUS COMPRESS: NORMAL
BH CV LOWER VASCULAR LEFT GREATER SAPH AK COMPRESS: NORMAL
BH CV LOWER VASCULAR LEFT GREATER SAPH BK COMPRESS: NORMAL
BH CV LOWER VASCULAR LEFT LESSER SAPH COMPRESS: NORMAL
BH CV LOWER VASCULAR LEFT MID FEMORAL AUGMENT: NORMAL
BH CV LOWER VASCULAR LEFT MID FEMORAL COMPETENT: NORMAL
BH CV LOWER VASCULAR LEFT MID FEMORAL COMPRESS: NORMAL
BH CV LOWER VASCULAR LEFT MID FEMORAL PHASIC: NORMAL
BH CV LOWER VASCULAR LEFT MID FEMORAL SPONT: NORMAL
BH CV LOWER VASCULAR LEFT PERONEAL COMPRESS: NORMAL
BH CV LOWER VASCULAR LEFT POPLITEAL AUGMENT: NORMAL
BH CV LOWER VASCULAR LEFT POPLITEAL COMPETENT: NORMAL
BH CV LOWER VASCULAR LEFT POPLITEAL COMPRESS: NORMAL
BH CV LOWER VASCULAR LEFT POPLITEAL PHASIC: NORMAL
BH CV LOWER VASCULAR LEFT POPLITEAL SPONT: NORMAL
BH CV LOWER VASCULAR LEFT POSTERIOR TIBIAL COMPRESS: NORMAL
BH CV LOWER VASCULAR LEFT PROXIMAL FEMORAL COMPRESS: NORMAL
BH CV LOWER VASCULAR LEFT SAPHENOFEMORAL JUNCTION COMPRESS: NORMAL
BH CV LOWER VASCULAR RIGHT COMMON FEMORAL AUGMENT: NORMAL
BH CV LOWER VASCULAR RIGHT COMMON FEMORAL COMPETENT: NORMAL
BH CV LOWER VASCULAR RIGHT COMMON FEMORAL COMPRESS: NORMAL
BH CV LOWER VASCULAR RIGHT COMMON FEMORAL PHASIC: NORMAL
BH CV LOWER VASCULAR RIGHT COMMON FEMORAL SPONT: NORMAL
BH CV VAS PRELIMINARY FINDINGS SCRIPTING: 1

## 2023-07-19 PROCEDURE — 99283 EMERGENCY DEPT VISIT LOW MDM: CPT

## 2023-07-19 PROCEDURE — 72100 X-RAY EXAM L-S SPINE 2/3 VWS: CPT

## 2023-07-19 PROCEDURE — 93971 EXTREMITY STUDY: CPT

## 2023-07-19 RX ORDER — LIDOCAINE 50 MG/G
1 PATCH TOPICAL EVERY 24 HOURS
Qty: 6 PATCH | Refills: 0 | Status: SHIPPED | OUTPATIENT
Start: 2023-07-19

## 2023-07-19 RX ORDER — LIDOCAINE 50 MG/G
1 PATCH TOPICAL
Status: DISCONTINUED | OUTPATIENT
Start: 2023-07-19 | End: 2023-07-19 | Stop reason: HOSPADM

## 2023-07-19 RX ADMIN — LIDOCAINE 1 PATCH: 140 PATCH CUTANEOUS at 13:06

## 2023-07-19 NOTE — DISCHARGE INSTRUCTIONS
Continue to take Tylenol or your Mobic as prescribed for pain.  Use lidocaine patch for additional pain relief.    Follow-up with primary care for recheck  Return to the ER for new or worsening symptoms

## 2023-07-19 NOTE — ED NOTES
Pt states she has known injury ion lower back and the pain I radiating from groin down to left knee, pt reports she has a pocket of fluid behind her left knee, pt is aox4, ambulatory, pt drove herself to hospital

## 2023-07-19 NOTE — ED PROVIDER NOTES
Subjective   History of Present Illness  Chief Complaint: Back pain, leg pain    Patient is a 83-year-old  female history of arthritis, diabetes hypertension, lumbar disc disease, spinal stenosis presents the ER with complaints of low back pain, left leg pain for a few days.  Patient denies any fall trauma or injury.  She reports history of chronic low back pain that she thinks has flared but now radiating to the hip and down the back of the leg.  She describes as an aching pain that shoots down the back of her leg that she rates a 7/10.  She states she is taken Tylenol and Mobic at home with little relief.  No saddle anesthesia or bladder or bowel dysfunction.  No redness swelling or warmth.  No recent travel or surgery.  No history of PE or DVT.    PCP: Celi Renner    History provided by:  Patient    Review of Systems   Constitutional:  Negative for chills and fever.   HENT:  Negative for sore throat and trouble swallowing.    Eyes: Negative.    Respiratory:  Negative for shortness of breath and wheezing.    Cardiovascular:  Negative for chest pain.   Gastrointestinal:  Negative for abdominal pain, diarrhea, nausea and vomiting.   Endocrine: Negative.    Genitourinary:  Negative for dysuria.   Musculoskeletal:  Positive for arthralgias and back pain. Negative for myalgias.   Skin:  Negative for rash.   Allergic/Immunologic: Negative.    Neurological:  Negative for headaches.   Psychiatric/Behavioral:  Negative for behavioral problems.    All other systems reviewed and are negative.    Past Medical History:   Diagnosis Date    Arthritis     Diabetes mellitus     DJD (degenerative joint disease)     Gallstones     Heart murmur     History of stress test 07/2011    Stress myoview- neg     Hypertension     Lumbar disc disease     Mitral regurgitation     Multiple lipomas     Pleurisy     Spinal stenosis        Allergies   Allergen Reactions    Pravastatin Dizziness       Past Surgical History:    Procedure Laterality Date    BREAST BIOPSY Right 1990    CARDIAC CATHETERIZATION  06/21/2017    HEMANGIOMA EXCISION Left 2010    left forearm     HX OVARIAN CYSTECTOMY  1962    HYSTERECTOMY  2004       Family History   Problem Relation Age of Onset    Heart disease Father         MI    Arthritis Other     Hypertension Other     Colon cancer Other     No Known Problems Mother     No Known Problems Sister     No Known Problems Brother     No Known Problems Maternal Aunt     No Known Problems Maternal Uncle     No Known Problems Paternal Aunt     No Known Problems Paternal Uncle     No Known Problems Maternal Grandmother     No Known Problems Maternal Grandfather     No Known Problems Paternal Grandmother     No Known Problems Paternal Grandfather     Anemia Neg Hx     Arrhythmia Neg Hx     Asthma Neg Hx     Clotting disorder Neg Hx     Fainting Neg Hx     Heart attack Neg Hx     Heart failure Neg Hx     Hyperlipidemia Neg Hx        Social History     Socioeconomic History    Marital status:    Tobacco Use    Smoking status: Never     Passive exposure: Never    Smokeless tobacco: Never   Vaping Use    Vaping Use: Never used   Substance and Sexual Activity    Alcohol use: No    Drug use: No    Sexual activity: Defer           Objective   Physical Exam  Vitals and nursing note reviewed.   Constitutional:       Appearance: Normal appearance. She is well-developed and normal weight. She is not ill-appearing or toxic-appearing.   HENT:      Head: Normocephalic and atraumatic.   Eyes:      Pupils: Pupils are equal, round, and reactive to light.   Cardiovascular:      Rate and Rhythm: Normal rate and regular rhythm.      Pulses: Normal pulses.      Heart sounds: Normal heart sounds. No murmur heard.  Pulmonary:      Effort: Pulmonary effort is normal. No respiratory distress.      Breath sounds: Normal breath sounds. No wheezing.   Abdominal:      General: Bowel sounds are normal. There is no distension.       "Palpations: Abdomen is soft.      Tenderness: There is no abdominal tenderness.   Musculoskeletal:         General: Tenderness present. No swelling or deformity.      Comments: Lumbar spine: No step-offs or deformities, no midline tenderness to palpation.  Tenderness to palpation left lower back extending into the left hip.  Bilateral lower extremities: Negative straight leg raise.  5 out of 5 strength.  Sensation intact to light touch.  2+ reflexes.  No clonus.  Negative Babinski sign.    Tenderness palpation posterior left knee with no palpable nodules or cords.  There is a varicosity.    No tenderness to palpation left anterior knee foot or ankle.   Skin:     General: Skin is warm and dry.      Capillary Refill: Capillary refill takes less than 2 seconds.      Findings: No bruising or rash.   Neurological:      General: No focal deficit present.      Mental Status: She is alert and oriented to person, place, and time.   Psychiatric:         Mood and Affect: Mood normal.         Behavior: Behavior normal.       Procedures           ED Course    /67   Pulse 71   Temp 97.8 °F (36.6 °C) (Oral)   Resp 17   Ht 152.4 cm (60\")   Wt 66.1 kg (145 lb 11.6 oz)   LMP  (LMP Unknown)   SpO2 93%   BMI 28.46 kg/m²   Labs Reviewed - No data to display  Medications   lidocaine (LIDODERM) 5 % 1 patch (1 patch Transdermal Medication Applied 7/19/23 1306)     XR Spine Lumbar 2 or 3 View    Result Date: 7/19/2023  Impression: 1. Negative for acute osseous abnormality. 2. Multilevel lumbar degenerative findings above with convex right lumbar dextrocurvature centered at L3 unchanged. Electronically Signed: Magno Casas  7/19/2023 12:48 PM EDT  Workstation ID: [id number]                                          Medical Decision Making  Differential Dx (Includes but not limited to): Fracture contusion, sprain, strain, sciatica  Medical Records Reviewed: Patient has had previous lumbar thoracic spine x-rays showing " arthritis  Labs: N/A  Imaging: Chest x-ray shows no obvious osseous abnormalities.  Lumbar degenerative findings  Telemetry: N/A  Testing considered but not ordered: CT head patient denies headache or head injury  Nature of Complaint: Acute  Admission vs Discharge: Discharge  Discussion: While in the ED appropriate PPE was worn during exam and throughout all encounters with the patient.  Patient with above evaluation.  Patient given lidocaine patch for pain.  X-ray imaging unchanged.  Ultrasound negative for acute DVT or SVT.  Suspect sciatica, no evidence of Baker's cyst or DVT.  Patient also has varicosities that do not appear thrombosed but could also cause some increased pain.  Findings discussed with patient at bedside.  Patient to follow-up with primary care for recheck.  Prescription sent for lidocaine patches.    Discharge plan and instructions were discussed with the patient who verbalized understanding and is in agreement with the plan, all questions were answered at this time.  Patient is aware of signs symptoms that would require immediate return to the emergency room.  Patient understands importance of following up with primary care provider for further evaluation and worsening concerns as well as blood pressure recheck in the next 4 weeks.    Patient was discharged in improved stable condition with an upright steady gait.    Patient is aware that discharge does not mean that nothing is wrong but indicates no emergencies present and they must continue care with follow-up as given below or physician of her choice.    Problems Addressed:  Left leg pain: acute illness or injury  Left-sided low back pain with left-sided sciatica, unspecified chronicity: acute illness or injury    Amount and/or Complexity of Data Reviewed  Radiology: ordered. Decision-making details documented in ED Course.    Risk  Prescription drug management.        Final diagnoses:   Left-sided low back pain with left-sided sciatica,  unspecified chronicity   Left leg pain       ED Disposition  ED Disposition       ED Disposition   Discharge    Condition   Stable    Comment   --               Celi Renner MD  [address]  [address]  [place] [address] [phone]    Schedule an appointment as soon as possible for a visit in 2 days  As needed, If symptoms worsen         Medication List        New Prescriptions      lidocaine 5 %  Commonly known as: LIDODERM  Place 1 patch on the skin as directed by provider Daily. Remove & Discard patch within 12 hours or as directed by MD               Where to Get Your Medications        These medications were sent to [facility] [id number] - [place], [place] - [address] - [phone]  - [phone]  [address] [place] [address]      Phone: [phone]   lidocaine 5 %            Shweta Boyce PA  07/19/23 6971

## 2023-07-19 NOTE — TELEPHONE ENCOUNTER
Caller: Maria D Fountain    Relationship to patient: Self    Best call back number: [phone]    Chief complaint: VOICED CONCERNS OF A BLOOD CLOT, LEFT LEG, CANNOT WALK HARDLY, PAINFUL     Patient directed to call 911 or go to their nearest emergency room.     Patient verbalized understanding: [x] Yes  [] No  If no, why?    Additional notes:      PATIENT WILL CALL AFTERWARDS TO UPDATE THE OFFICE AND SCHEDULE A HOSPITAL FOLLOW UP

## 2023-07-24 ENCOUNTER — PATIENT OUTREACH (OUTPATIENT)
Dept: CASE MANAGEMENT | Facility: OTHER | Age: 83
End: 2023-07-24
Payer: MEDICARE

## 2023-07-24 DIAGNOSIS — I25.118 CORONARY ARTERY DISEASE OF NATIVE ARTERY OF NATIVE HEART WITH STABLE ANGINA PECTORIS: ICD-10-CM

## 2023-07-24 DIAGNOSIS — E78.2 MIXED HYPERLIPIDEMIA: ICD-10-CM

## 2023-07-24 DIAGNOSIS — I10 ESSENTIAL HYPERTENSION: Primary | ICD-10-CM

## 2023-07-24 NOTE — OUTREACH NOTE
AMBULATORY CASE MANAGEMENT NOTE    Name and Relationship of Patient/Support Person: Maria D Fountain F - Self    CCM Interim Update    Outreach complete.    Patient enrolled in CCM program on this date.  Hospital f/u appointment for ED visit on 7/19 scheduled for tomorrow (7/25/23) at 1045.  Patient stated that her pharmacy contacted her this morning to inform her that her Lidoderm patches were on back order but are available later this afternoon.      Adult Patient Profile  Questions/Answers      Flowsheet Row Most Recent Value   Symptoms/Conditions Managed at Home cardiovascular   Barriers to Managing Health none   Cardiovascular Symptoms/Conditions coronary artery disease, high blood cholesterol, hypertension   Cardiovascular Management Strategies activity, adequate rest, diet modification, exercise, medical device, medication therapy, routine screening   Cardiovascular Self-Management Outcome 2 (bad)                Education Documentation  No documentation found.        Cory FOOTE  Ambulatory Case Management    7/24/2023, 10:06 EDT

## 2023-07-25 ENCOUNTER — OFFICE VISIT (OUTPATIENT)
Dept: FAMILY MEDICINE CLINIC | Facility: CLINIC | Age: 83
End: 2023-07-25
Payer: MEDICARE

## 2023-07-25 VITALS
BODY MASS INDEX: 29.06 KG/M2 | TEMPERATURE: 97.5 F | HEIGHT: 60 IN | SYSTOLIC BLOOD PRESSURE: 131 MMHG | HEART RATE: 65 BPM | WEIGHT: 148 LBS | OXYGEN SATURATION: 93 % | DIASTOLIC BLOOD PRESSURE: 80 MMHG

## 2023-07-25 DIAGNOSIS — M25.552 LEFT HIP PAIN: Primary | ICD-10-CM

## 2023-07-25 DIAGNOSIS — I10 ESSENTIAL HYPERTENSION: ICD-10-CM

## 2023-07-25 PROCEDURE — 99213 OFFICE O/P EST LOW 20 MIN: CPT | Performed by: NURSE PRACTITIONER

## 2023-07-25 PROCEDURE — 3075F SYST BP GE 130 - 139MM HG: CPT | Performed by: NURSE PRACTITIONER

## 2023-07-25 PROCEDURE — 3079F DIAST BP 80-89 MM HG: CPT | Performed by: NURSE PRACTITIONER

## 2023-07-25 RX ORDER — MELOXICAM 15 MG/1
15 TABLET ORAL DAILY
Qty: 90 TABLET | Refills: 3 | Status: SHIPPED | OUTPATIENT
Start: 2023-07-25

## 2023-07-25 RX ORDER — AMLODIPINE BESYLATE 10 MG/1
10 TABLET ORAL AS NEEDED
COMMUNITY

## 2023-07-25 RX ORDER — SENNOSIDES 8.6 MG
650 CAPSULE ORAL EVERY 8 HOURS PRN
COMMUNITY

## 2023-07-25 RX ORDER — CARVEDILOL 12.5 MG/1
12.5 TABLET ORAL 2 TIMES DAILY WITH MEALS
Qty: 180 TABLET | Refills: 1 | Status: SHIPPED | OUTPATIENT
Start: 2023-07-25

## 2023-07-25 NOTE — PATIENT INSTRUCTIONS
Continue Mobic and arthritis cream,  Start using lidocaine patch when you get them.  Continue physical therapy.  Call if no improvement.

## 2023-07-25 NOTE — PROGRESS NOTES
Chief Complaint  Hospital Follow Up Visit    Subjective        Maria D Fountain presents to [facility]  History of Present Illness  Maria D is an 83-year-old female presenting today for hospital follow-up.  She was seen at [facility] emergency department on 7/19/2023 for low back and leg pain.  X-ray was negative for any abnormalities.  It does show degenerative findings in the lumbar spine.  She was given lidocaine 5% patch. Has not used them bc they are on back order.  Patient reports her pain starts in her left hip and radiates down her leg.  She reports she had fluid behind her left knee and was told she had a Baker's cyst.  That is improving.  She is currently taking Mobic and using arthritis cream and that has been helping her pain quite a bit.  She is waiting for the lidocaine patches to be in stock.  She currently sees physical therapy for lower back pain.    The following portions of the patient's history were reviewed and updated as appropriate: allergies, current medications, past family history, past medical history, past social history, past surgical history and problem list.    Allergies   Allergen Reactions    Pravastatin Dizziness       Patient Active Problem List   Diagnosis    Coronary artery disease of native artery of native heart with stable angina pectoris    Degeneration of lumbar or lumbosacral intervertebral disc    Dyslipidemia    Essential hypertension    Angina pectoris    Chest pain, pleuritic    Lumbar spondylosis    Mitral regurgitation    Prediabetes    Chronic coronary artery disease    Varicose veins of left lower extremity with pain    Mixed hyperlipidemia    Osteopenia after menopause    Rotator cuff arthropathy of left shoulder    Clinical diagnosis of severe acute respiratory syndrome coronavirus 2 (SARS-CoV-2) disease       Current Outpatient Medications   Medication Instructions    acetaminophen (TYLENOL) 650 mg, Oral, Every 8 Hours  "PRN    amLODIPine (NORVASC) 5 mg, Oral, As Needed    amLODIPine (NORVASC) 10 mg, Oral, As Needed    ascorbic acid (VITAMIN C) 500 mg, Oral, 2 times daily    aspirin 81 mg, Oral, Every 24 Hours    atorvastatin (LIPITOR) 20 mg, Oral, Nightly    Calcium Carb-Cholecalciferol 1000-800 MG-UNIT tablet 4 tablets, Oral, Daily    carvedilol (COREG) 12.5 mg, Oral, 2 Times Daily With Meals    HM OMEGA-3-6-9 FATTY ACIDS capsule 1 capsule, Oral, Daily    lidocaine (LIDODERM) 5 % 1 patch, Transdermal, Every 24 Hours, Remove & Discard patch within 12 hours or as directed by MD    lisinopril (PRINIVIL,ZESTRIL) 20 MG tablet TAKE ONE TABLET BY MOUTH TWICE A DAY    meloxicam (MOBIC) 15 mg, Oral, Daily    multivitamin with minerals tablet tablet 1 tablet, Oral, Daily    multivitamin-minerals (CENTRUM) tablet 1 tablet, Oral, Daily    nitroglycerin (NITROSTAT) 0.4 mg, Sublingual, Every 5 Minutes PRN, Take no more than 3 doses in 15 minutes.    vitamin B-12 (CYANOCOBALAMIN) 1,000 mcg, Oral, Daily          Objective   Vital Signs:  /80 (BP Location: Left arm, Patient Position: Sitting, Cuff Size: Adult)   Pulse 65   Temp 97.5 °F (36.4 °C) (Temporal)   Ht 152.4 cm (60\")   Wt 67.1 kg (148 lb)   SpO2 93%   BMI 28.90 kg/m²   Estimated body mass index is 28.9 kg/m² as calculated from the following:    Height as of this encounter: 152.4 cm (60\").    Weight as of this encounter: 67.1 kg (148 lb).             Review of Systems   Constitutional:  Negative for activity change, chills, fatigue, fever and unexpected weight change.   Cardiovascular:  Negative for leg swelling.   Musculoskeletal:  Positive for arthralgias. Negative for back pain, gait problem, joint swelling, myalgias, neck pain and neck stiffness.   Neurological:  Negative for weakness.      Physical Exam  Constitutional:       Appearance: Normal appearance.   Cardiovascular:      Rate and Rhythm: Normal rate and regular rhythm.      Pulses: Normal pulses.      Heart sounds: " Normal heart sounds.   Pulmonary:      Effort: Pulmonary effort is normal.      Breath sounds: Normal breath sounds.   Musculoskeletal:         General: Normal range of motion.      Cervical back: Normal range of motion and neck supple.   Skin:     General: Skin is warm and dry.      Capillary Refill: Capillary refill takes less than 2 seconds.   Neurological:      Mental Status: She is alert and oriented to person, place, and time.   Psychiatric:         Mood and Affect: Mood normal.         Behavior: Behavior normal.         Thought Content: Thought content normal.         Judgment: Judgment normal.      Result Review :                   Assessment and Plan   Diagnoses and all orders for this visit:    1. Left hip pain (Primary)  Comments:  Continue Mobic and thread a screen.  Start using lidocaine patches once you get them.  Continue physical therapy.  Call if no improvement.  Orders:  -     meloxicam (MOBIC) 15 MG tablet; Take 1 tablet by mouth Daily.  Dispense: 90 tablet; Refill: 3    2. Essential hypertension  -     carvedilol (COREG) 12.5 MG tablet; Take 1 tablet by mouth 2 (Two) Times a Day With Meals.  Dispense: 180 tablet; Refill: 1             Follow Up   Return if symptoms worsen or fail to improve.  Patient was given instructions and counseling regarding her condition or for health maintenance advice. Please see specific information pulled into the AVS if appropriate.

## 2023-07-31 ENCOUNTER — DOCUMENTATION (OUTPATIENT)
Dept: PHYSICAL THERAPY | Facility: CLINIC | Age: 83
End: 2023-07-31
Payer: MEDICARE

## 2023-07-31 ENCOUNTER — PATIENT OUTREACH (OUTPATIENT)
Dept: CASE MANAGEMENT | Facility: OTHER | Age: 83
End: 2023-07-31
Payer: MEDICARE

## 2023-07-31 DIAGNOSIS — I10 ESSENTIAL HYPERTENSION: Primary | ICD-10-CM

## 2023-07-31 DIAGNOSIS — I25.118 CORONARY ARTERY DISEASE OF NATIVE ARTERY OF NATIVE HEART WITH STABLE ANGINA PECTORIS: ICD-10-CM

## 2023-07-31 DIAGNOSIS — E78.2 MIXED HYPERLIPIDEMIA: ICD-10-CM

## 2023-08-02 ENCOUNTER — PATIENT OUTREACH (OUTPATIENT)
Dept: CASE MANAGEMENT | Facility: OTHER | Age: 83
End: 2023-08-02
Payer: MEDICARE

## 2023-08-02 DIAGNOSIS — I10 ESSENTIAL HYPERTENSION: Primary | ICD-10-CM

## 2023-08-02 DIAGNOSIS — E78.2 MIXED HYPERLIPIDEMIA: ICD-10-CM

## 2023-08-08 ENCOUNTER — LAB (OUTPATIENT)
Dept: FAMILY MEDICINE CLINIC | Facility: CLINIC | Age: 83
End: 2023-08-08
Payer: MEDICARE

## 2023-08-08 ENCOUNTER — OFFICE VISIT (OUTPATIENT)
Dept: FAMILY MEDICINE CLINIC | Facility: CLINIC | Age: 83
End: 2023-08-08
Payer: MEDICARE

## 2023-08-08 VITALS
OXYGEN SATURATION: 94 % | WEIGHT: 147 LBS | DIASTOLIC BLOOD PRESSURE: 74 MMHG | BODY MASS INDEX: 28.86 KG/M2 | HEART RATE: 58 BPM | SYSTOLIC BLOOD PRESSURE: 128 MMHG | TEMPERATURE: 98 F | HEIGHT: 60 IN

## 2023-08-08 DIAGNOSIS — R06.02 SHORTNESS OF BREATH: ICD-10-CM

## 2023-08-08 DIAGNOSIS — I10 ESSENTIAL HYPERTENSION: Primary | ICD-10-CM

## 2023-08-08 DIAGNOSIS — R53.83 FATIGUE, UNSPECIFIED TYPE: ICD-10-CM

## 2023-08-08 DIAGNOSIS — R73.03 PREDIABETES: ICD-10-CM

## 2023-08-08 DIAGNOSIS — I42.9 CARDIOMYOPATHY, UNSPECIFIED TYPE: ICD-10-CM

## 2023-08-08 DIAGNOSIS — R61 SWEATING INCREASE: ICD-10-CM

## 2023-08-08 DIAGNOSIS — Z00.00 ENCOUNTER FOR ANNUAL WELLNESS EXAM IN MEDICARE PATIENT: ICD-10-CM

## 2023-08-08 DIAGNOSIS — E78.2 MIXED HYPERLIPIDEMIA: ICD-10-CM

## 2023-08-08 DIAGNOSIS — R06.09 DOE (DYSPNEA ON EXERTION): ICD-10-CM

## 2023-08-08 RX ORDER — PHENOL 1.4 %
600 AEROSOL, SPRAY (ML) MUCOUS MEMBRANE DAILY
COMMUNITY

## 2023-08-08 NOTE — PROGRESS NOTES
The ABCs of the Annual Wellness Visit  Subsequent Medicare Wellness Visit    Subjective    Maria D Fountain is a 83 y.o. female who presents for a Subsequent Medicare Wellness Visit.    The following portions of the patient's history were reviewed and   updated as appropriate: allergies, current medications, past family history, past medical history, past social history, past surgical history, and problem list.    Compared to one year ago, the patient feels her physical   health is the same.    Compared to one year ago, the patient feels her mental   health is the same.    Recent Hospitalizations:  She was not admitted to the hospital during the last year.       Current Medical Providers:  Patient Care Team:  Celi Renner MD as PCP - General (Family Medicine)  Mera Boothe MD as Surgeon (Orthopedic Surgery)  Matti Luna MD as Consulting Physician (Cardiology)  Cory Landa RN as Ambulatory  ([facility])    Outpatient Medications Prior to Visit   Medication Sig Dispense Refill    acetaminophen (TYLENOL) 650 MG 8 hr tablet Take 1 tablet by mouth Every 8 (Eight) Hours As Needed for Mild Pain.      ascorbic acid (VITAMIN C) 1000 MG tablet Take 0.5 tablets by mouth 2 (two) times a day.      aspirin (aspirin) 81 MG EC tablet Take 1 tablet by mouth Daily.      atorvastatin (LIPITOR) 20 MG tablet Take 1 tablet by mouth Every Night. 90 tablet 3    Calcium Carb-Cholecalciferol 1000-800 MG-UNIT tablet Take 4 tablets by mouth Daily.      calcium carbonate (OS-TOYA) 600 MG tablet Take 1 tablet by mouth Daily.      carvedilol (COREG) 12.5 MG tablet Take 1 tablet by mouth 2 (Two) Times a Day With Meals. 180 tablet 1    HM OMEGA-3-6-9 FATTY ACIDS capsule Take 1 capsule by mouth Daily.      lidocaine (LIDODERM) 5 % Place 1 patch on the skin as directed by provider Daily. Remove & Discard patch within 12 hours or as directed by MD 6 patch 0    lisinopril (PRINIVIL,ZESTRIL) 20  MG tablet TAKE ONE TABLET BY MOUTH TWICE A  tablet 1    meloxicam (MOBIC) 15 MG tablet Take 1 tablet by mouth Daily. 90 tablet 3    multivitamin-minerals (CENTRUM) tablet Take 1 tablet by mouth Daily.      nitroglycerin (Nitrostat) 0.4 MG SL tablet Place 1 tablet under the tongue Every 5 (Five) Minutes As Needed for Chest Pain. Take no more than 3 doses in 15 minutes. 30 tablet 5    vitamin B-12 (CYANOCOBALAMIN) 1000 MCG tablet Take 1 tablet by mouth Daily.      multivitamin with minerals tablet tablet Take 1 tablet by mouth Daily.      amLODIPine (NORVASC) 10 MG tablet Take 1 tablet by mouth As Needed.      amLODIPine (NORVASC) 5 MG tablet Take 1 tablet by mouth As Needed.       No facility-administered medications prior to visit.       No opioid medication identified on active medication list. I have reviewed chart for other potential  high risk medication/s and harmful drug interactions in the elderly.        Aspirin is on active medication list. Aspirin use is indicated based on review of current medical condition/s. Pros and cons of this therapy have been discussed today. Benefits of this medication outweigh potential harm.  Patient has been encouraged to continue taking this medication.  .      Patient Active Problem List   Diagnosis    Coronary artery disease of native artery of native heart with stable angina pectoris    Degeneration of lumbar or lumbosacral intervertebral disc    Dyslipidemia    Essential hypertension    Angina pectoris    Chest pain, pleuritic    Lumbar spondylosis    Mitral regurgitation    Prediabetes    Chronic coronary artery disease    Varicose veins of left lower extremity with pain    Mixed hyperlipidemia    Osteopenia after menopause    Rotator cuff arthropathy of left shoulder    Clinical diagnosis of severe acute respiratory syndrome coronavirus 2 (SARS-CoV-2) disease    Encounter for annual wellness exam in Medicare patient     Advance Care Planning   Advance Care  "Planning     Advance Directive is not on file.  ACP discussion was held with the patient during this visit. Patient has an advance directive (not in EMR), copy requested.     Objective    Vitals:    23 1325   BP: 128/74   BP Location: Left arm   Patient Position: Sitting   Cuff Size: Adult   Pulse: 58   Temp: 98 øF (36.7 øC)   TempSrc: Temporal   SpO2: 94%   Weight: 66.7 kg (147 lb)   Height: 152.4 cm (60\")   PainSc:   6   PainLoc: Back     Estimated body mass index is 28.71 kg/mý as calculated from the following:    Height as of this encounter: 152.4 cm (60\").    Weight as of this encounter: 66.7 kg (147 lb).    BMI is >= 25 and <30. (Overweight) The following options were offered after discussion;: nutrition counseling/recommendations      Does the patient have evidence of cognitive impairment? No  MMSE done           HEALTH RISK ASSESSMENT    Smoking Status:  Social History     Tobacco Use   Smoking Status Never    Passive exposure: Never   Smokeless Tobacco Never     Alcohol Consumption:  Social History     Substance and Sexual Activity   Alcohol Use No     Fall Risk Screen:    STEADI Fall Risk Assessment was completed, and patient is at LOW risk for falls.Assessment completed on:2023    Depression Screenin/8/2023     1:33 PM   PHQ-2/PHQ-9 Depression Screening   Little Interest or Pleasure in Doing Things 1-->several days   Feeling Down, Depressed or Hopeless 0-->not at all   PHQ-9: Brief Depression Severity Measure Score 1       Health Habits and Functional and Cognitive Screenin/8/2023     1:36 PM   Functional & Cognitive Status   Do you have difficulty preparing food and eating? No   Do you have difficulty bathing yourself, getting dressed or grooming yourself? No   Do you have difficulty using the toilet? No   Do you have difficulty moving around from place to place? No   Do you have trouble with steps or getting out of a bed or a chair? No   Current Diet Well Balanced Diet "   Dental Exam Not up to date        Dental Exam Comment false teeth   Eye Exam Up to date   Exercise (times per week) 7 times per week   Current Exercises Include Walking;House Cleaning;Yard Work   Do you need help using the phone?  No   Are you deaf or do you have serious difficulty hearing?  No   Do you need help to go to places out of walking distance? No   Do you need help shopping? No   Do you need help preparing meals?  No   Do you need help with housework?  No   Do you need help with laundry? No   Do you need help taking your medications? No   Do you need help managing money? No   Do you ever drive or ride in a car without wearing a seat belt? No   Have you felt unusual stress, anger or loneliness in the last month? No   Who do you live with? Alone   If you need help, do you have trouble finding someone available to you? No   Have you been bothered in the last four weeks by sexual problems? No   Do you have difficulty concentrating, remembering or making decisions? No       Age-appropriate Screening Schedule:  Refer to the list below for future screening recommendations based on patient's age, sex and/or medical conditions. Orders for these recommended tests are listed in the plan section. The patient has been provided with a written plan.    Health Maintenance   Topic Date Due    COVID-19 Vaccine (1) Never done    Pneumococcal Vaccine 65+ (1 - PCV) Never done    TDAP/TD VACCINES (1 - Tdap) Never done    ZOSTER VACCINE (1 of 2) Never done    DXA SCAN  06/18/2023    INFLUENZA VACCINE  10/01/2023    LIPID PANEL  02/02/2024    ANNUAL WELLNESS VISIT  08/08/2024    HEMOGLOBIN A1C  Discontinued    DIABETIC EYE EXAM  Discontinued    URINE MICROALBUMIN  Discontinued              She has a Samuel directives.  She was counseled on the need for weight loss.  She does not want any vaccines.    CMS Preventative Services Quick Reference  Risk Factors Identified During Encounter  Immunizations Discussed/Encouraged: Prevnar  "20 (Pneumococcal 20-valent conjugate)  The above risks/problems have been discussed with the patient.  Pertinent information has been shared with the patient in the After Visit Summary.  An After Visit Summary and PPPS were made available to the patient.    Follow Up:   Next Medicare Wellness visit to be scheduled in 1 year.       Additional E&M Note during same encounter follows:  Patient has multiple medical problems which are significant and separately identifiable that require additional work above and beyond the Medicare Wellness Visit.      Chief Complaint  Medicare Wellness-subsequent, Back Pain (Level 5-6. Arthritis. Meloxicam wears off in the afternoon. Uses otc lidocaine rubs. ), hot sweats (Throughout the day occ. Also having fatigue. Worn out. Feels hot. Says not like when she was going through menopause. The other day felt like she was going to vomit. Has to take a nap after she eats. ), and Temporomandibular Joint Pain (She wants you to look at her jaw. Saw ENT, but wants to discuss.)    Subjective        HPI  Maria Dajith Fountain is also being seen today for follow up on bp and chol  C/O hot sweats  Some nausea when this occurs  Denies night sweats  Some SOA  Overwhelming fatigue  No CP  Still having some back pain  No trauma  Recent x-rays  Has full dentures upper and lower  ENT told her it was TMJ    Review of Systems   Constitutional:  Positive for chills and fatigue.   Respiratory:  Positive for shortness of breath. Negative for chest tightness and wheezing.    Cardiovascular: Negative.    Neurological: Negative.    Psychiatric/Behavioral: Negative.       Objective   Vital Signs:  /74 (BP Location: Left arm, Patient Position: Sitting, Cuff Size: Adult)   Pulse 58   Temp 98 øF (36.7 øC) (Temporal)   Ht 152.4 cm (60\")   Wt 66.7 kg (147 lb)   SpO2 94%   BMI 28.71 kg/mý     Physical Exam  Vitals and nursing note reviewed.   Constitutional:       Appearance: Normal appearance. She is " well-developed, well-groomed and overweight.   Neck:      Thyroid: No thyromegaly.   Cardiovascular:      Rate and Rhythm: Normal rate.      Heart sounds: Normal heart sounds.   Pulmonary:      Effort: Pulmonary effort is normal.      Breath sounds: Normal breath sounds.   Abdominal:      General: Abdomen is flat. Bowel sounds are normal.      Palpations: There is no hepatomegaly or splenomegaly.      Tenderness: There is no abdominal tenderness.   Musculoskeletal:      Cervical back: Neck supple.      Right lower leg: No edema.      Left lower leg: No edema.   Lymphadenopathy:      Cervical: No cervical adenopathy.   Neurological:      Mental Status: She is alert and oriented to person, place, and time.   Psychiatric:         Behavior: Behavior is cooperative.                  Lab Results   Component Value Date    HGBA1C 5.5 02/02/2023          Assessment and Plan   Diagnoses and all orders for this visit:    1. Essential hypertension (Primary)  -     Comprehensive Metabolic Panel; Future  -     CBC & Differential; Future  -     Lipid Panel; Future    2. Encounter for annual wellness exam in Medicare patient    3. Mixed hyperlipidemia  -     Comprehensive Metabolic Panel; Future  -     Lipid Panel; Future    4. Prediabetes  -     Comprehensive Metabolic Panel; Future    5. Shortness of breath  -     XR Chest 2 View  -     Adult Transthoracic Echo Complete W/ Cont if Necessary Per Protocol; Future    6. Fatigue, unspecified type  -     Comprehensive Metabolic Panel; Future  -     CBC & Differential; Future  -     TSH; Future    7. Sweating increase  -     Comprehensive Metabolic Panel; Future  -     CBC & Differential; Future  -     TSH; Future  -     Adult Transthoracic Echo Complete W/ Cont if Necessary Per Protocol; Future    8. FRAGOSO (dyspnea on exertion)  -     Comprehensive Metabolic Panel; Future  -     CBC & Differential; Future  -     TSH; Future  -     XR Chest 2 View  -     Adult Transthoracic Echo Complete  W/ Cont if Necessary Per Protocol; Future    9. Cardiomyopathy, unspecified type    Other orders  -     SCANNED COGNITIVE ASSESSMENT    She will continue her current meds for blood pressure  I have ordered labs, chest x-ray, echocardiogram to evaluate her prediabetes and hyperlipidemia as well as her shortness of breath, fatigue, increased sweating, dyspnea on exertion, cardiomyopathy.         Follow Up   Return in about 1 year (around 8/8/2024) for Recheck, Medicare Wellness.  Patient was given instructions and counseling regarding her condition or for health maintenance advice. Please see specific information pulled into the AVS if appropriate.

## 2023-08-10 ENCOUNTER — HOSPITAL ENCOUNTER (OUTPATIENT)
Dept: GENERAL RADIOLOGY | Facility: HOSPITAL | Age: 83
Discharge: HOME | End: 2023-08-10
Payer: MEDICARE

## 2023-08-10 ENCOUNTER — LAB (OUTPATIENT)
Dept: LAB | Facility: HOSPITAL | Age: 83
End: 2023-08-10
Payer: MEDICARE

## 2023-08-10 DIAGNOSIS — R06.09 DOE (DYSPNEA ON EXERTION): ICD-10-CM

## 2023-08-10 DIAGNOSIS — R53.83 FATIGUE, UNSPECIFIED TYPE: ICD-10-CM

## 2023-08-10 DIAGNOSIS — E78.2 MIXED HYPERLIPIDEMIA: ICD-10-CM

## 2023-08-10 DIAGNOSIS — I10 ESSENTIAL HYPERTENSION: ICD-10-CM

## 2023-08-10 DIAGNOSIS — R61 SWEATING INCREASE: ICD-10-CM

## 2023-08-10 DIAGNOSIS — R73.03 PREDIABETES: ICD-10-CM

## 2023-08-10 LAB
ALBUMIN SERPL-MCNC: 4.2 G/DL (ref 3.5–5.2)
ALBUMIN/GLOB SERPL: 1.7 G/DL
ALP SERPL-CCNC: 86 U/L (ref 39–117)
ALT SERPL W P-5'-P-CCNC: 15 U/L (ref 1–33)
ANION GAP SERPL CALCULATED.3IONS-SCNC: 9.3 MMOL/L (ref 5–15)
AST SERPL-CCNC: 24 U/L (ref 1–32)
BASOPHILS # BLD AUTO: 0.06 10*3/MM3 (ref 0–0.2)
BASOPHILS NFR BLD AUTO: 0.9 % (ref 0–1.5)
BILIRUB SERPL-MCNC: 1.5 MG/DL (ref 0–1.2)
BUN SERPL-MCNC: 10 MG/DL (ref 8–23)
BUN/CREAT SERPL: 10.9 (ref 7–25)
CALCIUM SPEC-SCNC: 9.8 MG/DL (ref 8.6–10.5)
CHLORIDE SERPL-SCNC: 106 MMOL/L (ref 98–107)
CHOLEST SERPL-MCNC: 136 MG/DL (ref 0–200)
CO2 SERPL-SCNC: 28.7 MMOL/L (ref 22–29)
CREAT SERPL-MCNC: 0.92 MG/DL (ref 0.57–1)
DEPRECATED RDW RBC AUTO: 47 FL (ref 37–54)
EGFRCR SERPLBLD CKD-EPI 2021: 61.9 ML/MIN/1.73
EOSINOPHIL # BLD AUTO: 0.31 10*3/MM3 (ref 0–0.4)
EOSINOPHIL NFR BLD AUTO: 4.6 % (ref 0.3–6.2)
ERYTHROCYTE [DISTWIDTH] IN BLOOD BY AUTOMATED COUNT: 13.6 % (ref 12.3–15.4)
GLOBULIN UR ELPH-MCNC: 2.5 GM/DL
GLUCOSE SERPL-MCNC: 95 MG/DL (ref 65–99)
HCT VFR BLD AUTO: 43.5 % (ref 34–46.6)
HDLC SERPL-MCNC: 42 MG/DL (ref 40–60)
HGB BLD-MCNC: 14.3 G/DL (ref 12–15.9)
IMM GRANULOCYTES # BLD AUTO: 0.02 10*3/MM3 (ref 0–0.05)
IMM GRANULOCYTES NFR BLD AUTO: 0.3 % (ref 0–0.5)
LDLC SERPL CALC-MCNC: 62 MG/DL (ref 0–100)
LDLC/HDLC SERPL: 1.31 {RATIO}
LYMPHOCYTES # BLD AUTO: 2.1 10*3/MM3 (ref 0.7–3.1)
LYMPHOCYTES NFR BLD AUTO: 31.4 % (ref 19.6–45.3)
MCH RBC QN AUTO: 31 PG (ref 26.6–33)
MCHC RBC AUTO-ENTMCNC: 32.9 G/DL (ref 31.5–35.7)
MCV RBC AUTO: 94.4 FL (ref 79–97)
MONOCYTES # BLD AUTO: 0.76 10*3/MM3 (ref 0.1–0.9)
MONOCYTES NFR BLD AUTO: 11.4 % (ref 5–12)
NEUTROPHILS NFR BLD AUTO: 3.43 10*3/MM3 (ref 1.7–7)
NEUTROPHILS NFR BLD AUTO: 51.4 % (ref 42.7–76)
NRBC BLD AUTO-RTO: 0 /100 WBC (ref 0–0.2)
PLATELET # BLD AUTO: 286 10*3/MM3 (ref 140–450)
PMV BLD AUTO: 9.8 FL (ref 6–12)
POTASSIUM SERPL-SCNC: 4.6 MMOL/L (ref 3.5–5.2)
PROT SERPL-MCNC: 6.7 G/DL (ref 6–8.5)
RBC # BLD AUTO: 4.61 10*6/MM3 (ref 3.77–5.28)
SODIUM SERPL-SCNC: 144 MMOL/L (ref 136–145)
TRIGL SERPL-MCNC: 194 MG/DL (ref 0–150)
TSH SERPL DL<=0.05 MIU/L-ACNC: 2.27 UIU/ML (ref 0.27–4.2)
VLDLC SERPL-MCNC: 32 MG/DL (ref 5–40)
WBC NRBC COR # BLD: 6.68 10*3/MM3 (ref 3.4–10.8)

## 2023-08-10 PROCEDURE — 36415 COLL VENOUS BLD VENIPUNCTURE: CPT

## 2023-08-10 PROCEDURE — 85025 COMPLETE CBC W/AUTO DIFF WBC: CPT

## 2023-08-10 PROCEDURE — 71046 X-RAY EXAM CHEST 2 VIEWS: CPT

## 2023-08-10 PROCEDURE — 84443 ASSAY THYROID STIM HORMONE: CPT

## 2023-08-10 PROCEDURE — 80061 LIPID PANEL: CPT

## 2023-08-10 PROCEDURE — 80053 COMPREHEN METABOLIC PANEL: CPT

## 2023-08-11 DIAGNOSIS — R61 SWEATING INCREASE: ICD-10-CM

## 2023-08-11 DIAGNOSIS — R53.83 FATIGUE, UNSPECIFIED TYPE: ICD-10-CM

## 2023-08-11 DIAGNOSIS — E80.6 HYPERBILIRUBINEMIA: Primary | ICD-10-CM

## 2023-08-11 NOTE — PROGRESS NOTES
Left message to return call to doctor's office at [facility]. Either to get test results or message from provider.

## 2023-08-11 NOTE — PROGRESS NOTES
Left message to return call to doctor's office at [facility]. Either to get test results or message from provider.

## 2023-08-16 ENCOUNTER — TELEPHONE (OUTPATIENT)
Dept: CASE MANAGEMENT | Facility: OTHER | Age: 83
End: 2023-08-16
Payer: MEDICARE

## 2023-08-17 ENCOUNTER — TELEPHONE (OUTPATIENT)
Dept: CASE MANAGEMENT | Facility: OTHER | Age: 83
End: 2023-08-17
Payer: MEDICARE

## 2023-08-22 ENCOUNTER — PATIENT OUTREACH (OUTPATIENT)
Dept: CASE MANAGEMENT | Facility: OTHER | Age: 83
End: 2023-08-22
Payer: MEDICARE

## 2023-08-22 DIAGNOSIS — E78.2 MIXED HYPERLIPIDEMIA: Primary | ICD-10-CM

## 2023-08-22 DIAGNOSIS — I10 ESSENTIAL HYPERTENSION: ICD-10-CM

## 2023-08-22 NOTE — OUTREACH NOTE
"AMBULATORY CASE MANAGEMENT NOTE    Name and Relationship of Patient/Support Person: Maria D Fountain F - Self    CCM Interim Update    Outreach complete.  Patient stated that she \"just got up from a nap.  It's too hot to work outside\".    Reviewed patient's most recent office visit with her PCP for her AMW visit on 8/8/23.  Patient with elevated bilirubin found with lab work. Patient is scheduled for a Liver US on 8/28/23.  Patient was aware and had questions regarding the procedure itself and whether they are going to \"knock me out\" for the scan.  Patient was assured that that was not normal protocol for this procedure.      BP at home was reported as \"148/79 this morning and then I took my Lisinopril and brought it down 130 over something\".  Patient was encouraged to continue to take her BP as directed and call with any sustained BP readings above 140/90.  Patient verbalized understanding.        Education Documentation  No documentation found.        Cory FOOTE  Ambulatory Case Management    8/22/2023, 14:06 EDT  "

## 2023-08-28 ENCOUNTER — HOSPITAL ENCOUNTER (OUTPATIENT)
Dept: ULTRASOUND IMAGING | Facility: HOSPITAL | Age: 83
Discharge: HOME | End: 2023-08-28
Admitting: FAMILY MEDICINE
Payer: MEDICARE

## 2023-08-28 DIAGNOSIS — R61 SWEATING INCREASE: ICD-10-CM

## 2023-08-28 DIAGNOSIS — E80.6 HYPERBILIRUBINEMIA: ICD-10-CM

## 2023-08-28 DIAGNOSIS — R53.83 FATIGUE, UNSPECIFIED TYPE: ICD-10-CM

## 2023-08-28 PROCEDURE — 76705 ECHO EXAM OF ABDOMEN: CPT

## 2023-08-29 NOTE — PROGRESS NOTES
Left message to return call to doctor's office at [facility]. Either to get test results or message from provider.

## 2023-08-30 ENCOUNTER — TELEPHONE (OUTPATIENT)
Dept: FAMILY MEDICINE CLINIC | Facility: CLINIC | Age: 83
End: 2023-08-30

## 2023-08-30 DIAGNOSIS — E80.6 HYPERBILIRUBINEMIA: Primary | ICD-10-CM

## 2023-08-30 DIAGNOSIS — K80.20 CALCULUS OF GALLBLADDER WITHOUT CHOLECYSTITIS WITHOUT OBSTRUCTION: ICD-10-CM

## 2023-08-30 NOTE — TELEPHONE ENCOUNTER
Hub staff attempted to follow warm transfer process and was unsuccessful     Caller: Maria D Fountain    Relationship to patient: Self    Best call back number: [phone]     Patient is needing: RETURNING VARGASATA'S CALL, WILL BE LEAVING HOME AT 9:45 SO WOULD LIKE A PHONE CALL BEFORE THEN

## 2023-08-31 ENCOUNTER — PATIENT OUTREACH (OUTPATIENT)
Dept: CASE MANAGEMENT | Facility: OTHER | Age: 83
End: 2023-08-31
Payer: MEDICARE

## 2023-08-31 DIAGNOSIS — I10 ESSENTIAL HYPERTENSION: ICD-10-CM

## 2023-08-31 DIAGNOSIS — E78.2 MIXED HYPERLIPIDEMIA: Primary | ICD-10-CM

## 2023-09-05 ENCOUNTER — PATIENT OUTREACH (OUTPATIENT)
Dept: CASE MANAGEMENT | Facility: OTHER | Age: 83
End: 2023-09-05
Payer: MEDICARE

## 2023-09-05 DIAGNOSIS — I10 ESSENTIAL HYPERTENSION: Primary | ICD-10-CM

## 2023-09-05 DIAGNOSIS — E78.2 MIXED HYPERLIPIDEMIA: ICD-10-CM

## 2023-09-05 NOTE — OUTREACH NOTE
"AMBULATORY CASE MANAGEMENT NOTE    Name and Relationship of Patient/Support Person: Maria D Fountain F - Self    CCM Interim Update    Outreach complete.    Patient stated that she was at home \"working on bills and writing out greeting cards\".  Reviewed with the patient her BP logs in the home setting.  The patient stated that \"it was 143/78 this morning\".  Patient encouraged to contact this office if her SBP remains consistently at 140 or above or her DBP remains consistently at 90 or above.  Patient verbalized understanding.  Reviewed with the patient her upcoming Echocardiogram on 9/14/23.  Patient verbalized understanding.    Patient with questions about her general surgery referral to see Dr. Sierra on 9/11/23.  Patient was assured that it was proper judgement to establish care with that speciality and to explore options related to the results of her Liver US on 8/28/23.  Patient was thankful and verbalized understanding.         Education Documentation  Unresolved/Worsening Symptoms, taught by Cory Landa, RN at 9/5/2023  2:37 PM.  Learner: Patient  Readiness: Acceptance  Method: Explanation  Response: Verbalizes Understanding    Provider Follow-Up, taught by Cory Landa, RN at 9/5/2023  2:37 PM.  Learner: Patient  Readiness: Acceptance  Method: Explanation  Response: Verbalizes Understanding    Blood Pressure Monitoring, taught by Cory Landa, RN at 9/5/2023  2:37 PM.  Learner: Patient  Readiness: Acceptance  Method: Explanation  Response: Verbalizes Understanding    Risk Factors, taught by Cory Landa, RN at 9/5/2023  2:37 PM.  Learner: Patient  Readiness: Acceptance  Method: Explanation  Response: Verbalizes Understanding    Description, taught by Cory Landa, RN at 9/5/2023  2:37 PM.  Learner: Patient  Readiness: Acceptance  Method: Explanation  Response: Verbalizes Understanding          Cory FOOTE  Ambulatory Case Management    9/5/2023, 14:31 EDT  "

## 2023-09-10 NOTE — PROGRESS NOTES
General Surgery History and Physical      Referring Provider: Celi Bronson*    Chief Complaint:    Hyperbilirubinemia    History of Present Illness:    Maria D Fountain is a 83 y.o. female referred for evaluation of hyperbilirubinemia.  She had an ultrasound of the liver which showed cholelithiasis without any evidence of choledocholithiasis.  She reports she has intermittent right upper quadrant pain with radiation around the right side and to the mid back.  She also reports she has fairly significant chronic low back pain which she has been previously told is secondary to osteoarthritis.  Denies any nausea or vomiting.  Denies any clear relation to food.  She does report intermittent left lower abdominal pain with radiation to the groin.  She was previously told this is secondary to her chronic back pain as well.    Past Medical History:   Past Medical History:   Diagnosis Date    Arthritis     Diabetes mellitus     DJD (degenerative joint disease)     Gallstones     Heart murmur     History of stress test 07/2011    Stress myoview- neg     Hypertension     Lumbar disc disease     Mitral regurgitation     Multiple lipomas     Pleurisy     Spinal stenosis       Past Surgical History:    Past Surgical History:   Procedure Laterality Date    BREAST BIOPSY Right 1990    CARDIAC CATHETERIZATION  06/21/2017    HEMANGIOMA EXCISION Left 2010    left forearm     HX OVARIAN CYSTECTOMY  1962    HYSTERECTOMY  2004     Family History:    Family History   Problem Relation Age of Onset    Heart disease Father         MI    Arthritis Other     Hypertension Other     Colon cancer Other     No Known Problems Mother     No Known Problems Sister     No Known Problems Brother     No Known Problems Maternal Aunt     No Known Problems Maternal Uncle     No Known Problems Paternal Aunt     No Known Problems Paternal Uncle     No Known Problems Maternal Grandmother     No Known Problems Maternal Grandfather     No Known  Problems Paternal Grandmother     No Known Problems Paternal Grandfather     Anemia Neg Hx     Arrhythmia Neg Hx     Asthma Neg Hx     Clotting disorder Neg Hx     Fainting Neg Hx     Heart attack Neg Hx     Heart failure Neg Hx     Hyperlipidemia Neg Hx      Social History:    Social History     Socioeconomic History    Marital status:    Tobacco Use    Smoking status: Never     Passive exposure: Never    Smokeless tobacco: Never   Vaping Use    Vaping Use: Never used   Substance and Sexual Activity    Alcohol use: No    Drug use: No    Sexual activity: Defer     Allergies:   Allergies   Allergen Reactions    Pravastatin Dizziness       Medications:     Current Outpatient Medications:     acetaminophen (TYLENOL) 650 MG 8 hr tablet, Take 1 tablet by mouth Every 8 (Eight) Hours As Needed for Mild Pain., Disp: , Rfl:     amLODIPine (NORVASC) 10 MG tablet, Take 1 tablet by mouth As Needed., Disp: , Rfl:     ascorbic acid (VITAMIN C) 1000 MG tablet, Take 0.5 tablets by mouth 2 (two) times a day., Disp: , Rfl:     aspirin (aspirin) 81 MG EC tablet, Take 1 tablet by mouth Daily., Disp: , Rfl:     atorvastatin (LIPITOR) 20 MG tablet, Take 1 tablet by mouth Every Night., Disp: 90 tablet, Rfl: 3    Calcium Carb-Cholecalciferol 1000-800 MG-UNIT tablet, Take 4 tablets by mouth Daily., Disp: , Rfl:     calcium carbonate (OS-TOYA) 600 MG tablet, Take 1 tablet by mouth Daily., Disp: , Rfl:     carvedilol (COREG) 12.5 MG tablet, Take 1 tablet by mouth 2 (Two) Times a Day With Meals., Disp: 180 tablet, Rfl: 1    HM OMEGA-3-6-9 FATTY ACIDS capsule, Take 1 capsule by mouth Daily., Disp: , Rfl:     lidocaine (LIDODERM) 5 %, Place 1 patch on the skin as directed by provider Daily. Remove & Discard patch within 12 hours or as directed by MD, Disp: 6 patch, Rfl: 0    lisinopril (PRINIVIL,ZESTRIL) 20 MG tablet, TAKE ONE TABLET BY MOUTH TWICE A DAY, Disp: 180 tablet, Rfl: 1    meloxicam (MOBIC) 15 MG tablet, Take 1 tablet by mouth  Daily., Disp: 90 tablet, Rfl: 3    multivitamin-minerals (CENTRUM) tablet, Take 1 tablet by mouth Daily., Disp: , Rfl:     nitroglycerin (Nitrostat) 0.4 MG SL tablet, Place 1 tablet under the tongue Every 5 (Five) Minutes As Needed for Chest Pain. Take no more than 3 doses in 15 minutes., Disp: 30 tablet, Rfl: 5    vitamin B-12 (CYANOCOBALAMIN) 1000 MCG tablet, Take 1 tablet by mouth Daily., Disp: , Rfl:     Radiology/Endoscopy:    Ultrasound liver 8/28/2023  Impression:  1. Hepatic steatosis  2. No evidence of biliary obstruction  3. Cholelithiasis     Labs:    Recent labs reviewed  Bilirubin 1.5 - 8/10/2023; normal transaminases and alkaline phosphatase    Review of Systems:   As noted above in HPI    Physical Exam:   No acute distress, alert  Nonlabored respirations  Abdomen soft, nontender, nondistended, well-healed lower midline incision    Assessment and Plan:  Maria D Fountain is a 83 y.o. with cholelithiasis seen on imaging and symptoms which appear consistent with symptomatic cholelithiasis.  Elevation in bilirubin may be secondary to passed stone as no evidence of choledocholithiasis on imaging.    - Discussed option for cholecystectomy versus monitoring; we did discuss risk of cholecystitis as well as choledocholithiasis  - Surgery along with associated risk, benefits, alternatives were discussed with the patient; she expressed understanding and wishes to proceed with surgery  - Surgery tentatively scheduled for 9/18/2023 however the patient may choose to postpone this given other previously scheduled applications  - Patient is following up with cardiology regarding chest pain and diaphoresis; echocardiogram scheduled for Thursday and will request cardiac clearance    Michelle Sierra MD  General Surgery

## 2023-09-11 ENCOUNTER — OFFICE VISIT (OUTPATIENT)
Dept: SURGERY | Facility: CLINIC | Age: 83
End: 2023-09-11
Payer: MEDICARE

## 2023-09-11 VITALS
TEMPERATURE: 96 F | HEART RATE: 66 BPM | HEIGHT: 60 IN | OXYGEN SATURATION: 94 % | SYSTOLIC BLOOD PRESSURE: 153 MMHG | DIASTOLIC BLOOD PRESSURE: 74 MMHG | BODY MASS INDEX: 29.13 KG/M2 | WEIGHT: 148.4 LBS

## 2023-09-11 DIAGNOSIS — K80.20 SYMPTOMATIC CHOLELITHIASIS: Primary | ICD-10-CM

## 2023-09-11 PROCEDURE — 1159F MED LIST DOCD IN RCRD: CPT | Performed by: SURGERY

## 2023-09-11 PROCEDURE — 3077F SYST BP >= 140 MM HG: CPT | Performed by: SURGERY

## 2023-09-11 PROCEDURE — 3078F DIAST BP <80 MM HG: CPT | Performed by: SURGERY

## 2023-09-11 PROCEDURE — 99204 OFFICE O/P NEW MOD 45 MIN: CPT | Performed by: SURGERY

## 2023-09-11 PROCEDURE — 1160F RVW MEDS BY RX/DR IN RCRD: CPT | Performed by: SURGERY

## 2023-09-11 RX ORDER — SODIUM CHLORIDE 0.9 % (FLUSH) 0.9 %
3-10 SYRINGE (ML) INJECTION AS NEEDED
OUTPATIENT
Start: 2023-09-11

## 2023-09-11 RX ORDER — SODIUM CHLORIDE 0.9 % (FLUSH) 0.9 %
3 SYRINGE (ML) INJECTION EVERY 12 HOURS SCHEDULED
OUTPATIENT
Start: 2023-09-11

## 2023-09-11 RX ORDER — INDOCYANINE GREEN AND WATER 25 MG
2.5 KIT INJECTION ONCE
OUTPATIENT
Start: 2023-09-11 | End: 2023-09-11

## 2023-09-11 RX ORDER — SODIUM CHLORIDE 9 MG/ML
40 INJECTION, SOLUTION INTRAVENOUS AS NEEDED
OUTPATIENT
Start: 2023-09-11

## 2023-09-11 RX ORDER — SODIUM CHLORIDE 9 MG/ML
100 INJECTION, SOLUTION INTRAVENOUS CONTINUOUS
OUTPATIENT
Start: 2023-09-11

## 2023-09-12 ENCOUNTER — TELEPHONE (OUTPATIENT)
Dept: CARDIOLOGY | Facility: CLINIC | Age: 83
End: 2023-09-12
Payer: MEDICARE

## 2023-09-12 NOTE — TELEPHONE ENCOUNTER
FACILITY: [facility]  DR: SABRINA LEVINE  PHONE: [phone]  FAX: [phone]  PROCEDURE: LAPAROSCOPIC CHOLECYSTECTOMY  SCHEDULED: 9/18/2023  MEDS TO HOLD: ASPIRIN FOR 5 DAYS    PLACED ON 'S DESK FOR REVIEW.

## 2023-09-14 ENCOUNTER — HOSPITAL ENCOUNTER (OUTPATIENT)
Dept: CARDIOLOGY | Facility: HOSPITAL | Age: 83
Discharge: HOME | End: 2023-09-14
Payer: MEDICARE

## 2023-09-14 DIAGNOSIS — I42.9 CARDIOMYOPATHY, UNSPECIFIED TYPE: ICD-10-CM

## 2023-09-14 DIAGNOSIS — R61 SWEATING INCREASE: ICD-10-CM

## 2023-09-14 DIAGNOSIS — R06.02 SHORTNESS OF BREATH: ICD-10-CM

## 2023-09-14 DIAGNOSIS — R06.09 DOE (DYSPNEA ON EXERTION): ICD-10-CM

## 2023-09-14 LAB
BH CV ECHO MEAS - AO MAX PG: 7 MMHG
BH CV ECHO MEAS - AO MEAN PG: 4 MMHG
BH CV ECHO MEAS - AO ROOT DIAM: 2.9 CM
BH CV ECHO MEAS - AO V2 MAX: 131.8 CM/SEC
BH CV ECHO MEAS - AO V2 VTI: 28.5 CM
BH CV ECHO MEAS - AVA(I,D): 1.55 CM2
BH CV ECHO MEAS - EDV(CUBED): 56.7 ML
BH CV ECHO MEAS - EDV(MOD-SP2): 63.4 ML
BH CV ECHO MEAS - EDV(MOD-SP4): 51.3 ML
BH CV ECHO MEAS - EF(MOD-BP): 63 %
BH CV ECHO MEAS - EF(MOD-SP2): 68.3 %
BH CV ECHO MEAS - EF(MOD-SP4): 60.2 %
BH CV ECHO MEAS - ESV(CUBED): 16.8 ML
BH CV ECHO MEAS - ESV(MOD-SP2): 20.1 ML
BH CV ECHO MEAS - ESV(MOD-SP4): 20.4 ML
BH CV ECHO MEAS - FS: 33.3 %
BH CV ECHO MEAS - IVS/LVPW: 0.96 CM
BH CV ECHO MEAS - IVSD: 1.31 CM
BH CV ECHO MEAS - LA DIMENSION: 3.7 CM
BH CV ECHO MEAS - LV DIASTOLIC VOL/BSA (35-75): 31.2 CM2
BH CV ECHO MEAS - LV MASS(C)D: 183.7 GRAMS
BH CV ECHO MEAS - LV MAX PG: 3.4 MMHG
BH CV ECHO MEAS - LV MEAN PG: 1.77 MMHG
BH CV ECHO MEAS - LV SYSTOLIC VOL/BSA (12-30): 12.4 CM2
BH CV ECHO MEAS - LV V1 MAX: 91.6 CM/SEC
BH CV ECHO MEAS - LV V1 VTI: 20.1 CM
BH CV ECHO MEAS - LVIDD: 3.8 CM
BH CV ECHO MEAS - LVIDS: 2.6 CM
BH CV ECHO MEAS - LVOT AREA: 2.2 CM2
BH CV ECHO MEAS - LVOT DIAM: 1.67 CM
BH CV ECHO MEAS - LVPWD: 1.37 CM
BH CV ECHO MEAS - MV A MAX VEL: 110.7 CM/SEC
BH CV ECHO MEAS - MV DEC SLOPE: 446.6 CM/SEC2
BH CV ECHO MEAS - MV DEC TIME: 0.21 MSEC
BH CV ECHO MEAS - MV E MAX VEL: 91.8 CM/SEC
BH CV ECHO MEAS - MV E/A: 0.83
BH CV ECHO MEAS - MV MAX PG: 5.3 MMHG
BH CV ECHO MEAS - MV MEAN PG: 1.67 MMHG
BH CV ECHO MEAS - MV V2 VTI: 34.1 CM
BH CV ECHO MEAS - MVA(VTI): 1.3 CM2
BH CV ECHO MEAS - PA V2 MAX: 77.5 CM/SEC
BH CV ECHO MEAS - PI END-D VEL: 116.7 CM/SEC
BH CV ECHO MEAS - PULM A REVS DUR: 0.11 SEC
BH CV ECHO MEAS - PULM A REVS VEL: 31.7 CM/SEC
BH CV ECHO MEAS - PULM DIAS VEL: 30.7 CM/SEC
BH CV ECHO MEAS - PULM S/D: 1.45
BH CV ECHO MEAS - PULM SYS VEL: 44.7 CM/SEC
BH CV ECHO MEAS - RV MAX PG: 1.28 MMHG
BH CV ECHO MEAS - RV V1 MAX: 56.5 CM/SEC
BH CV ECHO MEAS - RV V1 VTI: 11.9 CM
BH CV ECHO MEAS - RVDD: 3.1 CM
BH CV ECHO MEAS - SI(MOD-SP2): 26.4 ML/M2
BH CV ECHO MEAS - SI(MOD-SP4): 18.8 ML/M2
BH CV ECHO MEAS - SV(LVOT): 44.2 ML
BH CV ECHO MEAS - SV(MOD-SP2): 43.3 ML
BH CV ECHO MEAS - SV(MOD-SP4): 30.9 ML
BH CV ECHO MEAS - TR MAX PG: 27.8 MMHG
BH CV ECHO MEAS - TR MAX VEL: 261.9 CM/SEC

## 2023-09-14 PROCEDURE — 93306 TTE W/DOPPLER COMPLETE: CPT

## 2023-09-19 ENCOUNTER — PATIENT OUTREACH (OUTPATIENT)
Dept: CASE MANAGEMENT | Facility: OTHER | Age: 83
End: 2023-09-19
Payer: MEDICARE

## 2023-09-19 DIAGNOSIS — E78.2 MIXED HYPERLIPIDEMIA: Primary | ICD-10-CM

## 2023-09-19 DIAGNOSIS — E80.6 HYPERBILIRUBINEMIA: ICD-10-CM

## 2023-09-19 DIAGNOSIS — K80.20 SYMPTOMATIC CHOLELITHIASIS: ICD-10-CM

## 2023-09-19 NOTE — OUTREACH NOTE
"AMBULATORY CASE MANAGEMENT NOTE    Name and Relationship of Patient/Support Person: Maria D Fountain F - Self    [facility] Interim Update    Outreach complete with lengthy conversation.    Followed up with the patient on her recent visit (initial) with Dr. Sierra (general surgery) about options discussed for management of hyperbilirubinemia.  The documented plan was to proceed with a lap kaela possible to open on 9/18/23.  Patient decided to \"cancel that\" after lengthy discussion with family and friends.      \"I talked to my daughter and my granddaughter is a nurse at [employer].  I thought it was all kind of rushed.  I never saw x-rays or reports.  Never heard anymore about it.  The surgeon office called me for the office visit.  I told them I'm not having surgery, I haven't talked to anyone.  I was told its just a consultation.\"  Patient proceeded to say \"The Doctor (Dr. Sierra) was very sweet, compassionate and intelligent.  It was cut and dry to have the surgery.  They were going to move me up on the schedule.  I was told I have a gallstone, which was on the report.  I was told to stop all meds five days before.  That threw up a red flag.  I had two major surgeries prior.  She told me it was going to be four little holes but was concerned with scar tissue.  If she had trouble, she would have to open me up.  I got nervous.  My son has Covid again and I'm afraid of that.  She (Dr. Sierra) said that one out of one hundred have problems.  Well, I can just see me being the one!  I want to talk to Dr. Renner and have her check me over.  I want her opinion before I do anything else.\"    Patient is scheduled to see her PCP this Thursday (9/21/23) at 1530.         Education Documentation  No documentation found.        Cory FOOTE  Ambulatory Case Management    9/19/2023, 11:55 EDT  "

## 2023-09-21 ENCOUNTER — OFFICE VISIT (OUTPATIENT)
Dept: FAMILY MEDICINE CLINIC | Facility: CLINIC | Age: 83
End: 2023-09-21

## 2023-09-21 VITALS
BODY MASS INDEX: 28.86 KG/M2 | HEART RATE: 67 BPM | HEIGHT: 60 IN | TEMPERATURE: 97.9 F | SYSTOLIC BLOOD PRESSURE: 138 MMHG | WEIGHT: 147 LBS | OXYGEN SATURATION: 92 % | DIASTOLIC BLOOD PRESSURE: 80 MMHG

## 2023-09-21 DIAGNOSIS — K80.20 CALCULUS OF GALLBLADDER WITHOUT CHOLECYSTITIS WITHOUT OBSTRUCTION: Primary | ICD-10-CM

## 2023-09-21 PROCEDURE — 1159F MED LIST DOCD IN RCRD: CPT | Performed by: FAMILY MEDICINE

## 2023-09-21 PROCEDURE — 1160F RVW MEDS BY RX/DR IN RCRD: CPT | Performed by: FAMILY MEDICINE

## 2023-09-21 PROCEDURE — 99214 OFFICE O/P EST MOD 30 MIN: CPT | Performed by: FAMILY MEDICINE

## 2023-09-21 PROCEDURE — 3075F SYST BP GE 130 - 139MM HG: CPT | Performed by: FAMILY MEDICINE

## 2023-09-21 PROCEDURE — 3079F DIAST BP 80-89 MM HG: CPT | Performed by: FAMILY MEDICINE

## 2023-09-28 ENCOUNTER — PATIENT OUTREACH (OUTPATIENT)
Dept: CASE MANAGEMENT | Facility: OTHER | Age: 83
End: 2023-09-28
Payer: MEDICARE

## 2023-09-28 DIAGNOSIS — I10 ESSENTIAL HYPERTENSION: ICD-10-CM

## 2023-09-28 DIAGNOSIS — E80.6 HYPERBILIRUBINEMIA: ICD-10-CM

## 2023-09-28 DIAGNOSIS — K80.20 SYMPTOMATIC CHOLELITHIASIS: ICD-10-CM

## 2023-09-28 DIAGNOSIS — E78.2 MIXED HYPERLIPIDEMIA: Primary | ICD-10-CM

## 2023-09-28 NOTE — OUTREACH NOTE
[facility] End of Month Documentation    This Chronic Medical Management Care Plan for Maria D Fountain, 83 y.o. female, has been monitored and managed; reviewed and a new plan of care implemented for the month of September.  A cumulative time of 63 minutes was spent on this patient record this month, including phone call with patient; chart review.    Regarding the patient's problems: has Coronary artery disease of native artery of native heart with stable angina pectoris; Degeneration of lumbar or lumbosacral intervertebral disc; Dyslipidemia; Essential hypertension; Angina pectoris; Chest pain, pleuritic; Lumbar spondylosis; Mitral regurgitation; Prediabetes; Chronic coronary artery disease; Varicose veins of left lower extremity with pain; Mixed hyperlipidemia; Osteopenia after menopause; Rotator cuff arthropathy of left shoulder; Clinical diagnosis of severe acute respiratory syndrome coronavirus 2 (SARS-CoV-2) disease; Encounter for annual wellness exam in Medicare patient; and Symptomatic cholelithiasis on their problem list., the following items were addressed: medical records; medications and any changes can be found within the plan section of the note.  A detailed listing of time spent for chronic care management is tracked within each outreach encounter.  Current medications include:  has a current medication list which includes the following prescription(s): acetaminophen, amlodipine, ascorbic acid, aspirin, atorvastatin, calcium carb-cholecalciferol, carvedilol, hm omega-3-6-9 fatty acids, lidocaine, lisinopril, meloxicam, multivitamin-minerals, nitroglycerin, vitamin b-12, and [DISCONTINUED] montelukast. and the patient is reported to be patient is compliant with medication protocol,  Medications are reported to be effective.  Regarding these diagnoses, referrals were made to the following provider(s):  Dr. Sierra.  All notes on chart for PCP to review.    The patient was monitored remotely for blood  "pressure; activity level; pain; medications.    The patient's physical needs include:  help taking medications as prescribed; physical healthcare.     The patient's mental support needs include:  not applicable    The patient's cognitive support needs include:  not applicable    The patient's psychosocial support needs include:  not applicable    The patient's functional needs include: physical healthcare; physician referral, Patient with general surgery referral and appointment on 9/11/23    The patient's environmental needs include:  not applicable    Care Plan overall comments:  No data recorded    Refer to previous outreach notes for more information on the areas listed above.    Monthly Billing Diagnoses  (E78.2) Mixed hyperlipidemia    (I10) Essential hypertension    (K80.20) Symptomatic cholelithiasis    (E80.6) Hyperbilirubinemia    Medications   Medications have been reconciled    Care Plan progress this month:      Recently Modified Care Plans Updates made since 8/28/2023 12:00 AM       Hypertension (Adult)           Problem Priority Last Modified     Hypertension (Hypertension) --  7/24/2023 10:01 AM by Cory Landa RN              Goal Recent Progress Last Modified     Hypertension Monitored --  7/24/2023 10:01 AM by Cory Landa RN     Evidence-based guidance:   Promote initial use of ambulatory blood pressure measurements (for 3 days) to rule out \"white-coat\" effect; identify masked hypertension and presence or absence of nocturnal \"dipping\" of blood pressure.    Encourage continued use of home blood pressure monitoring and recording in blood pressure log; include symptoms of hypotension or potential medication side effects in log.   Review blood pressure measurements taken inside and outside of the provider office; establish baseline and monitor trends; compare to target ranges or patient goal.   Share overall cardiovascular risk with patient; encourage changes to lifestyle risk factors, " including alcohol consumption, smoking, inadequate exercise, poor dietary habits and stress.    Notes:            Task Due Date Last Modified     Identify and Monitor Blood Pressure Elevation --  9/5/2023  2:29 PM by Cory Landa RN     Care Management Activities:      - blood pressure trends reviewed  - depression screen reviewed      Notes:                Problem Priority Last Modified     Disease Progression (Hypertension) --  7/24/2023 10:01 AM by Cory Landa RN              Goal Recent Progress Last Modified     Disease Progression Prevented or Minimized --  7/24/2023 10:01 AM by Croy Landa RN     Evidence-based guidance:   Tailor lifestyle advice to individual; review progress regularly; give frequent encouragement and respond positively to incremental successes.   Assess for and promote awareness of worsening disease or development of comorbidity.   Prepare patient for laboratory and diagnostic exams based on risk and presentation.   Prepare patient for use of pharmacologic therapy that may include diuretic, beta-blocker, beta-blocker/thiazide combination, angiotensin-converting enzyme inhibitor, renin-angiotensin blocker or calcium-channel blocker.   Expect periodic adjustments to pharmacologic therapy; manage side effects.   Promote a healthy diet that includes primarily plant-based foods, such as fruits, vegetables, whole grains, beans and legumes, low-fat dairy and lean meats.    Consider moderate reduction in sodium intake by avoiding the addition of salt to prepared foods and limiting processed meats, canned soup, frozen meals and salty snacks.    Promote a regular, daily exercise goal of 150 minutes per week of moderate exercise based on tolerance, ability and patient choice; consider referral to physical therapist, community wellness and/or activity program.   Encourage the avoidance of no more than 2 hours per day of sedentary activity, such as recreational screen time.   Review  sources of stress; explore current coping strategies and encourage use of mindfulness, yoga, meditation or exercise to manage stress.    Notes:            Task Due Date Last Modified     Alleviate Barriers to Hypertension Treatment --  9/5/2023  2:29 PM by Cory Landa RN     Care Management Activities:      - patient response to treatment assessed  - response to pharmacologic therapy monitored      Notes:                Problem Priority Last Modified     Resistant Hypertension (Hypertension) --  7/24/2023 10:01 AM by Cory Landa RN              Goal Recent Progress Last Modified     Response to Treatment Maximized --  7/24/2023 10:01 AM by Cory Landa RN     Evidence-based guidance:   Assess patient response to treatment, including presence or absence of medication side effects, degree of blood pressure control and patient satisfaction.   Assess technique (including cuff size and placement), measurement times, condition and calibration of blood pressure cuff set (both at-home and in-office equipment).   Assess factors that may influence response to treatment, including nonadherence to pharmacologic treatment plan, diet or activity changes and/or presence of pain, stress or sleep disturbance.   Screen for signs and symptoms of depression; if present, refer for or complete a comprehensive assessment.   Evaluate social and economic barriers that may affect adherence to treatment plan   Address pharmacologic nonadherence by simplifying dosing regimen, counseling or support by pharmacist, financial assistance, self-monitoring of blood pressure, use of motivational interviewing, voice or text messages.   Encourage behavioral adherence strategies, like habit-based interventions that link medication taking with existing daily routines.   Assess barriers to regular, daily physical activity; support family or support person-oriented activity changes and utilization of community activity or sports program.    Address barriers to dietary changes, especially sodium restriction, with referrals to community programs, like cooking classes, meal services or intensive education when available.   Refer to community-based peer support program or nurse home-visiting program.   Assess for chronic pain; when present add additional goals (Chronic Pain Care Plan Guide) as needed.   Provide frequent follow-up by telephone, telemonitoring, patient-practice portal or with home visit.   Review alcohol use screen; address using brief intervention beginning with risk that interferes with blood pressure control; refer for treatment when excessive alcohol use is noted.   Screen for obstructive sleep apnea; prepare patient for polysomnography based on risk and presentation and use of noninvasive ventilation to relieve obstructive sleep apnea when present.    Notes:            Task Due Date Last Modified     Facilitate Adherence to Lifestyle Change --  9/5/2023  2:29 PM by Cory Landa RN     Care Management Activities:      - depression screen reviewed  - medication adherence assessment completed  - support and encouragement provided      Notes:                            Instructions   Patient was provided an electronic copy of care plan  CCM services were explained and offered and patient has accepted these services.  Patient has given their written consent to receive CCM services and understands that this includes the authorization of electronic communication of medical information with the other treating providers.  Patient understands that they may stop CCM services at any time and these changes will be effective at the end of the calendar month and will effectively revocate the agreement of CCM services.  Patient understands that only one practitioner can furnish and be paid for CCM services during one calendar month.  Patient also understands that there may be co-payment or deductible fees in association with CCM  services.  Patient will continue with at least monthly follow-up calls with the Ambulatory .    Cory FOOTE  Ambulatory Case Management    9/28/2023, 15:01 EDT

## 2023-10-05 DIAGNOSIS — I10 ESSENTIAL HYPERTENSION: ICD-10-CM

## 2023-10-05 RX ORDER — LISINOPRIL 20 MG/1
TABLET ORAL
Qty: 180 TABLET | Refills: 0 | Status: SHIPPED | OUTPATIENT
Start: 2023-10-05

## 2023-10-05 NOTE — TELEPHONE ENCOUNTER
Rx Refill Note  Requested Prescriptions     Pending Prescriptions Disp Refills    lisinopril (PRINIVIL,ZESTRIL) 20 MG tablet [Pharmacy Med Name: LISINOPRIL 20 MG TABLET] 180 tablet 1     Sig: TAKE ONE TABLET BY MOUTH TWICE A DAY      Last office visit with prescribing clinician: 1/5/2023   Last telemedicine visit with prescribing clinician: Visit date not found   Next office visit with prescribing clinician: 1/8/2024                         Would you like a call back once the refill request has been completed: [] Yes [] No    If the office needs to give you a call back, can they leave a voicemail: [] Yes [] No    Bia Coello MA  10/05/23, 09:18 EDT

## 2023-10-11 ENCOUNTER — PATIENT OUTREACH (OUTPATIENT)
Dept: CASE MANAGEMENT | Facility: OTHER | Age: 83
End: 2023-10-11
Payer: MEDICARE

## 2023-10-11 DIAGNOSIS — E80.6 HYPERBILIRUBINEMIA: ICD-10-CM

## 2023-10-11 DIAGNOSIS — M25.562 ACUTE PAIN OF LEFT KNEE: ICD-10-CM

## 2023-10-11 DIAGNOSIS — I10 ESSENTIAL HYPERTENSION: Primary | ICD-10-CM

## 2023-10-11 NOTE — OUTREACH NOTE
"AMBULATORY CASE MANAGEMENT NOTE    Name and Relationship of Patient/Support Person: Maria D Fountain - Self    CCM Interim Update    Outreach complete.    Reviewed with the patient her PCP appointment on 9/21/23 related to potential cholecystectomy plan.  Patient verbalized understanding of plan with PCP which included watching for signs and symptoms of worsening condition and to contact this office and/or go to the ED, if necessary.  Patient stated that \"I went on my trip to [place] and ate crazy food.  I haven't had any problems.  However, my left knee started to swell a few days ago.  We were going up and down hills, stayed in a big log cabin.  My sister-in-law got me Tiger Balm and boy has that helped!\".  Patient additionally verbalized that she was elevating the extremity and staying off her feet, when able and appropriate.  Patient agreed to continue topical treatment along with monitoring for any continuation or worsening of the acute condition and to contact this office, if necessary, to be seen.  Patient verbalized understanding.    Patient was unable to provide dates to her BP log but did state \"its been doing really good.  The highest reading I have had is 168/82 but I'm usually in the 140 to 150 range anyway.\"  Patient asked to continue home monitoring of BP and to contact this office if she were to maintain readings consistently above 160 SBP.  Patient verbalized understanding.               Education Documentation  Self-Care, taught by Cory Landa RN at 10/11/2023 11:31 AM.  Learner: Patient  Readiness: Acceptance  Method: Explanation  Response: Verbalizes Understanding    Medication Management, taught by Cory Landa RN at 10/11/2023 11:31 AM.  Learner: Patient  Readiness: Acceptance  Method: Explanation  Response: Verbalizes Understanding    Blood Pressure Monitoring, taught by Cory Landa RN at 10/11/2023 11:31 AM.  Learner: Patient  Readiness: Acceptance  Method: " Explanation  Response: Verbalizes Understanding          Cory FOOTE  Ambulatory Case Management    10/11/2023, 11:31 EDT

## 2023-10-30 ENCOUNTER — PATIENT OUTREACH (OUTPATIENT)
Dept: CASE MANAGEMENT | Facility: OTHER | Age: 83
End: 2023-10-30
Payer: MEDICARE

## 2023-10-30 DIAGNOSIS — I10 ESSENTIAL HYPERTENSION: Primary | ICD-10-CM

## 2023-10-30 DIAGNOSIS — M25.562 ACUTE PAIN OF LEFT KNEE: ICD-10-CM

## 2023-10-30 NOTE — OUTREACH NOTE
"AMBULATORY CASE MANAGEMENT NOTE    Name and Relationship of Patient/Support Person: Maria D Fountain F - Self    CCM Interim Update    Outreach complete.    Patient states that her acute lt knee c/o from previous outreach has improved.  \"It still hurts but the swelling went down\".      Patient stated that \"my high blood pressure shot up last week.  The bottom number was 118 and the top number was 167\".  Patient stated she rechecked the BP and \"it went down\" but she could not provide a reading.  Patient was asked to continue to track her BP and log them daily and to contact this office if her readings are consistently 140 or above SBP and/or 90 or above DBP.  Patient agreeable to plan.  Patient stated that when she had her elevated reading, she had \"been outside working and it felt like I had a heat wave came over me.  I used a cold compress on my face and that helped\".        Education Documentation  Unresolved/Worsening Symptoms, taught by Cory Landa, RN at 10/30/2023  2:21 PM.  Learner: Patient  Readiness: Acceptance  Method: Explanation  Response: Verbalizes Understanding    Blood Pressure Monitoring, taught by Cory Landa, RN at 10/30/2023  2:21 PM.  Learner: Patient  Readiness: Acceptance  Method: Explanation  Response: Verbalizes Understanding          Cory FOOTE  Ambulatory Case Management    10/30/2023, 14:09 EDT  "

## 2023-10-31 ENCOUNTER — PATIENT OUTREACH (OUTPATIENT)
Dept: CASE MANAGEMENT | Facility: OTHER | Age: 83
End: 2023-10-31
Payer: MEDICARE

## 2023-10-31 DIAGNOSIS — I10 ESSENTIAL HYPERTENSION: Primary | ICD-10-CM

## 2023-10-31 DIAGNOSIS — M25.562 ACUTE PAIN OF LEFT KNEE: ICD-10-CM

## 2023-10-31 DIAGNOSIS — E80.6 HYPERBILIRUBINEMIA: ICD-10-CM

## 2023-10-31 NOTE — OUTREACH NOTE
[facility] End of Month Documentation    This Chronic Medical Management Care Plan for Maria D Fountain, 83 y.o. female, has been monitored and managed; reviewed and a new plan of care implemented for the month of October.  A cumulative time of 50 minutes was spent on this patient record this month, including phone call with patient; chart review.    Regarding the patient's problems: has Coronary artery disease of native artery of native heart with stable angina pectoris; Degeneration of lumbar or lumbosacral intervertebral disc; Dyslipidemia; Essential hypertension; Angina pectoris; Chest pain, pleuritic; Lumbar spondylosis; Mitral regurgitation; Prediabetes; Chronic coronary artery disease; Varicose veins of left lower extremity with pain; Mixed hyperlipidemia; Osteopenia after menopause; Rotator cuff arthropathy of left shoulder; Clinical diagnosis of severe acute respiratory syndrome coronavirus 2 (SARS-CoV-2) disease; Encounter for annual wellness exam in Medicare patient; and Symptomatic cholelithiasis on their problem list., the following items were addressed: medical records; medications and any changes can be found within the plan section of the note.  A detailed listing of time spent for chronic care management is tracked within each outreach encounter.  Current medications include:  has a current medication list which includes the following prescription(s): acetaminophen, amlodipine, ascorbic acid, aspirin, atorvastatin, calcium carb-cholecalciferol, carvedilol, hm omega-3-6-9 fatty acids, lidocaine, lisinopril, meloxicam, multivitamin-minerals, nitroglycerin, vitamin b-12, and [DISCONTINUED] montelukast. and the patient is reported to be patient is compliant with medication protocol,  Medications are reported to be effective.   All notes on chart for PCP to review.    The patient was monitored remotely for blood pressure; activity level; pain; medications.    The patient's physical needs include:  help taking  "medications as prescribed; physical healthcare.     The patient's mental support needs include:  not applicable    The patient's cognitive support needs include:  not applicable    The patient's psychosocial support needs include:  not applicable    The patient's functional needs include: physical healthcare    The patient's environmental needs include:  not applicable    Care Plan overall comments:  No data recorded    Refer to previous outreach notes for more information on the areas listed above.    Monthly Billing Diagnoses  (I10) Essential hypertension    (E80.6) Hyperbilirubinemia    (M25.562) Acute pain of left knee    Medications   Medications have been reconciled    Care Plan progress this month:      Recently Modified Care Plans Updates made since 9/30/2023 12:00 AM       Hypertension (Adult)           Problem Priority Last Modified     Hypertension (Hypertension) --  7/24/2023 10:01 AM by Cory Landa RN              Goal Recent Progress Last Modified     Hypertension Monitored --  7/24/2023 10:01 AM by Cory Landa RN     Evidence-based guidance:   Promote initial use of ambulatory blood pressure measurements (for 3 days) to rule out \"white-coat\" effect; identify masked hypertension and presence or absence of nocturnal \"dipping\" of blood pressure.    Encourage continued use of home blood pressure monitoring and recording in blood pressure log; include symptoms of hypotension or potential medication side effects in log.   Review blood pressure measurements taken inside and outside of the provider office; establish baseline and monitor trends; compare to target ranges or patient goal.   Share overall cardiovascular risk with patient; encourage changes to lifestyle risk factors, including alcohol consumption, smoking, inadequate exercise, poor dietary habits and stress.    Notes:            Task Due Date Last Modified     Identify and Monitor Blood Pressure Elevation --  10/11/2023 11:29 AM by " Cory Landa RN     Care Management Activities:      - blood pressure trends reviewed  - home or ambulatory blood pressure monitoring encouraged      Notes:                Problem Priority Last Modified     Disease Progression (Hypertension) --  7/24/2023 10:01 AM by Cory Landa RN              Goal Recent Progress Last Modified     Disease Progression Prevented or Minimized --  7/24/2023 10:01 AM by Cory Landa RN     Evidence-based guidance:   Tailor lifestyle advice to individual; review progress regularly; give frequent encouragement and respond positively to incremental successes.   Assess for and promote awareness of worsening disease or development of comorbidity.   Prepare patient for laboratory and diagnostic exams based on risk and presentation.   Prepare patient for use of pharmacologic therapy that may include diuretic, beta-blocker, beta-blocker/thiazide combination, angiotensin-converting enzyme inhibitor, renin-angiotensin blocker or calcium-channel blocker.   Expect periodic adjustments to pharmacologic therapy; manage side effects.   Promote a healthy diet that includes primarily plant-based foods, such as fruits, vegetables, whole grains, beans and legumes, low-fat dairy and lean meats.    Consider moderate reduction in sodium intake by avoiding the addition of salt to prepared foods and limiting processed meats, canned soup, frozen meals and salty snacks.    Promote a regular, daily exercise goal of 150 minutes per week of moderate exercise based on tolerance, ability and patient choice; consider referral to physical therapist, community wellness and/or activity program.   Encourage the avoidance of no more than 2 hours per day of sedentary activity, such as recreational screen time.   Review sources of stress; explore current coping strategies and encourage use of mindfulness, yoga, meditation or exercise to manage stress.    Notes:            Task Due Date Last Modified      Alleviate Barriers to Hypertension Treatment --  10/11/2023 11:29 AM by Cory Landa RN     Care Management Activities:      - pain assessed and managed  - patient response to treatment assessed  - response to pharmacologic therapy monitored      Notes:                Problem Priority Last Modified     Resistant Hypertension (Hypertension) --  7/24/2023 10:01 AM by Cory Landa RN              Goal Recent Progress Last Modified     Response to Treatment Maximized --  7/24/2023 10:01 AM by Cory Landa RN     Evidence-based guidance:   Assess patient response to treatment, including presence or absence of medication side effects, degree of blood pressure control and patient satisfaction.   Assess technique (including cuff size and placement), measurement times, condition and calibration of blood pressure cuff set (both at-home and in-office equipment).   Assess factors that may influence response to treatment, including nonadherence to pharmacologic treatment plan, diet or activity changes and/or presence of pain, stress or sleep disturbance.   Screen for signs and symptoms of depression; if present, refer for or complete a comprehensive assessment.   Evaluate social and economic barriers that may affect adherence to treatment plan   Address pharmacologic nonadherence by simplifying dosing regimen, counseling or support by pharmacist, financial assistance, self-monitoring of blood pressure, use of motivational interviewing, voice or text messages.   Encourage behavioral adherence strategies, like habit-based interventions that link medication taking with existing daily routines.   Assess barriers to regular, daily physical activity; support family or support person-oriented activity changes and utilization of community activity or sports program.   Address barriers to dietary changes, especially sodium restriction, with referrals to community programs, like cooking classes, meal services or  intensive education when available.   Refer to community-based peer support program or nurse home-visiting program.   Assess for chronic pain; when present add additional goals (Chronic Pain Care Plan Guide) as needed.   Provide frequent follow-up by telephone, telemonitoring, patient-practice portal or with home visit.   Review alcohol use screen; address using brief intervention beginning with risk that interferes with blood pressure control; refer for treatment when excessive alcohol use is noted.   Screen for obstructive sleep apnea; prepare patient for polysomnography based on risk and presentation and use of noninvasive ventilation to relieve obstructive sleep apnea when present.    Notes:            Task Due Date Last Modified     Facilitate Adherence to Lifestyle Change --  10/11/2023 11:29 AM by Cory Landa RN     Care Management Activities:      - medication adherence assessment completed  - pain assessed and managed  - support and encouragement provided      Notes:                          Instructions   Patient was provided an electronic copy of care plan  CCM services were explained and offered and patient has accepted these services.  Patient has given their written consent to receive CCM services and understands that this includes the authorization of electronic communication of medical information with the other treating providers.  Patient understands that they may stop CCM services at any time and these changes will be effective at the end of the calendar month and will effectively revocate the agreement of CCM services.  Patient understands that only one practitioner can furnish and be paid for CCM services during one calendar month.  Patient also understands that there may be co-payment or deductible fees in association with CCM services.  Patient will continue with at least monthly follow-up calls with the Ambulatory .    Cory FOOTE  Ambulatory Case Management    10/31/2023,  10:15 EDT

## 2023-11-13 NOTE — PROGRESS NOTES
Chief Complaint  Knee pain    Subjective        Maria D Fountain presents to [facility]  History of Present Illness  Maria D is an 83-year-old female presenting today with left knee swelling and right knee pain.  She went to [place] in September with her widows group.  She says there was a lot of walking involved for 4 days.  When she came back her left knee was swollen and tender.  She has been using ice and meloxicam without much relief.  She reports weakness in her left knee.  Two weeks ago her right knee started hurting.  Pain is described as burning.      The following portions of the patient's history were reviewed and updated as appropriate: allergies, current medications, past family history, past medical history, past social history, past surgical history and problem list.    Allergies   Allergen Reactions   • Pravastatin Dizziness       Patient Active Problem List   Diagnosis   • Coronary artery disease of native artery of native heart with stable angina pectoris   • Degeneration of lumbar or lumbosacral intervertebral disc   • Dyslipidemia   • Essential hypertension   • Angina pectoris   • Chest pain, pleuritic   • Lumbar spondylosis   • Mitral regurgitation   • Prediabetes   • Chronic coronary artery disease   • Varicose veins of left lower extremity with pain   • Mixed hyperlipidemia   • Osteopenia after menopause   • Rotator cuff arthropathy of left shoulder   • Clinical diagnosis of severe acute respiratory syndrome coronavirus 2 (SARS-CoV-2) disease   • Encounter for annual wellness exam in Medicare patient   • Symptomatic cholelithiasis       Current Outpatient Medications   Medication Instructions   • acetaminophen (TYLENOL) 650 mg, Oral, Every 8 Hours PRN   • amLODIPine (NORVASC) 10 mg, Oral, As Needed   • ascorbic acid (VITAMIN C) 500 mg, Oral, 2 times daily   • aspirin 81 mg, Oral, Every 24 Hours   • atorvastatin (LIPITOR) 20 mg, Oral, Nightly   • Calcium  "Carb-Cholecalciferol 1000-800 MG-UNIT tablet 4 tablets, Oral, Daily   • carvedilol (COREG) 12.5 mg, Oral, 2 Times Daily With Meals   • HM OMEGA-3-6-9 FATTY ACIDS capsule 1 capsule, Oral, Daily   • lidocaine (LIDODERM) 5 % 1 patch, Transdermal, Every 24 Hours, Remove & Discard patch within 12 hours or as directed by MD   • lisinopril (PRINIVIL,ZESTRIL) 20 MG tablet TAKE ONE TABLET BY MOUTH TWICE A DAY   • meloxicam (MOBIC) 15 mg, Oral, Daily   • multivitamin-minerals (CENTRUM) tablet 1 tablet, Oral, Daily   • nitroglycerin (NITROSTAT) 0.4 mg, Sublingual, Every 5 Minutes PRN, Take no more than 3 doses in 15 minutes.   • predniSONE (DELTASONE) 10 MG tablet Take 4 tablets by mouth Daily for 4 days, THEN 3 tablets Daily for 4 days, THEN 2 tablets Daily for 4 days, THEN 1 tablet Daily for 4 days.   • vitamin B-12 (CYANOCOBALAMIN) 1,000 mcg, Oral, Daily          Objective   Vital Signs:  BP (!) 188/122 (BP Location: Left arm, Patient Position: Sitting, Cuff Size: Adult)   Pulse 62   Temp 96 °F (35.6 °C) (Temporal)   Ht 154.9 cm (61\")   Wt 67.1 kg (148 lb)   SpO2 94%   BMI 27.96 kg/m²   Estimated body mass index is 27.96 kg/m² as calculated from the following:    Height as of this encounter: 154.9 cm (61\").    Weight as of this encounter: 67.1 kg (148 lb).               Review of Systems   Constitutional:  Negative for activity change, chills, fatigue, fever and unexpected weight change.   Cardiovascular:  Positive for leg swelling.   Musculoskeletal:  Positive for arthralgias. Negative for back pain, gait problem, joint swelling, myalgias, neck pain and neck stiffness.   Neurological:  Negative for weakness.        Physical Exam  Constitutional:       Appearance: Normal appearance.   Cardiovascular:      Rate and Rhythm: Normal rate and regular rhythm.   Pulmonary:      Effort: Pulmonary effort is normal.      Breath sounds: Normal breath sounds.   Musculoskeletal:      Cervical back: Normal range of motion and neck " supple.      Right knee: No swelling. Decreased range of motion. Tenderness present over the LCL.      Left knee: Swelling present. Decreased range of motion. Tenderness present over the LCL.   Skin:     General: Skin is warm and dry.   Neurological:      Mental Status: She is alert and oriented to person, place, and time.   Psychiatric:         Mood and Affect: Mood normal.         Behavior: Behavior normal.         Thought Content: Thought content normal.         Judgment: Judgment normal.      Result Review :                   Assessment and Plan   Diagnoses and all orders for this visit:    1. Left knee pain, unspecified chronicity (Primary)  Comments:  Start prednisone taper.  CT of knee ordered.  Continue meloxicam and heat.  Orders:  -     Cancel: CT Lower Extremity Left Without Contrast; Future  -     predniSONE (DELTASONE) 10 MG tablet; Take 4 tablets by mouth Daily for 4 days, THEN 3 tablets Daily for 4 days, THEN 2 tablets Daily for 4 days, THEN 1 tablet Daily for 4 days.  Dispense: 40 tablet; Refill: 0  -     CT Lower Extremity Left Without Contrast; Future             Follow Up   Return if symptoms worsen or fail to improve.  Patient was given instructions and counseling regarding her condition or for health maintenance advice. Please see specific information pulled into the AVS if appropriate.

## 2023-11-14 ENCOUNTER — OFFICE VISIT (OUTPATIENT)
Dept: FAMILY MEDICINE CLINIC | Facility: CLINIC | Age: 83
End: 2023-11-14
Payer: MEDICARE

## 2023-11-14 VITALS
BODY MASS INDEX: 27.94 KG/M2 | SYSTOLIC BLOOD PRESSURE: 188 MMHG | HEIGHT: 61 IN | HEART RATE: 62 BPM | WEIGHT: 148 LBS | TEMPERATURE: 96 F | DIASTOLIC BLOOD PRESSURE: 122 MMHG | OXYGEN SATURATION: 94 %

## 2023-11-14 DIAGNOSIS — M25.562 LEFT KNEE PAIN, UNSPECIFIED CHRONICITY: Primary | ICD-10-CM

## 2023-11-14 PROCEDURE — 99213 OFFICE O/P EST LOW 20 MIN: CPT | Performed by: NURSE PRACTITIONER

## 2023-11-14 PROCEDURE — 3077F SYST BP >= 140 MM HG: CPT | Performed by: NURSE PRACTITIONER

## 2023-11-14 PROCEDURE — 3080F DIAST BP >= 90 MM HG: CPT | Performed by: NURSE PRACTITIONER

## 2023-11-14 RX ORDER — PREDNISONE 10 MG/1
TABLET ORAL
Qty: 40 TABLET | Refills: 0 | Status: SHIPPED | OUTPATIENT
Start: 2023-11-14 | End: 2023-11-30

## 2023-11-15 ENCOUNTER — PATIENT OUTREACH (OUTPATIENT)
Dept: CASE MANAGEMENT | Facility: OTHER | Age: 83
End: 2023-11-15
Payer: MEDICARE

## 2023-11-15 DIAGNOSIS — I83.812 VARICOSE VEINS OF LEFT LOWER EXTREMITY WITH PAIN: ICD-10-CM

## 2023-11-15 DIAGNOSIS — I10 ESSENTIAL HYPERTENSION: Primary | ICD-10-CM

## 2023-11-15 DIAGNOSIS — M25.562 ACUTE PAIN OF LEFT KNEE: ICD-10-CM

## 2023-11-15 NOTE — OUTREACH NOTE
"AMBULATORY CASE MANAGEMENT NOTE    Name and Relationship of Patient/Support Person: Maria D Fountain - Self    CCM Interim Update    Outreach complete.    Followed up with the patient on her appointment here at the (PCP) office yesterday (11/14/23).  Patient confirmed that she started the Prednisone taper.  Patient with ordered CT of rt knee, which will be done at [facility].  Patient stated that she has yet to hear from them for scheduling.  Patient encouraged to contact this office if she has not heard from them by early next week.  Patient verbalized understanding.  The patient stated \"the arteries behind my knee are so popped out but I have those varicose veins\".      Patient confirms that her BP was elevated in the office yesterday but most likely d/t acute pain.  Patient states that she has an appointment today with Dr. Prasad (optometrist) at 1400 and is \"going to take my blood pressure at home before I leave shortly for that appointment\".        Education Documentation  No documentation found.        Cory FOOTE  Ambulatory Case Management    11/15/2023, 13:19 EST  "

## 2023-11-20 ENCOUNTER — PATIENT OUTREACH (OUTPATIENT)
Dept: CASE MANAGEMENT | Facility: OTHER | Age: 83
End: 2023-11-20
Payer: MEDICARE

## 2023-11-20 DIAGNOSIS — I10 ESSENTIAL HYPERTENSION: ICD-10-CM

## 2023-11-20 DIAGNOSIS — I83.812 VARICOSE VEINS OF LEFT LOWER EXTREMITY WITH PAIN: ICD-10-CM

## 2023-11-20 DIAGNOSIS — I25.10 CHRONIC CORONARY ARTERY DISEASE: ICD-10-CM

## 2023-11-20 DIAGNOSIS — I20.9 ANGINA PECTORIS: ICD-10-CM

## 2023-11-20 DIAGNOSIS — I25.118 CORONARY ARTERY DISEASE OF NATIVE ARTERY OF NATIVE HEART WITH STABLE ANGINA PECTORIS: Primary | ICD-10-CM

## 2023-11-20 NOTE — OUTREACH NOTE
"AMBULATORY CASE MANAGEMENT NOTE    Name and Relationship of Patient/Support Person: Maria D Fountain F - Self    CCM Interim Update    Outreach complete.  Patient contacted this office on this date.      Patient expressing concern related to her BLE (knees) and continued symptoms.  \"I don't think I'm getting any better.  I started the steroids last Tuesday (and I'm still taking them) and I thought I was feeling better.  I went out on Saturday night and then my right leg was all puffy and sore the next day.  I couldn't go to [facility] the next day (yesterday - Sunday 11/19/23)\".  Patient educated to not overwork her BLE in this current state as this could cause a regression to the treatment plan.  Patient educated to rest, elevate legs and alternate heat and ice on the affected areas.  Patient verbalized understanding.       \"I haven't slept well in three nights.  I read that the medicine (Prednisone) causes insomnia and nausea.  I have my CAT scan (LLE) on December 5th and I was wanting both of them done\".  \"I had to take my Nitro the other night but it could be from all the stress\".  Patient advised to go to the ED in times of CP.  Patient verbalized understanding.  Patient stated that she is taking \"Tylenol 650 twice a day because that's what the pharmacist told me\" in place of the prescribed Mobic.      Patient was offered the option of scheduling with a provider, with earliest availability being this Wednesday 11/22/23 or having this RN-ACM confer with providers in house for potential treatment.  Patient was also informed that an office visit with her PCP (Dr. Renner) would be available on 11/29/23 at 1530.  \"With Dr. Loera? Oh yes, I want to do that.  I don't have anything scheduled for that day and it would be before my CAT scan\".  Patient scheduled for 11/29/23.  Patient verbalized understanding.      Patient stated that her BP at home this morning was 158/86.              Education Documentation  No " documentation found.        Cory FOOTE  Ambulatory Case Management    11/20/2023, 14:42 EST

## 2023-11-29 ENCOUNTER — OFFICE VISIT (OUTPATIENT)
Dept: FAMILY MEDICINE CLINIC | Facility: CLINIC | Age: 83
End: 2023-11-29
Payer: MEDICARE

## 2023-11-29 VITALS
HEART RATE: 63 BPM | DIASTOLIC BLOOD PRESSURE: 81 MMHG | TEMPERATURE: 97.2 F | SYSTOLIC BLOOD PRESSURE: 128 MMHG | OXYGEN SATURATION: 94 % | HEIGHT: 60 IN | BODY MASS INDEX: 28.47 KG/M2 | WEIGHT: 145 LBS

## 2023-11-29 DIAGNOSIS — R63.4 WEIGHT LOSS: ICD-10-CM

## 2023-11-29 DIAGNOSIS — Z63.79 STRESS DUE TO ILLNESS OF FAMILY MEMBER: ICD-10-CM

## 2023-11-29 DIAGNOSIS — M25.562 ACUTE PAIN OF BOTH KNEES: Primary | ICD-10-CM

## 2023-11-29 DIAGNOSIS — M25.561 ACUTE PAIN OF BOTH KNEES: Primary | ICD-10-CM

## 2023-11-29 PROCEDURE — 3074F SYST BP LT 130 MM HG: CPT | Performed by: FAMILY MEDICINE

## 2023-11-29 PROCEDURE — 99214 OFFICE O/P EST MOD 30 MIN: CPT | Performed by: FAMILY MEDICINE

## 2023-11-29 PROCEDURE — 3079F DIAST BP 80-89 MM HG: CPT | Performed by: FAMILY MEDICINE

## 2023-11-29 NOTE — PROGRESS NOTES
"Subjective   Maria D Fountain is a 83 y.o. female.     History of Present Illness       The patient presents today for a follow-up after she was seen 2 weeks ago with bilateral knee pain, left greater than right, and was started on prednisone.    The steroids were helpful, but she continues to have pain in her bilateral legs. Her right knee has \"needles and pins\" in it, and she is unable to kneel. She has not been in [facility] for 2 weeks, and she was informed that they were going to do one leg at a time (CT scan). It took 2 to 3 weeks to get a CT scan on each knee, and it is scheduled for next Monday, 12/06/2023. She has been reading her medical book, and she thought she had a Baker's cyst in the back. 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 She is taking meloxicam, and it takes the edge off her back pain. Dr. Boyd ordered 5% lidocaine patches, and she could buy them over the counter. They were extra-large, and he ordered 6.    The patient is taking Nitrostat. She has pressure in her chest, and her jaw is shrinking. She lost her teeth on the bottom.  Her left shoulder was hurting, and she had stabbing pains across her stomach that started last week. She called Dr. Luna about her chest pressure, and they had that on file that she needed Nitrostat. They refilled it, and she took 2 of them, and it took away the pain. She has been losing weight. She is not eating as much. She weighed 148 pounds 2 weeks ago, and today she weighs 145 pounds. She is fatigued. She took her last dose of steroids today.  She is stressed and is very worried about her son who has long covid.    The following portions of the patient's history were reviewed and updated as appropriate: allergies, current medications, past family history, past medical history, past social history, past surgical history, and problem list.  Past Medical History:   Diagnosis Date    Arthritis     Diabetes mellitus     " DJD (degenerative joint disease)     Gallstones     Heart murmur     History of stress test 07/2011    Stress myoview- neg     Hypertension     Lumbar disc disease     Mitral regurgitation     Multiple lipomas     Pleurisy     Spinal stenosis      Past Surgical History:   Procedure Laterality Date    BREAST BIOPSY Right 1990    CARDIAC CATHETERIZATION  06/21/2017    HEMANGIOMA EXCISION Left 2010    left forearm     HX OVARIAN CYSTECTOMY  1962    HYSTERECTOMY  2004     Family History   Problem Relation Age of Onset    Heart disease Father         MI    Arthritis Other     Hypertension Other     Colon cancer Other     No Known Problems Mother     No Known Problems Sister     No Known Problems Brother     No Known Problems Maternal Aunt     No Known Problems Maternal Uncle     No Known Problems Paternal Aunt     No Known Problems Paternal Uncle     No Known Problems Maternal Grandmother     No Known Problems Maternal Grandfather     No Known Problems Paternal Grandmother     No Known Problems Paternal Grandfather     Anemia Neg Hx     Arrhythmia Neg Hx     Asthma Neg Hx     Clotting disorder Neg Hx     Fainting Neg Hx     Heart attack Neg Hx     Heart failure Neg Hx     Hyperlipidemia Neg Hx      Social History     Socioeconomic History    Marital status:    Tobacco Use    Smoking status: Never     Passive exposure: Never    Smokeless tobacco: Never   Vaping Use    Vaping Use: Never used   Substance and Sexual Activity    Alcohol use: No    Drug use: No    Sexual activity: Defer         Current Outpatient Medications:     acetaminophen (TYLENOL) 650 MG 8 hr tablet, Take 1 tablet by mouth Every 8 (Eight) Hours As Needed for Mild Pain., Disp: , Rfl:     amLODIPine (NORVASC) 10 MG tablet, Take 1 tablet by mouth As Needed., Disp: , Rfl:     ascorbic acid (VITAMIN C) 1000 MG tablet, Take 0.5 tablets by mouth 2 (two) times a day., Disp: , Rfl:     aspirin (aspirin) 81 MG EC tablet, Take 1 tablet by mouth Daily., Disp:  ", Rfl:     atorvastatin (LIPITOR) 20 MG tablet, Take 1 tablet by mouth Every Night., Disp: 90 tablet, Rfl: 3    Calcium Carb-Cholecalciferol 1000-800 MG-UNIT tablet, Take 4 tablets by mouth Daily., Disp: , Rfl:     carvedilol (COREG) 12.5 MG tablet, Take 1 tablet by mouth 2 (Two) Times a Day With Meals., Disp: 180 tablet, Rfl: 1    HM OMEGA-3-6-9 FATTY ACIDS capsule, Take 1 capsule by mouth Daily., Disp: , Rfl:     lidocaine (LIDODERM) 5 %, Place 1 patch on the skin as directed by provider Daily. Remove & Discard patch within 12 hours or as directed by MD, Disp: 6 patch, Rfl: 0    lisinopril (PRINIVIL,ZESTRIL) 20 MG tablet, TAKE ONE TABLET BY MOUTH TWICE A DAY, Disp: 180 tablet, Rfl: 0    meloxicam (MOBIC) 15 MG tablet, Take 1 tablet by mouth Daily., Disp: 90 tablet, Rfl: 3    multivitamin-minerals (CENTRUM) tablet, Take 1 tablet by mouth Daily., Disp: , Rfl:     nitroglycerin (Nitrostat) 0.4 MG SL tablet, Place 1 tablet under the tongue Every 5 (Five) Minutes As Needed for Chest Pain. Take no more than 3 doses in 15 minutes., Disp: 30 tablet, Rfl: 5    vitamin B-12 (CYANOCOBALAMIN) 1000 MCG tablet, Take 1 tablet by mouth Daily., Disp: , Rfl:     Review of Systems  /81 (BP Location: Left arm, Patient Position: Sitting, Cuff Size: Adult)   Pulse 63   Temp 97.2 °F (36.2 °C) (Temporal)   Ht 152.4 cm (60\")   Wt 65.8 kg (145 lb)   LMP  (LMP Unknown)   SpO2 94%   BMI 28.32 kg/m²    A review of systems was performed, and the pertinent positives are noted in the HPI.         Objective   Physical Exam  Vitals and nursing note reviewed.   Constitutional:       General: She is not in acute distress.     Appearance: She is well-developed.   HENT:      Head: Normocephalic and atraumatic.   Neck:      Thyroid: No thyromegaly.   Cardiovascular:      Rate and Rhythm: Normal rate and regular rhythm.      Heart sounds: Normal heart sounds. No murmur heard.     No friction rub. No gallop.   Pulmonary:      Effort: " Pulmonary effort is normal. No respiratory distress.      Breath sounds: Normal breath sounds. No wheezing or rales.   Musculoskeletal:      Cervical back: Neck supple.      Right knee: Crepitus present. No swelling, deformity, effusion, erythema or ecchymosis. Decreased range of motion. Tenderness present. No LCL laxity, MCL laxity, ACL laxity or PCL laxity.      Instability Tests: Anterior drawer test negative. Posterior drawer test negative.      Left knee: No swelling, deformity, effusion, erythema, ecchymosis or crepitus. Decreased range of motion. Tenderness present. No LCL laxity, MCL laxity, ACL laxity or PCL laxity.     Instability Tests: Anterior drawer test negative. Posterior drawer test negative.      Right lower leg: No edema.      Left lower leg: No edema.      Comments: No baker's cyst palpable post to either knee   Lymphadenopathy:      Cervical: No cervical adenopathy.   Skin:     General: Skin is warm and dry.   Neurological:      Mental Status: She is alert.   Psychiatric:         Mood and Affect: Mood is anxious.       Lab Results   Component Value Date    TSH 2.270 08/10/2023     Lab Results   Component Value Date    GLUCOSE 95 08/10/2023    BUN 10 08/10/2023    CREATININE 0.92 08/10/2023    EGFR 61.9 08/10/2023    BCR 10.9 08/10/2023    K 4.6 08/10/2023    CO2 28.7 08/10/2023    CALCIUM 9.8 08/10/2023    ALBUMIN 4.2 08/10/2023    BILITOT 1.5 (H) 08/10/2023    AST 24 08/10/2023    ALT 15 08/10/2023     Lab Results   Component Value Date    WBC 6.68 08/10/2023    HGB 14.3 08/10/2023    HCT 43.5 08/10/2023    MCV 94.4 08/10/2023     08/10/2023         Assessment & Plan   Problems Addressed this Visit    None  Visit Diagnoses       Acute pain of both knees    -  Primary    Relevant Orders    Ambulatory Referral to Orthopedic Surgery    Weight loss        Stress due to illness of family member              Diagnoses         Codes Comments    Acute pain of both knees    -  Primary ICD-10-CM:  M25.561, M25.562  ICD-9-CM: 338.19, 719.46     Weight loss     ICD-10-CM: R63.4  ICD-9-CM: 783.21     Stress due to illness of family member     ICD-10-CM: Z63.79  ICD-9-CM: V61.49             At this point I do not think she needs the CT left knee that is scheduled  Will refer to ortho for further eval and tx  She has meloxicam and will use tylenol  Suspect her weight loss is sec to the stress she has related to her son's health  Recent labs reviewed      Transcribed from ambient dictation for Celi Renner MD by Shalini Espinal.  11/29/23   17:10 EST    Patient or patient representative verbalized consent to the visit recording.  I have personally performed the services described in this document as transcribed by the above individual, and it is both accurate and complete.

## 2023-11-29 NOTE — PATIENT INSTRUCTIONS
May take 2 extra strength Tylenol 3 times a day as needed for pain  Okay  to use over the counter ruybs on your knees

## 2023-11-30 ENCOUNTER — PATIENT OUTREACH (OUTPATIENT)
Dept: CASE MANAGEMENT | Facility: OTHER | Age: 83
End: 2023-11-30
Payer: MEDICARE

## 2023-11-30 DIAGNOSIS — I25.10 CHRONIC CORONARY ARTERY DISEASE: ICD-10-CM

## 2023-11-30 DIAGNOSIS — I83.812 VARICOSE VEINS OF LEFT LOWER EXTREMITY WITH PAIN: ICD-10-CM

## 2023-11-30 DIAGNOSIS — I20.9 ANGINA PECTORIS: ICD-10-CM

## 2023-11-30 DIAGNOSIS — I10 ESSENTIAL HYPERTENSION: ICD-10-CM

## 2023-11-30 DIAGNOSIS — I25.118 CORONARY ARTERY DISEASE OF NATIVE ARTERY OF NATIVE HEART WITH STABLE ANGINA PECTORIS: Primary | ICD-10-CM

## 2023-11-30 DIAGNOSIS — M25.562 ACUTE PAIN OF LEFT KNEE: ICD-10-CM

## 2023-11-30 NOTE — OUTREACH NOTE
[facility] End of Month Documentation    This Chronic Medical Management Care Plan for Maria D Fountain, 83 y.o. female, has been monitored and managed; reviewed and a new plan of care implemented for the month of November.  A cumulative time of 63 minutes was spent on this patient record this month, including phone call with patient; chart review.    Regarding the patient's problems: has Coronary artery disease of native artery of native heart with stable angina pectoris; Degeneration of lumbar or lumbosacral intervertebral disc; Dyslipidemia; Essential hypertension; Angina pectoris; Chest pain, pleuritic; Lumbar spondylosis; Mitral regurgitation; Prediabetes; Chronic coronary artery disease; Varicose veins of left lower extremity with pain; Mixed hyperlipidemia; Osteopenia after menopause; Rotator cuff arthropathy of left shoulder; Clinical diagnosis of severe acute respiratory syndrome coronavirus 2 (SARS-CoV-2) disease; Encounter for annual wellness exam in Medicare patient; and Symptomatic cholelithiasis on their problem list., the following items were addressed: medical records; medications and any changes can be found within the plan section of the note.  A detailed listing of time spent for chronic care management is tracked within each outreach encounter.  Current medications include:  has a current medication list which includes the following prescription(s): acetaminophen, amlodipine, ascorbic acid, aspirin, atorvastatin, calcium carb-cholecalciferol, carvedilol, hm omega-3-6-9 fatty acids, lidocaine, lisinopril, meloxicam, multivitamin-minerals, nitroglycerin, vitamin b-12, and [DISCONTINUED] montelukast. and the patient is reported to be patient is compliant with medication protocol,  Medications are reported to be effective.   All notes on chart for PCP to review.    The patient was monitored remotely for blood pressure; activity level; pain; medications.    The patient's physical needs include:  help taking  "medications as prescribed; physical healthcare.     The patient's mental support needs include:  not applicable    The patient's cognitive support needs include:  not applicable    The patient's psychosocial support needs include:  not applicable    The patient's functional needs include: physical healthcare; eye care, Appointment with Dr. Prasad on 11/15/23    The patient's environmental needs include:  not applicable    Care Plan overall comments:  No data recorded    Refer to previous outreach notes for more information on the areas listed above.    Monthly Billing Diagnoses  (I25.118) Coronary artery disease of native artery of native heart with stable angina pectoris    (I25.10) Chronic coronary artery disease    (I10) Essential hypertension    (I20.9) Angina pectoris    (I83.812) Varicose veins of left lower extremity with pain    (M25.562) Acute pain of left knee    Medications   Medications have been reconciled    Care Plan progress this month:      Recently Modified Care Plans Updates made since 10/30/2023 12:00 AM       Hypertension (Adult)           Problem Priority Last Modified     Hypertension (Hypertension) --  7/24/2023 10:01 AM by Cory Landa RN              Goal Recent Progress Last Modified     Hypertension Monitored --  11/20/2023  2:42 PM by Cory Landa RN     Evidence-based guidance:   Promote initial use of ambulatory blood pressure measurements (for 3 days) to rule out \"white-coat\" effect; identify masked hypertension and presence or absence of nocturnal \"dipping\" of blood pressure.    Encourage continued use of home blood pressure monitoring and recording in blood pressure log; include symptoms of hypotension or potential medication side effects in log.   Review blood pressure measurements taken inside and outside of the provider office; establish baseline and monitor trends; compare to target ranges or patient goal.   Share overall cardiovascular risk with patient; encourage " changes to lifestyle risk factors, including alcohol consumption, smoking, inadequate exercise, poor dietary habits and stress.    Notes:                          Instructions   Patient was provided an electronic copy of care plan  CCM services were explained and offered and patient has accepted these services.  Patient has given their written consent to receive CCM services and understands that this includes the authorization of electronic communication of medical information with the other treating providers.  Patient understands that they may stop CCM services at any time and these changes will be effective at the end of the calendar month and will effectively revocate the agreement of CCM services.  Patient understands that only one practitioner can furnish and be paid for CCM services during one calendar month.  Patient also understands that there may be co-payment or deductible fees in association with CCM services.  Patient will continue with at least monthly follow-up calls with the Ambulatory .    Cory FOOTE  Ambulatory Case Management    11/30/2023, 10:30 EST

## 2023-12-04 ENCOUNTER — PATIENT OUTREACH (OUTPATIENT)
Dept: CASE MANAGEMENT | Facility: OTHER | Age: 83
End: 2023-12-04
Payer: MEDICARE

## 2023-12-04 DIAGNOSIS — M25.562 ACUTE PAIN OF BOTH KNEES: ICD-10-CM

## 2023-12-04 DIAGNOSIS — I10 ESSENTIAL HYPERTENSION: Primary | ICD-10-CM

## 2023-12-04 DIAGNOSIS — M25.561 ACUTE PAIN OF BOTH KNEES: ICD-10-CM

## 2023-12-04 NOTE — OUTREACH NOTE
"AMBULATORY CASE MANAGEMENT NOTE    Name and Relationship of Patient/Support Person: Maria D Fountain F - Self    CCM Interim Update    Outreach complete but brief.      Patient stated that she was \"outside right now working but I am also on the other line with insurance so I need to talk to them\".  Patient was quickly reminded of her appointment this week (12/7/23) with Dr. Hsieh (orthopedic surgery) as a new patient.  Patient was referred to this speciality after her office visit with her PCP on 11/29/23.  \"Oh yes. I know about that appointment\".  Confirmed the time and date with the patient and coordinated a plan to f/u next week to review visit and obtain home BP logs.  Patient agreeable to plan.            Education Documentation  No documentation found.        Cory FOOTE  Ambulatory Case Management    12/4/2023, 15:16 EST  "

## 2023-12-07 ENCOUNTER — OFFICE VISIT (OUTPATIENT)
Dept: ORTHOPEDIC SURGERY | Facility: CLINIC | Age: 83
End: 2023-12-07
Payer: MEDICARE

## 2023-12-07 VITALS — HEART RATE: 66 BPM | HEIGHT: 60 IN | BODY MASS INDEX: 28.47 KG/M2 | WEIGHT: 145 LBS | OXYGEN SATURATION: 97 %

## 2023-12-07 DIAGNOSIS — M25.561 ACUTE PAIN OF BOTH KNEES: Primary | ICD-10-CM

## 2023-12-07 DIAGNOSIS — M17.0 BILATERAL PRIMARY OSTEOARTHRITIS OF KNEE: ICD-10-CM

## 2023-12-07 DIAGNOSIS — M25.562 ACUTE PAIN OF BOTH KNEES: Primary | ICD-10-CM

## 2023-12-07 DIAGNOSIS — E66.3 OVERWEIGHT (BMI 25.0-29.9): ICD-10-CM

## 2023-12-07 NOTE — PROGRESS NOTES
NEW VISIT    Patient: Maria D Fountain  ?  YOB: 1940    MRN: [id number]  ?  Chief Complaint   Patient presents with    Right Knee - Initial Evaluation    Left Knee - Initial Evaluation      ?  HPI: Patient is an 83-year-old female presents a for bilateral knee pain.  She denies any acute injury.  She states the pain has been ongoing for the last 2 months after a trip to [place] where she was walking more frequently, including up and down hills.  States after this trip she had significant worsening knee pain, with swelling, painful popping and difficulty with ambulation/standing.  Pain is primarily over the anterior medial aspect of the knee right greater than left.  Was recently seen by PCP and prescribed a oral steroid burst, as well as taking more consistent Mobic and this has substantially improved her symptoms with better swelling and pain.  At this point the symptoms are more intermittent and activity dependent.  She denies any numbness or tingling.  She denies any locking or catching.  No prior injections, formal physical therapy.  She has utilized ice and compression knee sleeve as needed      Allergies:   Allergies   Allergen Reactions    Pravastatin Dizziness       Past Medical History:   Diagnosis Date    Arthritis     Diabetes mellitus     DJD (degenerative joint disease)     Gallstones     Heart murmur     History of stress test 07/2011    Stress myoview- neg     Hypertension     Lumbar disc disease     Mitral regurgitation     Multiple lipomas     Pleurisy     Spinal stenosis      Past Surgical History:   Procedure Laterality Date    BREAST BIOPSY Right 1990    CARDIAC CATHETERIZATION  06/21/2017    HEMANGIOMA EXCISION Left 2010    left forearm     HX OVARIAN CYSTECTOMY  1962    HYSTERECTOMY  2004     Social History     Occupational History    Not on file   Tobacco Use    Smoking status: Never     Passive exposure: Never    Smokeless tobacco: Never   Vaping Use    Vaping Use: Never used  "  Substance and Sexual Activity    Alcohol use: No    Drug use: No    Sexual activity: Defer      Social History     Social History Narrative    Not on file     Family History   Problem Relation Age of Onset    Heart disease Father         MI    Arthritis Other     Hypertension Other     Colon cancer Other     No Known Problems Mother     No Known Problems Sister     No Known Problems Brother     No Known Problems Maternal Aunt     No Known Problems Maternal Uncle     No Known Problems Paternal Aunt     No Known Problems Paternal Uncle     No Known Problems Maternal Grandmother     No Known Problems Maternal Grandfather     No Known Problems Paternal Grandmother     No Known Problems Paternal Grandfather     Anemia Neg Hx     Arrhythmia Neg Hx     Asthma Neg Hx     Clotting disorder Neg Hx     Fainting Neg Hx     Heart attack Neg Hx     Heart failure Neg Hx     Hyperlipidemia Neg Hx        Review of Systems  Constitutional: Negative.  Negative for fever.   Musculoskeletal: Positive for joint pain  Skin: Negative.  Negative for rash and wound.    Neurological: Negative for numbness.     Vitals:    12/07/23 1306   Pulse: 66   SpO2: 97%   Weight: 65.8 kg (145 lb)   Height: 152.4 cm (60\")        Physical Exam  Constitutional: Patient is oriented to person, place, and time. Appears well-developed and well-nourished.   Head: Normocephalic and atraumatic.   Pulmonary/Chest: Effort normal.   Musculoskeletal:   See detailed exam below   Neurological: Alert and oriented to person, place, and time. No sensory deficit. Coordination normal.   Skin: Skin is warm and dry. Capillary refill takes less than 2 seconds. No rash noted. No erythema.     The bilateral knee is without obvious signs of acute bony deformity, quadriceps atrophy, swelling, erythema, or ecchymosis. Mild joint effusion. The patella is without tenderness. Apprehension is negative with medial and lateral glide. Patella crepitus is positive. Patella grind is " positive. The medial joint line is tender to palpation. The lateral joint line is tender to palpation. Soft tissue including the distal hamstring tendons, pes anserine, quad tendon, patellar tendon, proximal gastroc tendon, distal IT band, and Gerdy's tubercle are nontender. Flexion & extension are limited at end range motion and painful. Knee strength is 4/5 secondary to pain. Varus & valgus stress, anterior drawer, Neo's, and posterior drawer are all negative. The opposite knee is nontender and stable. Gait is painful and tandem.      Diagnostics:  xrays obtained today     Bilateral Knee X-Ray  Indication: Pain    Views: AP, Lateral, and [place]    Findings:  No fracture  No bony lesion  Normal soft tissues  There is moderate osteoarthritic change with subchondral sclerosis joint space narrowing and mild osteophyte formation no significant in the medial and patellofemoral compartments right greater than left      Assessment:  Diagnoses and all orders for this visit:    1. Acute pain of both knees (Primary)  -     XR Knee 3 View Bilateral    2. Bilateral primary osteoarthritis of knee    3. Overweight (BMI 25.0-29.9)        Plan    Above diagnosis and plan discussed with the patient office today.  I did personally review her x-rays obtained in the office today remarkable for moderate osteoarthritic change primarily in the patellofemoral and medial compartments.  Consistent with her exam today.  Discussed her increasing pain is likely related to overuse from her most recent trip and flare of knee osteoarthritis.  We discussed conservative options as noted below elected to proceed with low-impact cardiovascular activity, activity modification, RICE therapy and as needed oral anti-inflammatories.  Intra-articular corticosteroid versus viscosupplementation injection discussed.  Patient declining at this time as current symptomatic improvement with oral anti-inflammatories does not feel symptoms are severe enough  for injection at this time  Physical Therapy discussed.  She declined at this time wanted to continue with low impact cardiovascular activity.  Had previously benefited from aqua physical therapy.  Could revisit in the future if worsening symptoms.  Activity modifications discussed and recommended.  Low-impact cardiovascular activities.  Use of neoprene sleeve brace discussed and recommended   Weight loss recommended  Rest, ice, compression, and elevation (RICE) therapy  Continue as needed Mobic.  Also discussed maximum Tylenol arthritis dosing today.  Follow up in 4 month(s) or sooner as needed if new or worsening symptoms    Date of encounter: 12/07/2023   Matthew Tovar DO    Electronically signed by Matthew Tovar DO, 12/07/23, 1:09 PM EST.    Disclaimer: Please note that areas of this note were completed with computer voice recognition software.  Quite often unanticipated grammatical, syntax, homophones, and other interpretive errors are inadvertently transcribed by the computer software. Please excuse any errors that have escaped final proofreading.

## 2023-12-08 ENCOUNTER — PATIENT ROUNDING (BHMG ONLY) (OUTPATIENT)
Dept: ORTHOPEDIC SURGERY | Facility: CLINIC | Age: 83
End: 2023-12-08
Payer: MEDICARE

## 2023-12-19 ENCOUNTER — PATIENT OUTREACH (OUTPATIENT)
Dept: CASE MANAGEMENT | Facility: OTHER | Age: 83
End: 2023-12-19
Payer: MEDICARE

## 2023-12-19 DIAGNOSIS — M17.0 PRIMARY OSTEOARTHRITIS OF KNEES, BILATERAL: Primary | ICD-10-CM

## 2023-12-19 DIAGNOSIS — I10 ESSENTIAL HYPERTENSION: ICD-10-CM

## 2023-12-19 NOTE — OUTREACH NOTE
"AMBULATORY CASE MANAGEMENT NOTE    Name and Relationship of Patient/Support Person: Maria D Fountain F - Self    [facility] Interim Update    Outreach complete.    Reviewed the patient's initial visit to orthopedic surgeon (Dr. Hsieh) on 12/7/23.  \"It was the most pleasant office visit.  I liked the whole staff.  They were 10 out of 10.  They were all so kind.  The doctor came in and told me it was almost bone on bone.  He said he could set me up with physical therapy for that water therapy\".  Patient was encouraged to do so and patient stated that she would think it over.  Patient reminded of recommendations from the POC from Dr. Hsieh's office for management and treatment of her condition.  Patient also reminded that 4 month f/u with their office was recommended (no f/u appointment scheduled as of this outreach).  Patient verbalized understanding.    Patient was reminded of her Cards appointment (Dr. Luna) on 1/8/24.  \"Oh, I didn't have that down since my calendar is 2023\".  Patient reminded to take any BP logs with her to the appointment for review by the provider.  Patient verbalized understanding.  \"Its been up and down but more in the 150's on that top number\".        Education Documentation  No documentation found.        Cory FOOTE  Ambulatory Case Management    12/19/2023, 14:31 EST  "

## 2023-12-28 ENCOUNTER — PATIENT OUTREACH (OUTPATIENT)
Dept: CASE MANAGEMENT | Facility: OTHER | Age: 83
End: 2023-12-28
Payer: MEDICARE

## 2023-12-28 DIAGNOSIS — M25.561 ACUTE PAIN OF BOTH KNEES: ICD-10-CM

## 2023-12-28 DIAGNOSIS — M25.562 ACUTE PAIN OF BOTH KNEES: ICD-10-CM

## 2023-12-28 DIAGNOSIS — I10 ESSENTIAL HYPERTENSION: ICD-10-CM

## 2023-12-28 DIAGNOSIS — M17.0 PRIMARY OSTEOARTHRITIS OF KNEES, BILATERAL: Primary | ICD-10-CM

## 2023-12-28 NOTE — OUTREACH NOTE
[facility] End of Month Documentation    This Chronic Medical Management Care Plan for Maria D Fountain, 83 y.o. female, has been monitored and managed; reviewed and a new plan of care implemented for the month of December.  A cumulative time of 35 minutes was spent on this patient record this month, including phone call with patient; chart review.    Regarding the patient's problems: has Coronary artery disease of native artery of native heart with stable angina pectoris; Degeneration of lumbar or lumbosacral intervertebral disc; Dyslipidemia; Essential hypertension; Angina pectoris; Chest pain, pleuritic; Lumbar spondylosis; Mitral regurgitation; Prediabetes; Chronic coronary artery disease; Varicose veins of left lower extremity with pain; Mixed hyperlipidemia; Osteopenia after menopause; Rotator cuff arthropathy of left shoulder; Clinical diagnosis of severe acute respiratory syndrome coronavirus 2 (SARS-CoV-2) disease; Encounter for annual wellness exam in Medicare patient; and Symptomatic cholelithiasis on their problem list., the following items were addressed: medical records; medications and any changes can be found within the plan section of the note.  A detailed listing of time spent for chronic care management is tracked within each outreach encounter.  Current medications include:  has a current medication list which includes the following prescription(s): acetaminophen, amlodipine, ascorbic acid, aspirin, atorvastatin, calcium carb-cholecalciferol, carvedilol, hm omega-3-6-9 fatty acids, lidocaine, lisinopril, meloxicam, multivitamin-minerals, nitroglycerin, vitamin b-12, and [DISCONTINUED] montelukast. and the patient is reported to be patient is compliant with medication protocol,  Medications are reported to be effective.   All notes on chart for PCP to review.    The patient was monitored remotely for blood pressure; activity level; pain; medications.    The patient's physical needs include:  help taking  medications as prescribed; physical healthcare.     The patient's mental support needs include:  not applicable    The patient's cognitive support needs include:  not applicable    The patient's psychosocial support needs include:  not applicable    The patient's functional needs include: physical healthcare    The patient's environmental needs include:  not applicable    Care Plan overall comments:  No data recorded    Refer to previous outreach notes for more information on the areas listed above.    Monthly Billing Diagnoses  (M17.0) Primary osteoarthritis of knees, bilateral    (I10) Essential hypertension    (M25.561,  M25.562) Acute pain of both knees    Medications   Medications have been reconciled    Care Plan progress this month:      Recently Modified Care Plans Updates made since 11/27/2023 12:00 AM      No recently modified care plans.            Instructions   Patient was provided an electronic copy of care plan  CCM services were explained and offered and patient has accepted these services.  Patient has given their written consent to receive CCM services and understands that this includes the authorization of electronic communication of medical information with the other treating providers.  Patient understands that they may stop CCM services at any time and these changes will be effective at the end of the calendar month and will effectively revocate the agreement of CCM services.  Patient understands that only one practitioner can furnish and be paid for CCM services during one calendar month.  Patient also understands that there may be co-payment or deductible fees in association with CCM services.  Patient will continue with at least monthly follow-up calls with the Ambulatory .    Cory FOOTE  Ambulatory Case Management    12/28/2023, 11:31 EST

## 2024-01-07 NOTE — PROGRESS NOTES
Subjective:     Encounter Date:01/08/2024      Patient ID: Maria D Fountain is a 83 y.o. female.    Chief Complaint and history of present illness:     Follow-up for CAD, MI, diabetes/prediabetes, hypertension     History of Present Illness  :     Ms. Maria D Fountain has PMH of        # CAD cardiac cath 06/21/2017 showing 50% OM1 disease and small-vessel disease distal LAD  #  mitral regurgitation, mild 9/2016  #  palpitations  #  hypertension  # LAE  #  diabetes  #?  ACE-inhibitor cough, now improved  # allergy/intolerance to pravastatin  # ALYSA        Here for follow-up.  Patient denies any shortness of breath.  Patient says her home blood pressure has been elevated.  Patient has intermittent chest pain fatigue and dizziness.  Had an episode of chest pain requiring 2 nitroglycerin a month ago.     Patient's arterial blood pressure is 105/63, heart rate 69, O2 sat of 92% on room air..        Patient was started on amlodipine and reportedly developed low blood pressure and stopped it on her own.  On 2/21/2022 patient had blood pressure of 220/117 went to the emergency room waited for 4 hours could not be seen therefore went back home.  Patient had history of not taking lisinopril in the past also.  Patient is complaining of extreme fatigue she thought she had COVID last summer had 3 rounds of steroids and antibiotics.  Patient has left arm hurting where she cannot lift it over the shoulders.  Went to rehab physical physical therapy and has improvement now she is able to lift it over the shoulder pain.  Patient says she is tired all the time and has right earache pain thinks she is having Covid again.           Patient's labs from 01/29/2018 revealed cholesterol 210 triglycerides 230 HDL low at 38 LDL high at 134 CMP is okay CK is 92.  Labs from 1/14/2020 reveal cholesterol 113, triglycerides 139 HDL 38 LDL 47, A1c of 5.6, CMP with total bilirubin 1.4, normal CBC and TSH.  Labs from 2/23/2022 ordered by me  reviewed by me revealed CMP with a glucose of 110, lipid profile with cholesterol 119 triglycerides 170 HDL 40 LDL 41.  proBNP normal at 121.  Labs from 8/10/2023 reveal cholesterol 136, triglycerides 194, HDL 42, LDL 62.  CMP normal except for bilirubin of 1.5.     Stress Cardiolite 4/12/2022: Patient walked 7.40 minutes had normal perfusion EF of 69%.    Echocardiogram 9/14/2023 reveals EF of 60 to 65%      ASSESSMENT:    -Labile hypertension  -Chest pain/Chronic stable angina  #. hypertension,   #  dyslipidemia  # CAD  #  mitral regurgitation  #. prediabetes      PLAN:     Reviewed EKG results with patient  Advised patient to check blood pressure at home   We will continue  medical management with  aspirin , lisinopril, amlodipine, atorvastatin, carvedilol, to help with angina, hypertension, dyslipidemia, CAD, MR  Counseled on walking exercise for low HDL   follow up with PMD for  diabetes/prediabetes.  Follow-up with PMD for noncardiac issues.            ECG 12 Lead    Date/Time: 1/8/2024 2:05 PM  Performed by: Matti Luna MD    Authorized by: Matti Luna MD  Comparison: compared with previous ECG from 1/5/2023  Comparison to previous ECG: EKG done today reviewed/interpreted by me reveals sinus rhythm with rate of 65 bpm, no new change compared EKG from 1/5/2023.          Copied text in this portion of the note has been reviewed and is accurate as of 1/8/2024  The following portions of the patient's history were reviewed and updated as appropriate: allergies, current medications, past family history, past medical history, past social history, past surgical history and problem list.    Assessment:         MDM       Diagnosis Plan   1. Coronary artery disease of native artery of native heart with stable angina pectoris        2. Labile hypertension        3. Dyslipidemia               Plan:               Past Medical History:  Past Medical History:   Diagnosis Date    Arthritis      Diabetes mellitus     DJD (degenerative joint disease)     Gallstones     Heart murmur     History of stress test 07/2011    Stress myoview- neg     Hypertension     Lumbar disc disease     Mitral regurgitation     Multiple lipomas     Pleurisy     Spinal stenosis      Past Surgical History:  Past Surgical History:   Procedure Laterality Date    BREAST BIOPSY Right 1990    CARDIAC CATHETERIZATION  06/21/2017    HEMANGIOMA EXCISION Left 2010    left forearm     HX OVARIAN CYSTECTOMY  1962    HYSTERECTOMY  2004      Allergies:  Allergies   Allergen Reactions    Pravastatin Dizziness     Home Meds:  Current Meds:     Current Outpatient Medications:     acetaminophen (TYLENOL) 650 MG 8 hr tablet, Take 1 tablet by mouth Every 8 (Eight) Hours As Needed for Mild Pain., Disp: , Rfl:     amLODIPine (NORVASC) 5 MG tablet, Take 1 tablet by mouth Daily., Disp: , Rfl:     ascorbic acid (VITAMIN C) 1000 MG tablet, Take 0.5 tablets by mouth 2 (two) times a day., Disp: , Rfl:     aspirin (aspirin) 81 MG EC tablet, Take 1 tablet by mouth Daily., Disp: , Rfl:     atorvastatin (LIPITOR) 20 MG tablet, Take 1 tablet by mouth Every Night., Disp: 90 tablet, Rfl: 3    Calcium Carb-Cholecalciferol 1000-800 MG-UNIT tablet, Take 4 tablets by mouth Daily., Disp: , Rfl:     carvedilol (COREG) 12.5 MG tablet, Take 1 tablet by mouth 2 (Two) Times a Day With Meals., Disp: 180 tablet, Rfl: 1    HM OMEGA-3-6-9 FATTY ACIDS capsule, Take 1 capsule by mouth Daily., Disp: , Rfl:     lidocaine (LIDODERM) 5 %, Place 1 patch on the skin as directed by provider Daily. Remove & Discard patch within 12 hours or as directed by MD, Disp: 6 patch, Rfl: 0    lisinopril (PRINIVIL,ZESTRIL) 20 MG tablet, TAKE ONE TABLET BY MOUTH TWICE A DAY, Disp: 180 tablet, Rfl: 0    meloxicam (MOBIC) 15 MG tablet, Take 1 tablet by mouth Daily., Disp: 90 tablet, Rfl: 3    multivitamin-minerals (CENTRUM) tablet, Take 1 tablet by mouth Daily., Disp: , Rfl:     nitroglycerin  "(Nitrostat) 0.4 MG SL tablet, Place 1 tablet under the tongue Every 5 (Five) Minutes As Needed for Chest Pain. Take no more than 3 doses in 15 minutes., Disp: 30 tablet, Rfl: 5    vitamin B-12 (CYANOCOBALAMIN) 1000 MCG tablet, Take 1 tablet by mouth Daily., Disp: , Rfl:   Social History:   Social History     Tobacco Use    Smoking status: Never     Passive exposure: Never    Smokeless tobacco: Never   Substance Use Topics    Alcohol use: No      Family History:  Family History   Problem Relation Age of Onset    Heart disease Father         MI    Arthritis Other     Hypertension Other     Colon cancer Other     No Known Problems Mother     No Known Problems Sister     No Known Problems Brother     No Known Problems Maternal Aunt     No Known Problems Maternal Uncle     No Known Problems Paternal Aunt     No Known Problems Paternal Uncle     No Known Problems Maternal Grandmother     No Known Problems Maternal Grandfather     No Known Problems Paternal Grandmother     No Known Problems Paternal Grandfather     Anemia Neg Hx     Arrhythmia Neg Hx     Asthma Neg Hx     Clotting disorder Neg Hx     Fainting Neg Hx     Heart attack Neg Hx     Heart failure Neg Hx     Hyperlipidemia Neg Hx               Review of Systems   Cardiovascular:  Positive for palpitations. Negative for chest pain and leg swelling.   Respiratory:  Negative for shortness of breath.    Neurological:  Positive for light-headedness. Negative for dizziness and numbness.     All other systems are negative         Objective:     Physical Exam  /63 (BP Location: Left arm, Patient Position: Sitting, Cuff Size: Adult)   Pulse 69   Ht 152.4 cm (60\")   Wt 67.1 kg (148 lb)   LMP  (LMP Unknown)   SpO2 92%   BMI 28.90 kg/m²   General:  Appears in no acute distress  Eyes: Sclera is anicteric,  conjunctiva is clear   HEENT:  No JVD.  No carotid bruits  Respiratory: Respirations regular and unlabored at rest.  Clear to auscultation  Cardiovascular: " "S1,S2 Regular rate and rhythm. .   Extremities: No digital clubbing or cyanosis, no edema  Skin: Color pink. Skin warm and dry to touch. No rashes  No xanthoma  Neuro: Alert and awake.    Lab Reviewed:         Matti Luna MD  1/8/2024 14:16 EST      EMR Dragon/Transcription:   \"Dictated utilizing Dragon dictation\".        "

## 2024-01-08 ENCOUNTER — OFFICE VISIT (OUTPATIENT)
Dept: CARDIOLOGY | Facility: CLINIC | Age: 84
End: 2024-01-08
Payer: MEDICARE

## 2024-01-08 VITALS
HEIGHT: 60 IN | OXYGEN SATURATION: 92 % | WEIGHT: 148 LBS | BODY MASS INDEX: 29.06 KG/M2 | DIASTOLIC BLOOD PRESSURE: 63 MMHG | SYSTOLIC BLOOD PRESSURE: 105 MMHG | HEART RATE: 69 BPM

## 2024-01-08 DIAGNOSIS — R09.89 LABILE HYPERTENSION: ICD-10-CM

## 2024-01-08 DIAGNOSIS — I25.118 CORONARY ARTERY DISEASE OF NATIVE ARTERY OF NATIVE HEART WITH STABLE ANGINA PECTORIS: Primary | ICD-10-CM

## 2024-01-08 DIAGNOSIS — E78.5 DYSLIPIDEMIA: ICD-10-CM

## 2024-01-08 PROCEDURE — 99214 OFFICE O/P EST MOD 30 MIN: CPT | Performed by: INTERNAL MEDICINE

## 2024-01-08 PROCEDURE — 3074F SYST BP LT 130 MM HG: CPT | Performed by: INTERNAL MEDICINE

## 2024-01-08 PROCEDURE — 1159F MED LIST DOCD IN RCRD: CPT | Performed by: INTERNAL MEDICINE

## 2024-01-08 PROCEDURE — 93000 ELECTROCARDIOGRAM COMPLETE: CPT | Performed by: INTERNAL MEDICINE

## 2024-01-08 PROCEDURE — 1160F RVW MEDS BY RX/DR IN RCRD: CPT | Performed by: INTERNAL MEDICINE

## 2024-01-08 PROCEDURE — 3078F DIAST BP <80 MM HG: CPT | Performed by: INTERNAL MEDICINE

## 2024-01-08 RX ORDER — AMLODIPINE BESYLATE 5 MG/1
5 TABLET ORAL DAILY
COMMUNITY

## 2024-01-09 ENCOUNTER — TELEPHONE (OUTPATIENT)
Dept: CASE MANAGEMENT | Facility: OTHER | Age: 84
End: 2024-01-09
Payer: MEDICARE

## 2024-01-10 ENCOUNTER — PATIENT OUTREACH (OUTPATIENT)
Dept: CASE MANAGEMENT | Facility: OTHER | Age: 84
End: 2024-01-10
Payer: MEDICARE

## 2024-01-10 DIAGNOSIS — E78.2 MIXED HYPERLIPIDEMIA: ICD-10-CM

## 2024-01-10 DIAGNOSIS — I10 ESSENTIAL HYPERTENSION: Primary | ICD-10-CM

## 2024-01-10 DIAGNOSIS — M17.0 PRIMARY OSTEOARTHRITIS OF KNEES, BILATERAL: ICD-10-CM

## 2024-01-10 NOTE — OUTREACH NOTE
"AMBULATORY CASE MANAGEMENT NOTE    Name and Relationship of Patient/Support Person: Maria D Fountain F - Self    CCM Interim Update    Outreach complete.    Followed up with the patient on her recent Cards appointment (1/8/23) this week with Dr. Luna.  \"I got a real good report.  He wants to see me again in about a year.  My blood pressure is much better now with the Amlodipine\".  Med reconciliation with other prescribed antihypertensives complete.  \"He (Dr. Luna) also told me I could take the Meloxicam along with the Tylenol for my pain.  This weather has got me hurting a little\".  Patient advised to adhere to the recommendations of more walking (per Cards) to help lower HDL but to not overdo exercise due to issues with BLE knee pain/symptoms.  Patient verbalized understanding.  \"Dr. Loera told me we could do the water therapy and I could always go back to the gym\"    Patient declined the need to schedule f/u appointment with Dr. Hsieh at this time.  Agreeable to assessing the need during next scheduled outreach.        Education Documentation  No documentation found.        Cory FOOTE  Ambulatory Case Management    1/10/2024, 11:35 EST  "

## 2024-01-15 DIAGNOSIS — I10 ESSENTIAL HYPERTENSION: ICD-10-CM

## 2024-01-16 RX ORDER — LISINOPRIL 20 MG/1
20 TABLET ORAL 2 TIMES DAILY
Qty: 180 TABLET | Refills: 3 | Status: SHIPPED | OUTPATIENT
Start: 2024-01-16

## 2024-01-16 NOTE — TELEPHONE ENCOUNTER
Rx Refill Note  Requested Prescriptions     Pending Prescriptions Disp Refills    lisinopril (PRINIVIL,ZESTRIL) 20 MG tablet [Pharmacy Med Name: LISINOPRIL 20 MG TABLET] 180 tablet 3     Sig: TAKE 1 TABLET BY MOUTH TWICE A DAY      Last office visit with prescribing clinician: 1/8/2024   Last telemedicine visit with prescribing clinician: Visit date not found   Next office visit with prescribing clinician: 1/6/2025                         Would you like a call back once the refill request has been completed: [] Yes [] No    If the office needs to give you a call back, can they leave a voicemail: [] Yes [] No    Chaya Da Silva MA  01/16/24, 08:17 EST

## 2024-01-24 ENCOUNTER — PATIENT OUTREACH (OUTPATIENT)
Dept: CASE MANAGEMENT | Facility: OTHER | Age: 84
End: 2024-01-24
Payer: MEDICARE

## 2024-01-24 DIAGNOSIS — M17.0 PRIMARY OSTEOARTHRITIS OF KNEES, BILATERAL: Primary | ICD-10-CM

## 2024-01-24 DIAGNOSIS — I10 ESSENTIAL HYPERTENSION: ICD-10-CM

## 2024-01-24 NOTE — OUTREACH NOTE
"AMBULATORY CASE MANAGEMENT NOTE    Name and Relationship of Patient/Support Person: Maria D Fountain F - Self    CCM Interim Update    Outreach complete.    Followed up with the patient on her BLE knee osteoarthritis and the treatment plan.  \"Well, I'm doing okay.  I am doing the exercises at home like they want me to do.  Its just so rainy and cold that it makes me ache.  I am taking the Mobic like I should along with the Tylenol but I'm not sure if its helping\".  Reviewed with the patient the office note from Dr. Hsieh on 12/7/23 and the recommendation to schedule 4 month f/u or sooner if the symptoms don't improve or worsen.  \"I think I will wait and see at this point\".    Patient provided that her BP in the home setting is \"pretty good.  I am doing pretty good on that\".  Patient unable to provide specific logs but did confirm that she is taking antihypertensives as directed.      Near the conclusion of the outreach, the patient was informed that based on her progress, her CCM program will be changed to 'Monitoring' and what that entails.  Patient verbalized understanding and thanked this RN-ACM for the outreaches.          Education Documentation  No documentation found.        Cory FOOTE  Ambulatory Case Management    1/24/2024, 11:37 EST  "

## 2024-01-29 ENCOUNTER — PATIENT OUTREACH (OUTPATIENT)
Dept: CASE MANAGEMENT | Facility: OTHER | Age: 84
End: 2024-01-29
Payer: MEDICARE

## 2024-01-29 DIAGNOSIS — I10 ESSENTIAL HYPERTENSION: ICD-10-CM

## 2024-01-29 DIAGNOSIS — E78.2 MIXED HYPERLIPIDEMIA: ICD-10-CM

## 2024-01-29 DIAGNOSIS — M17.0 PRIMARY OSTEOARTHRITIS OF KNEES, BILATERAL: Primary | ICD-10-CM

## 2024-01-29 NOTE — OUTREACH NOTE
[facility] End of Month Documentation    This Chronic Medical Management Care Plan for Maria D Fountain, 83 y.o. female, has been monitored and managed; reviewed; revised and a new plan of care implemented for the month of January.  A cumulative time of 46 minutes was spent on this patient record this month, including phone call with patient; chart review.    Regarding the patient's problems: has Coronary artery disease of native artery of native heart with stable angina pectoris; Degeneration of lumbar or lumbosacral intervertebral disc; Dyslipidemia; Essential hypertension; Angina pectoris; Chest pain, pleuritic; Lumbar spondylosis; Mitral regurgitation; Prediabetes; Chronic coronary artery disease; Varicose veins of left lower extremity with pain; Mixed hyperlipidemia; Osteopenia after menopause; Rotator cuff arthropathy of left shoulder; Clinical diagnosis of severe acute respiratory syndrome coronavirus 2 (SARS-CoV-2) disease; Encounter for annual wellness exam in Medicare patient; and Symptomatic cholelithiasis on their problem list., the following items were addressed: medical records; medications and any changes can be found within the plan section of the note.  A detailed listing of time spent for chronic care management is tracked within each outreach encounter.  Current medications include:  has a current medication list which includes the following prescription(s): acetaminophen, amlodipine, ascorbic acid, aspirin, atorvastatin, calcium carb-cholecalciferol, carvedilol, hm omega-3-6-9 fatty acids, lidocaine, lisinopril, meloxicam, multivitamin-minerals, nitroglycerin, vitamin b-12, and [DISCONTINUED] montelukast. and the patient is reported to be patient is compliant with medication protocol,  Medications are reported to be effective.   All notes on chart for PCP to review.    The patient was monitored remotely for blood pressure; activity level; pain; medications.    The patient's physical needs include:  help  "taking medications as prescribed; physical healthcare.     The patient's mental support needs include:  not applicable    The patient's cognitive support needs include:  not applicable    The patient's psychosocial support needs include:  not applicable    The patient's functional needs include: physical healthcare    The patient's environmental needs include:  not applicable    Care Plan overall comments:  No data recorded    Refer to previous outreach notes for more information on the areas listed above.    Monthly Billing Diagnoses  (M17.0) Primary osteoarthritis of knees, bilateral    (I10) Essential hypertension    (E78.2) Mixed hyperlipidemia    Medications   Medications have been reconciled    Care Plan progress this month:      Recently Modified Care Plans Updates made since 12/29/2023 12:00 AM       Hypertension (Adult)           Problem Priority Last Modified     Hypertension (Hypertension) --  7/24/2023 10:01 AM by Cory Landa RN              Goal Recent Progress Last Modified     Hypertension Monitored --  11/20/2023  2:42 PM by Cory Landa RN     Evidence-based guidance:   Promote initial use of ambulatory blood pressure measurements (for 3 days) to rule out \"white-coat\" effect; identify masked hypertension and presence or absence of nocturnal \"dipping\" of blood pressure.    Encourage continued use of home blood pressure monitoring and recording in blood pressure log; include symptoms of hypotension or potential medication side effects in log.   Review blood pressure measurements taken inside and outside of the provider office; establish baseline and monitor trends; compare to target ranges or patient goal.   Share overall cardiovascular risk with patient; encourage changes to lifestyle risk factors, including alcohol consumption, smoking, inadequate exercise, poor dietary habits and stress.    Notes:              Task Due Date Last Modified     Identify and Monitor Blood Pressure " Elevation --  1/10/2024 11:31 AM by Cory Landa RN     Care Management Activities:      - blood pressure trends reviewed  - home or ambulatory blood pressure monitoring encouraged      Notes:                Problem Priority Last Modified     Disease Progression (Hypertension) --  7/24/2023 10:01 AM by Cory Landa RN              Goal Recent Progress Last Modified     Disease Progression Prevented or Minimized --  7/24/2023 10:01 AM by Cory Landa RN     Evidence-based guidance:   Tailor lifestyle advice to individual; review progress regularly; give frequent encouragement and respond positively to incremental successes.   Assess for and promote awareness of worsening disease or development of comorbidity.   Prepare patient for laboratory and diagnostic exams based on risk and presentation.   Prepare patient for use of pharmacologic therapy that may include diuretic, beta-blocker, beta-blocker/thiazide combination, angiotensin-converting enzyme inhibitor, renin-angiotensin blocker or calcium-channel blocker.   Expect periodic adjustments to pharmacologic therapy; manage side effects.   Promote a healthy diet that includes primarily plant-based foods, such as fruits, vegetables, whole grains, beans and legumes, low-fat dairy and lean meats.    Consider moderate reduction in sodium intake by avoiding the addition of salt to prepared foods and limiting processed meats, canned soup, frozen meals and salty snacks.    Promote a regular, daily exercise goal of 150 minutes per week of moderate exercise based on tolerance, ability and patient choice; consider referral to physical therapist, community wellness and/or activity program.   Encourage the avoidance of no more than 2 hours per day of sedentary activity, such as recreational screen time.   Review sources of stress; explore current coping strategies and encourage use of mindfulness, yoga, meditation or exercise to manage stress.    Notes:             Task Due Date Last Modified     Alleviate Barriers to Hypertension Treatment --  1/10/2024 11:31 AM by Cory Landa RN     Care Management Activities:      - healthy family lifestyle promoted  - pain assessed and managed  - patient response to treatment assessed  - reduction in sedentary activities encouraged  - response to pharmacologic therapy monitored      Notes:                Problem Priority Last Modified     Resistant Hypertension (Hypertension) --  7/24/2023 10:01 AM by Cory Landa RN              Goal Recent Progress Last Modified     Response to Treatment Maximized --  7/24/2023 10:01 AM by Cory Landa RN     Evidence-based guidance:   Assess patient response to treatment, including presence or absence of medication side effects, degree of blood pressure control and patient satisfaction.   Assess technique (including cuff size and placement), measurement times, condition and calibration of blood pressure cuff set (both at-home and in-office equipment).   Assess factors that may influence response to treatment, including nonadherence to pharmacologic treatment plan, diet or activity changes and/or presence of pain, stress or sleep disturbance.   Screen for signs and symptoms of depression; if present, refer for or complete a comprehensive assessment.   Evaluate social and economic barriers that may affect adherence to treatment plan   Address pharmacologic nonadherence by simplifying dosing regimen, counseling or support by pharmacist, financial assistance, self-monitoring of blood pressure, use of motivational interviewing, voice or text messages.   Encourage behavioral adherence strategies, like habit-based interventions that link medication taking with existing daily routines.   Assess barriers to regular, daily physical activity; support family or support person-oriented activity changes and utilization of community activity or sports program.   Address barriers to dietary  changes, especially sodium restriction, with referrals to community programs, like cooking classes, meal services or intensive education when available.   Refer to community-based peer support program or nurse home-visiting program.   Assess for chronic pain; when present add additional goals (Chronic Pain Care Plan Guide) as needed.   Provide frequent follow-up by telephone, telemonitoring, patient-practice portal or with home visit.   Review alcohol use screen; address using brief intervention beginning with risk that interferes with blood pressure control; refer for treatment when excessive alcohol use is noted.   Screen for obstructive sleep apnea; prepare patient for polysomnography based on risk and presentation and use of noninvasive ventilation to relieve obstructive sleep apnea when present.    Notes:            Task Due Date Last Modified     Facilitate Adherence to Lifestyle Change --  1/10/2024 11:32 AM by Cory Landa RN     Care Management Activities:      - medication adherence assessment completed  - success praised  - support and encouragement provided      Notes:                     Osteoarthritis (Adult)           Problem Priority Last Modified     Pain (Osteoarthritis) --  1/10/2024 11:33 AM by Cory Landa RN              Goal Recent Progress Last Modified     Manage Pain --  1/10/2024 11:33 AM by Cory Landa RN     Evidence-based guidance:   Develop a mutual, multimodal pain management plan with patient and caregiver; determine person's knowledge of the condition, any concerns or fears, personal preferences and ability to access services.   Assess pain level, treatment efficacy and patient response at regular intervals using a consistent pain scale; review the effectiveness and tolerability of all treatments.   Correlate pain with impact on daily activities, occupation, mood, sleep quality, comorbidities, other (nonarthritic) pain, fall risk and quality of life.   Encourage  nonpharmacologic measures, such as applied heat or cold, exercise, physical or occupational therapy, orthoses, cognitive behavioral therapy, yoga, ranjan-chi, acupuncture, adequate sleep and weight loss.   Provide anticipatory guidance regarding benefits to multimodal treatment that improves quality of life.   Consider the use of transcutaneous TENS (electrical nerve stimulation), shock-absorbing footwear, orthoses, mineral baths, nutraceuticals or electromagnetic-field therapy.   Prepare patient for individualized pharmacologic pain management in a stepped approach based on presentation, risk and comorbidities; monitor and manage side effects and anticipate periodic adjustments.   Promote individualized plan to stay active when unable to participate in physical therapy, such as passive and active range of motion, yoga, walking, water aerobics or swimming.   Support and optimize psychosocial response to pain.    Notes:            Task Due Date Last Modified     Facilitate Multimodal Pain Management Plan --  1/10/2024 11:33 AM by Cory Landa RN     Care Management Activities:      - complementary therapy use encouraged  - pain assessed  - response to pain management plan monitored  - support and encouragement provided      Notes:                Problem Priority Last Modified     Mobility and Function (Osteoarthritis) --  1/10/2024 11:33 AM by Cory Landa RN              Goal Recent Progress Last Modified     Maintain Mobility and Function --  1/10/2024 11:33 AM by Cory Landa RN     Evidence-based guidance:   Acknowledge and validate impact of pain, loss of strength and potential disfigurement (hand osteoarthritis) on mental health and daily life, such as social isolation, anxiety, depression, impaired sexual relationship and    injury from falls.   Anticipate referral to physical or occupational therapy for assessment, therapeutic exercise and recommendation for adaptive equipment or assistive  "devices; encourage participation.   Assess impact on ability to perform activities of daily living, as well as engage in sports and leisure events or requirements of work or school.   Provide anticipatory guidance and reassurance about the benefit of exercise to maintain function; acknowledge and normalize fear that exercise may worsen symptoms.   Encourage regular exercise, at least 10 minutes at a time for 45 minutes per week; consider yoga, water exercise and proprioceptive exercises; encourage use of wearable activity tracker to increase motivation and adherence.   Encourage maintenance or resumption of daily activities, including employment, as pain allows and with minimal exposure to trauma.   Assist patient to advocate for adaptations to the work environment.   Consider level of pain and function, gender, age, lifestyle, patient preference, quality of life, readiness and  €œcapacity to benefit\" when recommending patients for orthopaedic surgery consultation.   Explore strategies, such as changes to medication regimen or activity that enables patient to anticipate and manage flare-ups that increase deconditioning and disability.   Explore patient preferences; encourage exposure to a broader range of activities that have been avoided for fear of experiencing pain.   Identify barriers to participation in therapy or exercise, such as pain with activity, anticipated or imagined pain.   Monitor postoperative joint replacement or any preexisting joint replacement for ongoing pain and loss of function; provide social support and encouragement throughout recovery.    Notes:            Task Due Date Last Modified     Maintain or Improve Strength and Functional Ability --  1/10/2024 11:33 AM by Cory Landa RN     Care Management Activities:      - activity or exercise based on tolerance encouraged      Notes:                Problem Priority Last Modified     Harm or Injury (Osteoarthritis) --  1/10/2024 11:33 AM " by Cory Landa RN              Goal Recent Progress Last Modified     Harm or Injury Prevented --  1/10/2024 11:33 AM by Cory Landa RN     Evidence-based guidance:   Assess fall risk related to postural control, balance and gait impairment, muscle weakness, diminished vision and hearing, presence of incontinence, environmental hazards and effects of medication.   Address potential barriers to activity or exercise, such as low self-efficacy and fear of falling.   Address fall risk by considering single vison versus multifocal lenses (glasses) during activity, environmental modification and antislip, flat-heeled shoes, use of orthoses and assistive devices.   Promote regular exercise focused on improving strength, based on ability and tolerance; consider activities, such as yoga or ranjan chi, that promote balance.   Review current medication; consider vitamin D supplementation and de-prescription of psychotropic medication.   Monitor and address analgesic use, such as acetaminophen, nonsteroidal anti-inflammatory agent or opioid, for complications that may include hepatotoxicity, dependence, constipation, dyspepsia, gastrointestinal bleeding, heart or    renal disease.   Monitor depression, anxiety and suicide risk.   Collaborate with patient and parent/caregiver to develop a safety plan or coping action plan to reduce suicide risk.   Include recognition of warning signs of an impending suicidal crisis, restricting access to lethal means, use of internal coping strategies and contact methods for mental health professional or agency.    Notes:            Task Due Date Last Modified     Identify and Reduce Risk to Safety --  1/10/2024 11:33 AM by Cory Landa RN     Care Management Activities:      - careful application of heat or ice encouraged      Notes:                          Instructions   Patient was provided an electronic copy of care plan  CCM services were explained and offered and patient  has accepted these services.  Patient has given their written consent to receive CCM services and understands that this includes the authorization of electronic communication of medical information with the other treating providers.  Patient understands that they may stop CCM services at any time and these changes will be effective at the end of the calendar month and will effectively revocate the agreement of CCM services.  Patient understands that only one practitioner can furnish and be paid for CCM services during one calendar month.  Patient also understands that there may be co-payment or deductible fees in association with CCM services.  Patient will continue with at least monthly follow-up calls with the Ambulatory .    Cory FOOTE  Ambulatory Case Management    1/29/2024, 08:50 EST

## 2024-02-02 ENCOUNTER — HOSPITAL ENCOUNTER (OUTPATIENT)
Facility: HOSPITAL | Age: 84
Setting detail: OBSERVATION
Discharge: HOME HEALTH | End: 2024-02-04
Attending: EMERGENCY MEDICINE | Admitting: EMERGENCY MEDICINE
Payer: MEDICARE

## 2024-02-02 ENCOUNTER — APPOINTMENT (OUTPATIENT)
Dept: GENERAL RADIOLOGY | Facility: HOSPITAL | Age: 84
End: 2024-02-02
Payer: MEDICARE

## 2024-02-02 DIAGNOSIS — R53.1 WEAKNESS: ICD-10-CM

## 2024-02-02 DIAGNOSIS — J10.1 INFLUENZA A: ICD-10-CM

## 2024-02-02 DIAGNOSIS — R09.02 HYPOXIA: ICD-10-CM

## 2024-02-02 DIAGNOSIS — R50.9 FEVER AND CHILLS: Primary | ICD-10-CM

## 2024-02-02 DIAGNOSIS — K80.20 SYMPTOMATIC CHOLELITHIASIS: ICD-10-CM

## 2024-02-02 LAB
ALBUMIN SERPL-MCNC: 4.5 G/DL (ref 3.5–5.2)
ALBUMIN/GLOB SERPL: 1.7 G/DL
ALP SERPL-CCNC: 95 U/L (ref 39–117)
ALT SERPL W P-5'-P-CCNC: 22 U/L (ref 1–33)
ANION GAP SERPL CALCULATED.3IONS-SCNC: 11 MMOL/L (ref 5–15)
AST SERPL-CCNC: 31 U/L (ref 1–32)
B PARAPERT DNA SPEC QL NAA+PROBE: NOT DETECTED
B PERT DNA SPEC QL NAA+PROBE: NOT DETECTED
BASOPHILS # BLD AUTO: 0 10*3/MM3 (ref 0–0.2)
BASOPHILS NFR BLD AUTO: 0.4 % (ref 0–1.5)
BILIRUB SERPL-MCNC: 1.5 MG/DL (ref 0–1.2)
BILIRUB UR QL STRIP: NEGATIVE
BUN SERPL-MCNC: 9 MG/DL (ref 8–23)
BUN/CREAT SERPL: 12.7 (ref 7–25)
C PNEUM DNA NPH QL NAA+NON-PROBE: NOT DETECTED
CALCIUM SPEC-SCNC: 9.2 MG/DL (ref 8.6–10.5)
CHLORIDE SERPL-SCNC: 100 MMOL/L (ref 98–107)
CLARITY UR: CLEAR
CO2 SERPL-SCNC: 27 MMOL/L (ref 22–29)
COLOR UR: YELLOW
CREAT SERPL-MCNC: 0.71 MG/DL (ref 0.57–1)
DEPRECATED RDW RBC AUTO: 49 FL (ref 37–54)
EGFRCR SERPLBLD CKD-EPI 2021: 84.5 ML/MIN/1.73
EOSINOPHIL # BLD AUTO: 0 10*3/MM3 (ref 0–0.4)
EOSINOPHIL NFR BLD AUTO: 0.5 % (ref 0.3–6.2)
ERYTHROCYTE [DISTWIDTH] IN BLOOD BY AUTOMATED COUNT: 14.5 % (ref 12.3–15.4)
FLUAV H3 RNA NPH QL NAA+PROBE: DETECTED
FLUBV RNA ISLT QL NAA+PROBE: NOT DETECTED
GLOBULIN UR ELPH-MCNC: 2.7 GM/DL
GLUCOSE SERPL-MCNC: 129 MG/DL (ref 65–99)
GLUCOSE UR STRIP-MCNC: NEGATIVE MG/DL
HADV DNA SPEC NAA+PROBE: NOT DETECTED
HCOV 229E RNA SPEC QL NAA+PROBE: NOT DETECTED
HCOV HKU1 RNA SPEC QL NAA+PROBE: NOT DETECTED
HCOV NL63 RNA SPEC QL NAA+PROBE: NOT DETECTED
HCOV OC43 RNA SPEC QL NAA+PROBE: NOT DETECTED
HCT VFR BLD AUTO: 41.9 % (ref 34–46.6)
HGB BLD-MCNC: 13.9 G/DL (ref 12–15.9)
HGB UR QL STRIP.AUTO: NEGATIVE
HMPV RNA NPH QL NAA+NON-PROBE: NOT DETECTED
HPIV1 RNA ISLT QL NAA+PROBE: NOT DETECTED
HPIV2 RNA SPEC QL NAA+PROBE: NOT DETECTED
HPIV3 RNA NPH QL NAA+PROBE: NOT DETECTED
HPIV4 P GENE NPH QL NAA+PROBE: NOT DETECTED
KETONES UR QL STRIP: ABNORMAL
LEUKOCYTE ESTERASE UR QL STRIP.AUTO: NEGATIVE
LYMPHOCYTES # BLD AUTO: 0.4 10*3/MM3 (ref 0.7–3.1)
LYMPHOCYTES NFR BLD AUTO: 4 % (ref 19.6–45.3)
M PNEUMO IGG SER IA-ACNC: NOT DETECTED
MCH RBC QN AUTO: 30.5 PG (ref 26.6–33)
MCHC RBC AUTO-ENTMCNC: 33.1 G/DL (ref 31.5–35.7)
MCV RBC AUTO: 92 FL (ref 79–97)
MONOCYTES # BLD AUTO: 1.4 10*3/MM3 (ref 0.1–0.9)
MONOCYTES NFR BLD AUTO: 14.7 % (ref 5–12)
NEUTROPHILS NFR BLD AUTO: 7.4 10*3/MM3 (ref 1.7–7)
NEUTROPHILS NFR BLD AUTO: 80.4 % (ref 42.7–76)
NITRITE UR QL STRIP: NEGATIVE
NRBC BLD AUTO-RTO: 0.1 /100 WBC (ref 0–0.2)
PH UR STRIP.AUTO: 8 [PH] (ref 5–8)
PLATELET # BLD AUTO: 225 10*3/MM3 (ref 140–450)
PMV BLD AUTO: 7.9 FL (ref 6–12)
POTASSIUM SERPL-SCNC: 3.6 MMOL/L (ref 3.5–5.2)
PROT SERPL-MCNC: 7.2 G/DL (ref 6–8.5)
PROT UR QL STRIP: ABNORMAL
RBC # BLD AUTO: 4.55 10*6/MM3 (ref 3.77–5.28)
RHINOVIRUS RNA SPEC NAA+PROBE: NOT DETECTED
RSV RNA NPH QL NAA+NON-PROBE: NOT DETECTED
SARS-COV-2 RNA NPH QL NAA+NON-PROBE: NOT DETECTED
SODIUM SERPL-SCNC: 138 MMOL/L (ref 136–145)
SP GR UR STRIP: 1.01 (ref 1–1.03)
UROBILINOGEN UR QL STRIP: ABNORMAL
WBC NRBC COR # BLD AUTO: 9.2 10*3/MM3 (ref 3.4–10.8)

## 2024-02-02 PROCEDURE — 94761 N-INVAS EAR/PLS OXIMETRY MLT: CPT

## 2024-02-02 PROCEDURE — 36415 COLL VENOUS BLD VENIPUNCTURE: CPT

## 2024-02-02 PROCEDURE — 99285 EMERGENCY DEPT VISIT HI MDM: CPT

## 2024-02-02 PROCEDURE — 85025 COMPLETE CBC W/AUTO DIFF WBC: CPT | Performed by: NURSE PRACTITIONER

## 2024-02-02 PROCEDURE — 0202U NFCT DS 22 TRGT SARS-COV-2: CPT | Performed by: NURSE PRACTITIONER

## 2024-02-02 PROCEDURE — 81003 URINALYSIS AUTO W/O SCOPE: CPT | Performed by: NURSE PRACTITIONER

## 2024-02-02 PROCEDURE — 80053 COMPREHEN METABOLIC PANEL: CPT | Performed by: NURSE PRACTITIONER

## 2024-02-02 PROCEDURE — 71045 X-RAY EXAM CHEST 1 VIEW: CPT

## 2024-02-02 RX ORDER — HYDROCODONE BITARTRATE AND HOMATROPINE METHYLBROMIDE 5; 1.5 MG/1; MG/1
1 TABLET ORAL ONCE
Status: COMPLETED | OUTPATIENT
Start: 2024-02-02 | End: 2024-02-02

## 2024-02-02 RX ORDER — METHYLPREDNISOLONE SODIUM SUCCINATE 125 MG/2ML
125 INJECTION, POWDER, LYOPHILIZED, FOR SOLUTION INTRAMUSCULAR; INTRAVENOUS ONCE
Status: COMPLETED | OUTPATIENT
Start: 2024-02-02 | End: 2024-02-03

## 2024-02-02 RX ORDER — SODIUM CHLORIDE 0.9 % (FLUSH) 0.9 %
10 SYRINGE (ML) INJECTION AS NEEDED
Status: DISCONTINUED | OUTPATIENT
Start: 2024-02-02 | End: 2024-02-05 | Stop reason: HOSPADM

## 2024-02-02 RX ORDER — ACETAMINOPHEN 500 MG
1000 TABLET ORAL ONCE
Status: COMPLETED | OUTPATIENT
Start: 2024-02-02 | End: 2024-02-02

## 2024-02-02 RX ADMIN — ACETAMINOPHEN 1000 MG: 500 TABLET ORAL at 23:29

## 2024-02-02 RX ADMIN — HYDROCODONE BITARTRATE AND HOMATROPINE METHYLBROMIDE 1 TABLET: 1.5; 5 TABLET ORAL at 23:29

## 2024-02-02 NOTE — LETTER
EMS Transport Request  For use at [facility], [place], [place], [place], and [place] only   Patient Name: Maria D Fountain : 1940   Weight:66 kg (145 lb 8.1 oz) Pick-up Location: [facility] BLS/ALS: BLS/ALS: BLS   Insurance: MEDICARE    Pre-Cert #: D/C Summary complete:    Destination: Home How many stairs 0, Will the patient be on the main level yes, Is there a ramp available no, Can the patient stand and pivot yes, Address [address] [place] [address], and Name/contact number for who will be present Maria D Fountain, [phone]   Contact Precautions: Droplet/Spore   Equipment (O2, Fluids, etc.): None   Arrive By Date/Time:  Stretcher/WC: Wheelchair   CM Requesting: Ines Swanson RN Ext: [phone]   Notes/Medical Necessity: Wheelchair van, pt doesn't have transportation home.     ______________________________________________________________________    *Only 2 patient bags OR 1 carry-on size bag are permitted.  Wheelchairs and walkers CANNOT transported with the patient. Acknowledge: Yes

## 2024-02-03 PROBLEM — J10.1 INFLUENZA A: Status: ACTIVE | Noted: 2024-02-03

## 2024-02-03 LAB
ALBUMIN SERPL-MCNC: 4.1 G/DL (ref 3.5–5.2)
ALP SERPL-CCNC: 84 U/L (ref 39–117)
ALT SERPL W P-5'-P-CCNC: 21 U/L (ref 1–33)
ANION GAP SERPL CALCULATED.3IONS-SCNC: 10 MMOL/L (ref 5–15)
AST SERPL-CCNC: 30 U/L (ref 1–32)
BASOPHILS # BLD AUTO: 0 10*3/MM3 (ref 0–0.2)
BASOPHILS NFR BLD AUTO: 0.6 % (ref 0–1.5)
BILIRUB CONJ SERPL-MCNC: 0.2 MG/DL (ref 0–0.3)
BILIRUB INDIRECT SERPL-MCNC: 1.1 MG/DL
BILIRUB SERPL-MCNC: 1.3 MG/DL (ref 0–1.2)
BUN SERPL-MCNC: 11 MG/DL (ref 8–23)
BUN/CREAT SERPL: 11.1 (ref 7–25)
CALCIUM SPEC-SCNC: 8.8 MG/DL (ref 8.6–10.5)
CHLORIDE SERPL-SCNC: 99 MMOL/L (ref 98–107)
CO2 SERPL-SCNC: 26 MMOL/L (ref 22–29)
CREAT SERPL-MCNC: 0.99 MG/DL (ref 0.57–1)
DEPRECATED RDW RBC AUTO: 48.6 FL (ref 37–54)
EGFRCR SERPLBLD CKD-EPI 2021: 56.7 ML/MIN/1.73
EOSINOPHIL # BLD AUTO: 0 10*3/MM3 (ref 0–0.4)
EOSINOPHIL NFR BLD AUTO: 0.1 % (ref 0.3–6.2)
ERYTHROCYTE [DISTWIDTH] IN BLOOD BY AUTOMATED COUNT: 14.3 % (ref 12.3–15.4)
GLUCOSE SERPL-MCNC: 146 MG/DL (ref 65–99)
HBA1C MFR BLD: 5.9 % (ref 4.8–5.6)
HCT VFR BLD AUTO: 39 % (ref 34–46.6)
HGB BLD-MCNC: 12.9 G/DL (ref 12–15.9)
LYMPHOCYTES # BLD AUTO: 0.3 10*3/MM3 (ref 0.7–3.1)
LYMPHOCYTES NFR BLD AUTO: 5.5 % (ref 19.6–45.3)
MCH RBC QN AUTO: 30.9 PG (ref 26.6–33)
MCHC RBC AUTO-ENTMCNC: 33.2 G/DL (ref 31.5–35.7)
MCV RBC AUTO: 92.9 FL (ref 79–97)
MONOCYTES # BLD AUTO: 0.3 10*3/MM3 (ref 0.1–0.9)
MONOCYTES NFR BLD AUTO: 4.7 % (ref 5–12)
NEUTROPHILS NFR BLD AUTO: 5.5 10*3/MM3 (ref 1.7–7)
NEUTROPHILS NFR BLD AUTO: 89.1 % (ref 42.7–76)
NRBC BLD AUTO-RTO: 0.1 /100 WBC (ref 0–0.2)
PLATELET # BLD AUTO: 214 10*3/MM3 (ref 140–450)
PMV BLD AUTO: 8.2 FL (ref 6–12)
POTASSIUM SERPL-SCNC: 3.7 MMOL/L (ref 3.5–5.2)
PROT SERPL-MCNC: 6.8 G/DL (ref 6–8.5)
RBC # BLD AUTO: 4.19 10*6/MM3 (ref 3.77–5.28)
SODIUM SERPL-SCNC: 135 MMOL/L (ref 136–145)
WBC NRBC COR # BLD AUTO: 6.2 10*3/MM3 (ref 3.4–10.8)

## 2024-02-03 PROCEDURE — G0378 HOSPITAL OBSERVATION PER HR: HCPCS

## 2024-02-03 PROCEDURE — 96376 TX/PRO/DX INJ SAME DRUG ADON: CPT

## 2024-02-03 PROCEDURE — 25810000003 SODIUM CHLORIDE 0.9 % SOLUTION: Performed by: NURSE PRACTITIONER

## 2024-02-03 PROCEDURE — 96361 HYDRATE IV INFUSION ADD-ON: CPT

## 2024-02-03 PROCEDURE — 94799 UNLISTED PULMONARY SVC/PX: CPT

## 2024-02-03 PROCEDURE — 87040 BLOOD CULTURE FOR BACTERIA: CPT | Performed by: NURSE PRACTITIONER

## 2024-02-03 PROCEDURE — 94640 AIRWAY INHALATION TREATMENT: CPT

## 2024-02-03 PROCEDURE — 25010000002 METHYLPREDNISOLONE PER 40 MG: Performed by: NURSE PRACTITIONER

## 2024-02-03 PROCEDURE — 80076 HEPATIC FUNCTION PANEL: CPT | Performed by: NURSE PRACTITIONER

## 2024-02-03 PROCEDURE — 80048 BASIC METABOLIC PNL TOTAL CA: CPT | Performed by: NURSE PRACTITIONER

## 2024-02-03 PROCEDURE — 85025 COMPLETE CBC W/AUTO DIFF WBC: CPT | Performed by: NURSE PRACTITIONER

## 2024-02-03 PROCEDURE — 83036 HEMOGLOBIN GLYCOSYLATED A1C: CPT | Performed by: NURSE PRACTITIONER

## 2024-02-03 PROCEDURE — 25010000002 METHYLPREDNISOLONE PER 125 MG: Performed by: NURSE PRACTITIONER

## 2024-02-03 PROCEDURE — 96374 THER/PROPH/DIAG INJ IV PUSH: CPT

## 2024-02-03 RX ORDER — CHOLECALCIFEROL (VITAMIN D3) 125 MCG
5 CAPSULE ORAL NIGHTLY PRN
Status: DISCONTINUED | OUTPATIENT
Start: 2024-02-03 | End: 2024-02-05 | Stop reason: HOSPADM

## 2024-02-03 RX ORDER — BENZONATATE 100 MG/1
100 CAPSULE ORAL 3 TIMES DAILY PRN
Status: DISCONTINUED | OUTPATIENT
Start: 2024-02-03 | End: 2024-02-05 | Stop reason: HOSPADM

## 2024-02-03 RX ORDER — ONDANSETRON 2 MG/ML
4 INJECTION INTRAMUSCULAR; INTRAVENOUS EVERY 6 HOURS PRN
Status: DISCONTINUED | OUTPATIENT
Start: 2024-02-03 | End: 2024-02-05 | Stop reason: HOSPADM

## 2024-02-03 RX ORDER — GUAIFENESIN 600 MG/1
600 TABLET, EXTENDED RELEASE ORAL EVERY 12 HOURS SCHEDULED
Status: DISCONTINUED | OUTPATIENT
Start: 2024-02-03 | End: 2024-02-05 | Stop reason: HOSPADM

## 2024-02-03 RX ORDER — SODIUM CHLORIDE 9 MG/ML
40 INJECTION, SOLUTION INTRAVENOUS AS NEEDED
Status: DISCONTINUED | OUTPATIENT
Start: 2024-02-03 | End: 2024-02-03

## 2024-02-03 RX ORDER — SODIUM CHLORIDE 0.9 % (FLUSH) 0.9 %
10 SYRINGE (ML) INJECTION EVERY 12 HOURS SCHEDULED
Status: DISCONTINUED | OUTPATIENT
Start: 2024-02-03 | End: 2024-02-05 | Stop reason: HOSPADM

## 2024-02-03 RX ORDER — LIDOCAINE 4 G/G
1 PATCH TOPICAL EVERY 24 HOURS
COMMUNITY
Start: 2020-01-01

## 2024-02-03 RX ORDER — SODIUM CHLORIDE 0.9 % (FLUSH) 0.9 %
10 SYRINGE (ML) INJECTION AS NEEDED
Status: DISCONTINUED | OUTPATIENT
Start: 2024-02-03 | End: 2024-02-05 | Stop reason: HOSPADM

## 2024-02-03 RX ORDER — SODIUM CHLORIDE 9 MG/ML
40 INJECTION, SOLUTION INTRAVENOUS AS NEEDED
Status: DISCONTINUED | OUTPATIENT
Start: 2024-02-03 | End: 2024-02-05 | Stop reason: HOSPADM

## 2024-02-03 RX ORDER — SODIUM CHLORIDE 9 MG/ML
75 INJECTION, SOLUTION INTRAVENOUS CONTINUOUS
Status: DISCONTINUED | OUTPATIENT
Start: 2024-02-03 | End: 2024-02-05 | Stop reason: HOSPADM

## 2024-02-03 RX ORDER — ACETAMINOPHEN 325 MG/1
650 TABLET ORAL EVERY 4 HOURS PRN
Status: DISCONTINUED | OUTPATIENT
Start: 2024-02-03 | End: 2024-02-05 | Stop reason: HOSPADM

## 2024-02-03 RX ORDER — ONDANSETRON 2 MG/ML
4 INJECTION INTRAMUSCULAR; INTRAVENOUS EVERY 6 HOURS PRN
Status: DISCONTINUED | OUTPATIENT
Start: 2024-02-03 | End: 2024-02-03

## 2024-02-03 RX ORDER — HYDRALAZINE HYDROCHLORIDE 20 MG/ML
10 INJECTION INTRAMUSCULAR; INTRAVENOUS EVERY 6 HOURS PRN
Status: DISCONTINUED | OUTPATIENT
Start: 2024-02-03 | End: 2024-02-05 | Stop reason: HOSPADM

## 2024-02-03 RX ORDER — METHYLPREDNISOLONE SODIUM SUCCINATE 40 MG/ML
40 INJECTION, POWDER, LYOPHILIZED, FOR SOLUTION INTRAMUSCULAR; INTRAVENOUS EVERY 12 HOURS
Status: DISCONTINUED | OUTPATIENT
Start: 2024-02-03 | End: 2024-02-05 | Stop reason: HOSPADM

## 2024-02-03 RX ORDER — SODIUM CHLORIDE 0.9 % (FLUSH) 0.9 %
3-10 SYRINGE (ML) INJECTION AS NEEDED
Status: DISCONTINUED | OUTPATIENT
Start: 2024-02-03 | End: 2024-02-03

## 2024-02-03 RX ORDER — SODIUM CHLORIDE 0.9 % (FLUSH) 0.9 %
3 SYRINGE (ML) INJECTION EVERY 12 HOURS SCHEDULED
Status: DISCONTINUED | OUTPATIENT
Start: 2024-02-03 | End: 2024-02-03

## 2024-02-03 RX ORDER — BISACODYL 10 MG
10 SUPPOSITORY, RECTAL RECTAL DAILY PRN
Status: DISCONTINUED | OUTPATIENT
Start: 2024-02-03 | End: 2024-02-05 | Stop reason: HOSPADM

## 2024-02-03 RX ORDER — LIDOCAINE 4 G/G
1 PATCH TOPICAL EVERY 24 HOURS
Status: DISCONTINUED | OUTPATIENT
Start: 2024-02-03 | End: 2024-02-05 | Stop reason: HOSPADM

## 2024-02-03 RX ORDER — HYDROCODONE BITARTRATE AND ACETAMINOPHEN 5; 325 MG/1; MG/1
1 TABLET ORAL EVERY 6 HOURS PRN
Status: DISCONTINUED | OUTPATIENT
Start: 2024-02-03 | End: 2024-02-05 | Stop reason: HOSPADM

## 2024-02-03 RX ORDER — SODIUM CHLORIDE 9 MG/ML
100 INJECTION, SOLUTION INTRAVENOUS CONTINUOUS
Status: DISCONTINUED | OUTPATIENT
Start: 2024-02-03 | End: 2024-02-03

## 2024-02-03 RX ORDER — AMLODIPINE BESYLATE 5 MG/1
5 TABLET ORAL DAILY
Status: DISCONTINUED | OUTPATIENT
Start: 2024-02-03 | End: 2024-02-05 | Stop reason: HOSPADM

## 2024-02-03 RX ORDER — IPRATROPIUM BROMIDE AND ALBUTEROL SULFATE 2.5; .5 MG/3ML; MG/3ML
3 SOLUTION RESPIRATORY (INHALATION)
Status: DISCONTINUED | OUTPATIENT
Start: 2024-02-03 | End: 2024-02-05 | Stop reason: HOSPADM

## 2024-02-03 RX ORDER — LIDOCAINE 4 G/G
1 PATCH TOPICAL DAILY
Status: DISCONTINUED | OUTPATIENT
Start: 2024-02-03 | End: 2024-02-03

## 2024-02-03 RX ORDER — POLYETHYLENE GLYCOL 3350 17 G/17G
17 POWDER, FOR SOLUTION ORAL DAILY PRN
Status: DISCONTINUED | OUTPATIENT
Start: 2024-02-03 | End: 2024-02-05 | Stop reason: HOSPADM

## 2024-02-03 RX ORDER — ONDANSETRON 4 MG/1
4 TABLET, ORALLY DISINTEGRATING ORAL EVERY 6 HOURS PRN
Status: DISCONTINUED | OUTPATIENT
Start: 2024-02-03 | End: 2024-02-05 | Stop reason: HOSPADM

## 2024-02-03 RX ORDER — OSELTAMIVIR PHOSPHATE 30 MG/1
30 CAPSULE ORAL EVERY 12 HOURS SCHEDULED
Status: DISCONTINUED | OUTPATIENT
Start: 2024-02-03 | End: 2024-02-05 | Stop reason: HOSPADM

## 2024-02-03 RX ORDER — ASPIRIN 81 MG/1
81 TABLET ORAL EVERY 24 HOURS
Status: DISCONTINUED | OUTPATIENT
Start: 2024-02-03 | End: 2024-02-05 | Stop reason: HOSPADM

## 2024-02-03 RX ORDER — LISINOPRIL 20 MG/1
20 TABLET ORAL 2 TIMES DAILY
Status: DISCONTINUED | OUTPATIENT
Start: 2024-02-03 | End: 2024-02-05 | Stop reason: HOSPADM

## 2024-02-03 RX ORDER — NITROGLYCERIN 0.4 MG/1
0.4 TABLET SUBLINGUAL
Status: DISCONTINUED | OUTPATIENT
Start: 2024-02-03 | End: 2024-02-05 | Stop reason: HOSPADM

## 2024-02-03 RX ORDER — ATORVASTATIN CALCIUM 20 MG/1
20 TABLET, FILM COATED ORAL NIGHTLY
Status: DISCONTINUED | OUTPATIENT
Start: 2024-02-03 | End: 2024-02-05 | Stop reason: HOSPADM

## 2024-02-03 RX ORDER — CARVEDILOL 6.25 MG/1
12.5 TABLET ORAL 2 TIMES DAILY WITH MEALS
Status: DISCONTINUED | OUTPATIENT
Start: 2024-02-03 | End: 2024-02-05 | Stop reason: HOSPADM

## 2024-02-03 RX ORDER — AMOXICILLIN 250 MG
2 CAPSULE ORAL 2 TIMES DAILY
Status: DISCONTINUED | OUTPATIENT
Start: 2024-02-03 | End: 2024-02-05 | Stop reason: HOSPADM

## 2024-02-03 RX ORDER — INDOCYANINE GREEN AND WATER 25 MG
2.5 KIT INJECTION ONCE
Status: DISCONTINUED | OUTPATIENT
Start: 2024-02-03 | End: 2024-02-03

## 2024-02-03 RX ORDER — ACETAMINOPHEN 325 MG/1
650 TABLET ORAL EVERY 4 HOURS PRN
Status: DISCONTINUED | OUTPATIENT
Start: 2024-02-03 | End: 2024-02-03

## 2024-02-03 RX ORDER — CARVEDILOL 6.25 MG/1
12.5 TABLET ORAL NIGHTLY
Status: DISCONTINUED | OUTPATIENT
Start: 2024-02-03 | End: 2024-02-03 | Stop reason: SDUPTHER

## 2024-02-03 RX ORDER — BISACODYL 5 MG/1
5 TABLET, DELAYED RELEASE ORAL DAILY PRN
Status: DISCONTINUED | OUTPATIENT
Start: 2024-02-03 | End: 2024-02-05 | Stop reason: HOSPADM

## 2024-02-03 RX ADMIN — CARVEDILOL 12.5 MG: 6.25 TABLET, FILM COATED ORAL at 08:30

## 2024-02-03 RX ADMIN — ATORVASTATIN CALCIUM 20 MG: 20 TABLET, FILM COATED ORAL at 20:45

## 2024-02-03 RX ADMIN — IPRATROPIUM BROMIDE AND ALBUTEROL SULFATE 3 ML: .5; 3 SOLUTION RESPIRATORY (INHALATION) at 08:41

## 2024-02-03 RX ADMIN — LISINOPRIL 20 MG: 20 TABLET ORAL at 08:30

## 2024-02-03 RX ADMIN — IPRATROPIUM BROMIDE AND ALBUTEROL SULFATE 3 ML: .5; 3 SOLUTION RESPIRATORY (INHALATION) at 13:58

## 2024-02-03 RX ADMIN — GUAIFENESIN 600 MG: 600 TABLET, EXTENDED RELEASE ORAL at 08:30

## 2024-02-03 RX ADMIN — LIDOCAINE 1 PATCH: 4 PATCH TOPICAL at 09:00

## 2024-02-03 RX ADMIN — CARVEDILOL 12.5 MG: 6.25 TABLET, FILM COATED ORAL at 17:43

## 2024-02-03 RX ADMIN — OSELTAMIVIR PHOSPHATE 30 MG: 30 CAPSULE ORAL at 08:30

## 2024-02-03 RX ADMIN — ACETAMINOPHEN 650 MG: 325 TABLET, FILM COATED ORAL at 08:30

## 2024-02-03 RX ADMIN — CARVEDILOL 12.5 MG: 6.25 TABLET, FILM COATED ORAL at 00:37

## 2024-02-03 RX ADMIN — LISINOPRIL 20 MG: 20 TABLET ORAL at 20:45

## 2024-02-03 RX ADMIN — HYDROCODONE BITARTRATE AND ACETAMINOPHEN 1 TABLET: 5; 325 TABLET ORAL at 17:43

## 2024-02-03 RX ADMIN — AMLODIPINE BESYLATE 5 MG: 5 TABLET ORAL at 08:30

## 2024-02-03 RX ADMIN — SODIUM CHLORIDE 100 ML/HR: 9 INJECTION, SOLUTION INTRAVENOUS at 10:41

## 2024-02-03 RX ADMIN — Medication 10 ML: at 09:00

## 2024-02-03 RX ADMIN — OSELTAMIVIR PHOSPHATE 30 MG: 30 CAPSULE ORAL at 20:45

## 2024-02-03 RX ADMIN — GUAIFENESIN 600 MG: 600 TABLET, EXTENDED RELEASE ORAL at 20:45

## 2024-02-03 RX ADMIN — METHYLPREDNISOLONE SODIUM SUCCINATE 40 MG: 40 INJECTION, POWDER, FOR SOLUTION INTRAMUSCULAR; INTRAVENOUS at 20:45

## 2024-02-03 RX ADMIN — SODIUM CHLORIDE 100 ML/HR: 9 INJECTION, SOLUTION INTRAVENOUS at 20:53

## 2024-02-03 RX ADMIN — METHYLPREDNISOLONE SODIUM SUCCINATE 40 MG: 40 INJECTION, POWDER, FOR SOLUTION INTRAMUSCULAR; INTRAVENOUS at 08:30

## 2024-02-03 RX ADMIN — DOCUSATE SODIUM 50MG AND SENNOSIDES 8.6MG 2 TABLET: 8.6; 5 TABLET, FILM COATED ORAL at 20:46

## 2024-02-03 RX ADMIN — IPRATROPIUM BROMIDE AND ALBUTEROL SULFATE 3 ML: .5; 3 SOLUTION RESPIRATORY (INHALATION) at 20:15

## 2024-02-03 RX ADMIN — ASPIRIN 81 MG: 81 TABLET, COATED ORAL at 08:30

## 2024-02-03 RX ADMIN — METHYLPREDNISOLONE SODIUM SUCCINATE 125 MG: 125 INJECTION, POWDER, FOR SOLUTION INTRAMUSCULAR; INTRAVENOUS at 00:37

## 2024-02-03 NOTE — ED PROVIDER NOTES
Subjective   History of Present Illness  Patient is an 83-year-old female from home with fever chills cough and congestion   No known ill contacts      Review of Systems   Constitutional:  Positive for chills and fever. Negative for fatigue.   HENT:  Positive for congestion. Negative for tinnitus and trouble swallowing.    Eyes:  Negative for photophobia, discharge and redness.   Respiratory:  Positive for cough and shortness of breath.    Cardiovascular:  Negative for chest pain and palpitations.   Gastrointestinal:  Negative for abdominal pain, diarrhea, nausea and vomiting.   Genitourinary:  Negative for dysuria, frequency and urgency.   Musculoskeletal:  Negative for back pain, joint swelling and myalgias.   Skin:  Negative for rash.   Neurological:  Negative for dizziness and headaches.   Psychiatric/Behavioral:  Negative for confusion.    All other systems reviewed and are negative.      Past Medical History:   Diagnosis Date    Arthritis     Diabetes mellitus     DJD (degenerative joint disease)     Gallstones     Heart murmur     History of stress test 07/2011    Stress myoview- neg     Hypertension     Lumbar disc disease     Mitral regurgitation     Multiple lipomas     Pleurisy     Spinal stenosis        Allergies   Allergen Reactions    Pravastatin Dizziness       Past Surgical History:   Procedure Laterality Date    BREAST BIOPSY Right 1990    CARDIAC CATHETERIZATION  06/21/2017    HEMANGIOMA EXCISION Left 2010    left forearm     HX OVARIAN CYSTECTOMY  1962    HYSTERECTOMY  2004       Family History   Problem Relation Age of Onset    Heart disease Father         MI    Arthritis Other     Hypertension Other     Colon cancer Other     No Known Problems Mother     No Known Problems Sister     No Known Problems Brother     No Known Problems Maternal Aunt     No Known Problems Maternal Uncle     No Known Problems Paternal Aunt     No Known Problems Paternal Uncle     No Known Problems Maternal Grandmother      No Known Problems Maternal Grandfather     No Known Problems Paternal Grandmother     No Known Problems Paternal Grandfather     Anemia Neg Hx     Arrhythmia Neg Hx     Asthma Neg Hx     Clotting disorder Neg Hx     Fainting Neg Hx     Heart attack Neg Hx     Heart failure Neg Hx     Hyperlipidemia Neg Hx        Social History     Socioeconomic History    Marital status:    Tobacco Use    Smoking status: Never     Passive exposure: Never    Smokeless tobacco: Never   Vaping Use    Vaping Use: Never used   Substance and Sexual Activity    Alcohol use: No    Drug use: No    Sexual activity: Defer           Objective   Physical Exam  Vitals reviewed.   Constitutional:       Appearance: Normal appearance. She is well-developed and normal weight.   HENT:      Head: Normocephalic and atraumatic.      Right Ear: External ear normal.      Left Ear: External ear normal.   Eyes:      Conjunctiva/sclera: Conjunctivae normal.      Pupils: Pupils are equal, round, and reactive to light.   Cardiovascular:      Rate and Rhythm: Normal rate and regular rhythm.      Heart sounds: Normal heart sounds.   Pulmonary:      Effort: Pulmonary effort is normal. No respiratory distress.      Breath sounds: Decreased air movement present. Examination of the right-lower field reveals decreased breath sounds. Examination of the left-lower field reveals decreased breath sounds. Decreased breath sounds present. No wheezing.   Abdominal:      General: Bowel sounds are normal.      Palpations: Abdomen is soft.      Tenderness: There is no abdominal tenderness.   Musculoskeletal:         General: No deformity. Normal range of motion.      Cervical back: Normal range of motion and neck supple.   Skin:     General: Skin is warm and dry.      Capillary Refill: Capillary refill takes less than 2 seconds.   Neurological:      General: No focal deficit present.      Mental Status: She is alert and oriented to person, place, and time.      GCS:  "GCS eye subscore is 4. GCS verbal subscore is 5. GCS motor subscore is 6.      Motor: No weakness.   Psychiatric:         Attention and Perception: Attention normal.         Mood and Affect: Mood normal.         Behavior: Behavior normal.         Procedures           ED Course      BP (!) 193/84   Pulse 101   Temp (!) 102.8 °F (39.3 °C) (Rectal)   Resp 19   Ht 154.9 cm (61\")   Wt 66 kg (145 lb 8.1 oz)   LMP  (LMP Unknown)   SpO2 92%   BMI 27.49 kg/m²   Labs Reviewed   RESPIRATORY PANEL PCR W/ COVID-19 (SARS-COV-2), NP SWAB IN UTM/VTP, 2 HR TAT - Abnormal; Notable for the following components:       Result Value    Influenza A H3 Detected (*)     All other components within normal limits    Narrative:     In the setting of a positive respiratory panel with a viral infection PLUS a negative procalcitonin without other underlying concern for bacterial infection, consider observing off antibiotics or discontinuation of antibiotics and continue supportive care. If the respiratory panel is positive for atypical bacterial infection (Bordetella pertussis, Chlamydophila pneumoniae, or Mycoplasma pneumoniae), consider antibiotic de-escalation to target atypical bacterial infection.   COMPREHENSIVE METABOLIC PANEL - Abnormal; Notable for the following components:    Glucose 129 (*)     Total Bilirubin 1.5 (*)     All other components within normal limits    Narrative:     GFR Normal >60  Chronic Kidney Disease <60  Kidney Failure <15    The GFR formula is only valid for adults with stable renal function between ages 18 and 70.   CBC WITH AUTO DIFFERENTIAL - Abnormal; Notable for the following components:    Neutrophil % 80.4 (*)     Lymphocyte % 4.0 (*)     Monocyte % 14.7 (*)     Neutrophils, Absolute 7.40 (*)     Lymphocytes, Absolute 0.40 (*)     Monocytes, Absolute 1.40 (*)     All other components within normal limits   URINALYSIS W/ CULTURE IF INDICATED - Abnormal; Notable for the following components:    " Ketones, UA 15 mg/dL (1+) (*)     Protein, UA Trace (*)     All other components within normal limits    Narrative:     In absence of clinical symptoms, the presence of pyuria, bacteria, and/or nitrites on the urinalysis result does not correlate with infection.  Urine microscopic not indicated.   BLOOD CULTURE   BLOOD CULTURE   CBC AND DIFFERENTIAL    Narrative:     The following orders were created for panel order CBC & Differential.  Procedure                               Abnormality         Status                     ---------                               -----------         ------                     CBC Auto Differential[[id number]]        Abnormal            Final result                 Please view results for these tests on the individual orders.     Medications   sodium chloride 0.9 % flush 10 mL (has no administration in time range)   methylPREDNISolone sodium succinate (SOLU-Medrol) injection 125 mg (has no administration in time range)   carvedilol (COREG) tablet 12.5 mg (has no administration in time range)   acetaminophen (TYLENOL) tablet 1,000 mg (1,000 mg Oral Given 2/2/24 2329)   HYDROcodone Bit-Homatrop MBr (HYCODAN) tablet 1 tablet (1 tablet Oral Given 2/2/24 2329)     XR Chest 1 View    Result Date: 2/3/2024  Impression: No active disease Electronically Signed: Emanuel Sevilla MD  2/3/2024 12:01 AM EST  Workstation ID: [id number]                                          Medical Decision Making  Patient is an 83-year-old female who comes in from home with fever cough congestion concerning for flu COVID RSV pneumonia this is not an all-inclusive list.  The patient had above exam IV was established blood work was obtained the patient was found to be influenza A positive she was oxygen dependent and required 2 L of oxygen here while in the emergency room to keep her above 90% she was found to have a fever of 102.8 and she was treated with Tylenol and Motrin  She was also found to be hypertensive and was  given her nighttime dose of Coreg  Will be placed in the ED observation overnight for observation and continued care treatment of fever and oxygen and to possible discharge in the morning patient was agreeable this plan of care as well as her son at bedside    Problems Addressed:  Fever and chills: complicated acute illness or injury  Hypoxia: complicated acute illness or injury  Influenza A: complicated acute illness or injury    Amount and/or Complexity of Data Reviewed  Labs: ordered. Decision-making details documented in ED Course.  Radiology: ordered and independent interpretation performed. Decision-making details documented in ED Course.    Risk  OTC drugs.  Prescription drug management.  Decision regarding hospitalization.        Final diagnoses:   Fever and chills   Hypoxia   Influenza A       ED Disposition  ED Disposition       ED Disposition   Decision to Admit    Condition   --    Comment   --               No follow-up provider specified.       Medication List      No changes were made to your prescriptions during this visit.            Rubia Branch, APRN  02/03/24 0005

## 2024-02-03 NOTE — PLAN OF CARE
Problem: Adult Inpatient Plan of Care  Goal: Plan of Care Review  2/3/2024 0222 by Joanna Bowles RN  Outcome: Ongoing, Progressing  Flowsheets (Taken 2/3/2024 0222)  Progress: no change  Plan of Care Reviewed With: patient  2/3/2024 0221 by Joanna Bowles RN  Outcome: Ongoing, Progressing  Goal: Patient-Specific Goal (Individualized)  2/3/2024 0222 by Joanna Bowles RN  Outcome: Ongoing, Progressing  2/3/2024 0221 by Joanna Bowles RN  Outcome: Ongoing, Progressing  Goal: Absence of Hospital-Acquired Illness or Injury  2/3/2024 0222 by Joanna Bowles RN  Outcome: Ongoing, Progressing  2/3/2024 0221 by Joanna Bowles RN  Outcome: Ongoing, Progressing  Intervention: Identify and Manage Fall Risk  Recent Flowsheet Documentation  Taken 2/3/2024 0202 by Joanna Bowles RN  Safety Promotion/Fall Prevention: safety round/check completed  Intervention: Prevent Skin Injury  Recent Flowsheet Documentation  Taken 2/3/2024 0202 by Joanna Bowles RN  Body Position: position changed independently  Intervention: Prevent and Manage VTE (Venous Thromboembolism) Risk  Recent Flowsheet Documentation  Taken 2/3/2024 0202 by Joanna Bowles RN  Activity Management: bedrest  Goal: Optimal Comfort and Wellbeing  2/3/2024 0222 by Joanna Bowles RN  Outcome: Ongoing, Progressing  2/3/2024 0221 by Joanna Bowles RN  Outcome: Ongoing, Progressing  Intervention: Monitor Pain and Promote Comfort  Recent Flowsheet Documentation  Taken 2/3/2024 0202 by Joanna Bowles RN  Pain Management Interventions: see MAR  Intervention: Provide Person-Centered Care  Recent Flowsheet Documentation  Taken 2/3/2024 0202 by Jonana Bowles RN  Trust Relationship/Rapport:   care explained   choices provided   emotional support provided   questions answered   empathic listening provided   questions encouraged   reassurance provided   thoughts/feelings acknowledged  Goal: Readiness for Transition of Care  2/3/2024 0222 by Joanna Bowles RN  Outcome:  Ongoing, Progressing  2/3/2024 0221 by Joanna Bowles RN  Outcome: Ongoing, Progressing     Problem: Breathing Pattern Ineffective  Goal: Effective Breathing Pattern  Outcome: Ongoing, Progressing  Intervention: Promote Improved Breathing Pattern  Recent Flowsheet Documentation  Taken 2/3/2024 0202 by Joanna Bowles RN  Head of Bed (HOB) Positioning:   HOB elevated   HOB at 30 degrees     Problem: Pain Acute  Goal: Acceptable Pain Control and Functional Ability  Outcome: Ongoing, Progressing  Intervention: Prevent or Manage Pain  Recent Flowsheet Documentation  Taken 2/3/2024 0202 by Joanna Bowles RN  Medication Review/Management: medications reviewed  Intervention: Develop Pain Management Plan  Recent Flowsheet Documentation  Taken 2/3/2024 0202 by Joanna Bowles RN  Pain Management Interventions: see MAR  Intervention: Optimize Psychosocial Wellbeing  Recent Flowsheet Documentation  Taken 2/3/2024 0202 by Joanna Bowles RN  Diversional Activities: television   Goal Outcome Evaluation:  Plan of Care Reviewed With: patient        Progress: no change

## 2024-02-03 NOTE — H&P
[facility] H&P    Patient Name: Maria D Fountain  : 1940  MRN: [id number]  Primary Care Physician: Celi Renner MD  Date of admission: 2024     Patient Care Team:  eCli Renner MD as PCP - General (Family Medicine)  Mera Boothe MD as Surgeon (Orthopedic Surgery)  Matti Luna MD as Consulting Physician (Cardiology)  Michelle Sierra MD as Surgeon (General Surgery)          Subjective   History Present Illness     Chief Complaint:   Chief Complaint   Patient presents with    Flu Symptoms     Influenza    Ms. Fountain is a 83 y.o.  presents to [facility] complaining of influenza      History of Present Illness    ED 2/3/24: Patient is an 83-year-old female from home with fever chills cough and congestion.    Observation 2/3/24: Patient is an 83-year-old female presenting to the hospital with cough congestion and fever.  Patient also reports shortness of breath.  Patient reported son also has similar symptoms.  Patient reported Tmax fever of 102 at home.  Patient states that she has had loss of appetite and nonproductive cough for the past few days.  Patient denies chest pain, vomiting, syncope or edema.    Review of Systems   Constitutional: Positive for fever and malaise/fatigue.   HENT:  Positive for congestion.    Eyes: Negative.    Cardiovascular:  Positive for dyspnea on exertion.   Respiratory:  Positive for cough and shortness of breath.    Endocrine: Negative.    Hematologic/Lymphatic: Negative.    Skin: Negative.    Musculoskeletal: Negative.    Gastrointestinal:  Positive for nausea.   Genitourinary: Negative.    Neurological:  Positive for headaches and weakness.   Psychiatric/Behavioral: Negative.     Allergic/Immunologic: Negative.            Personal History     Past Medical History:   Past Medical History:   Diagnosis Date    Arthritis     Diabetes mellitus     DJD (degenerative joint disease)     Gallstones     Heart murmur      History of stress test 07/2011    Stress myoview- neg     Hypertension     Lumbar disc disease     Mitral regurgitation     Multiple lipomas     Pleurisy     Spinal stenosis        Surgical History:      Past Surgical History:   Procedure Laterality Date    BREAST BIOPSY Right 1990    CARDIAC CATHETERIZATION  06/21/2017    HEMANGIOMA EXCISION Left 2010    left forearm     HX OVARIAN CYSTECTOMY  1962    HYSTERECTOMY  2004           Family History: family history includes Arthritis in an other family member; Colon cancer in an other family member; Heart disease in her father; Hypertension in an other family member; No Known Problems in her brother, maternal aunt, maternal grandfather, maternal grandmother, maternal uncle, mother, paternal aunt, paternal grandfather, paternal grandmother, paternal uncle, and sister. Otherwise pertinent FHx was reviewed and unremarkable.     Social History:  reports that she has never smoked. She has never been exposed to tobacco smoke. She has never used smokeless tobacco. She reports that she does not drink alcohol and does not use drugs.      Medications:  Prior to Admission medications    Medication Sig Start Date End Date Taking? Authorizing Provider   acetaminophen (TYLENOL) 650 MG 8 hr tablet Take 1 tablet by mouth Every 8 (Eight) Hours As Needed for Mild Pain.   Yes Shimon Carrillo MD   amLODIPine (NORVASC) 5 MG tablet Take 1 tablet by mouth Daily As Needed.   Yes Shimon Carrillo MD   ascorbic acid (VITAMIN C) 1000 MG tablet Take 0.5 tablets by mouth 2 (two) times a day. 5/14/14  Yes Shimon Carrillo MD   aspirin (aspirin) 81 MG EC tablet Take 1 tablet by mouth Daily. 1/14/19  Yes Shimon Carrillo MD   atorvastatin (LIPITOR) 20 MG tablet Take 1 tablet by mouth Every Night. 2/5/23  Yes Celi Renner MD   Calcium Carb-Cholecalciferol 1000-800 MG-UNIT tablet Take 4 tablets by mouth Daily. 2/8/19  Yes Shimon Carrillo MD   carvedilol (COREG)  12.5 MG tablet Take 1 tablet by mouth 2 (Two) Times a Day With Meals. 7/25/23  Yes Lizzie Gordon APRN   HM OMEGA-3-6-9 FATTY ACIDS capsule Take 1 capsule by mouth Daily. 2/8/19  Yes ProviderShimon MD   Lidocaine 4 % Place 1 patch on the skin as directed by provider Daily. Remove & Discard patch within 12 hours or as directed by MD   Yes Provider, MD Shimon   lisinopril (PRINIVIL,ZESTRIL) 20 MG tablet TAKE 1 TABLET BY MOUTH TWICE A DAY 1/16/24  Yes Matti Luna MD   multivitamin-minerals (CENTRUM) tablet Take 1 tablet by mouth Daily. 5/14/14  Yes ProviderShimon MD   nitroglycerin (Nitrostat) 0.4 MG SL tablet Place 1 tablet under the tongue Every 5 (Five) Minutes As Needed for Chest Pain. Take no more than 3 doses in 15 minutes. 2/2/23  Yes Celi Renner MD   vitamin B-12 (CYANOCOBALAMIN) 1000 MCG tablet Take 1 tablet by mouth Daily.   Yes Provider, MD Shimon   lidocaine (LIDODERM) 5 % Place 1 patch on the skin as directed by provider Daily. Remove & Discard patch within 12 hours or as directed by MD 7/19/23 2/3/24  Shweta Boyce PA   meloxicam (MOBIC) 15 MG tablet Take 1 tablet by mouth Daily. 7/25/23 2/3/24  Lizzie Gordon APRN   montelukast (SINGULAIR) 10 MG tablet Take 1 tablet by mouth Every Night. 10/15/20 1/18/21  Scotty Araya MD       Allergies:    Allergies   Allergen Reactions    Pravastatin Dizziness       Objective   Objective     Vital Signs  Temp:  [98.8 °F (37.1 °C)-102.8 °F (39.3 °C)] 99 °F (37.2 °C)  Heart Rate:  [] 90  Resp:  [16-20] 16  BP: (112-203)/(50-96) 155/90  SpO2:  [88 %-96 %] 94 %  on  Flow (L/min):  [2] 2;   Device (Oxygen Therapy): nasal cannula  Body mass index is 27.49 kg/m².    Physical Exam  Vitals and nursing note reviewed.   Constitutional:       Appearance: Normal appearance.   HENT:      Head: Normocephalic and atraumatic.      Right Ear: External ear normal.      Left Ear: External ear normal.      Nose: Congestion  present.      Mouth/Throat:      Pharynx: Oropharynx is clear.   Eyes:      Extraocular Movements: Extraocular movements intact.      Conjunctiva/sclera: Conjunctivae normal.      Pupils: Pupils are equal, round, and reactive to light.   Cardiovascular:      Rate and Rhythm: Normal rate and regular rhythm.      Pulses: Normal pulses.      Heart sounds: Normal heart sounds.   Pulmonary:      Effort: Pulmonary effort is normal.      Breath sounds: Normal breath sounds.   Abdominal:      General: Bowel sounds are normal.      Palpations: Abdomen is soft.   Musculoskeletal:         General: Normal range of motion.      Cervical back: Normal range of motion.   Skin:     General: Skin is warm.      Capillary Refill: Capillary refill takes less than 2 seconds.   Neurological:      Mental Status: She is alert and oriented to person, place, and time.      Motor: Weakness present.   Psychiatric:         Mood and Affect: Mood normal.         Behavior: Behavior normal.         Thought Content: Thought content normal.         Judgment: Judgment normal.           Results Review:  I have personally reviewed most recent cardiac tracings, lab results, microbiology results, and radiology images and interpretations and agree with findings, most notably: CBC, CMP, urinalysis, respiratory panel, blood cultures    Results from last 7 days   Lab Units 02/03/24  0352   WBC 10*3/mm3 6.20   HEMOGLOBIN g/dL 12.9   HEMATOCRIT % 39.0   PLATELETS 10*3/mm3 214     Results from last 7 days   Lab Units 02/03/24  0352   SODIUM mmol/L 135*   POTASSIUM mmol/L 3.7   CHLORIDE mmol/L 99   CO2 mmol/L 26.0   BUN mg/dL 11   CREATININE mg/dL 0.99   GLUCOSE mg/dL 146*   CALCIUM mg/dL 8.8   ALK PHOS U/L 84   ALT (SGPT) U/L 21   AST (SGOT) U/L 30     Estimated Creatinine Clearance: 37.5 mL/min (by C-G formula based on SCr of 0.99 mg/dL).  Brief Urine Lab Results  (Last result in the past 365 days)        Color   Clarity   Blood   Leuk Est   Nitrite   Protein    CREAT   Urine HCG        02/02/24 2328 Yellow   Clear   Negative   Negative   Negative   Trace                   Microbiology Results (last 10 days)       Procedure Component Value - Date/Time    Respiratory Panel PCR w/COVID-19(SARS-CoV-2) AMIE/WALESKA/OTTONIEL/PAD/COR/CHANTELL In-House, NP Swab in UTM/VTM, 2 HR TAT - Swab, Nasopharynx [[id number]]  (Abnormal) Collected: 02/02/24 2249    Lab Status: Final result Specimen: Swab from Nasopharynx Updated: 02/02/24 1819     ADENOVIRUS, PCR Not Detected     Coronavirus 229E Not Detected     Coronavirus HKU1 Not Detected     Coronavirus NL63 Not Detected     Coronavirus OC43 Not Detected     COVID19 Not Detected     Human Metapneumovirus Not Detected     Human Rhinovirus/Enterovirus Not Detected     Influenza A H3 Detected     Influenza B PCR Not Detected     Parainfluenza Virus 1 Not Detected     Parainfluenza Virus 2 Not Detected     Parainfluenza Virus 3 Not Detected     Parainfluenza Virus 4 Not Detected     RSV, PCR Not Detected     Bordetella pertussis pcr Not Detected     Bordetella parapertussis PCR Not Detected     Chlamydophila pneumoniae PCR Not Detected     Mycoplasma pneumo by PCR Not Detected    Narrative:      In the setting of a positive respiratory panel with a viral infection PLUS a negative procalcitonin without other underlying concern for bacterial infection, consider observing off antibiotics or discontinuation of antibiotics and continue supportive care. If the respiratory panel is positive for atypical bacterial infection (Bordetella pertussis, Chlamydophila pneumoniae, or Mycoplasma pneumoniae), consider antibiotic de-escalation to target atypical bacterial infection.            ECG/EMG Results (most recent)       None            Results for orders placed during the hospital encounter of 07/19/23    Duplex Venous Lower Extremity - Left    Interpretation Summary    Normal left lower extremity venous duplex scan.      Results for orders placed during the hospital  encounter of 09/14/23    Adult Transthoracic Echo Complete W/ Cont if Necessary Per Protocol    Interpretation Summary  Conclusion      Normal LV size and contractility EF of 60 to 65%  Normal RV size  Normal atrial size  Aortic valve, mitral valve, tricuspid, pulmonic valve appears structurally normal, mild mitral and pulmonic regurgitation seen.  Trace tricuspid regurgitation seen.  Calculated RV systolic pressure of 35 mmHg..  No pericardial effusion seen.  Proximal aorta appears normal in size.      XR Chest 1 View    Result Date: 2/3/2024  Impression: No active disease Electronically Signed: Emanuel Sevilla MD  2/3/2024 12:01 AM EST  Workstation ID: [id number]       Estimated Creatinine Clearance: 37.5 mL/min (by C-G formula based on SCr of 0.99 mg/dL).    Assessment & Plan   Assessment/Plan       Active Hospital Problems    Diagnosis  POA    **Influenza A [J10.1]  Yes      Resolved Hospital Problems   No resolved problems to display.     Influenza  Lab Results   Component Value Date    WBC 6.20 02/03/2024    LACTATE 0.9 09/04/2019   -Chest x-ray: No acute cardiopulmonary disease seen  -Respiratory panel positive for influenza A  -Continue IV fluids  -Tamiflu initiated  -Breathing treatments, Mucinex, Tessalon and incentive spirometry ordered  -Continue pulse oximetry and telemetry  -Blood cultures pending  -Isolation precautions maintain    Hypertension/CAD/Hyperlipidemia  -Poorly Controlled   BP Readings from Last 1 Encounters:   02/03/24 155/90   - Continue amlodipine, carvedilol, lisinopril  -Continue aspirin and statin  - Monitor while admitted      VTE Prophylaxis -   Mechanical Order History:        Ordered        02/03/24 0656  Place Sequential Compression Device  Once            02/03/24 0656  Maintain Sequential Compression Device  Continuous            02/03/24 0205  Place Sequential Compression Device  Once            02/03/24 0205  Maintain Sequential Compression Device  Continuous                           Pharmalogical Order History:       None            CODE STATUS:    Code Status and Medical Interventions:   Ordered at: 02/03/24 0656     Level Of Support Discussed With:    Patient     Code Status (Patient has no pulse and is not breathing):    CPR (Attempt to Resuscitate)     Medical Interventions (Patient has pulse or is breathing):    Full Support       This patient has been examined wearing personal protective equipment.     I discussed the patient's findings and my recommendations with patient, family, nursing staff, primary care team, and consulting provider.      Signature:Electronically signed by BJORN Kahn, 02/03/24, 12:30 PM EST.          I spent 35 minutes caring for Maria D on this date of service. This time includes time spent by me in the following activities: reviewing tests, obtaining and/or reviewing a separately obtained history, performing a medically appropriate examination and/or evaluation, counseling and educating the patient/family/caregiver, ordering medications, tests, or procedures, referring and communicating with other health care professionals, documenting information in the medical record, independently interpreting results and communicating that information with the patient/family/caregiver, and care coordination.

## 2024-02-03 NOTE — ED NOTES
Nursing report ED to floor  Maria D Fountain  83 y.o.  female    HPI:   Chief Complaint   Patient presents with    Flu Symptoms       Admitting doctor:   Jax Mai MD    Admitting diagnosis:   The primary encounter diagnosis was Fever and chills. Diagnoses of Hypoxia and Influenza A were also pertinent to this visit.    Code status:   Current Code Status       Date Active Code Status Order ID Comments User Context       2/3/2024 0009 CPR (Attempt to Resuscitate) [id number]  Rubia Branch, [facility]        Question Answer    Code Status (Patient has no pulse and is not breathing) CPR (Attempt to Resuscitate)    Medical Interventions (Patient has pulse or is breathing) Full Support    Level Of Support Discussed With Patient                    Allergies:   Pravastatin    Isolation:  No active isolations     Fall Risk:  Fall Risk Assessment was completed, and patient is at high risk for falls.   Predictive Model Details         12 (Low) Factor Value    Calculated 2/3/2024 00:48 Age 83    Risk of Fall Model Musculoskeletal Assessment WDL     Active Peripheral IV Present     Imaging order in this encounter Present     Respiratory Rate 19     Skin Assessment WDL     Magnesium not on file     Number of Distinct Medication Classes administered 4     Financial Class Medicare     Drug Use No     Total Bilirubin 1.5 mg/dL     Jersey Scale not on file     Peripheral Vascular Assessment WDL     Chloride 100 mmol/L     Gastrointestinal Assessment WDL     Diastolic BP 84     Calcium 9.2 mg/dL     Cardiac Assessment WDL     ALT 22 U/L     Days after Admission 0.122     Albumin 4.5 g/dL     Creatinine 0.71 mg/dL     Potassium 3.6 mmol/L         Weight:       02/02/24 2041   Weight: 66 kg (145 lb 8.1 oz)       Intake and Output  No intake or output data in the 24 hours ending 02/03/24 0048    Diet:        Most recent vitals:   Vitals:    02/02/24 2300 02/02/24 2327 02/02/24 2330 02/03/24 0044   BP: (!) 186/86  (!) 193/84     Pulse: 103  101    Resp:   19    Temp:  (!) 102.8 °F (39.3 °C)  (!) 101.2 °F (38.4 °C)   TempSrc:  Rectal  Oral   SpO2: (!) 88%  92%    Weight:       Height:           Active LDAs/IV Access:   Lines, Drains & Airways       Active LDAs       Name Placement date Placement time Site Days    Peripheral IV Anterior;Left Forearm --  --  Forearm  --                    Skin Condition:   Skin Assessments (last day)       None             Labs (abnormal labs have a star):   Labs Reviewed   RESPIRATORY PANEL PCR W/ COVID-19 (SARS-COV-2), NP SWAB IN UTM/VTP, 2 HR TAT - Abnormal; Notable for the following components:       Result Value    Influenza A H3 Detected (*)     All other components within normal limits    Narrative:     In the setting of a positive respiratory panel with a viral infection PLUS a negative procalcitonin without other underlying concern for bacterial infection, consider observing off antibiotics or discontinuation of antibiotics and continue supportive care. If the respiratory panel is positive for atypical bacterial infection (Bordetella pertussis, Chlamydophila pneumoniae, or Mycoplasma pneumoniae), consider antibiotic de-escalation to target atypical bacterial infection.   COMPREHENSIVE METABOLIC PANEL - Abnormal; Notable for the following components:    Glucose 129 (*)     Total Bilirubin 1.5 (*)     All other components within normal limits    Narrative:     GFR Normal >60  Chronic Kidney Disease <60  Kidney Failure <15    The GFR formula is only valid for adults with stable renal function between ages 18 and 70.   CBC WITH AUTO DIFFERENTIAL - Abnormal; Notable for the following components:    Neutrophil % 80.4 (*)     Lymphocyte % 4.0 (*)     Monocyte % 14.7 (*)     Neutrophils, Absolute 7.40 (*)     Lymphocytes, Absolute 0.40 (*)     Monocytes, Absolute 1.40 (*)     All other components within normal limits   URINALYSIS W/ CULTURE IF INDICATED - Abnormal; Notable for the following components:     Ketones, UA 15 mg/dL (1+) (*)     Protein, UA Trace (*)     All other components within normal limits    Narrative:     In absence of clinical symptoms, the presence of pyuria, bacteria, and/or nitrites on the urinalysis result does not correlate with infection.  Urine microscopic not indicated.   BLOOD CULTURE   BLOOD CULTURE   CBC AND DIFFERENTIAL    Narrative:     The following orders were created for panel order CBC & Differential.  Procedure                               Abnormality         Status                     ---------                               -----------         ------                     CBC Auto Differential[[id number]]        Abnormal            Final result                 Please view results for these tests on the individual orders.       LOC: Person, Place, Time, and Situation    Telemetry:  Observation Unit    Cardiac Monitoring Ordered: yes    EKG:   No orders to display       Medications Given in the ED:   Medications   sodium chloride 0.9 % flush 10 mL (has no administration in time range)   carvedilol (COREG) tablet 12.5 mg (12.5 mg Oral Given 2/3/24 0037)   acetaminophen (TYLENOL) tablet 1,000 mg (1,000 mg Oral Given 2/2/24 2329)   HYDROcodone Bit-Homatrop MBr (HYCODAN) tablet 1 tablet (1 tablet Oral Given 2/2/24 2329)   methylPREDNISolone sodium succinate (SOLU-Medrol) injection 125 mg (125 mg Intravenous Given 2/3/24 0037)       Imaging results:  XR Chest 1 View    Result Date: 2/3/2024  Impression: No active disease Electronically Signed: Emanuel Sevilla MD  2/3/2024 12:01 AM EST  Workstation ID: [id number]     Social issues:   Social History     Socioeconomic History    Marital status:    Tobacco Use    Smoking status: Never     Passive exposure: Never    Smokeless tobacco: Never   Vaping Use    Vaping Use: Never used   Substance and Sexual Activity    Alcohol use: No    Drug use: No    Sexual activity: Defer       NIH Stroke Scale:  Interval: (not recorded)  1a. Level of  Consciousness: (not recorded)  1b. LOC Questions: (not recorded)  1c. LOC Commands: (not recorded)  2. Best Gaze: (not recorded)  3. Visual: (not recorded)  4. Facial Palsy: (not recorded)  5a. Motor Arm, Left: (not recorded)  5b. Motor Arm, Right: (not recorded)  6a. Motor Leg, Left: (not recorded)  6b. Motor Leg, Right: (not recorded)  7. Limb Ataxia: (not recorded)  8. Sensory: (not recorded)  9. Best Language: (not recorded)  10. Dysarthria: (not recorded)  11. Extinction and Inattention (formerly Neglect): (not recorded)    Total (NIH Stroke Scale): (not recorded)     Additional notable assessment information:     Nursing report ED to floor:  Joanna Garcia RN   02/03/24 00:48 EST

## 2024-02-03 NOTE — PLAN OF CARE
Goal Outcome Evaluation:  Plan of Care Reviewed With: patient        Progress: improving  Outcome Evaluation: Patient alert and oriented. Patient on 2L NC. Patient states that she has a 5/10 headache. Patient has been given PO tylenol. Patient has dry cough. Patient has diminished lung sounds, expiratory wheeze.

## 2024-02-04 ENCOUNTER — HOME HEALTH ADMISSION (OUTPATIENT)
Dept: HOME HEALTH SERVICES | Facility: HOME HEALTHCARE | Age: 84
End: 2024-02-04
Payer: MEDICARE

## 2024-02-04 ENCOUNTER — APPOINTMENT (OUTPATIENT)
Dept: GENERAL RADIOLOGY | Facility: HOSPITAL | Age: 84
End: 2024-02-04
Payer: MEDICARE

## 2024-02-04 ENCOUNTER — READMISSION MANAGEMENT (OUTPATIENT)
Dept: CALL CENTER | Facility: HOSPITAL | Age: 84
End: 2024-02-04
Payer: MEDICARE

## 2024-02-04 VITALS
HEART RATE: 85 BPM | RESPIRATION RATE: 16 BRPM | TEMPERATURE: 98 F | DIASTOLIC BLOOD PRESSURE: 77 MMHG | WEIGHT: 145.5 LBS | SYSTOLIC BLOOD PRESSURE: 166 MMHG | BODY MASS INDEX: 27.47 KG/M2 | OXYGEN SATURATION: 97 % | HEIGHT: 61 IN

## 2024-02-04 LAB
ANION GAP SERPL CALCULATED.3IONS-SCNC: 9 MMOL/L (ref 5–15)
BUN SERPL-MCNC: 19 MG/DL (ref 8–23)
BUN/CREAT SERPL: 23.5 (ref 7–25)
CALCIUM SPEC-SCNC: 8.4 MG/DL (ref 8.6–10.5)
CHLORIDE SERPL-SCNC: 108 MMOL/L (ref 98–107)
CO2 SERPL-SCNC: 26 MMOL/L (ref 22–29)
CREAT SERPL-MCNC: 0.81 MG/DL (ref 0.57–1)
DEPRECATED RDW RBC AUTO: 50.3 FL (ref 37–54)
EGFRCR SERPLBLD CKD-EPI 2021: 72.1 ML/MIN/1.73
ERYTHROCYTE [DISTWIDTH] IN BLOOD BY AUTOMATED COUNT: 14.9 % (ref 12.3–15.4)
GLUCOSE SERPL-MCNC: 150 MG/DL (ref 65–99)
HCT VFR BLD AUTO: 36.9 % (ref 34–46.6)
HGB BLD-MCNC: 12.3 G/DL (ref 12–15.9)
LYMPHOCYTES # BLD MANUAL: 0.6 10*3/MM3 (ref 0.7–3.1)
LYMPHOCYTES NFR BLD MANUAL: 9 % (ref 5–12)
MCH RBC QN AUTO: 31 PG (ref 26.6–33)
MCHC RBC AUTO-ENTMCNC: 33.4 G/DL (ref 31.5–35.7)
MCV RBC AUTO: 92.7 FL (ref 79–97)
MONOCYTES # BLD: 1.35 10*3/MM3 (ref 0.1–0.9)
NEUTROPHILS # BLD AUTO: 13.05 10*3/MM3 (ref 1.7–7)
NEUTROPHILS NFR BLD MANUAL: 72 % (ref 42.7–76)
NEUTS BAND NFR BLD MANUAL: 15 % (ref 0–5)
PLATELET # BLD AUTO: 226 10*3/MM3 (ref 140–450)
PMV BLD AUTO: 8.3 FL (ref 6–12)
POTASSIUM SERPL-SCNC: 4.1 MMOL/L (ref 3.5–5.2)
RBC # BLD AUTO: 3.98 10*6/MM3 (ref 3.77–5.28)
RBC MORPH BLD: NORMAL
SCAN SLIDE: NORMAL
SMALL PLATELETS BLD QL SMEAR: ADEQUATE
SODIUM SERPL-SCNC: 143 MMOL/L (ref 136–145)
VARIANT LYMPHS NFR BLD MANUAL: 4 % (ref 19.6–45.3)
WBC MORPH BLD: NORMAL
WBC NRBC COR # BLD AUTO: 15 10*3/MM3 (ref 3.4–10.8)

## 2024-02-04 PROCEDURE — 94618 PULMONARY STRESS TESTING: CPT

## 2024-02-04 PROCEDURE — 71046 X-RAY EXAM CHEST 2 VIEWS: CPT

## 2024-02-04 PROCEDURE — 25010000002 CEFTRIAXONE PER 250 MG: Performed by: NURSE PRACTITIONER

## 2024-02-04 PROCEDURE — 85007 BL SMEAR W/DIFF WBC COUNT: CPT | Performed by: NURSE PRACTITIONER

## 2024-02-04 PROCEDURE — 94799 UNLISTED PULMONARY SVC/PX: CPT

## 2024-02-04 PROCEDURE — 25010000002 METHYLPREDNISOLONE PER 40 MG: Performed by: NURSE PRACTITIONER

## 2024-02-04 PROCEDURE — G0378 HOSPITAL OBSERVATION PER HR: HCPCS

## 2024-02-04 PROCEDURE — 85025 COMPLETE CBC W/AUTO DIFF WBC: CPT | Performed by: NURSE PRACTITIONER

## 2024-02-04 PROCEDURE — 94664 DEMO&/EVAL PT USE INHALER: CPT

## 2024-02-04 PROCEDURE — 96376 TX/PRO/DX INJ SAME DRUG ADON: CPT

## 2024-02-04 PROCEDURE — 80048 BASIC METABOLIC PNL TOTAL CA: CPT | Performed by: NURSE PRACTITIONER

## 2024-02-04 PROCEDURE — 63710000001 ONDANSETRON ODT 4 MG TABLET DISPERSIBLE: Performed by: NURSE PRACTITIONER

## 2024-02-04 PROCEDURE — 96361 HYDRATE IV INFUSION ADD-ON: CPT

## 2024-02-04 RX ORDER — SUMATRIPTAN 50 MG/1
50 TABLET, FILM COATED ORAL
Status: DISCONTINUED | OUTPATIENT
Start: 2024-02-04 | End: 2024-02-05 | Stop reason: HOSPADM

## 2024-02-04 RX ORDER — AZITHROMYCIN 500 MG/1
500 TABLET, FILM COATED ORAL DAILY
Qty: 30 TABLET | Refills: 0 | Status: SHIPPED | OUTPATIENT
Start: 2024-02-04

## 2024-02-04 RX ORDER — OSELTAMIVIR PHOSPHATE 30 MG/1
30 CAPSULE ORAL EVERY 12 HOURS SCHEDULED
Qty: 7 CAPSULE | Refills: 0 | Status: SHIPPED | OUTPATIENT
Start: 2024-02-04 | End: 2024-02-08

## 2024-02-04 RX ORDER — ALBUTEROL SULFATE 90 UG/1
2 AEROSOL, METERED RESPIRATORY (INHALATION) EVERY 4 HOURS PRN
Qty: 6.7 G | Refills: 0 | Status: SHIPPED | OUTPATIENT
Start: 2024-02-04

## 2024-02-04 RX ORDER — GUAIFENESIN 600 MG/1
600 TABLET, EXTENDED RELEASE ORAL EVERY 12 HOURS SCHEDULED
Qty: 14 TABLET | Refills: 0 | Status: SHIPPED | OUTPATIENT
Start: 2024-02-04 | End: 2024-02-11

## 2024-02-04 RX ORDER — BENZONATATE 100 MG/1
100 CAPSULE ORAL 3 TIMES DAILY PRN
Qty: 30 CAPSULE | Refills: 0 | Status: SHIPPED | OUTPATIENT
Start: 2024-02-04

## 2024-02-04 RX ORDER — METHYLPREDNISOLONE 4 MG/1
1 TABLET ORAL DAILY
Qty: 21 TABLET | Refills: 0 | Status: SHIPPED | OUTPATIENT
Start: 2024-02-04 | End: 2024-02-10

## 2024-02-04 RX ADMIN — LIDOCAINE 1 PATCH: 4 PATCH TOPICAL at 09:00

## 2024-02-04 RX ADMIN — Medication 10 ML: at 09:00

## 2024-02-04 RX ADMIN — IPRATROPIUM BROMIDE AND ALBUTEROL SULFATE 3 ML: .5; 3 SOLUTION RESPIRATORY (INHALATION) at 10:49

## 2024-02-04 RX ADMIN — LISINOPRIL 20 MG: 20 TABLET ORAL at 10:00

## 2024-02-04 RX ADMIN — GUAIFENESIN 600 MG: 600 TABLET, EXTENDED RELEASE ORAL at 09:00

## 2024-02-04 RX ADMIN — LISINOPRIL 20 MG: 20 TABLET ORAL at 20:14

## 2024-02-04 RX ADMIN — CARVEDILOL 12.5 MG: 6.25 TABLET, FILM COATED ORAL at 18:38

## 2024-02-04 RX ADMIN — CEFTRIAXONE 1000 MG: 1 INJECTION, POWDER, FOR SOLUTION INTRAMUSCULAR; INTRAVENOUS at 13:50

## 2024-02-04 RX ADMIN — OSELTAMIVIR PHOSPHATE 30 MG: 30 CAPSULE ORAL at 09:00

## 2024-02-04 RX ADMIN — OSELTAMIVIR PHOSPHATE 30 MG: 30 CAPSULE ORAL at 20:14

## 2024-02-04 RX ADMIN — BENZONATATE 100 MG: 100 CAPSULE ORAL at 10:00

## 2024-02-04 RX ADMIN — IPRATROPIUM BROMIDE AND ALBUTEROL SULFATE 3 ML: .5; 3 SOLUTION RESPIRATORY (INHALATION) at 07:26

## 2024-02-04 RX ADMIN — IPRATROPIUM BROMIDE AND ALBUTEROL SULFATE 3 ML: .5; 3 SOLUTION RESPIRATORY (INHALATION) at 16:46

## 2024-02-04 RX ADMIN — ASPIRIN 81 MG: 81 TABLET, COATED ORAL at 09:00

## 2024-02-04 RX ADMIN — METHYLPREDNISOLONE SODIUM SUCCINATE 40 MG: 40 INJECTION, POWDER, FOR SOLUTION INTRAMUSCULAR; INTRAVENOUS at 09:00

## 2024-02-04 RX ADMIN — ONDANSETRON 4 MG: 4 TABLET, ORALLY DISINTEGRATING ORAL at 10:00

## 2024-02-04 RX ADMIN — HYDROCODONE BITARTRATE AND ACETAMINOPHEN 1 TABLET: 5; 325 TABLET ORAL at 09:00

## 2024-02-04 RX ADMIN — CARVEDILOL 12.5 MG: 6.25 TABLET, FILM COATED ORAL at 09:00

## 2024-02-04 NOTE — PLAN OF CARE
Goal Outcome Evaluation:  Plan of Care Reviewed With: patient        Progress: improving  Outcome Evaluation: Patient has medication going to [facility] on [address] wheelchair van is providing transportation, and patient is being sent home with additional information/education on medications.

## 2024-02-04 NOTE — PROCEDURES
Exercise Oximetry    Patient Name:Maria D Fountain   MRN: [id number]   Date: 02/04/24             ROOM AIR BASELINE   SpO2%    94   Heart Rate      79   Blood Pressure      EXERCISE ON ROOM AIR SpO2% EXERCISE ON O2 @ LPM SpO2%   1 MINUTE   92 1 MINUTE    2 MINUTES 93 2 MINUTES    3 MINUTES 91 3 MINUTES    4 MINUTES 93 4 MINUTES    5 MINUTES 93 5 MINUTES    6 MINUTES 92 6 MINUTES               Distance Walked      1000  feet Distance Walked   Dyspnea ([place] Scale)      2 Dyspnea ([place] Scale)   Fatigue ([place] Scale)      8 Fatigue ([place] Scale)   SpO2% Post Exercise      93 SpO2% Post Exercise   HR Post Exercise      82 HR Post Exercise   Time to Recovery   Time to Recovery     Comments: Walked  pt  at  steady  pace  with  face  mask  around  obs  unit. (Infectious  disease  NP  approved).  No  oxygen  need  at  this  time.  Patient  seem  to  ventilate  well  with  walking.  Patient  stated  she  felt  weak  at  times  because  she  had  not  been  out  of  her  bed  for  several  days.

## 2024-02-04 NOTE — DISCHARGE PLACEMENT REQUEST
"Denisa Fountain (83 y.o. Female)       Date of Birth   1940    Social Security Number       Address   [address] [place] [address] [place] [address]    Home Phone   [phone]    MRN   [id number]       Adventism   Religious    Marital Status                               Admission Date   2/2/24    Admission Type   Emergency    Admitting Provider   Jax Mai MD    Attending Provider   Jax Mai MD    Department, Room/Bed   [facility] OBSERVATION, 108/1       Discharge Date       Discharge Disposition       Discharge Destination                                 Attending Provider: Jax Mai MD    Allergies: Pravastatin    Isolation: None   Infection: COVID (rule out) (02/02/24), Influenza (02/02/24)   Code Status: CPR    Ht: 154.9 cm (61\")   Wt: 66 kg (145 lb 8.1 oz)    Admission Cmt: None   Principal Problem: Influenza A [J10.1]                   Active Insurance as of 2/2/2024       Primary Coverage       Payor Plan Insurance Group Employer/Plan Group    MEDICARE MEDICARE A & B        Payor Plan Address Payor Plan Phone Number Payor Plan Fax Number Effective Dates    [address] [phone]  2/1/2005 - None Entered    [facility] [id number]         Subscriber Name Subscriber Birth Date Member ID       DENISA FOUNTAIN 1940 [id number]               Secondary Coverage       Payor Plan Insurance Group Employer/Plan Group    MISC MC SUP   MISC  SUP              PLAN G       Coverage Address Coverage Phone Number Coverage Fax Number Effective Dates    [address] [address] [phone]  1/1/2023 - None Entered    [facility] [address]         Subscriber Name Subscriber Birth Date Member ID       DENISA FOUNTAIN 1940 [id number]                     Emergency Contacts        (Rel.) Home Phone Work Phone Mobile Phone    LILLI PUGH (Daughter) -- -- [phone]    JUAN LUIS FOUNTAIN (Son) -- -- [phone]    HAYDEE ALVA (Relative) -- -- [phone] "

## 2024-02-04 NOTE — PLAN OF CARE
Goal Outcome Evaluation:  Plan of Care Reviewed With: patient        Progress: improving  Outcome Evaluation: Patient alert and oriented. Patient oxygen at 94% and was taken off nasal cannula. Patient has headache 5/10 and was given NORCO. Patient off floor for repeat cxr.

## 2024-02-04 NOTE — DISCHARGE SUMMARY
[facility]    Celi Renner MD    CHIEF COMPLAINT:     Influenza    HISTORY OF PRESENT ILLNESS:    [facility]  ED 2/3/24: Patient is an 83-year-old female from home with fever chills cough and congestion.     Observation 2/3/24: Patient is an 83-year-old female presenting to the hospital with cough congestion and fever.  Patient also reports shortness of breath.  Patient reported son also has similar symptoms.  Patient reported Tmax fever of 102 at home.  Patient states that she has had loss of appetite and nonproductive cough for the past few days.  Patient denies chest pain, vomiting, syncope or edema.    Past Medical History:   Diagnosis Date    Arthritis     Diabetes mellitus     DJD (degenerative joint disease)     Gallstones     Heart murmur     History of stress test 07/2011    Stress myoview- neg     Hypertension     Lumbar disc disease     Mitral regurgitation     Multiple lipomas     Pleurisy     Spinal stenosis      Past Surgical History:   Procedure Laterality Date    BREAST BIOPSY Right 1990    CARDIAC CATHETERIZATION  06/21/2017    HEMANGIOMA EXCISION Left 2010    left forearm     HX OVARIAN CYSTECTOMY  1962    HYSTERECTOMY  2004     Family History   Problem Relation Age of Onset    Heart disease Father         MI    Arthritis Other     Hypertension Other     Colon cancer Other     No Known Problems Mother     No Known Problems Sister     No Known Problems Brother     No Known Problems Maternal Aunt     No Known Problems Maternal Uncle     No Known Problems Paternal Aunt     No Known Problems Paternal Uncle     No Known Problems Maternal Grandmother     No Known Problems Maternal Grandfather     No Known Problems Paternal Grandmother     No Known Problems Paternal Grandfather     Anemia Neg Hx     Arrhythmia Neg Hx     Asthma Neg Hx     Clotting disorder Neg Hx     Fainting Neg Hx     Heart attack Neg Hx     Heart failure Neg Hx     Hyperlipidemia Neg Hx      Social  History     Tobacco Use    Smoking status: Never     Passive exposure: Never    Smokeless tobacco: Never   Vaping Use    Vaping Use: Never used   Substance Use Topics    Alcohol use: No    Drug use: No     Medications Prior to Admission   Medication Sig Dispense Refill Last Dose    acetaminophen (TYLENOL) 650 MG 8 hr tablet Take 1 tablet by mouth Every 8 (Eight) Hours As Needed for Mild Pain.       amLODIPine (NORVASC) 5 MG tablet Take 1 tablet by mouth Daily As Needed.       ascorbic acid (VITAMIN C) 1000 MG tablet Take 0.5 tablets by mouth 2 (two) times a day.       aspirin (aspirin) 81 MG EC tablet Take 1 tablet by mouth Daily.       atorvastatin (LIPITOR) 20 MG tablet Take 1 tablet by mouth Every Night. 90 tablet 3     Calcium Carb-Cholecalciferol 1000-800 MG-UNIT tablet Take 4 tablets by mouth Daily.       carvedilol (COREG) 12.5 MG tablet Take 1 tablet by mouth 2 (Two) Times a Day With Meals. 180 tablet 1     HM OMEGA-3-6-9 FATTY ACIDS capsule Take 1 capsule by mouth Daily.       Lidocaine 4 % Place 1 patch on the skin as directed by provider Daily. Remove & Discard patch within 12 hours or as directed by MD       lisinopril (PRINIVIL,ZESTRIL) 20 MG tablet TAKE 1 TABLET BY MOUTH TWICE A  tablet 3     multivitamin-minerals (CENTRUM) tablet Take 1 tablet by mouth Daily.       nitroglycerin (Nitrostat) 0.4 MG SL tablet Place 1 tablet under the tongue Every 5 (Five) Minutes As Needed for Chest Pain. Take no more than 3 doses in 15 minutes. 30 tablet 5     vitamin B-12 (CYANOCOBALAMIN) 1000 MCG tablet Take 1 tablet by mouth Daily.        Allergies:  Pravastatin    There is no immunization history for the selected administration types on file for this patient.        REVIEW OF SYSTEMS:    Review of Systems   Constitutional: Positive for malaise/fatigue.   HENT:  Positive for congestion.    Eyes: Negative.    Cardiovascular:  Positive for dyspnea on exertion.   Respiratory:  Positive for cough and sputum  production.    Endocrine: Negative.    Hematologic/Lymphatic: Negative.    Skin: Negative.    Musculoskeletal: Negative.    Gastrointestinal: Negative.    Genitourinary: Negative.    Neurological:  Positive for weakness.   Psychiatric/Behavioral: Negative.     Allergic/Immunologic: Negative.        Vital Signs  Temp:  [97 °F (36.1 °C)-98 °F (36.7 °C)] 98 °F (36.7 °C)  Heart Rate:  [] 81  Resp:  [16-22] 16  BP: (125-166)/(55-67) 125/67          Physical Exam:  Physical Exam  Vitals and nursing note reviewed.   Constitutional:       Appearance: Normal appearance.   HENT:      Head: Normocephalic and atraumatic.      Right Ear: External ear normal.      Left Ear: External ear normal.      Nose: Congestion present.      Mouth/Throat:      Pharynx: Oropharynx is clear.   Eyes:      Extraocular Movements: Extraocular movements intact.      Conjunctiva/sclera: Conjunctivae normal.      Pupils: Pupils are equal, round, and reactive to light.   Cardiovascular:      Rate and Rhythm: Normal rate and regular rhythm.      Pulses: Normal pulses.      Heart sounds: Normal heart sounds.   Pulmonary:      Effort: Pulmonary effort is normal.      Breath sounds: Wheezing present.   Abdominal:      General: Bowel sounds are normal.      Palpations: Abdomen is soft.   Musculoskeletal:         General: Normal range of motion.      Cervical back: Normal range of motion.   Skin:     General: Skin is warm.      Capillary Refill: Capillary refill takes less than 2 seconds.   Neurological:      Mental Status: She is alert and oriented to person, place, and time.      Motor: Weakness present.   Psychiatric:         Mood and Affect: Mood normal.         Behavior: Behavior normal.         Thought Content: Thought content normal.         Judgment: Judgment normal.         Emotional Behavior:    WNL   Debilities:   none  Results Review:    I reviewed the patient's new clinical results.  Lab Results (most recent)       Procedure Component  Value Units Date/Time    CBC & Differential [[id number]]  (Abnormal) Collected: 02/04/24 0358    Specimen: Blood from Arm, Left Updated: 02/04/24 0511    Narrative:      The following orders were created for panel order CBC & Differential.  Procedure                               Abnormality         Status                     ---------                               -----------         ------                     CBC Auto Differential[[id number]]        Abnormal            Final result               Scan Slide[[id number]]                                       Final result                 Please view results for these tests on the individual orders.    CBC Auto Differential [[id number]]  (Abnormal) Collected: 02/04/24 0358    Specimen: Blood from Arm, Left Updated: 02/04/24 0511     WBC 15.00 10*3/mm3      RBC 3.98 10*6/mm3      Hemoglobin 12.3 g/dL      Hematocrit 36.9 %      MCV 92.7 fL      MCH 31.0 pg      MCHC 33.4 g/dL      RDW 14.9 %      RDW-SD 50.3 fl      MPV 8.3 fL      Platelets 226 10*3/mm3     Narrative:      The previously reported component NRBC is no longer being reported. Previous result was 0.0 /100 WBC (Reference Range: 0.0-0.2 /100 WBC) on 2/4/2024 at 0431 EST.    Scan Slide [[id number]] Collected: 02/04/24 0358    Specimen: Blood from Arm, Left Updated: 02/04/24 0511     Scan Slide --     Comment: See Manual Differential Results       Manual Differential [[id number]]  (Abnormal) Collected: 02/04/24 0358    Specimen: Blood from Arm, Left Updated: 02/04/24 0511     Neutrophil % 72.0 %      Lymphocyte % 4.0 %      Monocyte % 9.0 %      Bands %  15.0 %      Neutrophils Absolute 13.05 10*3/mm3      Lymphocytes Absolute 0.60 10*3/mm3      Monocytes Absolute 1.35 10*3/mm3      RBC Morphology Normal     WBC Morphology Normal     Platelet Estimate Adequate    Basic Metabolic Panel [[id number]]  (Abnormal) Collected: 02/04/24 0358    Specimen: Blood from Arm, Left Updated: 02/04/24 0444     Glucose 150 mg/dL       BUN 19 mg/dL      Creatinine 0.81 mg/dL      Sodium 143 mmol/L      Potassium 4.1 mmol/L      Chloride 108 mmol/L      CO2 26.0 mmol/L      Calcium 8.4 mg/dL      BUN/Creatinine Ratio 23.5     Anion Gap 9.0 mmol/L      eGFR 72.1 mL/min/1.73     Narrative:      GFR Normal >60  Chronic Kidney Disease <60  Kidney Failure <15    The GFR formula is only valid for adults with stable renal function between ages 18 and 70.    Blood Culture - Blood, Arm, Right [[id number]]  (Normal) Collected: 02/03/24 0017    Specimen: Blood from Arm, Right Updated: 02/04/24 0031     Blood Culture No growth at 24 hours    Blood Culture - Blood, Arm, Left [[id number]]  (Normal) Collected: 02/03/24 0003    Specimen: Blood from Arm, Left Updated: 02/04/24 0015     Blood Culture No growth at 24 hours    Hemoglobin A1c [[id number]]  (Abnormal) Collected: 02/03/24 0352    Specimen: Blood Updated: 02/03/24 0915     Hemoglobin A1C 5.90 %     Hepatic Function Panel [[id number]]  (Abnormal) Collected: 02/03/24 0352    Specimen: Blood Updated: 02/03/24 0713     Total Protein 6.8 g/dL      Albumin 4.1 g/dL      ALT (SGPT) 21 U/L      AST (SGOT) 30 U/L      Alkaline Phosphatase 84 U/L      Total Bilirubin 1.3 mg/dL      Bilirubin, Direct 0.2 mg/dL      Bilirubin, Indirect 1.1 mg/dL     Basic Metabolic Panel [[id number]]  (Abnormal) Collected: 02/03/24 0352    Specimen: Blood Updated: 02/03/24 0449     Glucose 146 mg/dL      BUN 11 mg/dL      Creatinine 0.99 mg/dL      Sodium 135 mmol/L      Potassium 3.7 mmol/L      Comment: Slight hemolysis detected by analyzer. Result may be falsely elevated.        Chloride 99 mmol/L      CO2 26.0 mmol/L      Calcium 8.8 mg/dL      BUN/Creatinine Ratio 11.1     Anion Gap 10.0 mmol/L      eGFR 56.7 mL/min/1.73     Narrative:      GFR Normal >60  Chronic Kidney Disease <60  Kidney Failure <15    The GFR formula is only valid for adults with stable renal function between ages 18 and 70.    CBC Auto Differential [[id number]]   (Abnormal) Collected: 02/03/24 0352    Specimen: Blood Updated: 02/03/24 0433     WBC 6.20 10*3/mm3      RBC 4.19 10*6/mm3      Hemoglobin 12.9 g/dL      Hematocrit 39.0 %      MCV 92.9 fL      MCH 30.9 pg      MCHC 33.2 g/dL      RDW 14.3 %      RDW-SD 48.6 fl      MPV 8.2 fL      Platelets 214 10*3/mm3      Neutrophil % 89.1 %      Lymphocyte % 5.5 %      Monocyte % 4.7 %      Eosinophil % 0.1 %      Basophil % 0.6 %      Neutrophils, Absolute 5.50 10*3/mm3      Lymphocytes, Absolute 0.30 10*3/mm3      Monocytes, Absolute 0.30 10*3/mm3      Eosinophils, Absolute 0.00 10*3/mm3      Basophils, Absolute 0.00 10*3/mm3      nRBC 0.1 /100 WBC     Respiratory Panel PCR w/COVID-19(SARS-CoV-2) AMIE/WALESKA/OTTONIEL/PAD/COR/CHANTELL In-House, NP Swab in UTM/VTM, 2 HR TAT - Swab, Nasopharynx [[id number]]  (Abnormal) Collected: 02/02/24 2249    Specimen: Swab from Nasopharynx Updated: 02/02/24 1157     ADENOVIRUS, PCR Not Detected     Coronavirus 229E Not Detected     Coronavirus HKU1 Not Detected     Coronavirus NL63 Not Detected     Coronavirus OC43 Not Detected     COVID19 Not Detected     Human Metapneumovirus Not Detected     Human Rhinovirus/Enterovirus Not Detected     Influenza A H3 Detected     Influenza B PCR Not Detected     Parainfluenza Virus 1 Not Detected     Parainfluenza Virus 2 Not Detected     Parainfluenza Virus 3 Not Detected     Parainfluenza Virus 4 Not Detected     RSV, PCR Not Detected     Bordetella pertussis pcr Not Detected     Bordetella parapertussis PCR Not Detected     Chlamydophila pneumoniae PCR Not Detected     Mycoplasma pneumo by PCR Not Detected    Narrative:      In the setting of a positive respiratory panel with a viral infection PLUS a negative procalcitonin without other underlying concern for bacterial infection, consider observing off antibiotics or discontinuation of antibiotics and continue supportive care. If the respiratory panel is positive for atypical bacterial infection (Bordetella  pertussis, Chlamydophila pneumoniae, or Mycoplasma pneumoniae), consider antibiotic de-escalation to target atypical bacterial infection.    Urinalysis With Culture If Indicated - Urine, Clean Catch [[id number]]  (Abnormal) Collected: 02/02/24 2328    Specimen: Urine, Clean Catch Updated: 02/02/24 2343     Color, UA Yellow     Appearance, UA Clear     pH, UA 8.0     Specific Gravity, UA 1.014     Glucose, UA Negative     Ketones, UA 15 mg/dL (1+)     Bilirubin, UA Negative     Blood, UA Negative     Protein, UA Trace     Leuk Esterase, UA Negative     Nitrite, UA Negative     Urobilinogen, UA 0.2 E.U./dL    Narrative:      In absence of clinical symptoms, the presence of pyuria, bacteria, and/or nitrites on the urinalysis result does not correlate with infection.  Urine microscopic not indicated.    Comprehensive Metabolic Panel [[id number]]  (Abnormal) Collected: 02/02/24 2249    Specimen: Blood Updated: 02/02/24 2314     Glucose 129 mg/dL      BUN 9 mg/dL      Creatinine 0.71 mg/dL      Sodium 138 mmol/L      Potassium 3.6 mmol/L      Chloride 100 mmol/L      CO2 27.0 mmol/L      Calcium 9.2 mg/dL      Total Protein 7.2 g/dL      Albumin 4.5 g/dL      ALT (SGPT) 22 U/L      AST (SGOT) 31 U/L      Alkaline Phosphatase 95 U/L      Total Bilirubin 1.5 mg/dL      Globulin 2.7 gm/dL      A/G Ratio 1.7 g/dL      BUN/Creatinine Ratio 12.7     Anion Gap 11.0 mmol/L      eGFR 84.5 mL/min/1.73     Narrative:      GFR Normal >60  Chronic Kidney Disease <60  Kidney Failure <15    The GFR formula is only valid for adults with stable renal function between ages 18 and 70.    CBC & Differential [[id number]]  (Abnormal) Collected: 02/02/24 2249    Specimen: Blood Updated: 02/02/24 2300    Narrative:      The following orders were created for panel order CBC & Differential.  Procedure                               Abnormality         Status                     ---------                               -----------         ------                      CBC Auto Differential[[id number]]        Abnormal            Final result                 Please view results for these tests on the individual orders.            Imaging Results (Most Recent)       Procedure Component Value Units Date/Time    XR Chest PA & Lateral [[id number]] Collected: 02/04/24 1127     Updated: 02/04/24 1130    Narrative:      XR CHEST PA AND LATERAL    Date of Exam: 2/4/2024 11:18 AM EST    Indication: dyspnea    Comparison: 2/2/2024 at 2335 hours, 8/10/2023    Findings:  Focal density is noted in the lingula suggesting atelectasis versus possible infiltrate. No pleural effusions are seen. The cardiac silhouette and mediastinum are stable. No acute osseous abnormalities are observed.      Impression:      Impression:  Focal density in the lingula suggesting atelectasis versus possible developing infiltrate. The findings may indicate developing lingular pneumonia. Recommend correlation for signs or symptoms of acute infection and follow-up to ensure improvement.      Electronically Signed: Abhishek Castellanos MD    2/4/2024 11:28 AM EST    Workstation ID: [id number]    XR Chest 1 View [[id number]] Collected: 02/03/24 0001     Updated: 02/03/24 0003    Narrative:      XR CHEST 1 VW    Date of Exam: 2/2/2024 11:30 PM EST    Indication: fever cough    Comparison: Chest radiograph dated August 10, 2023    Findings:  There are no airspace consolidations. No pleural fluid. No pneumothorax. The pulmonary vasculature appears within normal limits. The cardiac and mediastinal silhouette appear unremarkable. No acute osseous abnormality identified.      Impression:      Impression:  No active disease      Electronically Signed: Emanuel Sevilla MD    2/3/2024 12:01 AM EST    Workstation ID: [id number]          reviewed    ECG/EMG Results (most recent)       None          reviewed    Results for orders placed during the hospital encounter of 07/19/23    Duplex Venous Lower Extremity - Left    Interpretation  Summary    Normal left lower extremity venous duplex scan.      Results for orders placed during the hospital encounter of 09/14/23    Adult Transthoracic Echo Complete W/ Cont if Necessary Per Protocol    Interpretation Summary  Conclusion      Normal LV size and contractility EF of 60 to 65%  Normal RV size  Normal atrial size  Aortic valve, mitral valve, tricuspid, pulmonic valve appears structurally normal, mild mitral and pulmonic regurgitation seen.  Trace tricuspid regurgitation seen.  Calculated RV systolic pressure of 35 mmHg..  No pericardial effusion seen.  Proximal aorta appears normal in size.      Microbiology Results (last 10 days)       Procedure Component Value - Date/Time    Blood Culture - Blood, Arm, Right [[id number]]  (Normal) Collected: 02/03/24 0017    Lab Status: Preliminary result Specimen: Blood from Arm, Right Updated: 02/04/24 0031     Blood Culture No growth at 24 hours    Blood Culture - Blood, Arm, Left [[id number]]  (Normal) Collected: 02/03/24 0003    Lab Status: Preliminary result Specimen: Blood from Arm, Left Updated: 02/04/24 0015     Blood Culture No growth at 24 hours    Respiratory Panel PCR w/COVID-19(SARS-CoV-2) [facility]IE/WALESKA/OTTONIEL/[facility] In-House, NP Swab in UTM/VTM, 2 HR TAT - Swab, Nasopharynx [[id number]]  (Abnormal) Collected: 02/02/24 2249    Lab Status: Final result Specimen: Swab from Nasopharynx Updated: 02/02/24 6028     ADENOVIRUS, PCR Not Detected     Coronavirus 229E Not Detected     Coronavirus HKU1 Not Detected     Coronavirus NL63 Not Detected     Coronavirus OC43 Not Detected     COVID19 Not Detected     Human Metapneumovirus Not Detected     Human Rhinovirus/Enterovirus Not Detected     Influenza A H3 Detected     Influenza B PCR Not Detected     Parainfluenza Virus 1 Not Detected     Parainfluenza Virus 2 Not Detected     Parainfluenza Virus 3 Not Detected     Parainfluenza Virus 4 Not Detected     RSV, PCR Not Detected     Bordetella pertussis pcr Not Detected      Bordetella parapertussis PCR Not Detected     Chlamydophila pneumoniae PCR Not Detected     Mycoplasma pneumo by PCR Not Detected    Narrative:      In the setting of a positive respiratory panel with a viral infection PLUS a negative procalcitonin without other underlying concern for bacterial infection, consider observing off antibiotics or discontinuation of antibiotics and continue supportive care. If the respiratory panel is positive for atypical bacterial infection (Bordetella pertussis, Chlamydophila pneumoniae, or Mycoplasma pneumoniae), consider antibiotic de-escalation to target atypical bacterial infection.            Assessment & Plan     Influenza A        Influenza        Lab Results   Component Value Date     WBC 6.20 02/03/2024     LACTATE 0.9 09/04/2019   -Chest x-ray: No acute cardiopulmonary disease seen  -Respiratory panel positive for influenza A  -Continue IV fluids  -Tamiflu initiated  -Breathing treatments, Mucinex, Tessalon and incentive spirometry ordered  -Continue pulse oximetry and telemetry  -Blood cultures pending  -Isolation precautions maintain     Hypertension/CAD/Hyperlipidemia  -Poorly Controlled       BP Readings from Last 1 Encounters:   02/04/24 151/64   - Continue amlodipine, carvedilol, lisinopril  -Continue aspirin and statin  - Monitor while admitted    I discussed the patients findings and my recommendations with patient and family.     Discharge Diagnosis:      Influenza A      Hospital Course  Patient is a 83 y.o. female presented with dyspnea.  Respiratory viral panel positive for influenza A.  Patient remain in contact precautions.  Patient initiated on Tamiflu, breathing treatments, Mucinex, Tessalon send spirometer.  Patient to wear oxygenation as needed for O2 below 90%.  White blood cell lactic within normal limits.  Blood cultures show no growth at 24 hours.  Patient to monitor blood pressures at home and take medication as prescribed.  Testing recommendations  reviewed patient she agreed to treatment plan.  If symptoms worsen patient call 911 or go to nearest ED.    Past Medical History:     Past Medical History:   Diagnosis Date    Arthritis     Diabetes mellitus     DJD (degenerative joint disease)     Gallstones     Heart murmur     History of stress test 07/2011    Stress myoview- neg     Hypertension     Lumbar disc disease     Mitral regurgitation     Multiple lipomas     Pleurisy     Spinal stenosis        Past Surgical History:     Past Surgical History:   Procedure Laterality Date    BREAST BIOPSY Right 1990    CARDIAC CATHETERIZATION  06/21/2017    HEMANGIOMA EXCISION Left 2010    left forearm     HX OVARIAN CYSTECTOMY  1962    HYSTERECTOMY  2004       Social History:   Social History     Socioeconomic History    Marital status:    Tobacco Use    Smoking status: Never     Passive exposure: Never    Smokeless tobacco: Never   Vaping Use    Vaping Use: Never used   Substance and Sexual Activity    Alcohol use: No    Drug use: No    Sexual activity: Defer       Procedures Performed    02/04 1045 Walking Oximetry    Consults:   Consults       No orders found from 1/4/2024 to 2/3/2024.            Condition on Discharge:     Stable    Discharge Disposition  Home-Health Care c    Discharge Medications     Discharge Medications        New Medications        Instructions Start Date   albuterol sulfate  (90 Base) MCG/ACT inhaler  Commonly known as: PROVENTIL HFA;VENTOLIN HFA;PROAIR HFA   2 puffs, Inhalation, Every 4 Hours PRN      azithromycin 500 MG tablet  Commonly known as: Zithromax   500 mg, Oral, Daily      benzonatate 100 MG capsule  Commonly known as: TESSALON   100 mg, Oral, 3 Times Daily PRN      guaiFENesin 600 MG 12 hr tablet  Commonly known as: MUCINEX   600 mg, Oral, Every 12 Hours Scheduled      methylPREDNISolone 4 MG dose pack  Commonly known as: MEDROL   4 mg, Oral, Daily, Take as directed on package instructions.      oseltamivir 30 MG  capsule  Commonly known as: TAMIFLU   30 mg, Oral, Every 12 Hours Scheduled             Continue These Medications        Instructions Start Date   acetaminophen 650 MG 8 hr tablet  Commonly known as: TYLENOL   650 mg, Oral, Every 8 Hours PRN      amLODIPine 5 MG tablet  Commonly known as: NORVASC   5 mg, Oral, Daily PRN      ascorbic acid 1000 MG tablet  Commonly known as: VITAMIN C   500 mg, Oral, 2 times daily      aspirin 81 MG EC tablet   81 mg, Oral, Every 24 Hours      atorvastatin 20 MG tablet  Commonly known as: LIPITOR   20 mg, Oral, Nightly      Calcium Carb-Cholecalciferol 1000-800 MG-UNIT tablet   4 tablets, Oral, Daily      carvedilol 12.5 MG tablet  Commonly known as: COREG   12.5 mg, Oral, 2 Times Daily With Meals      HM Omega-3-6-9 Fatty Acids capsule   1 capsule, Oral, Daily      Lidocaine 4 %   1 patch, Transdermal, Every 24 Hours, Remove & Discard patch within 12 hours or as directed by MD      lisinopril 20 MG tablet  Commonly known as: PRINIVIL,ZESTRIL   20 mg, Oral, 2 Times Daily      multivitamin-minerals tablet  Generic drug: multivitamin with minerals   1 tablet, Oral, Daily      nitroglycerin 0.4 MG SL tablet  Commonly known as: Nitrostat   0.4 mg, Sublingual, Every 5 Minutes PRN, Take no more than 3 doses in 15 minutes.      vitamin B-12 1000 MCG tablet  Commonly known as: CYANOCOBALAMIN   1,000 mcg, Oral, Daily               Discharge Diet:   Diet Instructions       Diet: Cardiac Diets; Healthy Heart (2-3 Na+); Regular Texture (IDDSI 7); Thin (IDDSI 0)      Discharge Diet: Cardiac Diets    Cardiac Diet: Healthy Heart (2-3 Na+)    Texture: Regular Texture (IDDSI 7)    Fluid Consistency: Thin (IDDSI 0)            Activity at Discharge:   Activity Instructions       Activity as Tolerated      Measure Blood Pressure              Follow-up Appointments  Future Appointments   Date Time Provider Department Center   2/14/2024 11:15 AM [facility]   8/15/2024  12:30 PM Ramón Puckett MD [facility]   1/6/2025  1:30 PM Matti Luna MD [facility]     Additional Instructions for the Follow-ups that You Need to Schedule       Ambulatory Referral to Home Health   As directed      Face to Face Visit Date: 2/4/2024   Follow-up provider for Plan of Care?: I treated the patient in an acute care facility and will not continue treatment after discharge.   Follow-up provider: RAMÓN PUCKETT [[id number]]   Reason/Clinical Findings: influenza, weakness   Describe mobility limitations that make leaving home difficult: influenza, weakness   Nursing/Therapeutic Services Requested: Physical Therapy   Frequency: 1 Week 1        Discharge Follow-up with PCP   As directed       Currently Documented PCP:    Ramón Puckett MD    PCP Phone Number:    [phone]     Follow Up Details: 7-10 days                Test Results Pending at Discharge  Pending Labs       Order Current Status    Blood Culture - Blood, Arm, Left Preliminary result    Blood Culture - Blood, Arm, Right Preliminary result             Risk for Readmission (LACE) Score: 2 (2/4/2024  6:00 AM)          BJORN Kahn  02/04/24  16:20 EST

## 2024-02-04 NOTE — CASE MANAGEMENT/SOCIAL WORK
Continued Stay Note  [facility]     Patient Name: Maria D Fountain  MRN: [id number]  Today's Date: 2/4/2024    Admit Date: 2/2/2024    Plan: Return home alone. Accepted to [facility]. Order requested.   Discharge Plan       Row Name 02/04/24 6036       Plan    Plan Return home alone. Accepted to [facility]. Order requested.    Plan Comments Received message from liaison Tracy Jerez with [facility], pt accepted. Order requested from NP.      Row Name 02/04/24 0268       Plan    Plan PT eval pending. Lives alone. Referral to [facility], pending acceptance. Order needed prior to d/c.    Plan Comments Met with pt in room to discuss d/c planning. Pt lives alone, she has 2 supportive children. Her son lives out of state, and her daughter lives about 45 minutes away. Pt plans to return home alone at discharge, she is agreeable to referral to [facility], pending acceptance. Liaison notified via secure chat. Pt IADLs. Pt denies need of DME, and she has no financial barriers with home medications. PCP and pharmacy verified. Pt may need transportation assistance home if her son cannot provide.      Row Name 02/04/24 2933       Plan    Patient/Family in Agreement with Plan yes                      Expected Discharge Date and Time       Expected Discharge Date Expected Discharge Time    Feb 5, 2024               Ines Swanson RN

## 2024-02-04 NOTE — CASE MANAGEMENT/SOCIAL WORK
Discharge Planning Assessment  [facility]     Patient Name: Maria D Fountain  MRN: [id number]  Today's Date: 2/4/2024    Admit Date: 2/2/2024    Plan: PT eval pending. Lives alone. Referral to [facility], pending acceptance. Order needed prior to d/c.   Discharge Needs Assessment       Row Name 02/04/24 1331       Living Environment    People in Home alone    Current Living Arrangements home    Potentially Unsafe Housing Conditions none    Primary Care Provided by self    Provides Primary Care For no one    Family Caregiver if Needed none    Quality of Family Relationships supportive    Able to Return to Prior Arrangements yes       Resource/Environmental Concerns    Resource/Environmental Concerns none    Transportation Concerns none       Transition Planning    Patient/Family Anticipates Transition to home    Patient/Family Anticipated Services at Transition home health care    Transportation Anticipated family or friend will provide       Discharge Needs Assessment    Readmission Within the Last 30 Days no previous admission in last 30 days    Equipment Currently Used at Home none    Concerns to be Addressed discharge planning    Anticipated Changes Related to Illness none    Equipment Needed After Discharge none    Current Discharge Risk lives alone                   Discharge Plan       Row Name 02/04/24 3898       Plan    Plan PT eval pending. Lives alone. Referral to [facility], pending acceptance. Order needed prior to d/c.    Plan Comments Met with pt in room to discuss d/c planning. Pt lives alone, she has 2 supportive children. Her son lives out of state, and her daughter lives about 45 minutes away. Pt plans to return home alone at discharge, she is agreeable to referral to [facility], pending acceptance. Liaison notified via secure chat. Pt IADLs. Pt denies need of DME, and she has no financial barriers with home medications. PCP and pharmacy verified. Pt may need transportation assistance home if her son cannot  provide.      Row Name 02/04/24 1332       Plan    Patient/Family in Agreement with Plan yes                  Continued Care and Services - Admitted Since 2/2/2024       Home Medical Care       Service Provider Request Status Selected Services Address Phone Fax Patient Preferred    [facility] Pending - Request Sent N/A [address] [facility] [place] [address] [phone] --                  Expected Discharge Date and Time       Expected Discharge Date Expected Discharge Time    Feb 5, 2024            Demographic Summary       Row Name 02/04/24 1330       General Information    Admission Type observation    Arrived From emergency department    Referral Source admission list    Reason for Consult discharge planning    Preferred Language English                   Functional Status       Row Name 02/04/24 1330       Functional Status    Usual Activity Tolerance good    Current Activity Tolerance moderate       Functional Status, IADL    Medications independent    Meal Preparation independent    Housekeeping independent    Laundry independent    Shopping independent       Mental Status    General Appearance WDL WDL       Mental Status Summary    Recent Changes in Mental Status/Cognitive Functioning no changes                                Ines Swanson, RN

## 2024-02-05 ENCOUNTER — TELEPHONE (OUTPATIENT)
Dept: FAMILY MEDICINE CLINIC | Facility: CLINIC | Age: 84
End: 2024-02-05
Payer: MEDICARE

## 2024-02-05 ENCOUNTER — TRANSITIONAL CARE MANAGEMENT TELEPHONE ENCOUNTER (OUTPATIENT)
Dept: CALL CENTER | Facility: HOSPITAL | Age: 84
End: 2024-02-05
Payer: MEDICARE

## 2024-02-05 NOTE — CASE MANAGEMENT/SOCIAL WORK
Case Management Discharge Note      Final Note: Home w/ [facility]         Selected Continued Care - Discharged on 2/4/2024 Admission date: 2/2/2024 - Discharge disposition: Home-Health Care c        Home Medical Care Coordination complete.      Service Provider Selected Services Address Phone Fax Patient Preferred    [facility] [address] [facility] [place] [address] [phone] --               Transportation Services  Private: Car    Final Discharge Disposition Code: 06 - home with home health care

## 2024-02-05 NOTE — OUTREACH NOTE
Prep Survey      Flowsheet Row Responses   [facility] discharged [facility]? [facility]   Is LACE score < 7 ? Yes   Eligibility [facility]   Date of Admission 02/02/24   Date of Discharge 02/04/24   Discharge Disposition Home-Health Care Svc   Discharge diagnosis Influenza A   Does the patient have one of the following disease processes/diagnoses(primary or secondary)? Other   Does the patient have Home health ordered? Yes   What is the Home health agency?  [facility]   Is there a DME ordered? No   Prep survey completed? Yes            Annemarie IVERSON - Registered Nurse

## 2024-02-05 NOTE — OUTREACH NOTE
Call Center TCM Note      Flowsheet Row Responses   [facility] patient discharged from? [facility]   Does the patient have one of the following disease processes/diagnoses(primary or secondary)? Other   TCM attempt successful? Yes  [ALESSIA- Melva]   Call start time 1256   Call end time 1326   Discharge diagnosis Influenza A   Meds reviewed with patient/caregiver? Yes   Is the patient having any side effects they believe may be caused by any medication additions or changes? No   Does the patient have all medications ordered at discharge? Yes   Is the patient taking all medications as directed (includes completed medication regime)? Yes   Comments HOSP DC FU appt 2/13/23 10 am   Does the patient have an appointment with their PCP within 7-14 days of discharge? Yes   What is the Home health agency?  [facility]    Has home health visited the patient within 72 hours of discharge? N/A   Home health interventions Called Home Health agency   Home health comments HH reports she has been processed and they will call her.   Psychosocial issues? No   Did the patient receive a copy of their discharge instructions? Yes   Nursing interventions Reviewed instructions with patient   What is the patient's perception of their health status since discharge? Same  [Pt reports she had a tempt of 104 spoke to hosp staff and they told her to take tylenol and recheck. Pt reports it is 99 after recheck.]   Is the patient/caregiver able to teach back signs and symptoms related to disease process for when to call PCP? Yes   Is the patient/caregiver able to teach back signs and symptoms related to disease process for when to call 911? Yes   Is the patient/caregiver able to teach back the hierarchy of who to call/visit for symptoms/problems? PCP, Specialist, Home health nurse, Urgent Care, ED, 911 Yes   TCM call completed? Yes   Wrap up additional comments pt reports that she is still sick and that she had a fever of 104 last night and confusion when  she first woke up. States she called the [facility] and asked for the ER. She states she spoke to some one and they told her to take tyenol. Pt states on recheck temp was 99. Advised pt if she feels she is worsening, SOA , confused or high temp she would need to call 911. Pt VU. Pt also awaiting call from  to start.   Call end time 1326            Claudia Contreras RN    2/5/2024, 13:26 EST

## 2024-02-05 NOTE — TELEPHONE ENCOUNTER
Doretha called requesting verbal okay for you to sign for HH. They want to see her tomorrow if possible.

## 2024-02-06 ENCOUNTER — TELEPHONE (OUTPATIENT)
Dept: FAMILY MEDICINE CLINIC | Facility: CLINIC | Age: 84
End: 2024-02-06
Payer: MEDICARE

## 2024-02-06 NOTE — TELEPHONE ENCOUNTER
Caller: Maria D Fountain    Relationship: Self    Best call back number:     Maria D Fountain (Self) [phone] (Mobile)       What was the call regarding:  PATIENT WENT TO EMERGENCY ROOM ON 2-2-24 AND WAS GIVEN SEVERAL MEDICATIONS     SHE HAS BEEN HAVING DIARRHEA AND SWEATING  A LOT / SOAKING HER CLOTHES AND BEDDING     SHE WANTED TO DISCUSS WHAT'S HAPPENING AND ASKING FOR A CALL BACK         Is it okay if the provider responds through MyChart: PLEASE CALL AND DISCUSS       2/2/2024 - 2/4/2024 (2 days)  [facility]    ED 2/3/24: Patient is an 83-year-old female from home with fever chills cough and congestion.     Observation 2/3/24: Patient is an 83-year-old female presenting to the hospital with cough congestion and fever.  Patient also reports shortness of breath.  Patient reported son also has similar symptoms.  Patient reported Tmax fever of 102 at home.  Patient states that she has had loss of appetite and nonproductive cough for the past few days.  Patient denies chest pain, vomiting, syncope or edema.

## 2024-02-06 NOTE — TELEPHONE ENCOUNTER
She was diagnosed with flu a and pneumonia which is probably contributing to the night sweats.  She is also on azithromycin which is probably triggering the diarrhea.  She really needs to push fluids.  She needs to rest.

## 2024-02-08 ENCOUNTER — HOME CARE VISIT (OUTPATIENT)
Dept: HOME HEALTH SERVICES | Facility: HOME HEALTHCARE | Age: 84
End: 2024-02-08
Payer: MEDICARE

## 2024-02-08 VITALS
DIASTOLIC BLOOD PRESSURE: 81 MMHG | TEMPERATURE: 97.4 F | OXYGEN SATURATION: 97 % | RESPIRATION RATE: 18 BRPM | SYSTOLIC BLOOD PRESSURE: 155 MMHG | HEART RATE: 65 BPM

## 2024-02-08 LAB
BACTERIA SPEC AEROBE CULT: NORMAL
BACTERIA SPEC AEROBE CULT: NORMAL

## 2024-02-08 PROCEDURE — G0151 HHCP-SERV OF PT,EA 15 MIN: HCPCS

## 2024-02-08 NOTE — CASE MANAGEMENT/SOCIAL WORK
Case Management/Social Work    Patient Name:  Maria D Fountain  YOB: 1940  MRN: [id number]  Admit Date:  2/2/2024        Patient discharge from [facility] 2/4/2024, patient admit date to [facility] 2/8/2024, per patient request. CM updated DC code.     Fatou York RN, BSN    [address] [address]  Phone: [phone]  Fax: [phone]

## 2024-02-08 NOTE — HOME HEALTH
"SOC Note:  Pt is an 84 y/o female pt recently hospitalized for cough, fever, vomitting, diarrhea and general malaise.  Pt tested positive for Influenza B and PNA.  Pt returned home 02/04/24 but requested delayed SOC for home care until 02/08 due to continuation of feeling poor and fever.  PT completed med rec and noted pt to be unaware of how to use albuterol inhaler.  Pt provided education on proper use and inhalation.  Pt will benefit from RN evaluation for med education and assessment of general condition due to illness.  PT noted pt to be able to ambulate without AD but has limited endurance, impaired balance and fatigues quickly related to influenza and PNA.  Pt will benefit from continued skilled home care PT for strengthening, gait/balance training, progression of mobility and safety education.     Home Health ordered for: disciplines RN 1w1 for eval; PT 1d1, 2w2, 1w1    Reason for Hosp/Primary Dx/Co-morbidities: influenza    Focus of Care: weakness and impaired mobility related to influenza    Patient's goal(s):  Pt reports her primary goal for home care PT is to \"get my strength back and get the pain out of my chest\"    Current Functional status/mobility/DME: ambulatory with close S without AD.  Pt does not own any medical equipment    HB status/Living Arrangements: Pt is homebound due to considerably decreased endurance, fatigue, SOA, cough and falls risk    Skin Integrity/wound status: N/A (no wounds)    Code Status: full code    Fall Risk/Safety concerns: medium    Educated on Emergency Plan, steps to take prior to going to the ER and when to Call Home Health First:  yes     Medication issues/Concerns:  No interactions noted    Plan for next visit: strengthening, gait/balance training, progression of mobility"

## 2024-02-09 ENCOUNTER — HOME CARE VISIT (OUTPATIENT)
Dept: HOME HEALTH SERVICES | Facility: HOME HEALTHCARE | Age: 84
End: 2024-02-09
Payer: MEDICARE

## 2024-02-09 VITALS
TEMPERATURE: 97.8 F | SYSTOLIC BLOOD PRESSURE: 160 MMHG | DIASTOLIC BLOOD PRESSURE: 88 MMHG | RESPIRATION RATE: 18 BRPM | OXYGEN SATURATION: 98 % | HEART RATE: 68 BPM

## 2024-02-09 PROCEDURE — G0299 HHS/HOSPICE OF RN EA 15 MIN: HCPCS

## 2024-02-09 NOTE — HOME HEALTH
"Eval Note    SN 1 wk 4     Patient's goal(s): \"to get stronger and stay out of the hospital.\"    Services required to achieve goals: PT and SN    Potential Issues for goal attainment: Weakness    Problems identified: GI s/s from oral abx, cough present, shortness of breath    Describe the Functional status and safety: Patient is independent in transfers and lives alone.     Describe any environmental issues: NOne    Any equipment needs None    POC confirmed with Maria D Fountain on date 2/9/24"

## 2024-02-12 ENCOUNTER — HOME CARE VISIT (OUTPATIENT)
Dept: HOME HEALTH SERVICES | Facility: HOME HEALTHCARE | Age: 84
End: 2024-02-12
Payer: MEDICARE

## 2024-02-12 PROCEDURE — G0157 HHC PT ASSISTANT EA 15: HCPCS

## 2024-02-13 ENCOUNTER — OFFICE VISIT (OUTPATIENT)
Dept: FAMILY MEDICINE CLINIC | Facility: CLINIC | Age: 84
End: 2024-02-13
Payer: MEDICARE

## 2024-02-13 VITALS
HEART RATE: 65 BPM | WEIGHT: 141 LBS | DIASTOLIC BLOOD PRESSURE: 77 MMHG | BODY MASS INDEX: 27.68 KG/M2 | TEMPERATURE: 97.7 F | HEIGHT: 60 IN | OXYGEN SATURATION: 97 % | SYSTOLIC BLOOD PRESSURE: 139 MMHG

## 2024-02-13 VITALS
OXYGEN SATURATION: 97 % | HEART RATE: 72 BPM | SYSTOLIC BLOOD PRESSURE: 124 MMHG | DIASTOLIC BLOOD PRESSURE: 78 MMHG | TEMPERATURE: 97.4 F

## 2024-02-13 DIAGNOSIS — R19.7 DIARRHEA, UNSPECIFIED TYPE: ICD-10-CM

## 2024-02-13 DIAGNOSIS — J10.1 INFLUENZA A: Primary | ICD-10-CM

## 2024-02-13 DIAGNOSIS — R09.02 HYPOXIA: ICD-10-CM

## 2024-02-13 DIAGNOSIS — J18.9 PNEUMONIA DUE TO INFECTIOUS ORGANISM, UNSPECIFIED LATERALITY, UNSPECIFIED PART OF LUNG: ICD-10-CM

## 2024-02-13 DIAGNOSIS — R53.83 FATIGUE, UNSPECIFIED TYPE: ICD-10-CM

## 2024-02-14 ENCOUNTER — HOME CARE VISIT (OUTPATIENT)
Dept: HOME HEALTH SERVICES | Facility: HOME HEALTHCARE | Age: 84
End: 2024-02-14
Payer: MEDICARE

## 2024-02-14 PROCEDURE — G0299 HHS/HOSPICE OF RN EA 15 MIN: HCPCS

## 2024-02-15 ENCOUNTER — HOME CARE VISIT (OUTPATIENT)
Dept: HOME HEALTH SERVICES | Facility: HOME HEALTHCARE | Age: 84
End: 2024-02-15
Payer: MEDICARE

## 2024-02-15 VITALS
SYSTOLIC BLOOD PRESSURE: 124 MMHG | HEART RATE: 78 BPM | DIASTOLIC BLOOD PRESSURE: 78 MMHG | RESPIRATION RATE: 18 BRPM | OXYGEN SATURATION: 94 % | TEMPERATURE: 97.6 F

## 2024-02-15 PROCEDURE — G0157 HHC PT ASSISTANT EA 15: HCPCS

## 2024-02-16 VITALS
TEMPERATURE: 96.9 F | DIASTOLIC BLOOD PRESSURE: 80 MMHG | OXYGEN SATURATION: 99 % | SYSTOLIC BLOOD PRESSURE: 126 MMHG | HEART RATE: 64 BPM

## 2024-02-19 ENCOUNTER — HOME CARE VISIT (OUTPATIENT)
Dept: HOME HEALTH SERVICES | Facility: HOME HEALTHCARE | Age: 84
End: 2024-02-19
Payer: MEDICARE

## 2024-02-19 VITALS
TEMPERATURE: 97.3 F | DIASTOLIC BLOOD PRESSURE: 80 MMHG | OXYGEN SATURATION: 96 % | HEART RATE: 68 BPM | SYSTOLIC BLOOD PRESSURE: 125 MMHG | RESPIRATION RATE: 18 BRPM

## 2024-02-19 PROCEDURE — G0151 HHCP-SERV OF PT,EA 15 MIN: HCPCS

## 2024-02-19 NOTE — HOME HEALTH
[employer] grid at home. MVI/AREDS discussed. Patient to call if any changes in vision or grid card. Pt answered door ambulating without AD.  Pt states she is feeling much better today.  Pt states she was having good days and bad days with diarrhea but now has resolved and feeling much better.  Pt states Dr. Renner recommending stopping antibiotic on Friday 02/16 due to severe diarrhea.  Pt states she has now had normal bowel movements since stopping antibiotic.  Pt reports her biggest complaint at this time is fatigue and limited endurance due to prolonged illness and limited daily activity.    Plan for next visit: strengthening, gait/balance training, progression of mobility

## 2024-02-20 DIAGNOSIS — I10 ESSENTIAL HYPERTENSION: ICD-10-CM

## 2024-02-21 ENCOUNTER — HOME CARE VISIT (OUTPATIENT)
Dept: HOME HEALTH SERVICES | Facility: HOME HEALTHCARE | Age: 84
End: 2024-02-21
Payer: MEDICARE

## 2024-02-21 VITALS
HEART RATE: 74 BPM | SYSTOLIC BLOOD PRESSURE: 136 MMHG | DIASTOLIC BLOOD PRESSURE: 78 MMHG | OXYGEN SATURATION: 99 % | TEMPERATURE: 97 F | RESPIRATION RATE: 18 BRPM

## 2024-02-21 PROCEDURE — G0299 HHS/HOSPICE OF RN EA 15 MIN: HCPCS

## 2024-02-21 RX ORDER — CARVEDILOL 12.5 MG/1
12.5 TABLET ORAL 2 TIMES DAILY WITH MEALS
Qty: 180 TABLET | Refills: 1 | Status: SHIPPED | OUTPATIENT
Start: 2024-02-21

## 2024-02-21 NOTE — HOME HEALTH
Routine Visit Note:  pt. states she is feeling much better, more energy, easier breathing, no falls    Skill/education provided: CP assess, pain assess, pneumonia education    Patient/caregiver response: tolerated well     Plan for next visit: CP assess, pain assess, falls assess, pneumonia education, pre-discharge visit.    Other pertinent info: n/a

## 2024-02-22 ENCOUNTER — HOME CARE VISIT (OUTPATIENT)
Dept: HOME HEALTH SERVICES | Facility: HOME HEALTHCARE | Age: 84
End: 2024-02-22
Payer: MEDICARE

## 2024-02-22 PROCEDURE — G0157 HHC PT ASSISTANT EA 15: HCPCS

## 2024-02-23 VITALS
TEMPERATURE: 96.9 F | OXYGEN SATURATION: 96 % | DIASTOLIC BLOOD PRESSURE: 76 MMHG | SYSTOLIC BLOOD PRESSURE: 124 MMHG | HEART RATE: 64 BPM

## 2024-02-27 ENCOUNTER — HOME CARE VISIT (OUTPATIENT)
Dept: HOME HEALTH SERVICES | Facility: HOME HEALTHCARE | Age: 84
End: 2024-02-27
Payer: MEDICARE

## 2024-02-27 VITALS
DIASTOLIC BLOOD PRESSURE: 85 MMHG | TEMPERATURE: 97.8 F | RESPIRATION RATE: 18 BRPM | SYSTOLIC BLOOD PRESSURE: 120 MMHG | HEART RATE: 78 BPM | OXYGEN SATURATION: 98 %

## 2024-02-27 PROCEDURE — G0151 HHCP-SERV OF PT,EA 15 MIN: HCPCS

## 2024-02-27 NOTE — HOME HEALTH
Pt answered door ambulating without AD.  Pt is now able to demonstrate independence with all mobility and self care without AD.  Pt met all goals with home care PT and is prepared for DC from PT services at this time.  Pt has no further questions or concerns and is agreeable to DC from PT as of 02/27/24.

## 2024-02-28 ENCOUNTER — HOME CARE VISIT (OUTPATIENT)
Dept: HOME HEALTH SERVICES | Facility: HOME HEALTHCARE | Age: 84
End: 2024-02-28
Payer: MEDICARE

## 2024-02-28 VITALS
TEMPERATURE: 97 F | SYSTOLIC BLOOD PRESSURE: 132 MMHG | DIASTOLIC BLOOD PRESSURE: 80 MMHG | BODY MASS INDEX: 27.68 KG/M2 | HEART RATE: 78 BPM | RESPIRATION RATE: 18 BRPM | WEIGHT: 141 LBS | OXYGEN SATURATION: 96 % | HEIGHT: 60 IN

## 2024-02-28 PROCEDURE — G0299 HHS/HOSPICE OF RN EA 15 MIN: HCPCS

## 2024-04-11 RX ORDER — ATORVASTATIN CALCIUM 20 MG/1
20 TABLET, FILM COATED ORAL NIGHTLY
Qty: 90 TABLET | Refills: 3 | Status: SHIPPED | OUTPATIENT
Start: 2024-04-11

## 2024-04-15 ENCOUNTER — OFFICE VISIT (OUTPATIENT)
Dept: FAMILY MEDICINE CLINIC | Facility: CLINIC | Age: 84
End: 2024-04-15
Payer: MEDICARE

## 2024-04-15 ENCOUNTER — LAB (OUTPATIENT)
Dept: FAMILY MEDICINE CLINIC | Facility: CLINIC | Age: 84
End: 2024-04-15
Payer: MEDICARE

## 2024-04-15 VITALS
DIASTOLIC BLOOD PRESSURE: 100 MMHG | HEART RATE: 68 BPM | TEMPERATURE: 98 F | HEIGHT: 60 IN | BODY MASS INDEX: 28.86 KG/M2 | WEIGHT: 147 LBS | OXYGEN SATURATION: 96 % | SYSTOLIC BLOOD PRESSURE: 173 MMHG

## 2024-04-15 DIAGNOSIS — G89.29 CHRONIC MIDLINE LOW BACK PAIN, UNSPECIFIED WHETHER SCIATICA PRESENT: ICD-10-CM

## 2024-04-15 DIAGNOSIS — M54.50 CHRONIC MIDLINE LOW BACK PAIN, UNSPECIFIED WHETHER SCIATICA PRESENT: ICD-10-CM

## 2024-04-15 DIAGNOSIS — M25.551 RIGHT HIP PAIN: Primary | ICD-10-CM

## 2024-04-15 DIAGNOSIS — T14.8XXA BRUISING: ICD-10-CM

## 2024-04-15 LAB
BASOPHILS # BLD AUTO: 0.04 10*3/MM3 (ref 0–0.2)
BASOPHILS NFR BLD AUTO: 0.6 % (ref 0–1.5)
DEPRECATED RDW RBC AUTO: 43.3 FL (ref 37–54)
EOSINOPHIL # BLD AUTO: 0.46 10*3/MM3 (ref 0–0.4)
EOSINOPHIL NFR BLD AUTO: 6.7 % (ref 0.3–6.2)
ERYTHROCYTE [DISTWIDTH] IN BLOOD BY AUTOMATED COUNT: 12.6 % (ref 12.3–15.4)
FERRITIN SERPL-MCNC: 14.4 NG/ML (ref 13–150)
HCT VFR BLD AUTO: 40.1 % (ref 34–46.6)
HGB BLD-MCNC: 13 G/DL (ref 12–15.9)
IMM GRANULOCYTES # BLD AUTO: 0.03 10*3/MM3 (ref 0–0.05)
IMM GRANULOCYTES NFR BLD AUTO: 0.4 % (ref 0–0.5)
IRON 24H UR-MRATE: 49 MCG/DL (ref 37–145)
IRON SATN MFR SERPL: 11 % (ref 20–50)
LYMPHOCYTES # BLD AUTO: 2.03 10*3/MM3 (ref 0.7–3.1)
LYMPHOCYTES NFR BLD AUTO: 29.8 % (ref 19.6–45.3)
MCH RBC QN AUTO: 30.7 PG (ref 26.6–33)
MCHC RBC AUTO-ENTMCNC: 32.4 G/DL (ref 31.5–35.7)
MCV RBC AUTO: 94.8 FL (ref 79–97)
MONOCYTES # BLD AUTO: 0.96 10*3/MM3 (ref 0.1–0.9)
MONOCYTES NFR BLD AUTO: 14.1 % (ref 5–12)
NEUTROPHILS NFR BLD AUTO: 3.3 10*3/MM3 (ref 1.7–7)
NEUTROPHILS NFR BLD AUTO: 48.4 % (ref 42.7–76)
NRBC BLD AUTO-RTO: 0 /100 WBC (ref 0–0.2)
PLATELET # BLD AUTO: 303 10*3/MM3 (ref 140–450)
PMV BLD AUTO: 10.1 FL (ref 6–12)
RBC # BLD AUTO: 4.23 10*6/MM3 (ref 3.77–5.28)
TIBC SERPL-MCNC: 462 MCG/DL (ref 298–536)
TRANSFERRIN SERPL-MCNC: 310 MG/DL (ref 200–360)
WBC NRBC COR # BLD AUTO: 6.82 10*3/MM3 (ref 3.4–10.8)

## 2024-04-15 PROCEDURE — 3077F SYST BP >= 140 MM HG: CPT | Performed by: NURSE PRACTITIONER

## 2024-04-15 PROCEDURE — 83540 ASSAY OF IRON: CPT | Performed by: NURSE PRACTITIONER

## 2024-04-15 PROCEDURE — 85025 COMPLETE CBC W/AUTO DIFF WBC: CPT | Performed by: NURSE PRACTITIONER

## 2024-04-15 PROCEDURE — 84466 ASSAY OF TRANSFERRIN: CPT | Performed by: NURSE PRACTITIONER

## 2024-04-15 PROCEDURE — 36415 COLL VENOUS BLD VENIPUNCTURE: CPT | Performed by: NURSE PRACTITIONER

## 2024-04-15 PROCEDURE — 3080F DIAST BP >= 90 MM HG: CPT | Performed by: NURSE PRACTITIONER

## 2024-04-15 PROCEDURE — 99214 OFFICE O/P EST MOD 30 MIN: CPT | Performed by: NURSE PRACTITIONER

## 2024-04-15 PROCEDURE — 82728 ASSAY OF FERRITIN: CPT | Performed by: NURSE PRACTITIONER

## 2024-04-15 NOTE — PROGRESS NOTES
Chief Complaint  Fall (Missed a step on latter, not really fell down but having pain in lower back and right hip pains shooting down in the groin, and wants labs done today. )    Subjective        Maria D Fountain presents to [facility]  History of Present Illness  Maria D is a 84 year old female presenting today with complaints of hip pain after a fall. She missed a step on a ladder and forcefully landed on her feet. She felt a sharp pain in her right hip.  In the morning she can barely move.  She is also complaining of chronic low back pain.  She is taking Mobic and Tylenol 650 mg.  She would like to have imaging done to her lower back.  It has been years since she had an MRI.  She had a x-ray in 2023 which showed degenerative changes.    She would also like to be checked for anemia.  She likes to donate blood and last time she was giving blood they were concerned with her possibly being anemic.         The following portions of the patient's history were reviewed and updated as appropriate: allergies, current medications, past family history, past medical history, past social history, past surgical history and problem list.    Allergies   Allergen Reactions    Pravastatin Dizziness       Patient Active Problem List   Diagnosis    Coronary artery disease of native artery of native heart with stable angina pectoris    Degeneration of lumbar or lumbosacral intervertebral disc    Dyslipidemia    Essential hypertension    Angina pectoris    Chest pain, pleuritic    Lumbar spondylosis    Mitral regurgitation    Prediabetes    Chronic coronary artery disease    Varicose veins of left lower extremity with pain    Mixed hyperlipidemia    Osteopenia after menopause    Rotator cuff arthropathy of left shoulder    Clinical diagnosis of severe acute respiratory syndrome coronavirus 2 (SARS-CoV-2) disease    Encounter for annual wellness exam in Medicare patient    Symptomatic cholelithiasis  "   Influenza A       Current Outpatient Medications   Medication Instructions    acetaminophen (TYLENOL) 650 MG 8 hr tablet 1 tablet, Oral, Every 8 Hours PRN    albuterol sulfate  (90 Base) MCG/ACT inhaler 2 puffs, Inhalation, Every 4 Hours PRN    amLODIPine (NORVASC) 5 MG tablet 1 tablet, Oral, Daily    ascorbic acid (VITAMIN C) 1000 MG tablet 0.5 tablets, Oral, 2 times daily    aspirin (aspirin) 81 MG EC tablet 1 tablet, Oral, Every 24 Hours    atorvastatin (LIPITOR) 20 mg, Oral, Nightly    benzonatate (TESSALON) 100 mg, Oral, 3 Times Daily PRN    Biotin 5000 MCG capsule 1 capsule, Oral, Nightly    carvedilol (COREG) 12.5 mg, Oral, 2 Times Daily With Meals    Lidocaine 4 % 1 patch, Transdermal, Every 24 Hours, Remove & Discard patch within 12 hours or as directed by MD    lisinopril (PRINIVIL,ZESTRIL) 20 mg, Oral, 2 Times Daily    loperamide (IMODIUM) 2 mg, Oral, As Needed    meloxicam (MOBIC) 15 mg, Oral, Daily    multivitamin-minerals (CENTRUM) tablet 1 tablet, Oral, Daily    nitroglycerin (NITROSTAT) 0.4 mg, Sublingual, Every 5 Minutes PRN, Take no more than 3 doses in 15 minutes.    Probiotic Product (PROBIOTIC BLEND PO) 1 capsule, Oral, Daily    vitamin B-12 (CYANOCOBALAMIN) 1000 MCG tablet 1 tablet, Oral, Daily    vitamin D3 5,000 Units, Oral, 2 Times Daily          Objective   Vital Signs:  /100 (BP Location: Left arm, Patient Position: Sitting, Cuff Size: Adult)   Pulse 68   Temp 98 °F (36.7 °C) (Temporal)   Ht 152.4 cm (60\")   Wt 66.7 kg (147 lb)   SpO2 96%   BMI 28.71 kg/m²   Estimated body mass index is 28.71 kg/m² as calculated from the following:    Height as of this encounter: 152.4 cm (60\").    Weight as of this encounter: 66.7 kg (147 lb).               Review of Systems   Constitutional:  Negative for activity change, chills, fatigue, fever and unexpected weight change.   Cardiovascular:  Negative for leg swelling.   Musculoskeletal:  Positive for back pain. Negative for " arthralgias, gait problem, joint swelling, myalgias, neck pain and neck stiffness.   Neurological:  Negative for weakness.        Physical Exam  Constitutional:       Appearance: Normal appearance.   Cardiovascular:      Rate and Rhythm: Normal rate and regular rhythm.   Pulmonary:      Effort: Pulmonary effort is normal.      Breath sounds: Normal breath sounds.   Musculoskeletal:         General: Normal range of motion.      Lumbar back: Tenderness present. Decreased range of motion.      Right hip: Tenderness present. Decreased range of motion.   Skin:     General: Skin is warm and dry.      Capillary Refill: Capillary refill takes less than 2 seconds.   Neurological:      Mental Status: She is alert and oriented to person, place, and time.   Psychiatric:         Mood and Affect: Mood normal.         Behavior: Behavior normal.         Thought Content: Thought content normal.         Judgment: Judgment normal.        Result Review :                   Assessment and Plan   Diagnoses and all orders for this visit:    1. Right hip pain (Primary)  Comments:  will start lidocaine patches  cont mobic and tylenol    2. Chronic midline low back pain, unspecified whether sciatica present  Comments:  Pt will get over the counter lidocaine patches  cont mobic and tylenol  will get MRI  Orders:  -     MRI Lumbar Spine Without Contrast; Future    3. Bruising  Comments:  Concerns with iron deficiency.  Will check labs  Orders:  -     CBC & Differential  -     Iron Profile  -     Ferritin             Follow Up   No follow-ups on file.  Patient was given instructions and counseling regarding her condition or for health maintenance advice. Please see specific information pulled into the AVS if appropriate.

## 2024-04-17 ENCOUNTER — TELEPHONE (OUTPATIENT)
Dept: FAMILY MEDICINE CLINIC | Facility: CLINIC | Age: 84
End: 2024-04-17

## 2024-04-17 NOTE — TELEPHONE ENCOUNTER
Caller: Maria D Fountain    Relationship: Self    Best call back number:   Maria D Fountain (Self) [phone] (Mobile)       What was the call regardin THINGS:      LAB RESULTS    AND MRI OF THE SPINE   PATIENT WANTED TO DISCUSS BOTH PLEASE    WANTED TO TALK WITH BJORN SHIPMAN       Is it okay if the provider responds through MyChart: CALL PLEASE

## 2024-04-19 DIAGNOSIS — M25.551 RIGHT HIP PAIN: Primary | ICD-10-CM

## 2024-04-19 NOTE — PROGRESS NOTES
Patient notified of test results. She want to add her (rt) hip to the MRI. She said she fell and that is hurting as well as her lower lumber.

## 2024-04-19 NOTE — PROGRESS NOTES
Follow-up message given to patient and/or PHI in chart. Message verified with patient understanding information given.

## 2024-04-30 DIAGNOSIS — G89.29 CHRONIC MIDLINE LOW BACK PAIN, UNSPECIFIED WHETHER SCIATICA PRESENT: Primary | ICD-10-CM

## 2024-04-30 DIAGNOSIS — M54.50 CHRONIC MIDLINE LOW BACK PAIN, UNSPECIFIED WHETHER SCIATICA PRESENT: Primary | ICD-10-CM

## 2024-04-30 DIAGNOSIS — M25.551 RIGHT HIP PAIN: ICD-10-CM

## 2024-05-08 ENCOUNTER — OFFICE VISIT (OUTPATIENT)
Dept: PAIN MEDICINE | Facility: CLINIC | Age: 84
End: 2024-05-08
Payer: MEDICARE

## 2024-05-08 VITALS
SYSTOLIC BLOOD PRESSURE: 163 MMHG | RESPIRATION RATE: 16 BRPM | BODY MASS INDEX: 28.71 KG/M2 | OXYGEN SATURATION: 94 % | WEIGHT: 147 LBS | HEART RATE: 61 BPM | DIASTOLIC BLOOD PRESSURE: 73 MMHG

## 2024-05-08 DIAGNOSIS — M54.16 LUMBAR RADICULOPATHY: ICD-10-CM

## 2024-05-08 DIAGNOSIS — M53.3 SACROILIAC JOINT DYSFUNCTION: Primary | ICD-10-CM

## 2024-05-08 DIAGNOSIS — M47.816 LUMBAR SPONDYLOSIS: ICD-10-CM

## 2024-05-09 ENCOUNTER — PATIENT ROUNDING (BHMG ONLY) (OUTPATIENT)
Dept: PAIN MEDICINE | Facility: CLINIC | Age: 84
End: 2024-05-09
Payer: MEDICARE

## 2024-06-14 ENCOUNTER — TELEPHONE (OUTPATIENT)
Dept: FAMILY MEDICINE CLINIC | Facility: CLINIC | Age: 84
End: 2024-06-14

## 2024-06-14 NOTE — TELEPHONE ENCOUNTER
"Caller: Maria D Fountain    Relationship to patient: Self    Best call back number: [phone]     Chief complaint: EXTENDED, BLOATED STOMACH WITH SORES ON BODY  DOESN'T WANT TO SEE NURSE PRACTITIONER  TOOK EARLIEST AVAILABLE  CITES \"I WILL BE DEAD BY THEN\" \"I HOPE IM STILL ALIVE THEN\"  EXPLAINS DR. PUCKETT HAS SECRET EMERGENCY APPOINTMENTS    Type of visit: SAME DAY    Requested date: 6/14/2204     If rescheduling, when is the original appointment: 7/10/2024   "

## 2024-06-18 ENCOUNTER — LAB (OUTPATIENT)
Dept: FAMILY MEDICINE CLINIC | Facility: CLINIC | Age: 84
End: 2024-06-18
Payer: MEDICARE

## 2024-06-18 ENCOUNTER — OFFICE VISIT (OUTPATIENT)
Dept: FAMILY MEDICINE CLINIC | Facility: CLINIC | Age: 84
End: 2024-06-18
Payer: MEDICARE

## 2024-06-18 VITALS
BODY MASS INDEX: 28.54 KG/M2 | WEIGHT: 145.4 LBS | HEIGHT: 60 IN | HEART RATE: 66 BPM | TEMPERATURE: 98.3 F | SYSTOLIC BLOOD PRESSURE: 140 MMHG | DIASTOLIC BLOOD PRESSURE: 100 MMHG | OXYGEN SATURATION: 97 %

## 2024-06-18 DIAGNOSIS — R19.7 DIARRHEA, UNSPECIFIED TYPE: ICD-10-CM

## 2024-06-18 DIAGNOSIS — K59.00 CONSTIPATION, UNSPECIFIED CONSTIPATION TYPE: Primary | ICD-10-CM

## 2024-06-18 DIAGNOSIS — K59.00 CONSTIPATION, UNSPECIFIED CONSTIPATION TYPE: ICD-10-CM

## 2024-06-18 DIAGNOSIS — R14.0 BLOATING: ICD-10-CM

## 2024-06-18 DIAGNOSIS — R42 LIGHTHEADEDNESS: ICD-10-CM

## 2024-06-18 DIAGNOSIS — R10.84 GENERALIZED ABDOMINAL PAIN: ICD-10-CM

## 2024-06-18 DIAGNOSIS — R53.83 FATIGUE, UNSPECIFIED TYPE: ICD-10-CM

## 2024-06-18 DIAGNOSIS — R53.83 FATIGUE, UNSPECIFIED TYPE: Primary | ICD-10-CM

## 2024-06-18 DIAGNOSIS — E80.6 HYPERBILIRUBINEMIA: ICD-10-CM

## 2024-06-18 LAB
ALBUMIN SERPL-MCNC: 4.5 G/DL (ref 3.5–5.2)
ALBUMIN/GLOB SERPL: 1.6 G/DL
ALP SERPL-CCNC: 84 U/L (ref 39–117)
ALT SERPL W P-5'-P-CCNC: 22 U/L (ref 1–33)
ANION GAP SERPL CALCULATED.3IONS-SCNC: 7 MMOL/L (ref 5–15)
AST SERPL-CCNC: 26 U/L (ref 1–32)
BASOPHILS # BLD AUTO: 0.05 10*3/MM3 (ref 0–0.2)
BASOPHILS NFR BLD AUTO: 0.6 % (ref 0–1.5)
BILIRUB SERPL-MCNC: 1.7 MG/DL (ref 0–1.2)
BUN SERPL-MCNC: 11 MG/DL (ref 8–23)
BUN/CREAT SERPL: 13.1 (ref 7–25)
CALCIUM SPEC-SCNC: 9.4 MG/DL (ref 8.6–10.5)
CHLORIDE SERPL-SCNC: 107 MMOL/L (ref 98–107)
CO2 SERPL-SCNC: 29 MMOL/L (ref 22–29)
CREAT SERPL-MCNC: 0.84 MG/DL (ref 0.57–1)
DEPRECATED RDW RBC AUTO: 45.9 FL (ref 37–54)
EGFRCR SERPLBLD CKD-EPI 2021: 68.6 ML/MIN/1.73
EOSINOPHIL # BLD AUTO: 0.37 10*3/MM3 (ref 0–0.4)
EOSINOPHIL NFR BLD AUTO: 4.4 % (ref 0.3–6.2)
ERYTHROCYTE [DISTWIDTH] IN BLOOD BY AUTOMATED COUNT: 13.6 % (ref 12.3–15.4)
GLOBULIN UR ELPH-MCNC: 2.8 GM/DL
GLUCOSE SERPL-MCNC: 106 MG/DL (ref 65–99)
HCT VFR BLD AUTO: 45.5 % (ref 34–46.6)
HGB BLD-MCNC: 14.4 G/DL (ref 12–15.9)
IMM GRANULOCYTES # BLD AUTO: 0.02 10*3/MM3 (ref 0–0.05)
IMM GRANULOCYTES NFR BLD AUTO: 0.2 % (ref 0–0.5)
LYMPHOCYTES # BLD AUTO: 2.12 10*3/MM3 (ref 0.7–3.1)
LYMPHOCYTES NFR BLD AUTO: 25.1 % (ref 19.6–45.3)
MCH RBC QN AUTO: 29.4 PG (ref 26.6–33)
MCHC RBC AUTO-ENTMCNC: 31.6 G/DL (ref 31.5–35.7)
MCV RBC AUTO: 93 FL (ref 79–97)
MONOCYTES # BLD AUTO: 0.84 10*3/MM3 (ref 0.1–0.9)
MONOCYTES NFR BLD AUTO: 10 % (ref 5–12)
NEUTROPHILS NFR BLD AUTO: 5.04 10*3/MM3 (ref 1.7–7)
NEUTROPHILS NFR BLD AUTO: 59.7 % (ref 42.7–76)
NRBC BLD AUTO-RTO: 0 /100 WBC (ref 0–0.2)
PLATELET # BLD AUTO: 284 10*3/MM3 (ref 140–450)
PMV BLD AUTO: 10 FL (ref 6–12)
POTASSIUM SERPL-SCNC: 4.6 MMOL/L (ref 3.5–5.2)
PROT SERPL-MCNC: 7.3 G/DL (ref 6–8.5)
RBC # BLD AUTO: 4.89 10*6/MM3 (ref 3.77–5.28)
SODIUM SERPL-SCNC: 143 MMOL/L (ref 136–145)
TSH SERPL DL<=0.05 MIU/L-ACNC: 1.93 UIU/ML (ref 0.27–4.2)
WBC NRBC COR # BLD AUTO: 8.44 10*3/MM3 (ref 3.4–10.8)

## 2024-06-18 PROCEDURE — 1160F RVW MEDS BY RX/DR IN RCRD: CPT | Performed by: FAMILY MEDICINE

## 2024-06-18 PROCEDURE — 3080F DIAST BP >= 90 MM HG: CPT | Performed by: FAMILY MEDICINE

## 2024-06-18 PROCEDURE — 1159F MED LIST DOCD IN RCRD: CPT | Performed by: FAMILY MEDICINE

## 2024-06-18 PROCEDURE — 84443 ASSAY THYROID STIM HORMONE: CPT | Performed by: FAMILY MEDICINE

## 2024-06-18 PROCEDURE — 80053 COMPREHEN METABOLIC PANEL: CPT | Performed by: FAMILY MEDICINE

## 2024-06-18 PROCEDURE — 36415 COLL VENOUS BLD VENIPUNCTURE: CPT

## 2024-06-18 PROCEDURE — 85025 COMPLETE CBC W/AUTO DIFF WBC: CPT | Performed by: FAMILY MEDICINE

## 2024-06-18 PROCEDURE — 99214 OFFICE O/P EST MOD 30 MIN: CPT | Performed by: FAMILY MEDICINE

## 2024-06-18 PROCEDURE — 3077F SYST BP >= 140 MM HG: CPT | Performed by: FAMILY MEDICINE

## 2024-06-18 PROCEDURE — 1125F AMNT PAIN NOTED PAIN PRSNT: CPT | Performed by: FAMILY MEDICINE

## 2024-06-18 NOTE — PROGRESS NOTES
"Margret Fountain is a 84 y.o. female.     History of Present Illness  The patient is an 84-year-old female who presents for evaluation of multiple medical concerns.    The patient was evaluated by a dermatologist three weeks ago for a rash, the etiology of which remains undetermined. She was prescribed mupirocin and advised to return in two weeks if there is no improvement. A week ago, she returned to the dermatologist, during which something was \"scraped off\" per patient.  The wound was not sent for pathological examination as it was already healing per patient. She was advised to continue using warm water with Epsom salts per patient.  The patient reports an increase in \"Insect bites\" in the vaginal and rectum area. She discovered a parasite after researching medical books, which she used to research her 's illnesses when he was alive. She also developed fever blisters and suspects a viral infection. These symptoms have been present since her hospital discharge in 02/2024, where she was diagnosed with acute lung disease and a viral infection. Post-discharge, she has been feeling unwell. Initial symptoms included fatigue, memory impairment, and hair loss. She sought treatment at [facility], where a vaginal exam and rectal exam were performed, but no abnormalities were detected. The white thread-like lesions resolve immediately, but reappear upon itching, resembling a pimple. She has been applying mupirocin cream and Corn Starch powder for relief. She has lost 5 pounds.  . A prescription for estrogen was issued by her gyndue to vaginal dryness and redness, but she has not yet started this medication. She reports fatigue, decreased appetite, and abdominal bloating, likening the sensation to feeling 9 months pregnant. Since her hospital discharge in 02/2024, she has been experiencing bowel issues, describing her stools as resembling small peas or hard beans. Her bowel movements vary in " consistency, sometimes appearing black, green, or yellow mucus. She declined colonoscopy due to her age. She reports tenderness and soreness on the left side of her abdomen. She denies hematochezia but reports diarrhea, with 2 to 3 episodes of diarrhea in the past month. She takes antidiarrheal medication and has limited her outdoor activities. Her last bowel movement was yesterday, which was solid and in small strings. She reports lightheadedness. She lives alone and manages her meals. She drives to [employer] once a week (prev every 2-3 days)  and uses city water. She denies recent travel, eating raw meat, sushi,  deer, or squirrels. She [place] in the [place]. She reports severe lower back pain and is under the care of Dr. Holliday, a pain medicine specialist. She is unsure if she has had a fever but reports hot flashes. She denies recent sick contacts. She denies taking iron or vitamin supplements. She denies nausea or vomiting. Her appetite is good, and she eats until midnight. She suspects parasites due to frequent consumption of sweets.    The patient reports elevated blood pressure, which she attributes to fatigue. On two occasions this year, her blood pressure exceeded 200, even during periods of inactivity. She takes lisinopril and carvedilol 12.5 mg twice daily, which lowers her blood pressure to 150 to 160. She takes amlodipine once or twice a month when her blood pressure exceeds 160 per her cardiologist's instructions. On days when she does not take amlodipine, her blood pressure typically ranges between 150 to 160, which is normal for her.    Supplemental Information  Her ankle swells. She was taken to urgent care from [facility] because that is where she lives. They did not know what was wrong. They put ice packs on it. They saw a little white head. She had itching when she urinated. It did not burn, but she started itching. She went to bed that night at 2 or 3 in the morning. She finally got to  sleep. At 5 or 6 in the morning, she had a sensation in her belly and vagina. She thought her vagina was falling out. She urinated at 5 in the morning. She noticed a stringy white mucus ball.  Her mother  of colon cancer. Her brother has colon cancer. Her father  from a heart problem and hypertension.       The following portions of the patient's history were reviewed and updated as appropriate: allergies, current medications, past family history, past medical history, past social history, past surgical history, and problem list.  Past Medical History:   Diagnosis Date    Arthritis     Back pain     COVID-19     Diabetes mellitus     DJD (degenerative joint disease)     Gallstones     Heart disease     Heart murmur     Hip pain, bilateral     History of stress test 2011    Stress myoview- neg     Hypertension     Lumbar disc disease     Mitral regurgitation     Multiple lipomas     Pleurisy     Spinal stenosis      Past Surgical History:   Procedure Laterality Date    BREAST BIOPSY Right     CARDIAC CATHETERIZATION  2017    HEMANGIOMA EXCISION Left     left forearm     HX OVARIAN CYSTECTOMY      HYSTERECTOMY       Family History   Problem Relation Age of Onset    No Known Problems Mother     Heart disease Father         MI    No Known Problems Sister     No Known Problems Brother     No Known Problems Maternal Aunt     No Known Problems Maternal Uncle     No Known Problems Paternal Aunt     No Known Problems Paternal Uncle     No Known Problems Maternal Grandmother     No Known Problems Maternal Grandfather     No Known Problems Paternal Grandmother     No Known Problems Paternal Grandfather     Arthritis Other     Hypertension Other     Colon cancer Other     Anemia Neg Hx     Arrhythmia Neg Hx     Asthma Neg Hx     Clotting disorder Neg Hx     Fainting Neg Hx     Heart attack Neg Hx     Heart failure Neg Hx     Hyperlipidemia Neg Hx      Social History     Socioeconomic History     Marital status:    Tobacco Use    Smoking status: Never     Passive exposure: Never    Smokeless tobacco: Never   Vaping Use    Vaping status: Never Used   Substance and Sexual Activity    Alcohol use: No    Drug use: No    Sexual activity: Defer         Current Outpatient Medications:     acetaminophen (TYLENOL) 650 MG 8 hr tablet, Take 1 tablet by mouth Every 8 (Eight) Hours As Needed for Mild Pain. Indications: Pain, Disp: , Rfl:     amLODIPine (NORVASC) 5 MG tablet, Take 1 tablet by mouth Daily. Indications: High Blood Pressure Disorder, Disp: , Rfl:     ascorbic acid (VITAMIN C) 1000 MG tablet, Take 0.5 tablets by mouth 2 (two) times a day. Indications: Inadequate Vitamin C, Disp: , Rfl:     aspirin (aspirin) 81 MG EC tablet, Take 1 tablet by mouth Daily. Indications: prophylactic, Disp: , Rfl:     atorvastatin (LIPITOR) 20 MG tablet, TAKE ONE TABLET BY MOUTH ONCE NIGHTLY, Disp: 90 tablet, Rfl: 3    Biotin 5000 MCG capsule, Take 1 capsule by mouth Every Night. Indications: thinning hair, Disp: , Rfl:     carvedilol (COREG) 12.5 MG tablet, TAKE 1 TABLET BY MOUTH TWICE A DAY WITH A MEAL, Disp: 180 tablet, Rfl: 1    Diclofenac Epolamine 1.3 % patch 24 hour, Apply 1 patch topically Daily As Needed (lower back pain) for up to 60 days., Disp: 30 patch, Rfl: 1    Lidocaine 4 %, Place 1 patch on the skin as directed by provider Daily. Remove & Discard patch within 12 hours or as directed by MD  Indications: pain, Disp: , Rfl:     lisinopril (PRINIVIL,ZESTRIL) 20 MG tablet, TAKE 1 TABLET BY MOUTH TWICE A DAY, Disp: 180 tablet, Rfl: 3    loperamide (IMODIUM) 2 MG capsule, Take 1 capsule by mouth As Needed for Diarrhea. Indications: Diarrhea, Disp: , Rfl:     meloxicam (MOBIC) 15 MG tablet, Take 1 tablet by mouth Daily. Indications: Joint Damage causing Pain and Loss of Function, Disp: , Rfl:     multivitamin-minerals (CENTRUM) tablet, Take 1 tablet by mouth Daily. Indications: supplement, Disp: , Rfl:      "nitroglycerin (Nitrostat) 0.4 MG SL tablet, Place 1 tablet under the tongue Every 5 (Five) Minutes As Needed for Chest Pain. Take no more than 3 doses in 15 minutes., Disp: 30 tablet, Rfl: 5    Probiotic Product (PROBIOTIC BLEND PO), Take 1 capsule by mouth Daily. Indications: supplement, Disp: , Rfl:     vitamin B-12 (CYANOCOBALAMIN) 1000 MCG tablet, Take 1 tablet by mouth Daily. Indications: Inadequate Vitamin B12, Disp: , Rfl:     vitamin D3 (vitamin d) 125 MCG (5000 UT) capsule capsule, Take 1 capsule by mouth 2 (Two) Times a Day. Indications: Vitamin D Deficiency, Disp: , Rfl:     albuterol sulfate  (90 Base) MCG/ACT inhaler, Inhale 2 puffs Every 4 (Four) Hours As Needed for Wheezing. (Patient not taking: Reported on 6/18/2024), Disp: 6.7 g, Rfl: 0    benzonatate (TESSALON) 100 MG capsule, Take 1 capsule by mouth 3 (Three) Times a Day As Needed for Cough. (Patient not taking: Reported on 6/18/2024), Disp: 30 capsule, Rfl: 0    Review of Systems  ROS done and noted in HPI    /100   Pulse 66   Temp 98.3 °F (36.8 °C) (Temporal)   Ht 152.4 cm (60\")   Wt 66 kg (145 lb 6.4 oz)   LMP  (LMP Unknown)   SpO2 97%   BMI 28.40 kg/m²           Objective   Physical Exam  Vitals and nursing note reviewed.   Constitutional:       Appearance: Normal appearance. She is well-developed, well-groomed and overweight.   Cardiovascular:      Rate and Rhythm: Normal rate and regular rhythm.      Heart sounds: Normal heart sounds.   Pulmonary:      Effort: Pulmonary effort is normal.      Breath sounds: Normal breath sounds.   Abdominal:      General: Abdomen is flat. Bowel sounds are normal. There is distension (MILD).      Palpations: Abdomen is soft. There is no mass.      Tenderness: There is abdominal tenderness (LLQ). There is no right CVA tenderness, left CVA tenderness, guarding or rebound.   Musculoskeletal:      Cervical back: Neck supple.      Right lower leg: No edema.      Left lower leg: No edema. "   Lymphadenopathy:      Cervical: No cervical adenopathy.   Neurological:      Mental Status: She is alert.   Psychiatric:         Mood and Affect: Mood is anxious.         Behavior: Behavior is cooperative.       Physical Exam         Results         Assessment & Plan   Problems Addressed this Visit    None  Visit Diagnoses       Fatigue, unspecified type    -  Primary    Relevant Orders    CBC & Differential    Comprehensive Metabolic Panel    TSH    Constipation, unspecified constipation type        Relevant Orders    CBC & Differential    Comprehensive Metabolic Panel    TSH    XR Abdomen KUB    Diarrhea, unspecified type        Relevant Orders    CBC & Differential    Comprehensive Metabolic Panel    TSH    Generalized abdominal pain        Relevant Orders    CBC & Differential    Comprehensive Metabolic Panel    TSH    XR Abdomen KUB    Lightheadedness        Relevant Orders    CBC & Differential    Comprehensive Metabolic Panel    TSH    Bloating        Relevant Orders    CBC & Differential    Comprehensive Metabolic Panel    TSH    XR Abdomen KUB          Diagnoses         Codes Comments    Fatigue, unspecified type    -  Primary ICD-10-CM: R53.83  ICD-9-CM: 780.79     Constipation, unspecified constipation type     ICD-10-CM: K59.00  ICD-9-CM: 564.00     Diarrhea, unspecified type     ICD-10-CM: R19.7  ICD-9-CM: 787.91     Generalized abdominal pain     ICD-10-CM: R10.84  ICD-9-CM: 789.07     Lightheadedness     ICD-10-CM: R42  ICD-9-CM: 780.4     Bloating     ICD-10-CM: R14.0  ICD-9-CM: 787.3           Assessment & Plan  1.diarrhea/constipation  The patient's last colonoscopy was conducted on 03/30/2016. Given the absence of diarrhea, a parasitic test is not feasible. Laboratory tests will be conducted to further evaluate the patient's condition. I have explained to patient that I do not think she has parasites.    2. Hypertension.  The patient's blood pressure remains slightly elevated, potentially  attributable to her current state of agitation and anxiety. She plans to go home and take her amlodipine    3. Left-sided abdominal pain, bloating, and constipation.  KUB will be conducted to further evaluate the patient's abdominal pain, bloating, constipation, and bloating.  I discussed ordering a CT but pt was hesitant    4. Lightheadedness: labs ordered; she was encouraged to increase fluid intake     Cbc, cmp, tsh ordered  With resolution of the diarrhea, stool studies for O&P cannot be done  If pt is agreeable, she may need to see GI again       Patient or patient representative verbalized consent for the use of Ambient Listening during the visit with  Celi Renner MD for chart documentation. 6/18/2024  16:32 EDT

## 2024-06-19 DIAGNOSIS — R10.84 GENERALIZED ABDOMINAL PAIN: ICD-10-CM

## 2024-06-19 DIAGNOSIS — R14.0 BLOATING: ICD-10-CM

## 2024-06-19 DIAGNOSIS — K59.00 CONSTIPATION, UNSPECIFIED CONSTIPATION TYPE: Primary | ICD-10-CM

## 2024-06-19 DIAGNOSIS — E80.6 HYPERBILIRUBINEMIA: ICD-10-CM

## 2024-06-19 NOTE — PROGRESS NOTES
Patient notified of test results. No questions. Patient verbalize understanding. Se didn't have the X ray done. She will do the CT scan. She want to have it done at [facility].

## 2024-06-27 ENCOUNTER — TELEPHONE (OUTPATIENT)
Dept: FAMILY MEDICINE CLINIC | Facility: CLINIC | Age: 84
End: 2024-06-27
Payer: MEDICARE

## 2024-06-27 DIAGNOSIS — R19.7 DIARRHEA, UNSPECIFIED TYPE: Primary | ICD-10-CM

## 2024-06-27 NOTE — TELEPHONE ENCOUNTER
"Pt is scheduled with [facility] in 1.5wks from now for her CT. Per pt the dark \"beans\" started back in January, but she has been having loose stools since March that are becoming more frequent. X1 month she has been having very loose stools that are green.   She also wanted to let you know that she just had surgery/biopsy with Dr. Mcmahon for spot/bite on each of her legs. She is waiting the biopsy results on that, but wanted you to be aware.   "

## 2024-06-27 NOTE — TELEPHONE ENCOUNTER
Pt states that [facility] cannot get her in for 2 months, and she doesn't feel she can wait that long with her chronic diarrhea.  What should she do?  Please call pt.

## 2024-06-27 NOTE — TELEPHONE ENCOUNTER
"According to what she told me at her appointment, she is not having chronic diarrhea  She told me she had 2-3 episodes of diarrhea in the last few months  She told me that she is passing little firm \"beans\"  I have a CT scan ordered  - has she scheduled that?  "

## 2024-06-28 NOTE — TELEPHONE ENCOUNTER
If she is having continuous diarrhea, that is a change  I will put orders in for stool studies  They will not run these on solid stool  I hope she made the appointment with GI  If they have a cancellation list she can always ask to be put on it as well  I don't know if she can get the kit for the stool studies from [facility] or if she has to go to the hospital for it but the orders will be in the computer

## 2024-06-28 NOTE — TELEPHONE ENCOUNTER
Pt advised. She has apt in 3 months with GI and they will not put her on cancellation list as she is a new patient they will not see her sooner. Pt will  stool kit in office today and is waiting on apt for CT.

## 2024-07-12 ENCOUNTER — OFFICE (AMBULATORY)
Dept: URBAN - METROPOLITAN AREA CLINIC 64 | Facility: CLINIC | Age: 84
End: 2024-07-12
Payer: COMMERCIAL

## 2024-07-12 VITALS
HEIGHT: 63 IN | DIASTOLIC BLOOD PRESSURE: 74 MMHG | HEART RATE: 60 BPM | WEIGHT: 144 LBS | SYSTOLIC BLOOD PRESSURE: 124 MMHG

## 2024-07-12 DIAGNOSIS — R19.4 CHANGE IN BOWEL HABIT: ICD-10-CM

## 2024-07-12 DIAGNOSIS — Z80.0 FAMILY HISTORY OF MALIGNANT NEOPLASM OF DIGESTIVE ORGANS: ICD-10-CM

## 2024-07-12 DIAGNOSIS — L29.0 PRURITUS ANI: ICD-10-CM

## 2024-07-12 DIAGNOSIS — K52.89 OTHER SPECIFIED NONINFECTIVE GASTROENTERITIS AND COLITIS: ICD-10-CM

## 2024-07-12 PROCEDURE — 99204 OFFICE O/P NEW MOD 45 MIN: CPT | Performed by: INTERNAL MEDICINE

## 2024-08-01 ENCOUNTER — TELEPHONE (OUTPATIENT)
Dept: CARDIOLOGY | Facility: CLINIC | Age: 84
End: 2024-08-01
Payer: MEDICARE

## 2024-08-01 NOTE — TELEPHONE ENCOUNTER
Caller: DENISA    Relationship: SELF    Best call back number: [phone]        What was the call regarding: PT SAYS ALL SUMMER SHE HAS BEEN DEALING WITH FATIGUE.  IT IS MAKING IT DIFFICULT FOR HER TO DO TASKS LIKE CLEANING THE HOUSE.    SHE ALSO SAID SHE HAS HAD AN INCREASE IN HOT FLASHES/SWEATS.  ALSO BP HAS STILL BEEN RUNNING HIGH, RECENTLY SOMETIMES WITH THE SYSTOLIC NUMBER BEING OVER 200 (ED VISIT IN FEBRUARY FOR THIS)    LAST NIGHT BEFORE GOING TO SHE WAS ABLE TO GET IT DOWN /80.  HER READING ON THE PHONE /62.

## 2024-08-02 NOTE — TELEPHONE ENCOUNTER
148/95, is the lowest she has been able to quan her BP.     She does take Amlodipine ( recently she has been adding a second one in the evening). She has been doubling all BP meds.

## 2024-08-02 NOTE — TELEPHONE ENCOUNTER
Okay.  Please schedule an appointment next week.     If blood pressure over 180s over the weekend go to the ER     She can increase carvedilol to 25 mg twice daily this is the max dose  Lisinopril max dose should be 40 mg daily (or 20 mg twice daily like she is doing)   She can continue the amlodipine 5 twice daily

## 2024-08-05 ENCOUNTER — OFFICE VISIT (OUTPATIENT)
Dept: CARDIOLOGY | Facility: CLINIC | Age: 84
End: 2024-08-05
Payer: MEDICARE

## 2024-08-05 ENCOUNTER — LAB (OUTPATIENT)
Dept: LAB | Facility: HOSPITAL | Age: 84
End: 2024-08-05
Payer: MEDICARE

## 2024-08-05 VITALS
HEIGHT: 60 IN | HEART RATE: 62 BPM | DIASTOLIC BLOOD PRESSURE: 72 MMHG | WEIGHT: 144 LBS | BODY MASS INDEX: 28.27 KG/M2 | SYSTOLIC BLOOD PRESSURE: 139 MMHG | OXYGEN SATURATION: 94 %

## 2024-08-05 DIAGNOSIS — R73.9 HYPERGLYCEMIA: ICD-10-CM

## 2024-08-05 DIAGNOSIS — R07.2 PRECORDIAL PAIN: ICD-10-CM

## 2024-08-05 DIAGNOSIS — I25.119 CORONARY ARTERY DISEASE INVOLVING NATIVE CORONARY ARTERY OF NATIVE HEART WITH ANGINA PECTORIS: ICD-10-CM

## 2024-08-05 DIAGNOSIS — R53.83 FATIGUE, UNSPECIFIED TYPE: ICD-10-CM

## 2024-08-05 DIAGNOSIS — R06.09 DYSPNEA ON EXERTION: ICD-10-CM

## 2024-08-05 DIAGNOSIS — I10 ESSENTIAL HYPERTENSION: ICD-10-CM

## 2024-08-05 DIAGNOSIS — R07.2 PRECORDIAL PAIN: Primary | ICD-10-CM

## 2024-08-05 LAB — HBA1C MFR BLD: 6.18 % (ref 4.8–5.6)

## 2024-08-05 PROCEDURE — 93000 ELECTROCARDIOGRAM COMPLETE: CPT | Performed by: INTERNAL MEDICINE

## 2024-08-05 PROCEDURE — 83036 HEMOGLOBIN GLYCOSYLATED A1C: CPT

## 2024-08-05 PROCEDURE — 1159F MED LIST DOCD IN RCRD: CPT | Performed by: INTERNAL MEDICINE

## 2024-08-05 PROCEDURE — 3075F SYST BP GE 130 - 139MM HG: CPT | Performed by: INTERNAL MEDICINE

## 2024-08-05 PROCEDURE — 36415 COLL VENOUS BLD VENIPUNCTURE: CPT

## 2024-08-05 PROCEDURE — 99214 OFFICE O/P EST MOD 30 MIN: CPT | Performed by: INTERNAL MEDICINE

## 2024-08-05 PROCEDURE — 3078F DIAST BP <80 MM HG: CPT | Performed by: INTERNAL MEDICINE

## 2024-08-05 PROCEDURE — 1160F RVW MEDS BY RX/DR IN RCRD: CPT | Performed by: INTERNAL MEDICINE

## 2024-08-05 PROCEDURE — 83880 ASSAY OF NATRIURETIC PEPTIDE: CPT

## 2024-08-05 NOTE — PROGRESS NOTES
Subjective:     Encounter Date:08/05/2024      Patient ID: Maria D Fountain is a 84 y.o. female.    Chief Complaint and history of present illness:     Follow-up for CAD, MI, diabetes/prediabetes, hypertension     History of Present Illness  :     Ms. Maria D Fountain has PMH of        # CAD cardiac cath 06/21/2017 showing 50% OM1 disease and small-vessel disease distal LAD  #  mitral regurgitation, mild 9/2016  #  palpitations  #  hypertension  # LAE  #  diabetes  #?  ACE-inhibitor cough, now improved  # allergy/intolerance to pravastatin  # ALYSA        Here for follow-up.  Patient has occasional fleeting chest pain.  No aggravating or relieving factor.  Has fatigue.  Has some dyspnea on exertion.     Patient's arterial blood pressure is 139/72, heart rate 62, O2 sat of 94% on room air.     Patient was started on amlodipine and reportedly developed low blood pressure and stopped it on her own.  On 2/21/2022 patient had blood pressure of 220/117 went to the emergency room waited for 4 hours could not be seen therefore went back home.  Patient had history of not taking lisinopril in the past also.  Patient is complaining of extreme fatigue she thought she had COVID last summer had 3 rounds of steroids and antibiotics.  Patient has left arm hurting where she cannot lift it over the shoulders.  Went to rehab physical physical therapy and has improvement now she is able to lift it over the shoulder pain.  Patient says she is tired all the time and has right earache pain thinks she is having Covid again.           Patient's labs from 01/29/2018 revealed cholesterol 210 triglycerides 230 HDL low at 38 LDL high at 134 CMP is okay CK is 92.  Labs from 1/14/2020 reveal cholesterol 113, triglycerides 139 HDL 38 LDL 47, A1c of 5.6, CMP with total bilirubin 1.4, normal CBC and TSH.  Labs from 2/23/2022 ordered by me reviewed by me revealed CMP with a glucose of 110, lipid profile with cholesterol 119 triglycerides 170 HDL 40  LDL 41.  proBNP normal at 121.  Labs from 8/10/2023 reveal cholesterol 136, triglycerides 194, HDL 42, LDL 62.  CMP normal except for bilirubin of 1.5.     Stress Cardiolite 4/12/2022: Patient walked 7.40 minutes had normal perfusion EF of 69%.     Echocardiogram 9/14/2023 reveals EF of 60 to 65%      ASSESSMENT:        -Chest pain.  - Dyspnea on exertion  - Fatigue  #. hypertension,   #  dyslipidemia  # CAD  #  mitral regurgitation  #. prediabetes      PLAN:     Reviewed EKG results with patient  Patient is complaining of chest pain and dyspnea.  Will schedule a stress test.  Patient is not sure she can complete treadmill due to fatigue and exercise and exercise tolerance.  Will do Lexiscan.  Will check labs to evaluate fatigue.  Advised patient to check blood pressure at home   We will continue  medical management with  aspirin , lisinopril, amlodipine, atorvastatin, carvedilol, to help with angina, hypertension, dyslipidemia, CAD, MR  Counseled on walking exercise for low HDL   follow up with PMD for  diabetes/prediabetes.  Follow-up with PMD for noncardiac issues.              ECG 12 Lead    Date/Time: 8/5/2024 10:14 AM  Performed by: Matti Luna MD    Authorized by: Matti Luna MD  Comparison: compared with previous ECG from 1/8/2024  Comparison to previous ECG: EKG done today reviewed/interpreted by me reveals sinus rhythm with a rate of 66 bpm, no significant change compared EKG from 1/8/2024          Copied text in this portion of the note has been reviewed and is accurate as of 8/8/2024  The following portions of the patient's history were reviewed and updated as appropriate: allergies, current medications, past family history, past medical history, past social history, past surgical history and problem list.    Assessment:         MDM       Diagnosis Plan   1. Precordial pain  Stress Test With Myocardial Perfusion One Day    Hemoglobin A1c    BNP      2. Fatigue, unspecified  type  Stress Test With Myocardial Perfusion One Day    Hemoglobin A1c    BNP      3. Dyspnea on exertion  Stress Test With Myocardial Perfusion One Day    Hemoglobin A1c    BNP      4. Coronary artery disease involving native coronary artery of native heart with angina pectoris  Stress Test With Myocardial Perfusion One Day    Hemoglobin A1c    BNP      5. Essential hypertension  Stress Test With Myocardial Perfusion One Day    Hemoglobin A1c    BNP      6. Hyperglycemia  Stress Test With Myocardial Perfusion One Day    Hemoglobin A1c    BNP             Plan:               Past Medical History:  Past Medical History:   Diagnosis Date    Arthritis     Back pain     COVID-19     Diabetes mellitus     DJD (degenerative joint disease)     Gallstones     Heart disease     Heart murmur     Hip pain, bilateral     History of stress test 07/2011    Stress myoview- neg     Hypertension     Lumbar disc disease     Mitral regurgitation     Multiple lipomas     Pleurisy     Spinal stenosis      Past Surgical History:  Past Surgical History:   Procedure Laterality Date    BREAST BIOPSY Right 1990    CARDIAC CATHETERIZATION  06/21/2017    HEMANGIOMA EXCISION Left 2010    left forearm     HX OVARIAN CYSTECTOMY  1962    HYSTERECTOMY  2004      Allergies:  Allergies   Allergen Reactions    Pravastatin Dizziness     Home Meds:  Current Meds:     Current Outpatient Medications:     acetaminophen (TYLENOL) 650 MG 8 hr tablet, Take 1 tablet by mouth Every 8 (Eight) Hours As Needed for Mild Pain. Indications: Pain, Disp: , Rfl:     amLODIPine (NORVASC) 5 MG tablet, Take 1 tablet by mouth Daily. Indications: High Blood Pressure Disorder, Disp: , Rfl:     ascorbic acid (VITAMIN C) 1000 MG tablet, Take 0.5 tablets by mouth 2 (two) times a day. Indications: Inadequate Vitamin C, Disp: , Rfl:     aspirin (aspirin) 81 MG EC tablet, Take 1 tablet by mouth Daily. Indications: prophylactic, Disp: , Rfl:     atorvastatin (LIPITOR) 20 MG tablet,  TAKE ONE TABLET BY MOUTH ONCE NIGHTLY, Disp: 90 tablet, Rfl: 3    Biotin 5000 MCG capsule, Take 1 capsule by mouth Every Night. Indications: thinning hair, Disp: , Rfl:     carvedilol (COREG) 12.5 MG tablet, TAKE 1 TABLET BY MOUTH TWICE A DAY WITH A MEAL, Disp: 180 tablet, Rfl: 1    Lidocaine 4 %, Place 1 patch on the skin as directed by provider Daily. Remove & Discard patch within 12 hours or as directed by MD  Indications: pain, Disp: , Rfl:     lisinopril (PRINIVIL,ZESTRIL) 20 MG tablet, TAKE 1 TABLET BY MOUTH TWICE A DAY, Disp: 180 tablet, Rfl: 3    loperamide (IMODIUM) 2 MG capsule, Take 1 capsule by mouth As Needed for Diarrhea. Indications: Diarrhea, Disp: , Rfl:     meloxicam (MOBIC) 15 MG tablet, Take 1 tablet by mouth Daily. Indications: Joint Damage causing Pain and Loss of Function, Disp: , Rfl:     multivitamin-minerals (CENTRUM) tablet, Take 1 tablet by mouth Daily. Indications: supplement, Disp: , Rfl:     nitroglycerin (Nitrostat) 0.4 MG SL tablet, Place 1 tablet under the tongue Every 5 (Five) Minutes As Needed for Chest Pain. Take no more than 3 doses in 15 minutes., Disp: 30 tablet, Rfl: 5    Probiotic Product (PROBIOTIC BLEND PO), Take 1 capsule by mouth Daily. Indications: supplement, Disp: , Rfl:     vitamin B-12 (CYANOCOBALAMIN) 1000 MCG tablet, Take 1 tablet by mouth Daily. Indications: Inadequate Vitamin B12, Disp: , Rfl:     vitamin D3 (vitamin d) 125 MCG (5000 UT) capsule capsule, Take 1 capsule by mouth 2 (Two) Times a Day. Indications: Vitamin D Deficiency, Disp: , Rfl:   Social History:   Social History     Tobacco Use    Smoking status: Never     Passive exposure: Never    Smokeless tobacco: Never   Substance Use Topics    Alcohol use: No      Family History:  Family History   Problem Relation Age of Onset    No Known Problems Mother     Heart disease Father         MI    No Known Problems Sister     No Known Problems Brother     No Known Problems Maternal Aunt     No Known Problems  "Maternal Uncle     No Known Problems Paternal Aunt     No Known Problems Paternal Uncle     No Known Problems Maternal Grandmother     No Known Problems Maternal Grandfather     No Known Problems Paternal Grandmother     No Known Problems Paternal Grandfather     Arthritis Other     Hypertension Other     Colon cancer Other     Anemia Neg Hx     Arrhythmia Neg Hx     Asthma Neg Hx     Clotting disorder Neg Hx     Fainting Neg Hx     Heart attack Neg Hx     Heart failure Neg Hx     Hyperlipidemia Neg Hx               Review of Systems   Constitutional: Positive for malaise/fatigue.   Cardiovascular:  Positive for chest pain. Negative for leg swelling and palpitations.   Respiratory:  Negative for shortness of breath.    Neurological:  Positive for light-headedness. Negative for dizziness and numbness.     All other systems are negative         Objective:     Physical Exam  /72 (BP Location: Left arm, Patient Position: Sitting, Cuff Size: Adult)   Pulse 62   Ht 152.4 cm (60\")   Wt 65.3 kg (144 lb)   LMP  (LMP Unknown)   SpO2 94%   BMI 28.12 kg/m²   General:  Appears in no acute distress  Eyes: Sclera is anicteric,  conjunctiva is clear   HEENT:  No JVD.  No carotid bruits  Respiratory: Respirations regular and unlabored at rest.  Clear to auscultation  Cardiovascular: S1,S2 Regular rate and rhythm. .   Extremities: No digital clubbing or cyanosis, no edema  Skin: Color pink. Skin warm and dry to touch. No rashes  No xanthoma  Neuro: Alert and awake.    Lab Reviewed:         Matti Luna MD  8/8/2024 10:15 EDT      EMR Dragon/Transcription:   \"Dictated utilizing Dragon dictation\".        "

## 2024-08-06 LAB — NT-PROBNP SERPL-MCNC: 109 PG/ML (ref 0–1800)

## 2024-08-07 ENCOUNTER — TELEPHONE (OUTPATIENT)
Dept: CARDIOLOGY | Facility: CLINIC | Age: 84
End: 2024-08-07
Payer: MEDICARE

## 2024-08-07 NOTE — TELEPHONE ENCOUNTER
A1C  BNP done , results ?     Called pt and let pt know we will call her back when we hear results from

## 2024-08-19 ENCOUNTER — HOSPITAL ENCOUNTER (OUTPATIENT)
Dept: CARDIOLOGY | Facility: HOSPITAL | Age: 84
Discharge: HOME | End: 2024-08-19
Payer: MEDICARE

## 2024-08-19 ENCOUNTER — OFFICE VISIT (OUTPATIENT)
Dept: CARDIOLOGY | Facility: CLINIC | Age: 84
End: 2024-08-19
Payer: MEDICARE

## 2024-08-19 VITALS
SYSTOLIC BLOOD PRESSURE: 176 MMHG | WEIGHT: 144 LBS | HEART RATE: 54 BPM | DIASTOLIC BLOOD PRESSURE: 92 MMHG | BODY MASS INDEX: 28.27 KG/M2 | HEIGHT: 60 IN

## 2024-08-19 DIAGNOSIS — R73.9 HYPERGLYCEMIA: ICD-10-CM

## 2024-08-19 DIAGNOSIS — R07.2 PRECORDIAL PAIN: ICD-10-CM

## 2024-08-19 DIAGNOSIS — E78.5 DYSLIPIDEMIA: ICD-10-CM

## 2024-08-19 DIAGNOSIS — I10 ESSENTIAL HYPERTENSION: Primary | ICD-10-CM

## 2024-08-19 DIAGNOSIS — R53.83 FATIGUE, UNSPECIFIED TYPE: ICD-10-CM

## 2024-08-19 DIAGNOSIS — R06.09 DYSPNEA ON EXERTION: ICD-10-CM

## 2024-08-19 DIAGNOSIS — R73.03 PREDIABETES: ICD-10-CM

## 2024-08-19 DIAGNOSIS — I10 ESSENTIAL HYPERTENSION: ICD-10-CM

## 2024-08-19 DIAGNOSIS — I25.119 CORONARY ARTERY DISEASE INVOLVING NATIVE CORONARY ARTERY OF NATIVE HEART WITH ANGINA PECTORIS: ICD-10-CM

## 2024-08-19 LAB
BH CV REST NUCLEAR ISOTOPE DOSE: 10.4 MCI
BH CV STRESS COMMENTS STAGE 1: NORMAL
BH CV STRESS DOSE REGADENOSON STAGE 1: 0.4
BH CV STRESS DURATION MIN STAGE 1: 0
BH CV STRESS DURATION SEC STAGE 1: 10
BH CV STRESS NUCLEAR ISOTOPE DOSE: 30.7 MCI
BH CV STRESS PROTOCOL 1: NORMAL
BH CV STRESS RECOVERY BP: NORMAL MMHG
BH CV STRESS RECOVERY HR: 88 BPM
BH CV STRESS STAGE 1: 1
MAXIMAL PREDICTED HEART RATE: 136 BPM
STRESS BASELINE BP: NORMAL MMHG
STRESS BASELINE HR: 55 BPM
STRESS TARGET HR: 116 BPM

## 2024-08-19 PROCEDURE — A9500 TC99M SESTAMIBI: HCPCS | Performed by: INTERNAL MEDICINE

## 2024-08-19 PROCEDURE — 3080F DIAST BP >= 90 MM HG: CPT | Performed by: INTERNAL MEDICINE

## 2024-08-19 PROCEDURE — 0 TECHNETIUM SESTAMIBI: Performed by: INTERNAL MEDICINE

## 2024-08-19 PROCEDURE — 93017 CV STRESS TEST TRACING ONLY: CPT

## 2024-08-19 PROCEDURE — 99214 OFFICE O/P EST MOD 30 MIN: CPT | Performed by: INTERNAL MEDICINE

## 2024-08-19 PROCEDURE — 1159F MED LIST DOCD IN RCRD: CPT | Performed by: INTERNAL MEDICINE

## 2024-08-19 PROCEDURE — 1160F RVW MEDS BY RX/DR IN RCRD: CPT | Performed by: INTERNAL MEDICINE

## 2024-08-19 PROCEDURE — 78452 HT MUSCLE IMAGE SPECT MULT: CPT

## 2024-08-19 PROCEDURE — 25010000002 REGADENOSON 0.4 MG/5ML SOLUTION: Performed by: INTERNAL MEDICINE

## 2024-08-19 PROCEDURE — 3077F SYST BP >= 140 MM HG: CPT | Performed by: INTERNAL MEDICINE

## 2024-08-19 RX ORDER — REGADENOSON 0.08 MG/ML
0.4 INJECTION, SOLUTION INTRAVENOUS
Status: COMPLETED | OUTPATIENT
Start: 2024-08-19 | End: 2024-08-19

## 2024-08-19 RX ADMIN — REGADENOSON 0.4 MG: 0.08 INJECTION, SOLUTION INTRAVENOUS at 13:50

## 2024-08-19 RX ADMIN — TECHNETIUM TC 99M SESTAMIBI 1 DOSE: 1 INJECTION INTRAVENOUS at 12:58

## 2024-08-19 RX ADMIN — TECHNETIUM TC 99M SESTAMIBI 1 DOSE: 1 INJECTION INTRAVENOUS at 13:50

## 2024-08-19 NOTE — PROGRESS NOTES
Subjective:     Encounter Date:08/19/2024      Patient ID: Maria D Fountain is a 84 y.o. female.    Chief Complaint and history of present illness:     Follow-up for CAD, MI, diabetes/prediabetes, hypertension     History of Present Illness  :     Ms. Maria D Fountain has PMH of      # CAD cardiac cath 06/21/2017 showing 50% OM1 disease and small-vessel disease distal LAD  #  mitral regurgitation, mild 9/2016  #  palpitations  #  hypertension  # LAE  #  diabetes  #?  ACE-inhibitor cough, now improved  # allergy/intolerance to pravastatin  # ALYSA        Here for stress test follow-up.  Patient has occasional fleeting chest pain.  No aggravating or relieving factor.  Has fatigue.  Has some dyspnea on exertion.     Patient's arterial blood pressure is 176/92, HR 54 bpm.     Patient was started on amlodipine and reportedly developed low blood pressure and stopped it on her own.  On 2/21/2022 patient had blood pressure of 220/117 went to the emergency room waited for 4 hours could not be seen therefore went back home.  Patient had history of not taking lisinopril in the past also.  Patient is complaining of extreme fatigue she thought she had COVID last summer had 3 rounds of steroids and antibiotics.  Patient has left arm hurting where she cannot lift it over the shoulders.  Went to rehab physical physical therapy and has improvement now she is able to lift it over the shoulder pain.  Patient says she is tired all the time and has right earache pain thinks she is having Covid again.           Patient's labs from 01/29/2018 revealed cholesterol 210 triglycerides 230 HDL low at 38 LDL high at 134 CMP is okay CK is 92.  Labs from 1/14/2020 reveal cholesterol 113, triglycerides 139 HDL 38 LDL 47, A1c of 5.6, CMP with total bilirubin 1.4, normal CBC and TSH.  Labs from 2/23/2022 ordered by me reviewed by me revealed CMP with a glucose of 110, lipid profile with cholesterol 119 triglycerides 170 HDL 40 LDL 41.  proBNP  normal at 121.  Labs from 8/10/2023 reveal cholesterol 136, triglycerides 194, HDL 42, LDL 62.  CMP normal except for bilirubin of 1.5.     Stress Cardiolite 4/12/2022: Patient walked 7.40 minutes had normal perfusion EF of 69%.     Echocardiogram 9/14/2023 reveals EF of 60 to 65%      ASSESSMENT:        -Chest pain.  - Dyspnea on exertion  - Fatigue  #. hypertension,   #  dyslipidemia  # CAD  #  mitral regurgitation  #. prediabetes      PLAN:     Reviewed stress test  results with patient  Advised patient to check blood pressure at home , BP elevated today due to holding meds  We will continue  medical management with  aspirin , lisinopril, amlodipine, atorvastatin, carvedilol, to help with angina, hypertension, dyslipidemia, CAD, MR  Counseled on walking exercise for low HDL   follow up with PMD for  diabetes/prediabetes.  Follow-up with PMD for noncardiac issues.                 ECG 12 Lead     Date/Time: 8/5/2024 10:14 AM  Performed by: Matti Luna MD     Authorized by: Matti Luna MD  Comparison: compared with previous ECG from 1/8/2024  Comparison to previous ECG: EKG done today reviewed/interpreted by me reveals sinus rhythm with a rate of 66 bpm, no significant change compared EKG from 1/8/2024            Procedures    Lexiscan cardiolite done 8/19/2024 negative for ischemia, LVEF 76%    Copied text in this portion of the note has been reviewed and is accurate as of 8/19/2024  The following portions of the patient's history were reviewed and updated as appropriate: allergies, current medications, past family history, past medical history, past social history, past surgical history and problem list.    Assessment:         MDM       Diagnosis Plan   1. Essential hypertension        2. Hyperglycemia        3. Dyslipidemia        4. Prediabetes               Plan:               Past Medical History:  Past Medical History:   Diagnosis Date    Arthritis     Back pain     COVID-19      Diabetes mellitus     DJD (degenerative joint disease)     Gallstones     Heart disease     Heart murmur     Hip pain, bilateral     History of stress test 07/2011    Stress myoview- neg     Hypertension     Lumbar disc disease     Mitral regurgitation     Multiple lipomas     Pleurisy     Spinal stenosis      Past Surgical History:  Past Surgical History:   Procedure Laterality Date    BREAST BIOPSY Right 1990    CARDIAC CATHETERIZATION  06/21/2017    HEMANGIOMA EXCISION Left 2010    left forearm     HX OVARIAN CYSTECTOMY  1962    HYSTERECTOMY  2004      Allergies:  Allergies   Allergen Reactions    Pravastatin Dizziness     Home Meds:  Current Meds:     Current Outpatient Medications:     acetaminophen (TYLENOL) 650 MG 8 hr tablet, Take 1 tablet by mouth Every 8 (Eight) Hours As Needed for Mild Pain. Indications: Pain, Disp: , Rfl:     amLODIPine (NORVASC) 5 MG tablet, Take 1 tablet by mouth Daily. Indications: High Blood Pressure Disorder, Disp: , Rfl:     ascorbic acid (VITAMIN C) 1000 MG tablet, Take 0.5 tablets by mouth 2 (two) times a day. Indications: Inadequate Vitamin C, Disp: , Rfl:     aspirin (aspirin) 81 MG EC tablet, Take 1 tablet by mouth Daily. Indications: prophylactic, Disp: , Rfl:     atorvastatin (LIPITOR) 20 MG tablet, TAKE ONE TABLET BY MOUTH ONCE NIGHTLY, Disp: 90 tablet, Rfl: 3    Biotin 5000 MCG capsule, Take 1 capsule by mouth Every Night. Indications: thinning hair, Disp: , Rfl:     carvedilol (COREG) 12.5 MG tablet, TAKE 1 TABLET BY MOUTH TWICE A DAY WITH A MEAL, Disp: 180 tablet, Rfl: 1    Lidocaine 4 %, Place 1 patch on the skin as directed by provider Daily. Remove & Discard patch within 12 hours or as directed by MD  Indications: pain, Disp: , Rfl:     lisinopril (PRINIVIL,ZESTRIL) 20 MG tablet, TAKE 1 TABLET BY MOUTH TWICE A DAY, Disp: 180 tablet, Rfl: 3    loperamide (IMODIUM) 2 MG capsule, Take 1 capsule by mouth As Needed for Diarrhea. Indications: Diarrhea, Disp: , Rfl:      meloxicam (MOBIC) 15 MG tablet, Take 1 tablet by mouth Daily. Indications: Joint Damage causing Pain and Loss of Function, Disp: , Rfl:     multivitamin-minerals (CENTRUM) tablet, Take 1 tablet by mouth Daily. Indications: supplement, Disp: , Rfl:     nitroglycerin (Nitrostat) 0.4 MG SL tablet, Place 1 tablet under the tongue Every 5 (Five) Minutes As Needed for Chest Pain. Take no more than 3 doses in 15 minutes., Disp: 30 tablet, Rfl: 5    Probiotic Product (PROBIOTIC BLEND PO), Take 1 capsule by mouth Daily. Indications: supplement, Disp: , Rfl:     vitamin B-12 (CYANOCOBALAMIN) 1000 MCG tablet, Take 1 tablet by mouth Daily. Indications: Inadequate Vitamin B12, Disp: , Rfl:     vitamin D3 (vitamin d) 125 MCG (5000 UT) capsule capsule, Take 1 capsule by mouth 2 (Two) Times a Day. Indications: Vitamin D Deficiency, Disp: , Rfl:   No current facility-administered medications for this visit.  Social History:   Social History     Tobacco Use    Smoking status: Never     Passive exposure: Never    Smokeless tobacco: Never   Substance Use Topics    Alcohol use: No      Family History:  Family History   Problem Relation Age of Onset    No Known Problems Mother     Heart disease Father         MI    No Known Problems Sister     No Known Problems Brother     No Known Problems Maternal Aunt     No Known Problems Maternal Uncle     No Known Problems Paternal Aunt     No Known Problems Paternal Uncle     No Known Problems Maternal Grandmother     No Known Problems Maternal Grandfather     No Known Problems Paternal Grandmother     No Known Problems Paternal Grandfather     Arthritis Other     Hypertension Other     Colon cancer Other     Anemia Neg Hx     Arrhythmia Neg Hx     Asthma Neg Hx     Clotting disorder Neg Hx     Fainting Neg Hx     Heart attack Neg Hx     Heart failure Neg Hx     Hyperlipidemia Neg Hx               ROS  All other systems are negative         Objective:     Physical Exam  /92   Pulse 54   Ht  "152.4 cm (60\")   Wt 65.3 kg (144 lb)   LMP  (LMP Unknown)   BMI 28.12 kg/m²   General:  Appears in no acute distress  Eyes: Sclera is anicteric,  conjunctiva is clear   HEENT:  No JVD.  No carotid bruits  Respiratory: Respirations regular and unlabored at rest.  Clear to auscultation  Cardiovascular: S1,S2 Regular rate and rhythm. .   Extremities: No digital clubbing or cyanosis, no edema  Skin: Color pink. Skin warm and dry to touch. No rashes  No xanthoma  Neuro: Alert and awake.    Lab Reviewed:         Matti Luna MD  8/19/2024 20:09 EDT      EMR Dragon/Transcription:   \"Dictated utilizing Dragon dictation\".        "

## 2024-08-20 RX ORDER — MELOXICAM 15 MG/1
15 TABLET ORAL DAILY
Qty: 90 TABLET | Refills: 0 | Status: SHIPPED | OUTPATIENT
Start: 2024-08-20

## 2024-09-09 ENCOUNTER — TELEPHONE (OUTPATIENT)
Dept: CARDIOLOGY | Facility: CLINIC | Age: 84
End: 2024-09-09
Payer: MEDICARE

## 2024-09-09 DIAGNOSIS — I10 ESSENTIAL HYPERTENSION: ICD-10-CM

## 2024-09-09 RX ORDER — CARVEDILOL 12.5 MG/1
12.5 TABLET ORAL 2 TIMES DAILY WITH MEALS
Qty: 180 TABLET | Refills: 3 | Status: SHIPPED | OUTPATIENT
Start: 2024-09-09

## 2024-09-09 NOTE — TELEPHONE ENCOUNTER
Rx Refill Note  Requested Prescriptions     Pending Prescriptions Disp Refills    carvedilol (COREG) 12.5 MG tablet 180 tablet 3     Sig: Take 1 tablet by mouth 2 (Two) Times a Day With Meals.      Last office visit with prescribing clinician: 8/19/2024   Last telemedicine visit with prescribing clinician: Visit date not found   Next office visit with prescribing clinician: 8/21/2025                         Would you like a call back once the refill request has been completed: [] Yes [] No    If the office needs to give you a call back, can they leave a voicemail: [] Yes [] No    Kassy Pulido MA  09/09/24, 15:02 EDT

## 2024-09-09 NOTE — TELEPHONE ENCOUNTER
Caller: Maria D Fountain    Relationship: Self    Best call back number: [phone]    Requested Prescriptions:   Requested Prescriptions     Pending Prescriptions Disp Refills    carvedilol (COREG) 12.5 MG tablet 180 tablet 1     Sig: Take 1 tablet by mouth 2 (Two) Times a Day With Meals.        Pharmacy where request should be sent: [facility] [id number], [place] [address] - [phone]  - [phone]     Last office visit with prescribing clinician: 8/19/2024   Last telemedicine visit with prescribing clinician: Visit date not found   Next office visit with prescribing clinician: 8/21/2025     Additional details provided by patient: PT WAS ABLE TO GET A REFILL FOR 3 DAYS BUT NEEDS REFILL SENT TO PHARMACY    Does the patient have less than a 3 day supply:  [x] Yes  [] No    Would you like a call back once the refill request has been completed: [] Yes [x] No    If the office needs to give you a call back, can they leave a voicemail: [] Yes [x] No    Jose Enrique Leonard Rep   09/09/24 14:36 EDT

## 2024-09-23 ENCOUNTER — OFFICE VISIT (OUTPATIENT)
Dept: FAMILY MEDICINE CLINIC | Facility: CLINIC | Age: 84
End: 2024-09-23
Payer: MEDICARE

## 2024-09-23 VITALS
OXYGEN SATURATION: 96 % | DIASTOLIC BLOOD PRESSURE: 95 MMHG | HEIGHT: 60 IN | SYSTOLIC BLOOD PRESSURE: 169 MMHG | HEART RATE: 61 BPM | TEMPERATURE: 97.6 F | BODY MASS INDEX: 27.92 KG/M2 | WEIGHT: 142.2 LBS

## 2024-09-23 DIAGNOSIS — N89.8 VAGINAL DISCHARGE: Primary | ICD-10-CM

## 2024-09-23 DIAGNOSIS — M54.50 LOW BACK PAIN, UNSPECIFIED BACK PAIN LATERALITY, UNSPECIFIED CHRONICITY, UNSPECIFIED WHETHER SCIATICA PRESENT: ICD-10-CM

## 2024-09-23 LAB
BILIRUB BLD-MCNC: NEGATIVE MG/DL
CLARITY, POC: CLEAR
CLUE CELLS SPEC QL WET PREP: ABNORMAL
COLOR UR: YELLOW
EXPIRATION DATE: NORMAL
GLUCOSE UR STRIP-MCNC: NEGATIVE MG/DL
HYDATID CYST SPEC WET PREP: ABNORMAL
KETONES UR QL: NEGATIVE
LEUKOCYTE EST, POC: NEGATIVE
Lab: NORMAL
NITRITE UR-MCNC: NEGATIVE MG/ML
PH UR: 6.5 [PH] (ref 5–8)
PROT UR STRIP-MCNC: NEGATIVE MG/DL
RBC # UR STRIP: NEGATIVE /UL
SP GR UR: 1 (ref 1–1.03)
T VAGINALIS SPEC QL WET PREP: ABNORMAL
UROBILINOGEN UR QL: NORMAL
WBC SPEC QL WET PREP: ABNORMAL
YEAST GENITAL QL WET PREP: ABNORMAL

## 2024-09-23 PROCEDURE — 3080F DIAST BP >= 90 MM HG: CPT | Performed by: NURSE PRACTITIONER

## 2024-09-23 PROCEDURE — 87210 SMEAR WET MOUNT SALINE/INK: CPT | Performed by: NURSE PRACTITIONER

## 2024-09-23 PROCEDURE — 81003 URINALYSIS AUTO W/O SCOPE: CPT | Performed by: NURSE PRACTITIONER

## 2024-09-23 PROCEDURE — 99213 OFFICE O/P EST LOW 20 MIN: CPT | Performed by: NURSE PRACTITIONER

## 2024-09-23 PROCEDURE — 1125F AMNT PAIN NOTED PAIN PRSNT: CPT | Performed by: NURSE PRACTITIONER

## 2024-09-23 PROCEDURE — G2211 COMPLEX E/M VISIT ADD ON: HCPCS | Performed by: NURSE PRACTITIONER

## 2024-09-23 PROCEDURE — 3077F SYST BP >= 140 MM HG: CPT | Performed by: NURSE PRACTITIONER

## 2024-09-24 RX ORDER — FLUCONAZOLE 150 MG/1
150 TABLET ORAL ONCE
Qty: 1 TABLET | Refills: 0 | Status: SHIPPED | OUTPATIENT
Start: 2024-09-24 | End: 2024-09-24

## 2024-09-26 ENCOUNTER — PATIENT OUTREACH (OUTPATIENT)
Dept: CASE MANAGEMENT | Facility: CLINIC | Age: 84
End: 2024-09-26
Payer: MEDICARE

## 2024-09-26 DIAGNOSIS — I10 ESSENTIAL HYPERTENSION: Primary | ICD-10-CM

## 2024-09-30 ENCOUNTER — PATIENT OUTREACH (OUTPATIENT)
Dept: CASE MANAGEMENT | Facility: CLINIC | Age: 84
End: 2024-09-30
Payer: MEDICARE

## 2024-09-30 DIAGNOSIS — M54.50 LOW BACK PAIN WITHOUT SCIATICA, UNSPECIFIED BACK PAIN LATERALITY, UNSPECIFIED CHRONICITY: ICD-10-CM

## 2024-09-30 DIAGNOSIS — I25.118 CORONARY ARTERY DISEASE OF NATIVE ARTERY OF NATIVE HEART WITH STABLE ANGINA PECTORIS: ICD-10-CM

## 2024-09-30 DIAGNOSIS — M17.0 PRIMARY OSTEOARTHRITIS OF KNEES, BILATERAL: ICD-10-CM

## 2024-09-30 DIAGNOSIS — I10 ESSENTIAL HYPERTENSION: Primary | ICD-10-CM

## 2024-09-30 DIAGNOSIS — M47.816 LUMBAR SPONDYLOSIS: ICD-10-CM

## 2024-09-30 NOTE — OUTREACH NOTE
[facility] End of Month Documentation    This Chronic Medical Management Care Plan for Maria D Fountain, 84 y.o. female, has been a new plan of care implemented; established and a new plan of care implemented for the month of September.  A cumulative time of 26  minutes was spent on this patient record this month, including electronic communication with primary care provider; chart review; phone call with patient.    Regarding the patient's problems: has Coronary artery disease of native artery of native heart with stable angina pectoris; Degeneration of lumbar or lumbosacral intervertebral disc; Dyslipidemia; Essential hypertension; Angina pectoris; Chest pain, pleuritic; Lumbar spondylosis; Mitral regurgitation; Prediabetes; Chronic coronary artery disease; Varicose veins of left lower extremity with pain; Mixed hyperlipidemia; Osteopenia after menopause; Rotator cuff arthropathy of left shoulder; Clinical diagnosis of severe acute respiratory syndrome coronavirus 2 (SARS-CoV-2) disease; Encounter for annual wellness exam in Medicare patient; Symptomatic cholelithiasis; and Influenza A on their problem list., the following items were addressed: medical records; medications; referrals to community service providers, Care estabilished and requested referral to [facility] PT and any changes can be found within the plan section of the note.  A detailed listing of time spent for chronic care management is tracked within each outreach encounter.  Current medications include:  has a current medication list which includes the following prescription(s): acetaminophen, amlodipine, ascorbic acid, aspirin, atorvastatin, biotin, carvedilol, lidocaine, lisinopril, loperamide, meloxicam, multivitamin-minerals, nitroglycerin, probiotic product, vitamin b-12, vitamin d3, and [DISCONTINUED] montelukast. and the patient is reported to be patient is compliant with medication protocol,  Medications are reported to be effective.    All notes on  "chart for PCP to review.    The patient was monitored remotely for blood pressure; pain.    The patient's physical needs include:  physical healthcare; physician referral.     The patient's mental support needs include:  needs met    The patient's cognitive support needs include:  needs met    The patient's psychosocial support needs include:  needs met    The patient's functional needs include: physical healthcare    The patient's environmental needs include:  not applicable    Care Plan overall comments:  No data recorded    Refer to previous outreach notes for more information on the areas listed above.    Monthly Billing Diagnoses  (I10) Essential hypertension    (M17.0) Primary osteoarthritis of knees, bilateral    (I25.118) Coronary artery disease of native artery of native heart with stable angina pectoris    Medications   Medications have been reconciled    Care Plan progress this month:      Recently Modified Care Plans Updates made since 8/30/2024 12:00 AM       Hypertension (Adult)           Problem Priority Last Modified     Hypertension (Hypertension) --  9/30/2024 11:46 AM by Carolina Lowry RN              Goal Recent Progress Last Modified     Hypertension Monitored --  9/30/2024 11:46 AM by Carolina Lowry RN     Evidence-based guidance:   Promote initial use of ambulatory blood pressure measurements (for 3 days) to rule out \"white-coat\" effect; identify masked hypertension and presence or absence of nocturnal \"dipping\" of blood pressure.    Encourage continued use of home blood pressure monitoring and recording in blood pressure log; include symptoms of hypotension or potential medication side effects in log.   Review blood pressure measurements taken inside and outside of the provider office; establish baseline and monitor trends; compare to target ranges or patient goal.   Share overall cardiovascular risk with patient; encourage changes to lifestyle risk factors, including alcohol " consumption, smoking, inadequate exercise, poor dietary habits and stress.    Notes:            Task Due Date Last Modified     Identify and Monitor Blood Pressure Elevation --  9/30/2024 11:48 AM by Carolina Lowry RN     Care Management Activities:      - not discussed during this outreach      Notes:                Problem Priority Last Modified     Disease Progression (Hypertension) --  9/30/2024 11:46 AM by Carolina Lowry RN              Goal Recent Progress Last Modified     Disease Progression Prevented or Minimized --  9/30/2024 11:46 AM by Carolina Lowry RN     Evidence-based guidance:   Tailor lifestyle advice to individual; review progress regularly; give frequent encouragement and respond positively to incremental successes.   Assess for and promote awareness of worsening disease or development of comorbidity.   Prepare patient for laboratory and diagnostic exams based on risk and presentation.   Prepare patient for use of pharmacologic therapy that may include diuretic, beta-blocker, beta-blocker/thiazide combination, angiotensin-converting enzyme inhibitor, renin-angiotensin blocker or calcium-channel blocker.   Expect periodic adjustments to pharmacologic therapy; manage side effects.   Promote a healthy diet that includes primarily plant-based foods, such as fruits, vegetables, whole grains, beans and legumes, low-fat dairy and lean meats.    Consider moderate reduction in sodium intake by avoiding the addition of salt to prepared foods and limiting processed meats, canned soup, frozen meals and salty snacks.    Promote a regular, daily exercise goal of 150 minutes per week of moderate exercise based on tolerance, ability and patient choice; consider referral to physical therapist, community wellness and/or activity program.   Encourage the avoidance of no more than 2 hours per day of sedentary activity, such as recreational screen time.   Review sources of stress; explore current coping  strategies and encourage use of mindfulness, yoga, meditation or exercise to manage stress.    Notes:            Task Due Date Last Modified     Alleviate Barriers to Hypertension Treatment --  9/30/2024 11:48 AM by Carolina Lowry RN     Care Management Activities:      - not discussed during this outreach      Notes:                Problem Priority Last Modified     Resistant Hypertension (Hypertension) --  9/30/2024 11:46 AM by Carolina Lowry RN              Goal Recent Progress Last Modified     Response to Treatment Maximized --  9/30/2024 11:46 AM by Carolina Lowry RN     Evidence-based guidance:   Assess patient response to treatment, including presence or absence of medication side effects, degree of blood pressure control and patient satisfaction.   Assess technique (including cuff size and placement), measurement times, condition and calibration of blood pressure cuff set (both at-home and in-office equipment).   Assess factors that may influence response to treatment, including nonadherence to pharmacologic treatment plan, diet or activity changes and/or presence of pain, stress or sleep disturbance.   Screen for signs and symptoms of depression; if present, refer for or complete a comprehensive assessment.   Evaluate social and economic barriers that may affect adherence to treatment plan   Address pharmacologic nonadherence by simplifying dosing regimen, counseling or support by pharmacist, financial assistance, self-monitoring of blood pressure, use of motivational interviewing, voice or text messages.   Encourage behavioral adherence strategies, like habit-based interventions that link medication taking with existing daily routines.   Assess barriers to regular, daily physical activity; support family or support person-oriented activity changes and utilization of community activity or sports program.   Address barriers to dietary changes, especially sodium restriction, with referrals to  community programs, like cooking classes, meal services or intensive education when available.   Refer to community-based peer support program or nurse home-visiting program.   Assess for chronic pain; when present add additional goals (Chronic Pain Care Plan Guide) as needed.   Provide frequent follow-up by telephone, telemonitoring, patient-practice portal or with home visit.   Review alcohol use screen; address using brief intervention beginning with risk that interferes with blood pressure control; refer for treatment when excessive alcohol use is noted.   Screen for obstructive sleep apnea; prepare patient for polysomnography based on risk and presentation and use of noninvasive ventilation to relieve obstructive sleep apnea when present.    Notes:            Task Due Date Last Modified     Facilitate Adherence to Lifestyle Change --  9/30/2024 11:48 AM by Carolina Lowry RN     Care Management Activities:      - barriers to lifestyle change identified      Notes:                     Osteoarthritis (Adult)           Problem Priority Last Modified     Pain (Osteoarthritis) --  9/30/2024 11:46 AM by Carolina Lowry RN              Goal Recent Progress Last Modified     Manage Pain --  9/30/2024 11:46 AM by Carolina Lowry RN     Evidence-based guidance:   Develop a mutual, multimodal pain management plan with patient and caregiver; determine person's knowledge of the condition, any concerns or fears, personal preferences and ability to access services.   Assess pain level, treatment efficacy and patient response at regular intervals using a consistent pain scale; review the effectiveness and tolerability of all treatments.   Correlate pain with impact on daily activities, occupation, mood, sleep quality, comorbidities, other (nonarthritic) pain, fall risk and quality of life.   Encourage nonpharmacologic measures, such as applied heat or cold, exercise, physical or occupational therapy, orthoses,  cognitive behavioral therapy, yoga, ranjan-chi, acupuncture, adequate sleep and weight loss.   Provide anticipatory guidance regarding benefits to multimodal treatment that improves quality of life.   Consider the use of transcutaneous TENS (electrical nerve stimulation), shock-absorbing footwear, orthoses, mineral baths, nutraceuticals or electromagnetic-field therapy.   Prepare patient for individualized pharmacologic pain management in a stepped approach based on presentation, risk and comorbidities; monitor and manage side effects and anticipate periodic adjustments.   Promote individualized plan to stay active when unable to participate in physical therapy, such as passive and active range of motion, yoga, walking, water aerobics or swimming.   Support and optimize psychosocial response to pain.    Notes:            Task Due Date Last Modified     Facilitate Multimodal Pain Management Plan --  9/30/2024 11:48 AM by Carolina Lowry RN     Care Management Activities:      - healthy lifestyle promoted      Notes:                Problem Priority Last Modified     Mobility and Function (Osteoarthritis) --  9/30/2024 11:46 AM by Carolina Lowry RN              Goal Recent Progress Last Modified     Maintain Mobility and Function --  9/30/2024 11:46 AM by Carolina Lowry RN     Evidence-based guidance:   Acknowledge and validate impact of pain, loss of strength and potential disfigurement (hand osteoarthritis) on mental health and daily life, such as social isolation, anxiety, depression, impaired sexual relationship and    injury from falls.   Anticipate referral to physical or occupational therapy for assessment, therapeutic exercise and recommendation for adaptive equipment or assistive devices; encourage participation.   Assess impact on ability to perform activities of daily living, as well as engage in sports and leisure events or requirements of work or school.   Provide anticipatory guidance and  "reassurance about the benefit of exercise to maintain function; acknowledge and normalize fear that exercise may worsen symptoms.   Encourage regular exercise, at least 10 minutes at a time for 45 minutes per week; consider yoga, water exercise and proprioceptive exercises; encourage use of wearable activity tracker to increase motivation and adherence.   Encourage maintenance or resumption of daily activities, including employment, as pain allows and with minimal exposure to trauma.   Assist patient to advocate for adaptations to the work environment.   Consider level of pain and function, gender, age, lifestyle, patient preference, quality of life, readiness and  €œcapacity to benefit\" when recommending patients for orthopaedic surgery consultation.   Explore strategies, such as changes to medication regimen or activity that enables patient to anticipate and manage flare-ups that increase deconditioning and disability.   Explore patient preferences; encourage exposure to a broader range of activities that have been avoided for fear of experiencing pain.   Identify barriers to participation in therapy or exercise, such as pain with activity, anticipated or imagined pain.   Monitor postoperative joint replacement or any preexisting joint replacement for ongoing pain and loss of function; provide social support and encouragement throughout recovery.    Notes:            Task Due Date Last Modified     Maintain or Improve Strength and Functional Ability --  9/30/2024 11:49 AM by Carolina Lowry RN     Care Management Activities:      - participation in rehabilitation therapy encouraged      Notes: patient requesting OP PT                Problem Priority Last Modified     Harm or Injury (Osteoarthritis) --  9/30/2024 11:46 AM by Carolina Lowry RN              Goal Recent Progress Last Modified     Harm or Injury Prevented --  9/30/2024 11:46 AM by Carolina Lowry, RN     Evidence-based guidance:   Assess fall " risk related to postural control, balance and gait impairment, muscle weakness, diminished vision and hearing, presence of incontinence, environmental hazards and effects of medication.   Address potential barriers to activity or exercise, such as low self-efficacy and fear of falling.   Address fall risk by considering single vison versus multifocal lenses (glasses) during activity, environmental modification and antislip, flat-heeled shoes, use of orthoses and assistive devices.   Promote regular exercise focused on improving strength, based on ability and tolerance; consider activities, such as yoga or ranjan chi, that promote balance.   Review current medication; consider vitamin D supplementation and de-prescription of psychotropic medication.   Monitor and address analgesic use, such as acetaminophen, nonsteroidal anti-inflammatory agent or opioid, for complications that may include hepatotoxicity, dependence, constipation, dyspepsia, gastrointestinal bleeding, heart or    renal disease.   Monitor depression, anxiety and suicide risk.   Collaborate with patient and parent/caregiver to develop a safety plan or coping action plan to reduce suicide risk.   Include recognition of warning signs of an impending suicidal crisis, restricting access to lethal means, use of internal coping strategies and contact methods for mental health professional or agency.    Notes:            Task Due Date Last Modified     Identify and Reduce Risk to Safety --  9/30/2024 11:46 AM by Carolina Lowry RN     Care Management Activities:      - not discussed during this outreach      Notes:                        Current Specialty Plan of Care Status in process    Instructions   Patient was provided an electronic copy of care plan  CCM services were explained and offered and patient has accepted these services.  Patient has given their written consent to receive CCM services and understands that this includes the authorization of  electronic communication of medical information with the other treating providers.  Patient understands that they may stop CCM services at any time and these changes will be effective at the end of the calendar month and will effectively revocate the agreement of CCM services.  Patient understands that only one practitioner can furnish and be paid for CCM services during one calendar month.  Patient also understands that there may be co-payment or deductible fees in association with CCM services.  Patient will continue with at least monthly follow-up calls with the Ambulatory .    Carolina OLEA  Ambulatory Case Management    9/30/2024, 12:05 EDT

## 2024-09-30 NOTE — OUTREACH NOTE
AMBULATORY CASE MANAGEMENT NOTE    Names and Relationships of Patient/Support Persons: Contact: Maria D Fountain; Relationship: Self -     CCM Interim Update  ACM called patient Identified Self and Role. Gave detailed information about CCM and all questions answered. Patient enrolled and consented to CCM program. Her goal of program is to improve health and mobility. She would like to also start Outpatient physical therapy as she feels Arthritis is worsening and she is agreeable to managing BP.  Patient requested Outpatient Physical therapy at [facility] as she is interested in Aqua therapy.     Patient reports she will be going out of town Wednesday- Sunday this week with her daughter. Advised I would outreach her next week with update of OP PT at [facility] and further explore how to accomplish her goals in program.     No SDOH needs identified.     Care Coordination    Message sent to PCP requesting Outpatient Physical therapy at [facility]     Adult Patient Profile  Questions/Answers      Flowsheet Row Most Recent Value   Symptoms/Conditions Managed at Home musculoskeletal, cardiovascular   Barriers to Managing Health none   Cardiovascular Symptoms/Conditions coronary artery disease, hypertension   Cardiovascular Management Strategies medication therapy   Cardiovascular Self-Management Outcome 2 (bad)   Musculoskeletal Symptoms/Conditions osteoarthritis   Musculoskeletal Management Strategies activity   Musculoskeletal Self-Management Outcome 3 (uncertain)   Identifying Health Goals keep illness under control, be more active   How to be Addressed Maria D   How Well Do You Speak English? very well   Source of Information patient   Patient Aware of Diagnosis yes   Limitations on Visitors/Phone Calls none   Temporary Family Living Arrangements (While Hospitalized) none needed   Admission in Past 90 Days none   Current or Previous  Service none   Concentrating, Remembering or Making Decisions Difficulty no    Doing Errands Independently Difficulty (such as shopping) no   Feels Unsafe at Home or Work/School no   Feels Threatened by Someone no   Does Anyone Try to Keep You From Having Contact with Others or Doing Things Outside Your Home? no   Physical Signs of Abuse Present no   Current Living Arrangements home          Adult Wellness Assessment  Questions/Answers      Flowsheet Row Most Recent Value   Regular Exercise no   Keeps from Regular Exercise nothing   Current Feelings of Stress not at all        SDOH updated and reviewed with the patient during this program:  --     Depression: Not at risk (5/8/2024)    PHQ-2     PHQ-2 Score: 0      --     Disabilities: Not At Risk (9/30/2024)    Disabilities     Concentrating, Remembering, or Making Decisions Difficulty: no     Doing Errands Independently Difficulty: no      --     Employment: Unknown (9/30/2024)    Employment     Do you want help finding or keeping work or a job?: Patient declined      Financial Resource Strain: Low Risk  (9/30/2024)    Overall Financial Resource Strain (CARDIA)     Difficulty of Paying Living Expenses: Not hard at all      --     Food Insecurity: No Food Insecurity (9/30/2024)    Hunger Vital Sign     Worried About Running Out of Food in the Last Year: Never true     Ran Out of Food in the Last Year: Never true      --     Housing Stability: Unknown (9/30/2024)    Housing Stability Vital Sign     Unable to Pay for Housing in the Last Year: No     Homeless in the Last Year: No          --     Tobacco Use: Low Risk  (9/30/2024)    Patient History     Smoking Tobacco Use: Never     Smokeless Tobacco Use: Never     Passive Exposure: Never      --     Transportation Needs: No Transportation Needs (9/30/2024)    PRAPARE - Transportation     Lack of Transportation (Medical): No     Lack of Transportation (Non-Medical): No      --     Utilities: Not At Risk (9/30/2024)    [facility] Utilities     Threatened with loss of utilities: No           Education  Documentation  Blood Pressure Monitoring, taught by Carolina Lowry RN at 9/30/2024 11:51 AM.  Learner: Patient  Readiness: Acceptance  Method: Explanation  Response: Verbalizes Understanding          Carolina OLEA  Ambulatory Case Management    9/30/2024, 11:55 EDT

## 2024-10-07 ENCOUNTER — PATIENT OUTREACH (OUTPATIENT)
Dept: CASE MANAGEMENT | Facility: CLINIC | Age: 84
End: 2024-10-07
Payer: MEDICARE

## 2024-10-07 DIAGNOSIS — M17.0 PRIMARY OSTEOARTHRITIS OF KNEES, BILATERAL: Primary | ICD-10-CM

## 2024-10-07 DIAGNOSIS — I10 ESSENTIAL HYPERTENSION: ICD-10-CM

## 2024-10-07 NOTE — OUTREACH NOTE
AMBULATORY CASE MANAGEMENT NOTE    Names and Relationships of Patient/Support Persons: Contact: Maria D Fountain; Relationship: Self -     CCM Interim Update    [facility] called patient for monthly CCM outreach. Patient is Back in town from trip and updated on [facility] referral. Phone number to [facility] provided for patient to call and make first appt that is good with her schedule. Patient reports she remains with bilateral knee and back pain and is really hoping physical therapy will help her again with pain management of her arthritis.     Patient reports her BP has actually been running much better and not going above 160 SBP since the medication adjustments. Patient reports she takes BP twice daily and per patient for the most part is will stay under 130 SBP.     Encouraged patient to reach out to [facility] between outreaches with any needs and confirmed patient had correct phone number for .         Education Documentation  treatment options, taught by Carolina Lowry RN at 10/7/2024  1:48 PM.  Learner: Patient  Readiness: Acceptance  Method: Explanation  Response: Verbalizes Understanding    pain management, taught by Carolina Lowry RN at 10/7/2024  1:48 PM.  Learner: Patient  Readiness: Acceptance  Method: Explanation  Response: Verbalizes Understanding    Medication Management, taught by Carolina Lowry RN at 10/7/2024  1:48 PM.  Learner: Patient  Readiness: Acceptance  Method: Explanation  Response: Verbalizes Understanding    Blood Pressure Monitoring, taught by Carolina Lowry RN at 10/7/2024  1:48 PM.  Learner: Patient  Readiness: Acceptance  Method: Explanation  Response: Verbalizes Understanding          Carolina OLEA  Ambulatory Case Management    10/7/2024, 13:48 EDT

## 2024-10-11 RX ORDER — AMLODIPINE BESYLATE 5 MG/1
5 TABLET ORAL DAILY
Qty: 90 TABLET | Refills: 3 | Status: SHIPPED | OUTPATIENT
Start: 2024-10-11

## 2024-10-11 NOTE — TELEPHONE ENCOUNTER
Rx Refill Note  Requested Prescriptions     Pending Prescriptions Disp Refills    amLODIPine (NORVASC) 5 MG tablet [Pharmacy Med Name: AMLODIPINE BESYLATE 5 MG TAB] 90 tablet 3     Sig: TAKE ONE TABLET BY MOUTH DAILY      Last office visit with prescribing clinician: 8/19/2024   Last telemedicine visit with prescribing clinician: Visit date not found   Next office visit with prescribing clinician: 8/21/2025                         Would you like a call back once the refill request has been completed: [] Yes [] No    If the office needs to give you a call back, can they leave a voicemail: [] Yes [] No    Yandy Barkley MA  10/11/24, 15:21 EDT

## 2024-10-21 ENCOUNTER — PATIENT OUTREACH (OUTPATIENT)
Dept: CASE MANAGEMENT | Facility: CLINIC | Age: 84
End: 2024-10-21
Payer: MEDICARE

## 2024-10-21 DIAGNOSIS — I25.118 CORONARY ARTERY DISEASE OF NATIVE ARTERY OF NATIVE HEART WITH STABLE ANGINA PECTORIS: ICD-10-CM

## 2024-10-21 DIAGNOSIS — I10 ESSENTIAL HYPERTENSION: Primary | ICD-10-CM

## 2024-10-21 NOTE — OUTREACH NOTE
AMBULATORY CASE MANAGEMENT NOTE    Names and Relationships of Patient/Support Persons: Contact: [place] Maria D LOVE; Relationship: Self -     CCM Interim Update    Patient called AC and reports she is still having GI issues and feel like she needs seen possibly referred to specialist per patient as some issues have been ongoing. [facility] scheduled her an appointment for tomorrow. Patient also reports that she has started her OP PT at [facility] and doing well so far.             Carolina OLEA  Ambulatory Case Management    10/21/2024, 11:17 EDT

## 2024-10-21 NOTE — PROGRESS NOTES
Chief Complaint  GI Problem    Subjective        Maria D Fountain presents to [facility]  History of Present Illness  Maria D is a 84 year old female presenting today with complaints of parasites in her colon, bladder and vagina. She has had multiple stool tests that were negative for parasites, but she is convinced that she has them. She was recently treated for a vaginal yeast infection with diflucan. She says GI wants to do extensive testing to rule out parasites, but she does not want to do it. She says she has talked to multiple pharmacist who have told her this is a serious problem and they could be in all her organs. The patient is very concerned about this.            The following portions of the patient's history were reviewed and updated as appropriate: allergies, current medications, past family history, past medical history, past social history, past surgical history and problem list.    Allergies   Allergen Reactions    Pravastatin Dizziness       Patient Active Problem List   Diagnosis    Coronary artery disease of native artery of native heart with stable angina pectoris    Degeneration of lumbar or lumbosacral intervertebral disc    Dyslipidemia    Essential hypertension    Angina pectoris    Chest pain, pleuritic    Lumbar spondylosis    Mitral regurgitation    Prediabetes    Chronic coronary artery disease    Varicose veins of left lower extremity with pain    Mixed hyperlipidemia    Osteopenia after menopause    Rotator cuff arthropathy of left shoulder    Clinical diagnosis of severe acute respiratory syndrome coronavirus 2 (SARS-CoV-2) disease    Encounter for annual wellness exam in Medicare patient    Symptomatic cholelithiasis    Influenza A       Current Outpatient Medications   Medication Instructions    acetaminophen (TYLENOL) 650 MG 8 hr tablet 1 tablet, Oral, Every 8 Hours PRN    amLODIPine (NORVASC) 5 mg, Oral, Daily    ascorbic acid (VITAMIN C) 1000  "MG tablet 0.5 tablets, Oral, 2 times daily    aspirin (aspirin) 81 MG EC tablet 1 tablet, Oral, Every 24 Hours    atorvastatin (LIPITOR) 20 mg, Oral, Nightly    Biotin 5000 MCG capsule 1 capsule, Oral, Nightly    carvedilol (COREG) 12.5 mg, Oral, 2 Times Daily With Meals    Lidocaine 4 % 1 patch, Transdermal, Every 24 Hours, Remove & Discard patch within 12 hours or as directed by MD    lisinopril (PRINIVIL,ZESTRIL) 20 mg, Oral, 2 Times Daily    loperamide (IMODIUM) 2 mg, Oral, As Needed    meloxicam (MOBIC) 15 mg, Oral, Daily    metroNIDAZOLE (FLAGYL) 500 mg, Oral, 2 Times Daily    multivitamin-minerals (CENTRUM) tablet 1 tablet, Oral, Daily    nitroglycerin (NITROSTAT) 0.4 mg, Sublingual, Every 5 Minutes PRN, Take no more than 3 doses in 15 minutes.    Probiotic Product (PROBIOTIC BLEND PO) 1 capsule, Oral, Daily    vitamin B-12 (CYANOCOBALAMIN) 1000 MCG tablet 1 tablet, Oral, Daily    vitamin D3 5,000 Units, Oral, 2 Times Daily          Objective   Vital Signs:  BP (!) 182/96   Pulse 74   Temp 98.4 °F (36.9 °C) (Tympanic)   Wt 64.9 kg (143 lb)   SpO2 97%   BMI 27.93 kg/m²   Estimated body mass index is 27.93 kg/m² as calculated from the following:    Height as of 9/23/24: 152.4 cm (60\").    Weight as of this encounter: 64.9 kg (143 lb).               Review of Systems   Constitutional:  Negative for appetite change, chills, fatigue, fever and unexpected weight change.   Respiratory:  Negative for cough, choking and shortness of breath.    Gastrointestinal:  Negative for abdominal distention, abdominal pain, blood in stool, constipation, diarrhea, nausea and vomiting.   Allergic/Immunologic: Negative for food allergies.        Physical Exam  Constitutional:       Appearance: Normal appearance.   Cardiovascular:      Rate and Rhythm: Normal rate and regular rhythm.   Pulmonary:      Effort: Pulmonary effort is normal.      Breath sounds: Normal breath sounds.   Abdominal:      General: Abdomen is flat. Bowel " sounds are normal.      Palpations: Abdomen is soft.   Skin:     General: Skin is warm and dry.   Neurological:      Mental Status: She is alert and oriented to person, place, and time.   Psychiatric:         Mood and Affect: Mood normal.         Behavior: Behavior normal.         Thought Content: Thought content normal.         Judgment: Judgment normal.        Result Review :                   Assessment and Plan   Diagnoses and all orders for this visit:    1. Abnormal stools (Primary)  Comments:  Patient is adamant that she has parasites, despite negative tests.  Will start flagyl.  she needs to follow up with GI    Other orders  -     metroNIDAZOLE (Flagyl) 500 MG tablet; Take 1 tablet by mouth 2 (Two) Times a Day.  Dispense: 14 tablet; Refill: 0             Follow Up   No follow-ups on file.  Patient was given instructions and counseling regarding her condition or for health maintenance advice. Please see specific information pulled into the AVS if appropriate.

## 2024-10-22 ENCOUNTER — OFFICE VISIT (OUTPATIENT)
Dept: FAMILY MEDICINE CLINIC | Facility: CLINIC | Age: 84
End: 2024-10-22
Payer: MEDICARE

## 2024-10-22 VITALS
HEART RATE: 74 BPM | DIASTOLIC BLOOD PRESSURE: 96 MMHG | SYSTOLIC BLOOD PRESSURE: 182 MMHG | TEMPERATURE: 98.4 F | BODY MASS INDEX: 27.93 KG/M2 | WEIGHT: 143 LBS | OXYGEN SATURATION: 97 %

## 2024-10-22 DIAGNOSIS — R19.5 ABNORMAL STOOLS: Primary | ICD-10-CM

## 2024-10-22 PROCEDURE — 1125F AMNT PAIN NOTED PAIN PRSNT: CPT | Performed by: NURSE PRACTITIONER

## 2024-10-22 PROCEDURE — 3080F DIAST BP >= 90 MM HG: CPT | Performed by: NURSE PRACTITIONER

## 2024-10-22 PROCEDURE — 3077F SYST BP >= 140 MM HG: CPT | Performed by: NURSE PRACTITIONER

## 2024-10-22 PROCEDURE — 99214 OFFICE O/P EST MOD 30 MIN: CPT | Performed by: NURSE PRACTITIONER

## 2024-10-22 RX ORDER — METRONIDAZOLE 500 MG/1
500 TABLET ORAL 2 TIMES DAILY
Qty: 14 TABLET | Refills: 0 | Status: SHIPPED | OUTPATIENT
Start: 2024-10-22

## 2024-10-29 ENCOUNTER — TELEPHONE (OUTPATIENT)
Dept: CASE MANAGEMENT | Facility: CLINIC | Age: 84
End: 2024-10-29
Payer: MEDICARE

## 2024-10-29 NOTE — TELEPHONE ENCOUNTER
Name: Александр Maria D IVAN      Relationship: Self      Best Callback Number: [phone]       HUB PROVIDED THE RELAY MESSAGE FROM THE OFFICE      PATIENT: VOICED UNDERSTANDING AND HAS NO FURTHER QUESTIONS AT THIS TIME

## 2024-10-29 NOTE — TELEPHONE ENCOUNTER
Patient called and he reports she is still symptomatic and having bowel issues. She was requested advice on if she needs to come back in and be seen vs extend the medication.

## 2024-10-31 ENCOUNTER — PATIENT OUTREACH (OUTPATIENT)
Dept: CASE MANAGEMENT | Facility: CLINIC | Age: 84
End: 2024-10-31
Payer: MEDICARE

## 2024-10-31 DIAGNOSIS — I10 ESSENTIAL HYPERTENSION: Primary | ICD-10-CM

## 2024-10-31 DIAGNOSIS — M17.0 PRIMARY OSTEOARTHRITIS OF KNEES, BILATERAL: ICD-10-CM

## 2024-10-31 NOTE — OUTREACH NOTE
[facility] End of Month Documentation    This Chronic Medical Management Care Plan for Maria D Fountain, 84 y.o. female, has been monitored and managed and a new plan of care implemented for the month of October.  A cumulative time of 32  minutes was spent on this patient record this month, including chart review; phone call with patient.    Regarding the patient's problems: has Coronary artery disease of native artery of native heart with stable angina pectoris; Degeneration of lumbar or lumbosacral intervertebral disc; Dyslipidemia; Essential hypertension; Angina pectoris; Chest pain, pleuritic; Lumbar spondylosis; Mitral regurgitation; Prediabetes; Chronic coronary artery disease; Varicose veins of left lower extremity with pain; Mixed hyperlipidemia; Osteopenia after menopause; Rotator cuff arthropathy of left shoulder; Clinical diagnosis of severe acute respiratory syndrome coronavirus 2 (SARS-CoV-2) disease; Encounter for annual wellness exam in Medicare patient; Symptomatic cholelithiasis; and Influenza A on their problem list., the following items were addressed: medical records; medications; referrals to community service providers, Pending first appt at [facility]. and any changes can be found within the plan section of the note.  A detailed listing of time spent for chronic care management is tracked within each outreach encounter.  Current medications include:  has a current medication list which includes the following prescription(s): acetaminophen, amlodipine, ascorbic acid, aspirin, atorvastatin, biotin, carvedilol, lidocaine, lisinopril, loperamide, meloxicam, metronidazole, multivitamin-minerals, nitroglycerin, probiotic product, vitamin b-12, vitamin d3, and [DISCONTINUED] montelukast. and the patient is reported to be patient is compliant with medication protocol,  Medications are reported to be effective.    All notes on chart for PCP to review.    The patient was monitored remotely for blood pressure;  "pain, Takes BP twice daily.    The patient's physical needs include:  physical healthcare; physician referral.     The patient's mental support needs include:  needs met    The patient's cognitive support needs include:  needs met    The patient's psychosocial support needs include:  needs met    The patient's functional needs include: physical healthcare    The patient's environmental needs include:  not applicable    Care Plan overall comments:  No data recorded    Refer to previous outreach notes for more information on the areas listed above.    Monthly Billing Diagnoses  (I10) Essential hypertension    (M17.0) Primary osteoarthritis of knees, bilateral    Medications   Medications have been reconciled    Care Plan progress this month:      Recently Modified Care Plans Updates made since 9/30/2024 12:00 AM       Hypertension (Adult)           Problem Priority Last Modified     Hypertension (Hypertension) --  9/30/2024 11:46 AM by Carolina Lowry RN              Goal Recent Progress Last Modified     Hypertension Monitored --  9/30/2024 11:46 AM by Carolina Lowry RN     Evidence-based guidance:   Promote initial use of ambulatory blood pressure measurements (for 3 days) to rule out \"white-coat\" effect; identify masked hypertension and presence or absence of nocturnal \"dipping\" of blood pressure.    Encourage continued use of home blood pressure monitoring and recording in blood pressure log; include symptoms of hypotension or potential medication side effects in log.   Review blood pressure measurements taken inside and outside of the provider office; establish baseline and monitor trends; compare to target ranges or patient goal.   Share overall cardiovascular risk with patient; encourage changes to lifestyle risk factors, including alcohol consumption, smoking, inadequate exercise, poor dietary habits and stress.    Notes:            Task Due Date Last Modified     Identify and Monitor Blood Pressure " Elevation --  10/21/2024 11:11 AM by Carolina Lowry RN     Care Management Activities:      - not discussed during this outreach      Notes:                Problem Priority Last Modified     Disease Progression (Hypertension) --  9/30/2024 11:46 AM by Carolina Lowry RN              Goal Recent Progress Last Modified     Disease Progression Prevented or Minimized --  9/30/2024 11:46 AM by Carolina Lowry RN     Evidence-based guidance:   Tailor lifestyle advice to individual; review progress regularly; give frequent encouragement and respond positively to incremental successes.   Assess for and promote awareness of worsening disease or development of comorbidity.   Prepare patient for laboratory and diagnostic exams based on risk and presentation.   Prepare patient for use of pharmacologic therapy that may include diuretic, beta-blocker, beta-blocker/thiazide combination, angiotensin-converting enzyme inhibitor, renin-angiotensin blocker or calcium-channel blocker.   Expect periodic adjustments to pharmacologic therapy; manage side effects.   Promote a healthy diet that includes primarily plant-based foods, such as fruits, vegetables, whole grains, beans and legumes, low-fat dairy and lean meats.    Consider moderate reduction in sodium intake by avoiding the addition of salt to prepared foods and limiting processed meats, canned soup, frozen meals and salty snacks.    Promote a regular, daily exercise goal of 150 minutes per week of moderate exercise based on tolerance, ability and patient choice; consider referral to physical therapist, community wellness and/or activity program.   Encourage the avoidance of no more than 2 hours per day of sedentary activity, such as recreational screen time.   Review sources of stress; explore current coping strategies and encourage use of mindfulness, yoga, meditation or exercise to manage stress.    Notes:            Task Due Date Last Modified     Alleviate Barriers  to Hypertension Treatment --  10/21/2024 11:11 AM by Carolina Lowry RN     Care Management Activities:      - healthy diet promoted      Notes:                Problem Priority Last Modified     Resistant Hypertension (Hypertension) --  9/30/2024 11:46 AM by Carolina Lowry RN              Goal Recent Progress Last Modified     Response to Treatment Maximized --  9/30/2024 11:46 AM by Carolina Lowry, RN     Evidence-based guidance:   Assess patient response to treatment, including presence or absence of medication side effects, degree of blood pressure control and patient satisfaction.   Assess technique (including cuff size and placement), measurement times, condition and calibration of blood pressure cuff set (both at-home and in-office equipment).   Assess factors that may influence response to treatment, including nonadherence to pharmacologic treatment plan, diet or activity changes and/or presence of pain, stress or sleep disturbance.   Screen for signs and symptoms of depression; if present, refer for or complete a comprehensive assessment.   Evaluate social and economic barriers that may affect adherence to treatment plan   Address pharmacologic nonadherence by simplifying dosing regimen, counseling or support by pharmacist, financial assistance, self-monitoring of blood pressure, use of motivational interviewing, voice or text messages.   Encourage behavioral adherence strategies, like habit-based interventions that link medication taking with existing daily routines.   Assess barriers to regular, daily physical activity; support family or support person-oriented activity changes and utilization of community activity or sports program.   Address barriers to dietary changes, especially sodium restriction, with referrals to community programs, like cooking classes, meal services or intensive education when available.   Refer to community-based peer support program or nurse home-visiting program.    Assess for chronic pain; when present add additional goals (Chronic Pain Care Plan Guide) as needed.   Provide frequent follow-up by telephone, telemonitoring, patient-practice portal or with home visit.   Review alcohol use screen; address using brief intervention beginning with risk that interferes with blood pressure control; refer for treatment when excessive alcohol use is noted.   Screen for obstructive sleep apnea; prepare patient for polysomnography based on risk and presentation and use of noninvasive ventilation to relieve obstructive sleep apnea when present.    Notes:            Task Due Date Last Modified     Facilitate Adherence to Lifestyle Change --  10/7/2024  1:46 PM by Carolina Lowry RN     Care Management Activities:      - success praised      Notes:                     Osteoarthritis (Adult)           Problem Priority Last Modified     Pain (Osteoarthritis) --  9/30/2024 11:46 AM by Carolina Lowry RN              Goal Recent Progress Last Modified     Manage Pain --  9/30/2024 11:46 AM by Carolina Lowry RN     Evidence-based guidance:   Develop a mutual, multimodal pain management plan with patient and caregiver; determine person's knowledge of the condition, any concerns or fears, personal preferences and ability to access services.   Assess pain level, treatment efficacy and patient response at regular intervals using a consistent pain scale; review the effectiveness and tolerability of all treatments.   Correlate pain with impact on daily activities, occupation, mood, sleep quality, comorbidities, other (nonarthritic) pain, fall risk and quality of life.   Encourage nonpharmacologic measures, such as applied heat or cold, exercise, physical or occupational therapy, orthoses, cognitive behavioral therapy, yoga, ranjan-chi, acupuncture, adequate sleep and weight loss.   Provide anticipatory guidance regarding benefits to multimodal treatment that improves quality of life.   Consider  the use of transcutaneous TENS (electrical nerve stimulation), shock-absorbing footwear, orthoses, mineral baths, nutraceuticals or electromagnetic-field therapy.   Prepare patient for individualized pharmacologic pain management in a stepped approach based on presentation, risk and comorbidities; monitor and manage side effects and anticipate periodic adjustments.   Promote individualized plan to stay active when unable to participate in physical therapy, such as passive and active range of motion, yoga, walking, water aerobics or swimming.   Support and optimize psychosocial response to pain.    Notes:            Task Due Date Last Modified     Facilitate Multimodal Pain Management Plan --  9/30/2024 11:48 AM by Carolina Lowry RN     Care Management Activities:      - healthy lifestyle promoted      Notes:                Problem Priority Last Modified     Mobility and Function (Osteoarthritis) --  9/30/2024 11:46 AM by Carolina Lowry RN              Goal Recent Progress Last Modified     Maintain Mobility and Function --  9/30/2024 11:46 AM by Carolina Lowry RN     Evidence-based guidance:   Acknowledge and validate impact of pain, loss of strength and potential disfigurement (hand osteoarthritis) on mental health and daily life, such as social isolation, anxiety, depression, impaired sexual relationship and    injury from falls.   Anticipate referral to physical or occupational therapy for assessment, therapeutic exercise and recommendation for adaptive equipment or assistive devices; encourage participation.   Assess impact on ability to perform activities of daily living, as well as engage in sports and leisure events or requirements of work or school.   Provide anticipatory guidance and reassurance about the benefit of exercise to maintain function; acknowledge and normalize fear that exercise may worsen symptoms.   Encourage regular exercise, at least 10 minutes at a time for 45 minutes per week;  "consider yoga, water exercise and proprioceptive exercises; encourage use of wearable activity tracker to increase motivation and adherence.   Encourage maintenance or resumption of daily activities, including employment, as pain allows and with minimal exposure to trauma.   Assist patient to advocate for adaptations to the work environment.   Consider level of pain and function, gender, age, lifestyle, patient preference, quality of life, readiness and  €œcapacity to benefit\" when recommending patients for orthopaedic surgery consultation.   Explore strategies, such as changes to medication regimen or activity that enables patient to anticipate and manage flare-ups that increase deconditioning and disability.   Explore patient preferences; encourage exposure to a broader range of activities that have been avoided for fear of experiencing pain.   Identify barriers to participation in therapy or exercise, such as pain with activity, anticipated or imagined pain.   Monitor postoperative joint replacement or any preexisting joint replacement for ongoing pain and loss of function; provide social support and encouragement throughout recovery.    Notes:            Task Due Date Last Modified     Maintain or Improve Strength and Functional Ability --  10/21/2024 11:15 AM by Carolina Lowry RN     Care Management Activities:      - participation in rehabilitation therapy encouraged      Notes: Current with [facility]                Problem Priority Last Modified     Harm or Injury (Osteoarthritis) --  9/30/2024 11:46 AM by Carolina Lowry RN              Goal Recent Progress Last Modified     Harm or Injury Prevented --  9/30/2024 11:46 AM by Carolina Lowry RN     Evidence-based guidance:   Assess fall risk related to postural control, balance and gait impairment, muscle weakness, diminished vision and hearing, presence of incontinence, environmental hazards and effects of medication.   Address potential barriers " to activity or exercise, such as low self-efficacy and fear of falling.   Address fall risk by considering single vison versus multifocal lenses (glasses) during activity, environmental modification and antislip, flat-heeled shoes, use of orthoses and assistive devices.   Promote regular exercise focused on improving strength, based on ability and tolerance; consider activities, such as yoga or ranjan chi, that promote balance.   Review current medication; consider vitamin D supplementation and de-prescription of psychotropic medication.   Monitor and address analgesic use, such as acetaminophen, nonsteroidal anti-inflammatory agent or opioid, for complications that may include hepatotoxicity, dependence, constipation, dyspepsia, gastrointestinal bleeding, heart or    renal disease.   Monitor depression, anxiety and suicide risk.   Collaborate with patient and parent/caregiver to develop a safety plan or coping action plan to reduce suicide risk.   Include recognition of warning signs of an impending suicidal crisis, restricting access to lethal means, use of internal coping strategies and contact methods for mental health professional or agency.    Notes:            Task Due Date Last Modified     Identify and Reduce Risk to Safety --  10/7/2024  1:46 PM by Carolina Lowry RN     Care Management Activities:      - balance, gait and fall risk reviewed      Notes:                          Current Specialty Plan of Care Status finalized    Instructions   Patient was provided an electronic copy of care plan  CCM services were explained and offered and patient has accepted these services.  Patient has given their written consent to receive CCM services and understands that this includes the authorization of electronic communication of medical information with the other treating providers.  Patient understands that they may stop CCM services at any time and these changes will be effective at the end of the calendar month  and will effectively revocate the agreement of CCM services.  Patient understands that only one practitioner can furnish and be paid for CCM services during one calendar month.  Patient also understands that there may be co-payment or deductible fees in association with CCM services.  Patient will continue with at least monthly follow-up calls with the Ambulatory .    Carolina OLEA  Ambulatory Case Management    10/31/2024, 09:19 EDT

## 2024-11-12 ENCOUNTER — OFFICE VISIT (OUTPATIENT)
Dept: FAMILY MEDICINE CLINIC | Facility: CLINIC | Age: 84
End: 2024-11-12
Payer: MEDICARE

## 2024-11-12 VITALS
HEART RATE: 71 BPM | OXYGEN SATURATION: 98 % | SYSTOLIC BLOOD PRESSURE: 188 MMHG | TEMPERATURE: 98.1 F | BODY MASS INDEX: 28.27 KG/M2 | WEIGHT: 144 LBS | HEIGHT: 60 IN | DIASTOLIC BLOOD PRESSURE: 99 MMHG

## 2024-11-12 DIAGNOSIS — R10.32 LLQ ABDOMINAL PAIN: Primary | ICD-10-CM

## 2024-11-12 DIAGNOSIS — M95.8 DEFORMITY OF CLAVICLE: ICD-10-CM

## 2024-11-12 PROCEDURE — 3077F SYST BP >= 140 MM HG: CPT | Performed by: NURSE PRACTITIONER

## 2024-11-12 PROCEDURE — 99214 OFFICE O/P EST MOD 30 MIN: CPT | Performed by: NURSE PRACTITIONER

## 2024-11-12 PROCEDURE — 1125F AMNT PAIN NOTED PAIN PRSNT: CPT | Performed by: NURSE PRACTITIONER

## 2024-11-12 PROCEDURE — 3080F DIAST BP >= 90 MM HG: CPT | Performed by: NURSE PRACTITIONER

## 2024-11-12 NOTE — PROGRESS NOTES
Chief Complaint  Abdominal Pain (Left Lower stomach and swelling ) and collar bone  (Still concerned of swelling )    Subjective        Maria D Fountain presents to [facility]  History of Present Illness  Maria D is an 84-year-old female presenting today with multiple complaints.    Left lower quadrant pain:  Started a couple days ago.    Has noticed swelling in her left abdomen.  Reports a stabbing pain that radiates to her groin.  No fever or chills.  Bowel movements are regular.    Right clavicle swelling:  Noticed about a year ago.  No history of clavicle fracture.  Denies any pain.      The following portions of the patient's history were reviewed and updated as appropriate: allergies, current medications, past family history, past medical history, past social history, past surgical history and problem list.    Allergies   Allergen Reactions    Pravastatin Dizziness       Patient Active Problem List   Diagnosis    Coronary artery disease of native artery of native heart with stable angina pectoris    Degeneration of lumbar or lumbosacral intervertebral disc    Dyslipidemia    Essential hypertension    Angina pectoris    Chest pain, pleuritic    Lumbar spondylosis    Mitral regurgitation    Prediabetes    Chronic coronary artery disease    Varicose veins of left lower extremity with pain    Mixed hyperlipidemia    Osteopenia after menopause    Rotator cuff arthropathy of left shoulder    Clinical diagnosis of severe acute respiratory syndrome coronavirus 2 (SARS-CoV-2) disease    Encounter for annual wellness exam in Medicare patient    Symptomatic cholelithiasis    Influenza A       Current Outpatient Medications   Medication Instructions    acetaminophen (TYLENOL) 650 MG 8 hr tablet 1 tablet, Every 8 Hours PRN    amLODIPine (NORVASC) 5 mg, Oral, Daily    ascorbic acid (VITAMIN C) 1000 MG tablet 0.5 tablets, 2 times daily    aspirin (aspirin) 81 MG EC tablet 1 tablet, Every 24  "Hours    atorvastatin (LIPITOR) 20 mg, Oral, Nightly    Biotin 5000 MCG capsule 1 capsule, Nightly    carvedilol (COREG) 12.5 mg, Oral, 2 Times Daily With Meals    Lidocaine 4 % 1 patch, Every 24 Hours    lisinopril (PRINIVIL,ZESTRIL) 20 mg, Oral, 2 Times Daily    loperamide (IMODIUM) 2 mg, As Needed    meloxicam (MOBIC) 15 mg, Oral, Daily    metroNIDAZOLE (FLAGYL) 500 mg, Oral, 2 Times Daily    multivitamin-minerals (CENTRUM) tablet 1 tablet, Daily    nitroglycerin (NITROSTAT) 0.4 mg, Sublingual, Every 5 Minutes PRN, Take no more than 3 doses in 15 minutes.    Probiotic Product (PROBIOTIC BLEND PO) 1 capsule, Daily    vitamin B-12 (CYANOCOBALAMIN) 1000 MCG tablet 1 tablet, Daily    vitamin D3 5,000 Units, 2 Times Daily          Objective   Vital Signs:  BP (!) 188/99   Pulse 71   Temp 98.1 °F (36.7 °C) (Temporal)   Ht 152.4 cm (60\")   Wt 65.3 kg (144 lb)   SpO2 98%   BMI 28.12 kg/m²   Estimated body mass index is 28.12 kg/m² as calculated from the following:    Height as of this encounter: 152.4 cm (60\").    Weight as of this encounter: 65.3 kg (144 lb).               Review of Systems   Constitutional:  Negative for appetite change, chills, fatigue, fever and unexpected weight change.   Respiratory:  Negative for cough, choking and shortness of breath.    Gastrointestinal:  Positive for abdominal distention and abdominal pain. Negative for blood in stool, constipation, diarrhea, nausea and vomiting.   Allergic/Immunologic: Negative for food allergies.        Physical Exam  Constitutional:       Appearance: Normal appearance.   Cardiovascular:      Rate and Rhythm: Normal rate and regular rhythm.   Pulmonary:      Effort: Pulmonary effort is normal.      Breath sounds: Normal breath sounds.   Abdominal:      General: Bowel sounds are normal. There is distension.      Palpations: Abdomen is soft.      Tenderness: There is abdominal tenderness in the left lower quadrant.   Musculoskeletal:        " Arms:    Neurological:      Mental Status: She is alert.        Result Review :                   Assessment and Plan   Diagnoses and all orders for this visit:    1. LLQ abdominal pain (Primary)  Comments:  Will get CT scan of abdomen to rule out any bowel issues.  Monitor for increased pain, fever or chills.  Orders:  -     CT Abdomen Pelvis With & Without Contrast; Future    2. Deformity of clavicle  Comments:  Will get CT scan  Orders:  -     CT Soft Tissue Neck With Contrast; Future             Follow Up   No follow-ups on file.  Patient was given instructions and counseling regarding her condition or for health maintenance advice. Please see specific information pulled into the AVS if appropriate.

## 2024-11-18 ENCOUNTER — TELEPHONE (OUTPATIENT)
Dept: FAMILY MEDICINE CLINIC | Facility: CLINIC | Age: 84
End: 2024-11-18
Payer: MEDICARE

## 2024-11-18 DIAGNOSIS — R10.32 LLQ ABDOMINAL PAIN: ICD-10-CM

## 2024-11-18 DIAGNOSIS — M95.8 DEFORMITY OF CLAVICLE: ICD-10-CM

## 2024-11-18 NOTE — TELEPHONE ENCOUNTER
Caller: Maria D Fountain    Relationship to patient: Self    Best call back number: [phone]     Patient is needing: PATIENT CALLED TO SCHEDULE CT SCAN ORDERED BY DR. SHIPMAN, BUT WAS TOLD BY [facility] THAT SHE WOULD BE ABLE TO GET IN UNTIL JANUARY. [facility] TOLD THE PATIENT TO SEE IF THESE ORDERS CAN BE SENT TO [facility] SO THE PATIENT CAN HAVE THESE DONE ASAP.    PLEASE CALL PATIENT BACK TO [facility]

## 2024-11-20 ENCOUNTER — OFFICE VISIT (OUTPATIENT)
Dept: FAMILY MEDICINE CLINIC | Facility: CLINIC | Age: 84
End: 2024-11-20
Payer: MEDICARE

## 2024-11-20 ENCOUNTER — LAB (OUTPATIENT)
Dept: FAMILY MEDICINE CLINIC | Facility: CLINIC | Age: 84
End: 2024-11-20
Payer: MEDICARE

## 2024-11-20 VITALS
HEIGHT: 60 IN | WEIGHT: 142 LBS | BODY MASS INDEX: 27.88 KG/M2 | OXYGEN SATURATION: 95 % | DIASTOLIC BLOOD PRESSURE: 63 MMHG | HEART RATE: 57 BPM | SYSTOLIC BLOOD PRESSURE: 134 MMHG

## 2024-11-20 DIAGNOSIS — Z00.00 ENCOUNTER FOR ANNUAL WELLNESS EXAM IN MEDICARE PATIENT: Primary | ICD-10-CM

## 2024-11-20 DIAGNOSIS — R19.7 DIARRHEA, UNSPECIFIED TYPE: ICD-10-CM

## 2024-11-20 DIAGNOSIS — E78.2 MIXED HYPERLIPIDEMIA: ICD-10-CM

## 2024-11-20 DIAGNOSIS — R10.32 LLQ ABDOMINAL PAIN: ICD-10-CM

## 2024-11-20 DIAGNOSIS — R73.03 PREDIABETES: ICD-10-CM

## 2024-11-20 DIAGNOSIS — I10 ESSENTIAL HYPERTENSION: ICD-10-CM

## 2024-11-20 DIAGNOSIS — M54.50 LOW BACK PAIN, UNSPECIFIED BACK PAIN LATERALITY, UNSPECIFIED CHRONICITY, UNSPECIFIED WHETHER SCIATICA PRESENT: ICD-10-CM

## 2024-11-20 DIAGNOSIS — R53.83 FATIGUE, UNSPECIFIED TYPE: ICD-10-CM

## 2024-11-20 DIAGNOSIS — J18.9 PNEUMONIA DUE TO INFECTIOUS ORGANISM, UNSPECIFIED LATERALITY, UNSPECIFIED PART OF LUNG: ICD-10-CM

## 2024-11-20 LAB
ALBUMIN SERPL-MCNC: 4.3 G/DL (ref 3.5–5.2)
ALBUMIN/GLOB SERPL: 1.4 G/DL
ALP SERPL-CCNC: 87 U/L (ref 39–117)
ALT SERPL W P-5'-P-CCNC: 20 U/L (ref 1–33)
ANION GAP SERPL CALCULATED.3IONS-SCNC: 7.1 MMOL/L (ref 5–15)
AST SERPL-CCNC: 25 U/L (ref 1–32)
BASOPHILS # BLD AUTO: 0.04 10*3/MM3 (ref 0–0.2)
BASOPHILS NFR BLD AUTO: 0.5 % (ref 0–1.5)
BILIRUB SERPL-MCNC: 1.2 MG/DL (ref 0–1.2)
BUN SERPL-MCNC: 15 MG/DL (ref 8–23)
BUN/CREAT SERPL: 15 (ref 7–25)
CALCIUM SPEC-SCNC: 9.8 MG/DL (ref 8.6–10.5)
CHLORIDE SERPL-SCNC: 105 MMOL/L (ref 98–107)
CHOLEST SERPL-MCNC: 122 MG/DL (ref 0–200)
CO2 SERPL-SCNC: 31.9 MMOL/L (ref 22–29)
CREAT SERPL-MCNC: 1 MG/DL (ref 0.57–1)
DEPRECATED RDW RBC AUTO: 42.8 FL (ref 37–54)
EGFRCR SERPLBLD CKD-EPI 2021: 55.7 ML/MIN/1.73
EOSINOPHIL # BLD AUTO: 0.42 10*3/MM3 (ref 0–0.4)
EOSINOPHIL NFR BLD AUTO: 4.8 % (ref 0.3–6.2)
ERYTHROCYTE [DISTWIDTH] IN BLOOD BY AUTOMATED COUNT: 12.2 % (ref 12.3–15.4)
GLOBULIN UR ELPH-MCNC: 3 GM/DL
GLUCOSE SERPL-MCNC: 97 MG/DL (ref 65–99)
HCT VFR BLD AUTO: 44.2 % (ref 34–46.6)
HDLC SERPL-MCNC: 40 MG/DL (ref 40–60)
HGB BLD-MCNC: 14.8 G/DL (ref 12–15.9)
IMM GRANULOCYTES # BLD AUTO: 0.03 10*3/MM3 (ref 0–0.05)
IMM GRANULOCYTES NFR BLD AUTO: 0.3 % (ref 0–0.5)
LDLC SERPL CALC-MCNC: 49 MG/DL (ref 0–100)
LDLC/HDLC SERPL: 1.05 {RATIO}
LYMPHOCYTES # BLD AUTO: 2.33 10*3/MM3 (ref 0.7–3.1)
LYMPHOCYTES NFR BLD AUTO: 26.5 % (ref 19.6–45.3)
MCH RBC QN AUTO: 32.5 PG (ref 26.6–33)
MCHC RBC AUTO-ENTMCNC: 33.5 G/DL (ref 31.5–35.7)
MCV RBC AUTO: 96.9 FL (ref 79–97)
MONOCYTES # BLD AUTO: 1.02 10*3/MM3 (ref 0.1–0.9)
MONOCYTES NFR BLD AUTO: 11.6 % (ref 5–12)
NEUTROPHILS NFR BLD AUTO: 4.94 10*3/MM3 (ref 1.7–7)
NEUTROPHILS NFR BLD AUTO: 56.3 % (ref 42.7–76)
NRBC BLD AUTO-RTO: 0 /100 WBC (ref 0–0.2)
PLATELET # BLD AUTO: 329 10*3/MM3 (ref 140–450)
PMV BLD AUTO: 9.8 FL (ref 6–12)
POTASSIUM SERPL-SCNC: 4.9 MMOL/L (ref 3.5–5.2)
PROT SERPL-MCNC: 7.3 G/DL (ref 6–8.5)
RBC # BLD AUTO: 4.56 10*6/MM3 (ref 3.77–5.28)
SODIUM SERPL-SCNC: 144 MMOL/L (ref 136–145)
TRIGL SERPL-MCNC: 201 MG/DL (ref 0–150)
TSH SERPL DL<=0.05 MIU/L-ACNC: 1.76 UIU/ML (ref 0.27–4.2)
VLDLC SERPL-MCNC: 33 MG/DL (ref 5–40)
WBC NRBC COR # BLD AUTO: 8.78 10*3/MM3 (ref 3.4–10.8)

## 2024-11-20 PROCEDURE — 85025 COMPLETE CBC W/AUTO DIFF WBC: CPT | Performed by: FAMILY MEDICINE

## 2024-11-20 PROCEDURE — 36415 COLL VENOUS BLD VENIPUNCTURE: CPT

## 2024-11-20 PROCEDURE — 80053 COMPREHEN METABOLIC PANEL: CPT | Performed by: FAMILY MEDICINE

## 2024-11-20 PROCEDURE — 80061 LIPID PANEL: CPT | Performed by: FAMILY MEDICINE

## 2024-11-20 PROCEDURE — 84443 ASSAY THYROID STIM HORMONE: CPT | Performed by: FAMILY MEDICINE

## 2024-11-20 NOTE — ASSESSMENT & PLAN NOTE
She refuses vaccines despite tosha flu A this past season  She was counseled on need for weight loss

## 2024-11-20 NOTE — ASSESSMENT & PLAN NOTE
She will continue current meds  BP is controlled  I suspect her bp was elevated at blood donation center secondary to back pain    Orders:    Comprehensive Metabolic Panel; Future    Lipid Panel; Future

## 2024-11-20 NOTE — ASSESSMENT & PLAN NOTE
She will continue atorvastatin    Orders:    Comprehensive Metabolic Panel; Future    Lipid Panel; Future

## 2024-11-20 NOTE — PROGRESS NOTES
Subjective   The ABCs of the Annual Wellness Visit  Medicare Wellness Visit      Maria D Fountain is a 84 y.o. patient who presents for a Medicare Wellness Visit.    The following portions of the patient's history were reviewed and   updated as appropriate: allergies, current medications, past family history, past medical history, past social history, past surgical history, and problem list.    Compared to one year ago, the patient's physical   health is worse.  Compared to one year ago, the patient's mental   health is worse.    Recent Hospitalizations:  This patient has had a [facility] admission record on file within the last 365 days.  Current Medical Providers:  Patient Care Team:  Celi Renner MD as PCP - General (Family Medicine)  Mera Boothe MD as Surgeon (Orthopedic Surgery)  Matti Luna MD as Consulting Physician (Cardiology)  Michelle Sierra MD as Surgeon (General Surgery)  Carolina Lowry, RN as Ambulatory   Rosette Nguyễn MD as Consulting Physician (Gastroenterology)  Rhonda Mcmahon PA-C (Physician Assistant)  Samanta Davis APRN (Nurse Practitioner)    Outpatient Medications Prior to Visit   Medication Sig Dispense Refill    acetaminophen (TYLENOL) 650 MG 8 hr tablet Take 1 tablet by mouth Every 8 (Eight) Hours As Needed for Mild Pain. Indications: Pain      amLODIPine (NORVASC) 5 MG tablet TAKE ONE TABLET BY MOUTH DAILY 90 tablet 3    ascorbic acid (VITAMIN C) 1000 MG tablet Take 0.5 tablets by mouth 2 (two) times a day. Indications: Inadequate Vitamin C      aspirin (aspirin) 81 MG EC tablet Take 1 tablet by mouth Daily. Indications: prophylactic      atorvastatin (LIPITOR) 20 MG tablet TAKE ONE TABLET BY MOUTH ONCE NIGHTLY 90 tablet 3    Biotin 5000 MCG capsule Take 1 capsule by mouth Every Night. Indications: thinning hair      carvedilol (COREG) 12.5 MG tablet Take 1 tablet by mouth 2 (Two) Times a Day With Meals. 180 tablet 3     Lidocaine 4 % Place 1 patch on the skin as directed by provider Daily. Remove & Discard patch within 12 hours or as directed by MD  Indications: pain      lisinopril (PRINIVIL,ZESTRIL) 20 MG tablet TAKE 1 TABLET BY MOUTH TWICE A  tablet 3    loperamide (IMODIUM) 2 MG capsule Take 1 capsule by mouth As Needed for Diarrhea. Indications: Diarrhea      meloxicam (MOBIC) 15 MG tablet TAKE 1 TABLET BY MOUTH DAILY 90 tablet 0    metroNIDAZOLE (Flagyl) 500 MG tablet Take 1 tablet by mouth 2 (Two) Times a Day. 14 tablet 0    multivitamin-minerals (CENTRUM) tablet Take 1 tablet by mouth Daily. Indications: supplement      nitroglycerin (Nitrostat) 0.4 MG SL tablet Place 1 tablet under the tongue Every 5 (Five) Minutes As Needed for Chest Pain. Take no more than 3 doses in 15 minutes. 30 tablet 5    Probiotic Product (PROBIOTIC BLEND PO) Take 1 capsule by mouth Daily. Indications: supplement      vitamin B-12 (CYANOCOBALAMIN) 1000 MCG tablet Take 1 tablet by mouth Daily. Indications: Inadequate Vitamin B12      vitamin D3 (vitamin d) 125 MCG (5000 UT) capsule capsule Take 1 capsule by mouth 2 (Two) Times a Day. Indications: Vitamin D Deficiency       No facility-administered medications prior to visit.     No opioid medication identified on active medication list. I have reviewed chart for other potential  high risk medication/s and harmful drug interactions in the elderly.      Aspirin is on active medication list. Aspirin use is indicated based on review of current medical condition/s. Pros and cons of this therapy have been discussed today. Benefits of this medication outweigh potential harm.  Patient has been encouraged to continue taking this medication.  .      Patient Active Problem List   Diagnosis    Coronary artery disease of native artery of native heart with stable angina pectoris    Degeneration of lumbar or lumbosacral intervertebral disc    Dyslipidemia    Essential hypertension    Angina pectoris     "Chest pain, pleuritic    Lumbar spondylosis    Mitral regurgitation    Prediabetes    Chronic coronary artery disease    Varicose veins of left lower extremity with pain    Mixed hyperlipidemia    Osteopenia after menopause    Rotator cuff arthropathy of left shoulder    Clinical diagnosis of severe acute respiratory syndrome coronavirus 2 (SARS-CoV-2) disease    Encounter for annual wellness exam in Medicare patient    Symptomatic cholelithiasis    Influenza A     Advance Care Planning Advance Directive is on file.  ACP discussion was held with the patient during this visit. Patient has an advance directive in EMR which is still valid.             Objective   Vitals:    11/20/24 1238   BP: 134/63   BP Location: Left arm   Patient Position: Sitting   Cuff Size: Large Adult   Pulse: 57   SpO2: 95%   Weight: 64.4 kg (142 lb)   Height: 152.4 cm (60\")   PainSc:   8   PainLoc: Back       Estimated body mass index is 27.73 kg/m² as calculated from the following:    Height as of this encounter: 152.4 cm (60\").    Weight as of this encounter: 64.4 kg (142 lb).            Does the patient have evidence of cognitive impairment? No  Lab Results   Component Value Date    TRIG 201 (H) 11/20/2024    HDL 40 11/20/2024    LDL 49 11/20/2024    VLDL 33 11/20/2024          MMSE done 30/30                                                                                      Health  Risk Assessment    Smoking Status:  Social History     Tobacco Use   Smoking Status Never    Passive exposure: Never   Smokeless Tobacco Never     Alcohol Consumption:  Social History     Substance and Sexual Activity   Alcohol Use No       Fall Risk Screen  STEADI Fall Risk Assessment was completed, and patient is at MODERATE risk for falls. Assessment completed on:11/20/2024    Depression Screening   Little interest or pleasure in doing things? Not at all   Feeling down, depressed, or hopeless? Not at all   PHQ-2 Total Score 0      Health Habits and " Functional and Cognitive Screenin/20/2024    12:43 PM   Functional & Cognitive Status   Do you have difficulty preparing food and eating? No   Do you have difficulty bathing yourself, getting dressed or grooming yourself? No   Do you have difficulty using the toilet? No   Do you have difficulty moving around from place to place? No   Do you have trouble with steps or getting out of a bed or a chair? No   Current Diet Frequent Junk Food   Dental Exam Up to date        Dental Exam Comment dentures   Eye Exam Up to date   Exercise (times per week) 3 times per week   Current Exercises Include Other        Exercise Comment stretches & PT   Do you need help using the phone?  No   Are you deaf or do you have serious difficulty hearing?  No   Do you need help to go to places out of walking distance? No   Do you need help shopping? No   Do you need help preparing meals?  No   Do you need help with housework?  No   Do you need help with laundry? No   Do you need help taking your medications? No   Do you need help managing money? No   Do you ever drive or ride in a car without wearing a seat belt? No   Have you felt unusual stress, anger or loneliness in the last month? Yes   Who do you live with? Alone   If you need help, do you have trouble finding someone available to you? No   Have you been bothered in the last four weeks by sexual problems? No   Do you have difficulty concentrating, remembering or making decisions? No           Age-appropriate Screening Schedule:  Refer to the list below for future screening recommendations based on patient's age, sex and/or medical conditions. Orders for these recommended tests are listed in the plan section. The patient has been provided with a written plan.    Health Maintenance List  Health Maintenance   Topic Date Due    ZOSTER VACCINE (1 of 2) Never done    INFLUENZA VACCINE  2025 (Originally 2024)    COVID-19 Vaccine (2024- season) 09/15/2025 (Originally  9/1/2024)    RSV Vaccine - Adults (1 - 1-dose 75+ series) 09/19/2025 (Originally 2/11/2015)    Pneumococcal Vaccine 65+ (2 of 2 - PCV) 11/11/2025 (Originally 7/9/2014)    TDAP/TD VACCINES (1 - Tdap) 11/11/2025 (Originally 2/11/1959)    DXA SCAN  06/20/2025    BMI FOLLOWUP  09/30/2025    ANNUAL WELLNESS VISIT  11/20/2025    LIPID PANEL  11/20/2025    DIABETIC FOOT EXAM  Discontinued    HEMOGLOBIN A1C  Discontinued    DIABETIC EYE EXAM  Discontinued                                                                                                                                                CMS Preventative Services Quick Reference  Risk Factors Identified During Encounter  Immunizations Discussed/Encouraged: Influenza    The above risks/problems have been discussed with the patient.  Pertinent information has been shared with the patient in the After Visit Summary.  An After Visit Summary and PPPS were made available to the patient.    Follow Up:   Next Medicare Wellness visit to be scheduled in 1 year.         Additional E&M Note during same encounter follows:  Patient has additional, significant, and separately identifiable condition(s)/problem(s) that require work above and beyond the Medicare Wellness Visit     Chief Complaint  Hyperlipidemia, Hypertension, Medicare Wellness-subsequent, and Joint Pain (Multiple joint pain. Takes 2 tylenol. Makes her tired and had to stopped PT due to other issues.)    Subjective   HPI  Maria D is also being seen today for additional medical problem/s: follow up on bp/chol/bs  BP elevated at blood donation and was denied because her bp was too high  She says they took it at arrival, 25 min and 50min and it remained high so she was turned away  She was told to go to the ER but refused and went home and took her second dose of her lisinopril and initially said she took an amlodipine but when questioned says she took a tylenol for ehr back pain  She is c/o pain that runs along the  "entire spine and left hip  She is taking tylenol   She was doing PT and stopped sec to fatigue that she relates to the pneumonia she had earlier this year  PT was helping until she stopped  She is scheduled for a CT abd/pelvis by Lizzie for pain and is scheduled for it in Dec at [facility] radiology after she was initially scheduled at [facility] for Feb (earliest appt she says)  She is convinced she had parasites and did not want to contaminate water at PT   She says she took an otc parasite cleanse from a pharmacy and  another from an online clinic/store  She saw JUAN who wanted her to do a series of tests but she did not do all 8 tests because it was too complicated and too exhausting  She says JUAN told her the test Dr. Mosqueda ordered wasn't completely accurate because it was only a single sample  She says JUAN ordered 8 tubes but she was unable to complete all 8 so she says JUAN could not give her a definitive answer   Pt says she showed bottles of samples she brought from home to Lizzie  who treated her with flagyl  Pt says she passed all the parasites after that  She no longer sees any and no longer has any itching  Pt demanded the ability to give detailed description of the \"parasites\" and how she collected the vials  Some of her family has given her a probiotic and numerous vitamins/supplements to take  Later in the visit, she says she has seen 2 more parasites but with no itching is not sure it is really a problem  She is angry with me because I would not test her in June but at that time she told me she was constipated so I could not order a test  She is convinced the appt was in May and that we recorded the date of the visit incorrectly  She would like to do more PT for her back but, again, wants to hold off until she determines if she is still having parasites  She says her memory is very clear and her family all call her for dates and information  She was hospitalized in Feb with flu A and pneumonia  Pt refuses vaccines " "including flu.  When I pointed out that if she had had the flu vaccine this past season she would probably not have contracted flu/pneumonia and had to be admitted to the hospital, her comment was \"I didn't die\" so she still refuses a flu vaccine            Review of Systems   Constitutional:  Positive for fatigue. Negative for activity change, appetite change, chills, diaphoresis and fever.   Respiratory: Negative.     Cardiovascular: Negative.    Gastrointestinal:  Negative for nausea and vomiting.   Musculoskeletal:  Positive for back pain.              Objective   Vital Signs:  /63 (BP Location: Left arm, Patient Position: Sitting, Cuff Size: Large Adult)   Pulse 57   Ht 152.4 cm (60\")   Wt 64.4 kg (142 lb)   SpO2 95%   BMI 27.73 kg/m²   Physical Exam  Vitals and nursing note reviewed.   Constitutional:       Appearance: Normal appearance. She is well-developed, well-groomed and overweight.   Cardiovascular:      Rate and Rhythm: Normal rate and regular rhythm.      Heart sounds: Normal heart sounds.   Pulmonary:      Effort: Pulmonary effort is normal.      Breath sounds: Normal breath sounds.   Musculoskeletal:      Cervical back: Neck supple.      Right lower leg: No edema.      Left lower leg: No edema.      Comments: No vertebral tenderness.     Lymphadenopathy:      Cervical: No cervical adenopathy.   Neurological:      Mental Status: She is alert and oriented to person, place, and time.   Psychiatric:         Behavior: Behavior is agitated. Behavior is cooperative.               Lab Results   Component Value Date    HGBA1C 6.18 (H) 08/05/2024     Lab Results   Component Value Date    GLUCOSE 97 11/20/2024    BUN 15 11/20/2024    CREATININE 1.00 11/20/2024     11/20/2024    K 4.9 11/20/2024     11/20/2024    CALCIUM 9.8 11/20/2024    PROTEINTOT 7.3 11/20/2024    ALBUMIN 4.3 11/20/2024    ALT 20 11/20/2024    AST 25 11/20/2024    ALKPHOS 87 11/20/2024    BILITOT 1.2 11/20/2024    GLOB " 3.0 11/20/2024    AGRATIO 1.4 11/20/2024    BCR 15.0 11/20/2024    ANIONGAP 7.1 11/20/2024    EGFR 55.7 (L) 11/20/2024     Lab Results   Component Value Date    HGBA1C 6.18 (H) 08/05/2024             Assessment and Plan            Encounter for annual wellness exam in Medicare patient  She refuses vaccines despite tosha flu A this past season  She was counseled on need for weight loss         Mixed hyperlipidemia   She will continue atorvastatin    Orders:    Comprehensive Metabolic Panel; Future    Lipid Panel; Future    Essential hypertension  She will continue current meds  BP is controlled  I suspect her bp was elevated at blood donation center secondary to back pain    Orders:    Comprehensive Metabolic Panel; Future    Lipid Panel; Future    Prediabetes    Orders:    Comprehensive Metabolic Panel; Future    Lipid Panel; Future    Fatigue, unspecified type  I will check labs to further evaluate  Orders:    Comprehensive Metabolic Panel; Future    TSH; Future    CBC & Differential; Future    Low back pain, unspecified back pain laterality, unspecified chronicity, unspecified whether sciatica present  She will resume PT when she feels certain she no longer has parasites in her stool and urine as she has researched them and found they can spread to all her bodily fluids       Diarrhea, unspecified type  Appears to have resolved       LLQ abdominal pain  She has a CT scheduled in Dec       Pneumonia due to infectious organism, unspecified laterality, unspecified part of lung  Resolved; encouraged flu vaccine but pt refused        One hour 20 minutes spent reviewing chart, doing exam, taking history, counseling the patient, ordering tests, and completing chart       Follow Up   Return in about 1 year (around 11/20/2025) for Medicare Wellness.  Patient was given instructions and counseling regarding her condition or for health maintenance advice. Please see specific information pulled into the AVS if  appropriate.

## 2024-11-21 ENCOUNTER — PATIENT OUTREACH (OUTPATIENT)
Dept: CASE MANAGEMENT | Facility: CLINIC | Age: 84
End: 2024-11-21
Payer: MEDICARE

## 2024-11-21 DIAGNOSIS — I10 ESSENTIAL HYPERTENSION: Primary | ICD-10-CM

## 2024-11-21 DIAGNOSIS — E78.2 MIXED HYPERLIPIDEMIA: ICD-10-CM

## 2024-11-21 NOTE — OUTREACH NOTE
AMBULATORY CASE MANAGEMENT NOTE    Names and Relationships of Patient/Support Persons: Contact: Maria D Fountain; Relationship: Self -     CCM Interim Update    ACM called patient for Monthly CCM outreach. Patient was also in office yesterday for Annual wellness visit. Patient care gaps closed and Seasonal Vaccines up to date. Patient goal this winter is to attempt to stay active in colder months ahead. Patient encouraged to find indoor activities she enjoys. Patient also reports she will continue OP PT at [facility] to assist her with staying active and improving or at least maintaining amount of pain she has from her arthritis. Patient reports med compliance and states she is now taking a probiotic that is helping with her GI issues and she updated PCP of this yesterday.         Education Documentation  sleep/rest, taught by Carolina Lowry RN at 11/21/2024  2:00 PM.  Learner: Patient  Readiness: Acceptance  Method: Explanation  Response: Verbalizes Understanding    activity, taught by Carolina Lowry RN at 11/21/2024  2:00 PM.  Learner: Patient  Readiness: Acceptance  Method: Explanation  Response: Verbalizes Understanding    Medication Management, taught by Carolina Lowry RN at 11/21/2024  2:00 PM.  Learner: Patient  Readiness: Acceptance  Method: Explanation  Response: Verbalizes Understanding    Blood Pressure Monitoring, taught by Carolina Lowry RN at 11/21/2024  2:00 PM.  Learner: Patient  Readiness: Acceptance  Method: Explanation  Response: Verbalizes Understanding          Carolina OLEA  Ambulatory Case Management    11/21/2024, 14:00 EST

## 2024-11-26 RX ORDER — MELOXICAM 15 MG/1
15 TABLET ORAL DAILY
Qty: 90 TABLET | Refills: 0 | Status: SHIPPED | OUTPATIENT
Start: 2024-11-26

## 2024-11-27 ENCOUNTER — PATIENT OUTREACH (OUTPATIENT)
Dept: CASE MANAGEMENT | Facility: CLINIC | Age: 84
End: 2024-11-27
Payer: MEDICARE

## 2024-11-27 DIAGNOSIS — I10 ESSENTIAL HYPERTENSION: Primary | ICD-10-CM

## 2024-11-27 DIAGNOSIS — E78.2 MIXED HYPERLIPIDEMIA: ICD-10-CM

## 2024-11-27 NOTE — OUTREACH NOTE
[facility] End of Month Documentation    This Chronic Medical Management Care Plan for Maria D Fountain, 84 y.o. female, has been monitored and managed and a new plan of care implemented for the month of November.  A cumulative time of 22  minutes was spent on this patient record this month, including chart review; phone call with patient.    Regarding the patient's problems: has Coronary artery disease of native artery of native heart with stable angina pectoris; Degeneration of lumbar or lumbosacral intervertebral disc; Dyslipidemia; Essential hypertension; Angina pectoris; Chest pain, pleuritic; Lumbar spondylosis; Mitral regurgitation; Prediabetes; Chronic coronary artery disease; Varicose veins of left lower extremity with pain; Mixed hyperlipidemia; Osteopenia after menopause; Rotator cuff arthropathy of left shoulder; Clinical diagnosis of severe acute respiratory syndrome coronavirus 2 (SARS-CoV-2) disease; Encounter for annual wellness exam in Medicare patient; Symptomatic cholelithiasis; and Influenza A on their problem list., the following items were addressed: medical records; medications; referrals to community service providers and any changes can be found within the plan section of the note.  A detailed listing of time spent for chronic care management is tracked within each outreach encounter.  Current medications include:  has a current medication list which includes the following prescription(s): acetaminophen, amlodipine, ascorbic acid, aspirin, atorvastatin, biotin, carvedilol, lidocaine, lisinopril, loperamide, meloxicam, metronidazole, multivitamin-minerals, nitroglycerin, probiotic product, vitamin b-12, vitamin d3, and [DISCONTINUED] montelukast. and the patient is reported to be patient is compliant with medication protocol,  Medications are reported to be effective.    All notes on chart for PCP to review.    The patient was monitored remotely for blood pressure; pain, Takes BP twice daily.    The  "patient's physical needs include:  physical healthcare; physician referral.     The patient's mental support needs include:  needs met    The patient's cognitive support needs include:  needs met    The patient's psychosocial support needs include:  needs met    The patient's functional needs include: No data recorded    The patient's environmental needs include:  not applicable    Care Plan overall comments:  No data recorded    Refer to previous outreach notes for more information on the areas listed above.    Monthly Billing Diagnoses  (I10) Essential hypertension    (E78.2) Mixed hyperlipidemia    Medications   Medications have been reconciled    Care Plan progress this month:      Recently Modified Care Plans Updates made since 10/27/2024 12:00 AM       Hypertension (Adult)           Problem Priority Last Modified     Hypertension (Hypertension) --  9/30/2024 11:46 AM by Carolina Lowry RN              Goal Recent Progress Last Modified     Hypertension Monitored --  9/30/2024 11:46 AM by Carolina Lowry RN     Evidence-based guidance:   Promote initial use of ambulatory blood pressure measurements (for 3 days) to rule out \"white-coat\" effect; identify masked hypertension and presence or absence of nocturnal \"dipping\" of blood pressure.    Encourage continued use of home blood pressure monitoring and recording in blood pressure log; include symptoms of hypotension or potential medication side effects in log.   Review blood pressure measurements taken inside and outside of the provider office; establish baseline and monitor trends; compare to target ranges or patient goal.   Share overall cardiovascular risk with patient; encourage changes to lifestyle risk factors, including alcohol consumption, smoking, inadequate exercise, poor dietary habits and stress.    Notes:            Task Due Date Last Modified     Identify and Monitor Blood Pressure Elevation --  11/21/2024  1:59 PM by Carolina Lowry RN     " Care Management Activities:      - home or ambulatory blood pressure monitoring encouraged      Notes:                          Current Specialty Plan of Care Status finalized    Instructions   Patient was provided an electronic copy of care plan  CCM services were explained and offered and patient has accepted these services.  Patient has given their written consent to receive CCM services and understands that this includes the authorization of electronic communication of medical information with the other treating providers.  Patient understands that they may stop CCM services at any time and these changes will be effective at the end of the calendar month and will effectively revocate the agreement of CCM services.  Patient understands that only one practitioner can furnish and be paid for CCM services during one calendar month.  Patient also understands that there may be co-payment or deductible fees in association with CCM services.  Patient will continue with at least monthly follow-up calls with the Ambulatory .    Carolina OLEA  Ambulatory Case Management    11/27/2024, 10:10 EST

## 2024-12-02 ENCOUNTER — APPOINTMENT (OUTPATIENT)
Dept: GENERAL RADIOLOGY | Facility: HOSPITAL | Age: 84
End: 2024-12-02
Payer: MEDICARE

## 2024-12-02 ENCOUNTER — HOSPITAL ENCOUNTER (OUTPATIENT)
Facility: HOSPITAL | Age: 84
Setting detail: OBSERVATION
Discharge: HOME | End: 2024-12-04
Attending: EMERGENCY MEDICINE
Payer: MEDICARE

## 2024-12-02 DIAGNOSIS — I1A.0 RESISTANT HYPERTENSION: ICD-10-CM

## 2024-12-02 DIAGNOSIS — R73.03 PREDIABETES: ICD-10-CM

## 2024-12-02 DIAGNOSIS — I25.118 CORONARY ARTERY DISEASE OF NATIVE ARTERY OF NATIVE HEART WITH STABLE ANGINA PECTORIS: ICD-10-CM

## 2024-12-02 DIAGNOSIS — I20.0 UNSTABLE ANGINA: Primary | ICD-10-CM

## 2024-12-02 DIAGNOSIS — R07.9 CHEST PAIN, UNSPECIFIED TYPE: ICD-10-CM

## 2024-12-02 DIAGNOSIS — E78.2 MIXED HYPERLIPIDEMIA: ICD-10-CM

## 2024-12-02 DIAGNOSIS — E78.5 DYSLIPIDEMIA: ICD-10-CM

## 2024-12-02 LAB
BASOPHILS # BLD AUTO: 0.03 10*3/MM3 (ref 0–0.2)
BASOPHILS NFR BLD AUTO: 0.3 % (ref 0–1.5)
DEPRECATED RDW RBC AUTO: 45 FL (ref 37–54)
EOSINOPHIL # BLD AUTO: 0.49 10*3/MM3 (ref 0–0.4)
EOSINOPHIL NFR BLD AUTO: 5.7 % (ref 0.3–6.2)
ERYTHROCYTE [DISTWIDTH] IN BLOOD BY AUTOMATED COUNT: 12.6 % (ref 12.3–15.4)
HCT VFR BLD AUTO: 42.4 % (ref 34–46.6)
HGB BLD-MCNC: 14.1 G/DL (ref 12–15.9)
HOLD SPECIMEN: NORMAL
HOLD SPECIMEN: NORMAL
IMM GRANULOCYTES # BLD AUTO: 0.02 10*3/MM3 (ref 0–0.05)
IMM GRANULOCYTES NFR BLD AUTO: 0.2 % (ref 0–0.5)
LYMPHOCYTES # BLD AUTO: 2.7 10*3/MM3 (ref 0.7–3.1)
LYMPHOCYTES NFR BLD AUTO: 31.3 % (ref 19.6–45.3)
MCH RBC QN AUTO: 31.9 PG (ref 26.6–33)
MCHC RBC AUTO-ENTMCNC: 33.3 G/DL (ref 31.5–35.7)
MCV RBC AUTO: 95.9 FL (ref 79–97)
MONOCYTES # BLD AUTO: 1.1 10*3/MM3 (ref 0.1–0.9)
MONOCYTES NFR BLD AUTO: 12.7 % (ref 5–12)
NEUTROPHILS NFR BLD AUTO: 4.29 10*3/MM3 (ref 1.7–7)
NEUTROPHILS NFR BLD AUTO: 49.8 % (ref 42.7–76)
NRBC BLD AUTO-RTO: 0 /100 WBC (ref 0–0.2)
PLATELET # BLD AUTO: 275 10*3/MM3 (ref 140–450)
PMV BLD AUTO: 9.6 FL (ref 6–12)
RBC # BLD AUTO: 4.42 10*6/MM3 (ref 3.77–5.28)
WBC NRBC COR # BLD AUTO: 8.63 10*3/MM3 (ref 3.4–10.8)
WHOLE BLOOD HOLD COAG: NORMAL
WHOLE BLOOD HOLD SPECIMEN: NORMAL

## 2024-12-02 PROCEDURE — 85379 FIBRIN DEGRADATION QUANT: CPT | Performed by: EMERGENCY MEDICINE

## 2024-12-02 PROCEDURE — 84484 ASSAY OF TROPONIN QUANT: CPT | Performed by: EMERGENCY MEDICINE

## 2024-12-02 PROCEDURE — 85025 COMPLETE CBC W/AUTO DIFF WBC: CPT | Performed by: EMERGENCY MEDICINE

## 2024-12-02 PROCEDURE — 99285 EMERGENCY DEPT VISIT HI MDM: CPT

## 2024-12-02 PROCEDURE — 71045 X-RAY EXAM CHEST 1 VIEW: CPT

## 2024-12-02 PROCEDURE — 80053 COMPREHEN METABOLIC PANEL: CPT | Performed by: EMERGENCY MEDICINE

## 2024-12-02 PROCEDURE — 36415 COLL VENOUS BLD VENIPUNCTURE: CPT

## 2024-12-02 RX ORDER — SODIUM CHLORIDE 0.9 % (FLUSH) 0.9 %
10 SYRINGE (ML) INJECTION AS NEEDED
Status: DISCONTINUED | OUTPATIENT
Start: 2024-12-02 | End: 2024-12-04 | Stop reason: HOSPADM

## 2024-12-03 ENCOUNTER — APPOINTMENT (OUTPATIENT)
Dept: CARDIOLOGY | Facility: HOSPITAL | Age: 84
End: 2024-12-03
Payer: MEDICARE

## 2024-12-03 PROBLEM — I20.0 UNSTABLE ANGINA: Status: ACTIVE | Noted: 2024-12-02

## 2024-12-03 PROBLEM — I10 HTN (HYPERTENSION): Status: ACTIVE | Noted: 2024-12-03

## 2024-12-03 LAB
ALBUMIN SERPL-MCNC: 4.2 G/DL (ref 3.5–5.2)
ALBUMIN/GLOB SERPL: 1.4 G/DL
ALP SERPL-CCNC: 82 U/L (ref 39–117)
ALT SERPL W P-5'-P-CCNC: 19 U/L (ref 1–33)
ANION GAP SERPL CALCULATED.3IONS-SCNC: 9.2 MMOL/L (ref 5–15)
AST SERPL-CCNC: 31 U/L (ref 1–32)
BH CV ECHO LEFT VENTRICLE GLOBAL LONGITUDINAL STRAIN: -17.6 %
BH CV ECHO MEAS - ACS: 1.8 CM
BH CV ECHO MEAS - AO MAX PG: 6.1 MMHG
BH CV ECHO MEAS - AO MEAN PG: 3 MMHG
BH CV ECHO MEAS - AO ROOT DIAM: 3 CM
BH CV ECHO MEAS - AO V2 MAX: 123 CM/SEC
BH CV ECHO MEAS - AO V2 VTI: 25.3 CM
BH CV ECHO MEAS - AVA(I,D): 2.24 CM2
BH CV ECHO MEAS - EDV(CUBED): 42.9 ML
BH CV ECHO MEAS - EDV(MOD-SP4): 47.1 ML
BH CV ECHO MEAS - EF(MOD-BP): 66 %
BH CV ECHO MEAS - EF(MOD-SP4): 66.5 %
BH CV ECHO MEAS - ESV(CUBED): 12.2 ML
BH CV ECHO MEAS - ESV(MOD-SP4): 15.8 ML
BH CV ECHO MEAS - FS: 34.3 %
BH CV ECHO MEAS - IVS/LVPW: 1.09 CM
BH CV ECHO MEAS - IVSD: 1.2 CM
BH CV ECHO MEAS - LA DIMENSION: 3.3 CM
BH CV ECHO MEAS - LAT PEAK E' VEL: 6.3 CM/SEC
BH CV ECHO MEAS - LV DIASTOLIC VOL/BSA (35-75): 28.4 CM2
BH CV ECHO MEAS - LV MASS(C)D: 127.3 GRAMS
BH CV ECHO MEAS - LV MAX PG: 3.2 MMHG
BH CV ECHO MEAS - LV MEAN PG: 2 MMHG
BH CV ECHO MEAS - LV SYSTOLIC VOL/BSA (12-30): 9.5 CM2
BH CV ECHO MEAS - LV V1 MAX: 89.6 CM/SEC
BH CV ECHO MEAS - LV V1 VTI: 20 CM
BH CV ECHO MEAS - LVIDD: 3.5 CM
BH CV ECHO MEAS - LVIDS: 2.3 CM
BH CV ECHO MEAS - LVOT AREA: 2.8 CM2
BH CV ECHO MEAS - LVOT DIAM: 1.9 CM
BH CV ECHO MEAS - LVPWD: 1.1 CM
BH CV ECHO MEAS - MED PEAK E' VEL: 7.4 CM/SEC
BH CV ECHO MEAS - MR MAX PG: 54.5 MMHG
BH CV ECHO MEAS - MR MAX VEL: 369 CM/SEC
BH CV ECHO MEAS - MV A MAX VEL: 105 CM/SEC
BH CV ECHO MEAS - MV DEC SLOPE: 254 CM/SEC2
BH CV ECHO MEAS - MV DEC TIME: 0.32 SEC
BH CV ECHO MEAS - MV E MAX VEL: 66 CM/SEC
BH CV ECHO MEAS - MV E/A: 0.63
BH CV ECHO MEAS - MV MAX PG: 5.1 MMHG
BH CV ECHO MEAS - MV MEAN PG: 2 MMHG
BH CV ECHO MEAS - MV P1/2T: 82.4 MSEC
BH CV ECHO MEAS - MV V2 VTI: 31.7 CM
BH CV ECHO MEAS - MVA(P1/2T): 2.7 CM2
BH CV ECHO MEAS - MVA(VTI): 1.79 CM2
BH CV ECHO MEAS - PA V2 MAX: 87.3 CM/SEC
BH CV ECHO MEAS - PI END-D VEL: 113 CM/SEC
BH CV ECHO MEAS - PULM A REVS DUR: 0.14 SEC
BH CV ECHO MEAS - PULM A REVS VEL: 26.6 CM/SEC
BH CV ECHO MEAS - PULM DIAS VEL: 34.2 CM/SEC
BH CV ECHO MEAS - PULM S/D: 1.49
BH CV ECHO MEAS - PULM SYS VEL: 50.8 CM/SEC
BH CV ECHO MEAS - RAP SYSTOLE: 3 MMHG
BH CV ECHO MEAS - RV MAX PG: 1.36 MMHG
BH CV ECHO MEAS - RV V1 MAX: 58.3 CM/SEC
BH CV ECHO MEAS - RV V1 VTI: 14.1 CM
BH CV ECHO MEAS - RVSP: 28 MMHG
BH CV ECHO MEAS - SV(LVOT): 56.7 ML
BH CV ECHO MEAS - SV(MOD-SP4): 31.3 ML
BH CV ECHO MEAS - SVI(LVOT): 34.2 ML/M2
BH CV ECHO MEAS - SVI(MOD-SP4): 18.9 ML/M2
BH CV ECHO MEAS - TAPSE (>1.6): 1.98 CM
BH CV ECHO MEAS - TR MAX PG: 25 MMHG
BH CV ECHO MEAS - TR MAX VEL: 250 CM/SEC
BH CV ECHO MEASUREMENTS AVERAGE E/E' RATIO: 9.64
BH CV XLRA - TDI S': 16.9 CM/SEC
BILIRUB SERPL-MCNC: 0.8 MG/DL (ref 0–1.2)
BUN SERPL-MCNC: 15 MG/DL (ref 8–23)
BUN/CREAT SERPL: 17.9 (ref 7–25)
CALCIUM SPEC-SCNC: 9.6 MG/DL (ref 8.6–10.5)
CHLORIDE SERPL-SCNC: 103 MMOL/L (ref 98–107)
CO2 SERPL-SCNC: 27.8 MMOL/L (ref 22–29)
CREAT SERPL-MCNC: 0.84 MG/DL (ref 0.57–1)
D DIMER PPP FEU-MCNC: 0.8 MCGFEU/ML (ref 0–0.84)
EGFRCR SERPLBLD CKD-EPI 2021: 68.6 ML/MIN/1.73
GEN 5 1HR TROPONIN T REFLEX: 9 NG/L
GLOBULIN UR ELPH-MCNC: 2.9 GM/DL
GLUCOSE SERPL-MCNC: 106 MG/DL (ref 65–99)
LEFT ATRIUM VOLUME INDEX: 21.9 ML/M2
NT-PROBNP SERPL-MCNC: 197 PG/ML (ref 0–1800)
POTASSIUM SERPL-SCNC: 3.9 MMOL/L (ref 3.5–5.2)
PROT SERPL-MCNC: 7.1 G/DL (ref 6–8.5)
QT INTERVAL: 432 MS
QTC INTERVAL: 424 MS
SINUS: 2.6 CM
SODIUM SERPL-SCNC: 140 MMOL/L (ref 136–145)
STJ: 2.2 CM
TROPONIN T DELTA: -5 NG/L
TROPONIN T SERPL HS-MCNC: 14 NG/L

## 2024-12-03 PROCEDURE — 93005 ELECTROCARDIOGRAM TRACING: CPT | Performed by: EMERGENCY MEDICINE

## 2024-12-03 PROCEDURE — 93356 MYOCRD STRAIN IMG SPCKL TRCK: CPT

## 2024-12-03 PROCEDURE — G0378 HOSPITAL OBSERVATION PER HR: HCPCS

## 2024-12-03 PROCEDURE — 84484 ASSAY OF TROPONIN QUANT: CPT | Performed by: INTERNAL MEDICINE

## 2024-12-03 PROCEDURE — 93306 TTE W/DOPPLER COMPLETE: CPT

## 2024-12-03 PROCEDURE — 36415 COLL VENOUS BLD VENIPUNCTURE: CPT

## 2024-12-03 PROCEDURE — 99214 OFFICE O/P EST MOD 30 MIN: CPT | Performed by: INTERNAL MEDICINE

## 2024-12-03 PROCEDURE — 93306 TTE W/DOPPLER COMPLETE: CPT | Performed by: INTERNAL MEDICINE

## 2024-12-03 PROCEDURE — 93356 MYOCRD STRAIN IMG SPCKL TRCK: CPT | Performed by: INTERNAL MEDICINE

## 2024-12-03 PROCEDURE — 83880 ASSAY OF NATRIURETIC PEPTIDE: CPT | Performed by: NURSE PRACTITIONER

## 2024-12-03 RX ORDER — BISACODYL 10 MG
10 SUPPOSITORY, RECTAL RECTAL DAILY PRN
Status: DISCONTINUED | OUTPATIENT
Start: 2024-12-03 | End: 2024-12-04 | Stop reason: HOSPADM

## 2024-12-03 RX ORDER — AMOXICILLIN 250 MG
2 CAPSULE ORAL 2 TIMES DAILY PRN
Status: DISCONTINUED | OUTPATIENT
Start: 2024-12-03 | End: 2024-12-04 | Stop reason: HOSPADM

## 2024-12-03 RX ORDER — NITROGLYCERIN 0.4 MG/1
0.4 TABLET SUBLINGUAL
Status: DISCONTINUED | OUTPATIENT
Start: 2024-12-03 | End: 2024-12-04 | Stop reason: HOSPADM

## 2024-12-03 RX ORDER — ATORVASTATIN CALCIUM 20 MG/1
20 TABLET, FILM COATED ORAL NIGHTLY
Status: DISCONTINUED | OUTPATIENT
Start: 2024-12-03 | End: 2024-12-04 | Stop reason: HOSPADM

## 2024-12-03 RX ORDER — BISACODYL 5 MG/1
5 TABLET, DELAYED RELEASE ORAL DAILY PRN
Status: DISCONTINUED | OUTPATIENT
Start: 2024-12-03 | End: 2024-12-04 | Stop reason: HOSPADM

## 2024-12-03 RX ORDER — ASPIRIN 81 MG/1
81 TABLET ORAL EVERY 24 HOURS
Status: DISCONTINUED | OUTPATIENT
Start: 2024-12-03 | End: 2024-12-04 | Stop reason: HOSPADM

## 2024-12-03 RX ORDER — MELOXICAM 15 MG/1
15 TABLET ORAL DAILY
Status: DISCONTINUED | OUTPATIENT
Start: 2024-12-04 | End: 2024-12-04 | Stop reason: HOSPADM

## 2024-12-03 RX ORDER — SODIUM CHLORIDE 0.9 % (FLUSH) 0.9 %
10 SYRINGE (ML) INJECTION AS NEEDED
Status: DISCONTINUED | OUTPATIENT
Start: 2024-12-03 | End: 2024-12-04 | Stop reason: HOSPADM

## 2024-12-03 RX ORDER — ACETAMINOPHEN 500 MG
1000 TABLET ORAL ONCE
Status: COMPLETED | OUTPATIENT
Start: 2024-12-03 | End: 2024-12-03

## 2024-12-03 RX ORDER — LISINOPRIL 20 MG/1
20 TABLET ORAL 2 TIMES DAILY
Status: DISCONTINUED | OUTPATIENT
Start: 2024-12-03 | End: 2024-12-04 | Stop reason: HOSPADM

## 2024-12-03 RX ORDER — CARVEDILOL 6.25 MG/1
12.5 TABLET ORAL 2 TIMES DAILY WITH MEALS
Status: DISCONTINUED | OUTPATIENT
Start: 2024-12-03 | End: 2024-12-04 | Stop reason: HOSPADM

## 2024-12-03 RX ORDER — HYDRALAZINE HYDROCHLORIDE 20 MG/ML
10 INJECTION INTRAMUSCULAR; INTRAVENOUS EVERY 6 HOURS PRN
Status: DISCONTINUED | OUTPATIENT
Start: 2024-12-03 | End: 2024-12-04

## 2024-12-03 RX ORDER — SODIUM CHLORIDE 9 MG/ML
40 INJECTION, SOLUTION INTRAVENOUS AS NEEDED
Status: DISCONTINUED | OUTPATIENT
Start: 2024-12-03 | End: 2024-12-04 | Stop reason: HOSPADM

## 2024-12-03 RX ORDER — POLYETHYLENE GLYCOL 3350 17 G/17G
17 POWDER, FOR SOLUTION ORAL DAILY PRN
Status: DISCONTINUED | OUTPATIENT
Start: 2024-12-03 | End: 2024-12-04 | Stop reason: HOSPADM

## 2024-12-03 RX ORDER — AMLODIPINE BESYLATE 10 MG/1
10 TABLET ORAL DAILY
COMMUNITY
End: 2024-12-04 | Stop reason: HOSPADM

## 2024-12-03 RX ORDER — ONDANSETRON 4 MG/1
4 TABLET, ORALLY DISINTEGRATING ORAL EVERY 6 HOURS PRN
Status: DISCONTINUED | OUTPATIENT
Start: 2024-12-03 | End: 2024-12-04 | Stop reason: HOSPADM

## 2024-12-03 RX ORDER — MELOXICAM 15 MG/1
15 TABLET ORAL ONCE
Status: COMPLETED | OUTPATIENT
Start: 2024-12-03 | End: 2024-12-03

## 2024-12-03 RX ORDER — AMLODIPINE BESYLATE 5 MG/1
10 TABLET ORAL DAILY
Status: DISCONTINUED | OUTPATIENT
Start: 2024-12-03 | End: 2024-12-04

## 2024-12-03 RX ORDER — HYDROXYZINE HYDROCHLORIDE 25 MG/1
25 TABLET, FILM COATED ORAL 3 TIMES DAILY PRN
Status: DISCONTINUED | OUTPATIENT
Start: 2024-12-03 | End: 2024-12-04 | Stop reason: HOSPADM

## 2024-12-03 RX ORDER — ONDANSETRON 2 MG/ML
4 INJECTION INTRAMUSCULAR; INTRAVENOUS EVERY 6 HOURS PRN
Status: DISCONTINUED | OUTPATIENT
Start: 2024-12-03 | End: 2024-12-04 | Stop reason: HOSPADM

## 2024-12-03 RX ORDER — SODIUM CHLORIDE 0.9 % (FLUSH) 0.9 %
10 SYRINGE (ML) INJECTION EVERY 12 HOURS SCHEDULED
Status: DISCONTINUED | OUTPATIENT
Start: 2024-12-03 | End: 2024-12-04 | Stop reason: HOSPADM

## 2024-12-03 RX ORDER — ACETAMINOPHEN 325 MG/1
650 TABLET ORAL EVERY 4 HOURS PRN
Status: DISCONTINUED | OUTPATIENT
Start: 2024-12-03 | End: 2024-12-04 | Stop reason: HOSPADM

## 2024-12-03 RX ADMIN — AMLODIPINE BESYLATE 10 MG: 5 TABLET ORAL at 07:31

## 2024-12-03 RX ADMIN — ATORVASTATIN CALCIUM 20 MG: 20 TABLET, FILM COATED ORAL at 20:20

## 2024-12-03 RX ADMIN — LISINOPRIL 20 MG: 20 TABLET ORAL at 20:20

## 2024-12-03 RX ADMIN — CARVEDILOL 12.5 MG: 6.25 TABLET, FILM COATED ORAL at 10:46

## 2024-12-03 RX ADMIN — ACETAMINOPHEN 650 MG: 325 TABLET, FILM COATED ORAL at 16:36

## 2024-12-03 RX ADMIN — ASPIRIN 81 MG: 81 TABLET, COATED ORAL at 09:20

## 2024-12-03 RX ADMIN — Medication 10 ML: at 07:31

## 2024-12-03 RX ADMIN — Medication 10 ML: at 02:43

## 2024-12-03 RX ADMIN — MELOXICAM 15 MG: 15 TABLET ORAL at 22:32

## 2024-12-03 RX ADMIN — Medication 10 ML: at 20:20

## 2024-12-03 RX ADMIN — CARVEDILOL 12.5 MG: 6.25 TABLET, FILM COATED ORAL at 16:36

## 2024-12-03 RX ADMIN — LISINOPRIL 20 MG: 20 TABLET ORAL at 10:46

## 2024-12-03 RX ADMIN — HYDROXYZINE HYDROCHLORIDE 25 MG: 25 TABLET, FILM COATED ORAL at 03:32

## 2024-12-03 RX ADMIN — ACETAMINOPHEN 1000 MG: 500 TABLET, FILM COATED ORAL at 02:43

## 2024-12-03 NOTE — CONSULTS
Cardiology Consult Note    Patient Identification:  Name: Maria D Fountain  Age: 84 y.o.  Sex: female  :  1940  MRN: [id number]             Requesting Physician :  Dr. Mica Holliday, Hospitalist     Reason for Consultation / Chief Complaint :   Chest pain, Uncontrolled hypertension     History of Present Illness:        Ms. Maria D Fountain has PMH of      # CAD cardiac cath 2017 showing 50% OM1 disease and small-vessel disease distal LAD  #  mitral regurgitation, mild 2016  #  palpitations  #  hypertension  # LAE  #  diabetes  #?  ACE-inhibitor cough, now improved  # allergy/intolerance to pravastatin  # ALYSA  ? Pancreatic cyst        Presented to the ED with hypertension.  Patient reports that she felt a wave of sweat and pressure over her chest and started to feel funny as well as having tingling/numbness in her left jaw.  She took her blood pressure and it was 223/108; she took her lisinopril and carvedilol and laid down for about an hour, her symptoms seem to resolve after about an hour however her blood pressure was still up 210 systolic therefore she called a neighbor and had her drive her to the emergency room.  Patient reports for the last few months she has been having pain between her shoulder blades and pressure in her chest that has been intermittent.  Patient also reports that she has had high blood pressure over the past several years since her   and has been anxious.  She reports that she may have had some hallucinations and that she solved bruises on her Mantel when she was laying down.      Labs in the ED showed HS troponin of 14--9, CMP unremarkable except glucose 106, d-dimer negative, CBC unremarkable CXR negative   EKG sinus bradycardia rate 58     Patient had recent stress test in August that was negative for ischemia.    Given her chest pressure short of a pain facial numbness, I advised cardiac cath.   Patient wanted me to speak with her daughter, Melva.   I  spoke with Melva and explained all the testing that had already been completed including labs, EKG and recent stress test in August.    Tried to explain these symptoms could be from blockages vs high blood pressure.     Will check echocardiogram to assess LV function and valve abnormalities     Further assessment and plan per Dr. Luna   Electronically signed by BJORN Mauro, 12/03/24, 1:28 PM EST.     Cardiology attending addendum :    I have personally performed a face-to-face diagnostic evaluation, physical exam and reviewed data on this patient.  I have reviewed documentation done by me and nurse practitioner  and corrected as needed.  And agree with the different components of documentation.Greater than 50% of the time spent in the care of this patient was provided by attending consultant/me.          Echocardiogram 9/14/2023 reveals EF of 60 to 65%      ASSESSMENT:     #Hypertensive urgency  #Chest pain/pressure     #. hypertension,   #  dyslipidemia  # CAD  #  mitral regurgitation  #. prediabetes      PLAN:     Patient says her main complaint is abdominal pain headache but also has chest tightness and elevated blood pressure.  Blood pressure control.  HS troponin is negative.  14--->9  proBNP is normal at 197.  Normal CMP, CBC.  Chest x-ray 12/3/2024 reveals no acute cardiopulmonary abnormality  EKG done 12/3/2024 reviewed/interpreted by me reveals sinus bradycardia with rate of 58 bpm  Patient had echocardiogram 9/14/23 which revealed normal LV systolic function mild MR.  Will check repeat echo.  Patient had recent stress test on 8/19/2024 which was Negative for ischemia.  Patient does not want any invasive workup.  Will continue medical management and blood pressure control as she tolerates.  Patient blood pressure might be increased due to pain whether it is headaches or abdominal pain.  Will continue medical management with aspirin, amlodipine, lisinopril, atorvastatin, , Carvedilol as  tolerated.  Give clonidine 0.1 every 6 as needed for systolic blood pressure greater than 150.  Patient was seen was complaining of chest tightness with elevated blood pressure.  Since she had recent stress test considering chest heaviness  recommended cardiac cath.  Patient does not want cardiac cath and want conservative management.  Will continue the same.  Will nurse practitioner Kathy Marshall spoke to patient's daughter on the telephone.          Procedures     Lexiscan cardiolite done 8/19/2024 negative for ischemia, LVEF 76%                  Diagnosis Plan   1. Unstable angina  Case Request Cath Lab: Left Heart Cath, possible pci    Case Request Cath Lab: Left Heart Cath, possible pci    Cardiac Catheterization/Vascular Study    Cardiac Catheterization/Vascular Study      2. Chest pain, unspecified type        3. Resistant hypertension  Case Request Cath Lab: Left Heart Cath, possible pci    Case Request Cath Lab: Left Heart Cath, possible pci    Cardiac Catheterization/Vascular Study    Cardiac Catheterization/Vascular Study      4. Coronary artery disease of native artery of native heart with stable angina pectoris  Case Request Cath Lab: Left Heart Cath, possible pci    Case Request Cath Lab: Left Heart Cath, possible pci    Cardiac Catheterization/Vascular Study    Cardiac Catheterization/Vascular Study      5. Dyslipidemia  Case Request Cath Lab: Left Heart Cath, possible pci    Case Request Cath Lab: Left Heart Cath, possible pci    Cardiac Catheterization/Vascular Study    Cardiac Catheterization/Vascular Study      6. Prediabetes  Case Request Cath Lab: Left Heart Cath, possible pci    Case Request Cath Lab: Left Heart Cath, possible pci    Cardiac Catheterization/Vascular Study    Cardiac Catheterization/Vascular Study      7. Mixed hyperlipidemia  Case Request Cath Lab: Left Heart Cath, possible pci    Case Request Cath Lab: Left Heart Cath, possible pci    Cardiac Catheterization/Vascular Study     Cardiac Catheterization/Vascular Study                 Past Medical History:  Past Medical History:   Diagnosis Date    Arthritis     Back pain     COVID-19     Diabetes mellitus     DJD (degenerative joint disease)     Gallstones     Heart disease     Heart murmur     Hip pain, bilateral     History of stress test 07/2011    Stress myoview- neg     Hypertension     Lumbar disc disease     Mitral regurgitation     Multiple lipomas     Pleurisy     Spinal stenosis      Past Surgical History:  Past Surgical History:   Procedure Laterality Date    BREAST BIOPSY Right 1990    CARDIAC CATHETERIZATION  06/21/2017    HEMANGIOMA EXCISION Left 2010    left forearm     HX OVARIAN CYSTECTOMY  1962    HYSTERECTOMY  2004      Allergies:  Allergies   Allergen Reactions    Pravastatin Dizziness     Home Meds:  Medications Prior to Admission   Medication Sig Dispense Refill Last Dose/Taking    acetaminophen (TYLENOL) 650 MG 8 hr tablet Take 1 tablet by mouth Every 8 (Eight) Hours As Needed for Mild Pain. Indications: Pain   12/2/2024 at  5:00 PM    amLODIPine (NORVASC) 5 MG tablet TAKE ONE TABLET BY MOUTH DAILY 90 tablet 3 12/2/2024 at  8:00 PM    ascorbic acid (VITAMIN C) 1000 MG tablet Take 0.5 tablets by mouth 2 (two) times a day. Indications: Inadequate Vitamin C   12/2/2024 at  8:00 PM    aspirin (aspirin) 81 MG EC tablet Take 1 tablet by mouth Daily. Indications: prophylactic   12/2/2024 at  8:00 PM    atorvastatin (LIPITOR) 20 MG tablet TAKE ONE TABLET BY MOUTH ONCE NIGHTLY 90 tablet 3 12/2/2024 at  8:00 PM    Biotin 5000 MCG capsule Take 1 capsule by mouth Every Night. Indications: thinning hair   12/2/2024 at  8:00 PM    carvedilol (COREG) 12.5 MG tablet Take 1 tablet by mouth 2 (Two) Times a Day With Meals. 180 tablet 3 12/2/2024 at  8:00 PM    Lidocaine 4 % Place 1 patch on the skin as directed by provider Daily. Remove & Discard patch within 12 hours or as directed by MD  Indications: pain   12/2/2024 Morning     lisinopril (PRINIVIL,ZESTRIL) 20 MG tablet TAKE 1 TABLET BY MOUTH TWICE A  tablet 3 12/2/2024 at  8:00 PM    loperamide (IMODIUM) 2 MG capsule Take 1 capsule by mouth As Needed for Diarrhea. Indications: Diarrhea   Taking As Needed    meloxicam (MOBIC) 15 MG tablet Take 1 tablet by mouth Daily. 90 tablet 0 12/2/2024 at 10:00 AM    multivitamin-minerals (CENTRUM) tablet Take 1 tablet by mouth Daily. Indications: supplement   12/2/2024 Morning    Probiotic Product (PROBIOTIC BLEND PO) Take 1 capsule by mouth Daily. Indications: supplement   12/2/2024 Morning    vitamin B-12 (CYANOCOBALAMIN) 1000 MCG tablet Take 1 tablet by mouth Daily. Indications: Inadequate Vitamin B12   12/2/2024 Morning    vitamin D3 (vitamin d) 125 MCG (5000 UT) capsule capsule Take 1 capsule by mouth 2 (Two) Times a Day. Indications: Vitamin D Deficiency   12/2/2024 at  8:00 PM    amLODIPine (NORVASC) 10 MG tablet Take 1 tablet by mouth Daily.       nitroglycerin (Nitrostat) 0.4 MG SL tablet Place 1 tablet under the tongue Every 5 (Five) Minutes As Needed for Chest Pain. Take no more than 3 doses in 15 minutes. 30 tablet 5      Current Meds:     Current Facility-Administered Medications:     amLODIPine (NORVASC) tablet 10 mg, 10 mg, Oral, Daily, Holliday, Jalaram, DO, 10 mg at 12/03/24 0731    aspirin EC tablet 81 mg, 81 mg, Oral, Q24H, Holliday, Jalaram, DO, 81 mg at 12/03/24 0920    atorvastatin (LIPITOR) tablet 20 mg, 20 mg, Oral, Nightly, Holliday, Jalaram, DO    sennosides-docusate (PERICOLACE) 8.6-50 MG per tablet 2 tablet, 2 tablet, Oral, BID PRN **AND** polyethylene glycol (MIRALAX) packet 17 g, 17 g, Oral, Daily PRN **AND** bisacodyl (DULCOLAX) EC tablet 5 mg, 5 mg, Oral, Daily PRN **AND** bisacodyl (DULCOLAX) suppository 10 mg, 10 mg, Rectal, Daily PRN, Holliday, Jalaram, DO    Calcium Replacement - Follow Nurse / BPA Driven Protocol, , Not Applicable, PRMg OLEA Jalaram, DO    carvedilol (COREG) tablet 12.5 mg, 12.5 mg, Oral, BID With  Meals, Holliday, Jalaram, DO, 12.5 mg at 12/03/24 1046    hydrALAZINE (APRESOLINE) injection 10 mg, 10 mg, Intravenous, Q6H PRN, Holliday, Jalaram, DO    hydrOXYzine (ATARAX) tablet 25 mg, 25 mg, Oral, TID PRN, Holliday, Jalaram, DO, 25 mg at 12/03/24 0332    lisinopril (PRINIVIL,ZESTRIL) tablet 20 mg, 20 mg, Oral, BID, Holliday, Keolaram, DO, 20 mg at 12/03/24 1046    Magnesium Standard Dose Replacement - Follow Nurse / BPA Driven Protocol, , Not Applicable, PRN, Holliday, Jalaram, DO    nitroglycerin (NITROSTAT) SL tablet 0.4 mg, 0.4 mg, Sublingual, Q5 Min PRN, Holliday, Keolaram, DO    ondansetron ODT (ZOFRAN-ODT) disintegrating tablet 4 mg, 4 mg, Oral, Q6H PRN **OR** ondansetron (ZOFRAN) injection 4 mg, 4 mg, Intravenous, Q6H PRN, Holliday, Jalaram, DO    Phosphorus Replacement - Follow Nurse / BPA Driven Protocol, , Not Applicable, PRN, Holliday, Jalaram, DO    Potassium Replacement - Follow Nurse / BPA Driven Protocol, , Not Applicable, PRN, Holliday, Jalaram, DO    [COMPLETED] Insert Peripheral IV, , , Once **AND** sodium chloride 0.9 % flush 10 mL, 10 mL, Intravenous, PRN, Holliday, Jalaram, DO    sodium chloride 0.9 % flush 10 mL, 10 mL, Intravenous, Q12H, Holliday, Jalaram, DO, 10 mL at 12/03/24 0731    sodium chloride 0.9 % flush 10 mL, 10 mL, Intravenous, PRN, Holliday, Jalaram, DO    sodium chloride 0.9 % infusion 40 mL, 40 mL, Intravenous, PRN, Holliday, Jalaram, DO  Social History:   Social History     Tobacco Use    Smoking status: Never     Passive exposure: Never    Smokeless tobacco: Never   Substance Use Topics    Alcohol use: No      Family History:  Family History   Problem Relation Age of Onset    No Known Problems Mother     Heart disease Father         MI    No Known Problems Sister     No Known Problems Brother     No Known Problems Maternal Aunt     No Known Problems Maternal Uncle     No Known Problems Paternal Aunt     No Known Problems Paternal Uncle     No Known Problems Maternal Grandmother     No Known Problems Maternal  "Grandfather     No Known Problems Paternal Grandmother     No Known Problems Paternal Grandfather     Arthritis Other     Hypertension Other     Colon cancer Other     Anemia Neg Hx     Arrhythmia Neg Hx     Asthma Neg Hx     Clotting disorder Neg Hx     Fainting Neg Hx     Heart attack Neg Hx     Heart failure Neg Hx     Hyperlipidemia Neg Hx         Review of Systems : Review of Systems   Constitutional: Negative for fever.   Cardiovascular:  Positive for chest pain. Negative for irregular heartbeat, leg swelling and syncope.   Respiratory:  Negative for cough and shortness of breath.    Musculoskeletal:  Positive for back pain.   Gastrointestinal:  Positive for bloating and abdominal pain. Negative for nausea and vomiting.   Neurological:  Positive for numbness (left cheek, now resolved).   Psychiatric/Behavioral:  The patient is nervous/anxious.    All other systems reviewed and are negative.       Constitutional:  Temp:  [97.7 °F (36.5 °C)-98 °F (36.7 °C)] 97.7 °F (36.5 °C)  Heart Rate:  [52-72] 65  Resp:  [11-20] 17  BP: (120-194)/(64-96) 139/85    Physical Exam   /85   Pulse 65   Temp 97.7 °F (36.5 °C) (Oral)   Resp 17   Ht 152.4 cm (60\")   Wt 68.8 kg (151 lb 10.8 oz)   LMP  (LMP Unknown)   SpO2 94%   BMI 29.62 kg/m²   Physical Exam  General:  Appears in no acute distress  Eyes: Sclerae are anicteric,  conjunctivae are clear   HEENT:  No JVD. Thyroid not visibly enlarged. No mucosal pallor or cyanosis  Respiratory: Respirations regular and unlabored at rest.  Bilaterally good breath sounds with good air entry in all fields. No crackles, rubs or wheezes auscultated  Cardiovascular: S1,S2 Regular rate and rhythm. No murmur, rub or gallop auscultated. No pretibial pitting edema  Gastrointestinal: Abdomen soft, flat, nontender. Bowel sounds present.   Musculoskeletal:  No abnormal movements  Extremities: No digital clubbing or cyanosis  Skin: Color pink. Skin warm and dry to touch. No rashes  No " xanthoma  Neuro: Alert and awake, no lateralizing deficits appreciated    Cardiographics  ECG: EKG tracing was  personally reviewed/interpreted by me  ECG 12 Lead Chest Pain   Final Result   HEART RATE=58  bpm   RR Rhtpktxl=8916  ms   AR Eybaxotg=198  ms   P Horizontal Axis=-4  deg   P Front Axis=52  deg   QRSD Interval=78  ms   QT Ccouihso=601  ms   AYiZ=126  ms   QRS Axis=5  deg   T Wave Axis=31  deg   - OTHERWISE NORMAL ECG -   Sinus bradycardia   When compared with ECG of 21-Feb-2022 22:03:05,   Significant axis, voltage or hypertrophy change   Electronically Signed By: Horacio Macias (OTTONIEL) 2024-12-03 06:01:06   Date and Time of Study:2024-12-03 00:31:25          Telemetry: sinus rhythm     Echocardiogram:   Results for orders placed during the hospital encounter of 09/14/23    Adult Transthoracic Echo Complete W/ Cont if Necessary Per Protocol    Interpretation Summary  Conclusion      Normal LV size and contractility EF of 60 to 65%  Normal RV size  Normal atrial size  Aortic valve, mitral valve, tricuspid, pulmonic valve appears structurally normal, mild mitral and pulmonic regurgitation seen.  Trace tricuspid regurgitation seen.  Calculated RV systolic pressure of 35 mmHg..  No pericardial effusion seen.  Proximal aorta appears normal in size.      Imaging  Chest X-ray:   Imaging Results (Last 24 Hours)       Procedure Component Value Units Date/Time    XR Chest 1 View [[id number]] Collected: 12/03/24 0009     Updated: 12/03/24 0011    Narrative:      XR CHEST 1 VW    Date of Exam: 12/2/2024 11:57 PM EST    Indication: pain    Comparison: 2/4/2024.    Findings:  There is no pneumothorax, pleural effusion or focal airspace consolidation. Heart size and pulmonary vasculature appear within normal limits. Regional bones appear intact.      Impression:      Impression:  No acute cardiopulmonary abnormality.        Electronically Signed: Rodrigo Carmona MD    12/3/2024 12:09 AM EST    Workstation ID: [id number]             Lab Review: I have reviewed the labs  Results from last 7 days   Lab Units 12/03/24  0211 12/02/24  2338   HSTROP T ng/L 9 14*         Results from last 7 days   Lab Units 12/02/24  2338   SODIUM mmol/L 140   POTASSIUM mmol/L 3.9   BUN mg/dL 15   CREATININE mg/dL 0.84   CALCIUM mg/dL 9.6         Results from last 7 days   Lab Units 12/03/24 0211   PROBNP pg/mL 197.0     Results from last 7 days   Lab Units 12/02/24 2338   WBC 10*3/mm3 8.63   HEMOGLOBIN g/dL 14.1   HEMATOCRIT % 42.4   PLATELETS 10*3/mm3 275                 Matti Luna MD  12/3/2024, 14:23 EST      NORMA Cuello/Transcription:   Dictated utilizing Dragon dictation

## 2024-12-03 NOTE — ED PROVIDER NOTES
Subjective   History of Present Illness  84-year-old female presents with complaints of elevated blood pressure.  She states she had a ham sandwich tonight.  Felt her face feels warm he had some feeling of numbness in her left cheek.  She reports blood pressure was 228/108.  She had checked with 3-4 times at home.  She reports she had some chest discomfort some pain in her back.  She reports no headache no visual difficulty no abdominal pain.  She has no extremity complaints.  She states she is scheduled for test on her pancreas and liver possible pancreatic mass elevated liver enzymes.  She does have history of hypertension states blood pressure goal is around 150 systolic.  Review of Systems    Past Medical History:   Diagnosis Date    Arthritis     Back pain     COVID-19     Diabetes mellitus     DJD (degenerative joint disease)     Gallstones     Heart disease     Heart murmur     Hip pain, bilateral     History of stress test 07/2011    Stress myoview- neg     Hypertension     Lumbar disc disease     Mitral regurgitation     Multiple lipomas     Pleurisy     Spinal stenosis        Allergies   Allergen Reactions    Pravastatin Dizziness       Past Surgical History:   Procedure Laterality Date    BREAST BIOPSY Right 1990    CARDIAC CATHETERIZATION  06/21/2017    HEMANGIOMA EXCISION Left 2010    left forearm     HX OVARIAN CYSTECTOMY  1962    HYSTERECTOMY  2004       Family History   Problem Relation Age of Onset    No Known Problems Mother     Heart disease Father         MI    No Known Problems Sister     No Known Problems Brother     No Known Problems Maternal Aunt     No Known Problems Maternal Uncle     No Known Problems Paternal Aunt     No Known Problems Paternal Uncle     No Known Problems Maternal Grandmother     No Known Problems Maternal Grandfather     No Known Problems Paternal Grandmother     No Known Problems Paternal Grandfather     Arthritis Other     Hypertension Other     Colon cancer Other      Anemia Neg Hx     Arrhythmia Neg Hx     Asthma Neg Hx     Clotting disorder Neg Hx     Fainting Neg Hx     Heart attack Neg Hx     Heart failure Neg Hx     Hyperlipidemia Neg Hx        Social History     Socioeconomic History    Marital status:    Tobacco Use    Smoking status: Never     Passive exposure: Never    Smokeless tobacco: Never   Vaping Use    Vaping status: Never Used   Substance and Sexual Activity    Alcohol use: No    Drug use: No    Sexual activity: Defer     Prior to Admission medications    Medication Sig Start Date End Date Taking? Authorizing Provider   acetaminophen (TYLENOL) 650 MG 8 hr tablet Take 1 tablet by mouth Every 8 (Eight) Hours As Needed for Mild Pain. Indications: Pain 1/1/20   Shimon Carrillo MD   amLODIPine (NORVASC) 5 MG tablet TAKE ONE TABLET BY MOUTH DAILY 10/11/24   Matti Luna MD   ascorbic acid (VITAMIN C) 1000 MG tablet Take 0.5 tablets by mouth 2 (two) times a day. Indications: Inadequate Vitamin C 5/14/14   Shimon Carrillo MD   aspirin (aspirin) 81 MG EC tablet Take 1 tablet by mouth Daily. Indications: prophylactic 1/14/19   Shimon Carrillo MD   atorvastatin (LIPITOR) 20 MG tablet TAKE ONE TABLET BY MOUTH ONCE NIGHTLY 4/11/24   Celi Renner MD   Biotin 5000 MCG capsule Take 1 capsule by mouth Every Night. Indications: thinning hair 1/1/20   Shimon Carrillo MD   carvedilol (COREG) 12.5 MG tablet Take 1 tablet by mouth 2 (Two) Times a Day With Meals. 9/9/24   Matti Luna MD   Lidocaine 4 % Place 1 patch on the skin as directed by provider Daily. Remove & Discard patch within 12 hours or as directed by MD  Indications: pain 1/1/20   Shimon Carrillo MD   lisinopril (PRINIVIL,ZESTRIL) 20 MG tablet TAKE 1 TABLET BY MOUTH TWICE A DAY 1/16/24   Matti Luna MD   loperamide (IMODIUM) 2 MG capsule Take 1 capsule by mouth As Needed for Diarrhea. Indications: Diarrhea 2/8/24   Lorena  MD Shimon   meloxicam (MOBIC) 15 MG tablet Take 1 tablet by mouth Daily. 11/26/24   Celi Renner MD   metroNIDAZOLE (Flagyl) 500 MG tablet Take 1 tablet by mouth 2 (Two) Times a Day. 10/22/24   Lizzie Gordon APRN   multivitamin-minerals (CENTRUM) tablet Take 1 tablet by mouth Daily. Indications: supplement 5/14/14   Shimon Carrillo MD   nitroglycerin (Nitrostat) 0.4 MG SL tablet Place 1 tablet under the tongue Every 5 (Five) Minutes As Needed for Chest Pain. Take no more than 3 doses in 15 minutes. 2/2/23   Celi Renner MD   Probiotic Product (PROBIOTIC BLEND PO) Take 1 capsule by mouth Daily. Indications: supplement 2/8/24   Shimon Carrillo MD   vitamin B-12 (CYANOCOBALAMIN) 1000 MCG tablet Take 1 tablet by mouth Daily. Indications: Inadequate Vitamin B12 1/1/20   Shimon Carrillo MD   vitamin D3 (vitamin d) 125 MCG (5000 UT) capsule capsule Take 1 capsule by mouth 2 (Two) Times a Day. Indications: Vitamin D Deficiency 1/1/20   Shimon Carrillo MD   montelukast (SINGULAIR) 10 MG tablet Take 1 tablet by mouth Every Night. 10/15/20 1/18/21  Scotty Araya MD           Objective   Physical Exam  84-year-old female awake alert.  Generally well-developed well-nourished.  Pupils equal round at light.  No facial asymmetry neck supple chest clear cardiovascular related rhythm abdomen soft nontender neurologic exam GCS 15 no focal findings noted motor or sensory intact to extremities without deficit.  Speech clear.  No facial asymmetry.  Procedures           ED Course      Results for orders placed or performed during the hospital encounter of 12/02/24   Comprehensive Metabolic Panel    Collection Time: 12/02/24 11:38 PM    Specimen: Blood   Result Value Ref Range    Glucose 106 (H) 65 - 99 mg/dL    BUN 15 8 - 23 mg/dL    Creatinine 0.84 0.57 - 1.00 mg/dL    Sodium 140 136 - 145 mmol/L    Potassium 3.9 3.5 - 5.2 mmol/L    Chloride 103 98 - 107 mmol/L    CO2 27.8 22.0 -  29.0 mmol/L    Calcium 9.6 8.6 - 10.5 mg/dL    Total Protein 7.1 6.0 - 8.5 g/dL    Albumin 4.2 3.5 - 5.2 g/dL    ALT (SGPT) 19 1 - 33 U/L    AST (SGOT) 31 1 - 32 U/L    Alkaline Phosphatase 82 39 - 117 U/L    Total Bilirubin 0.8 0.0 - 1.2 mg/dL    Globulin 2.9 gm/dL    A/G Ratio 1.4 g/dL    BUN/Creatinine Ratio 17.9 7.0 - 25.0    Anion Gap 9.2 5.0 - 15.0 mmol/L    eGFR 68.6 >60.0 mL/min/1.73   High Sensitivity Troponin T    Collection Time: 12/02/24 11:38 PM    Specimen: Blood   Result Value Ref Range    HS Troponin T 14 (H) <14 ng/L   D-dimer, Quantitative    Collection Time: 12/02/24 11:38 PM    Specimen: Blood   Result Value Ref Range    D-Dimer, Quantitative 0.80 0.00 - 0.84 MCGFEU/mL   CBC Auto Differential    Collection Time: 12/02/24 11:38 PM    Specimen: Blood   Result Value Ref Range    WBC 8.63 3.40 - 10.80 10*3/mm3    RBC 4.42 3.77 - 5.28 10*6/mm3    Hemoglobin 14.1 12.0 - 15.9 g/dL    Hematocrit 42.4 34.0 - 46.6 %    MCV 95.9 79.0 - 97.0 fL    MCH 31.9 26.6 - 33.0 pg    MCHC 33.3 31.5 - 35.7 g/dL    RDW 12.6 12.3 - 15.4 %    RDW-SD 45.0 37.0 - 54.0 fl    MPV 9.6 6.0 - 12.0 fL    Platelets 275 140 - 450 10*3/mm3    Neutrophil % 49.8 42.7 - 76.0 %    Lymphocyte % 31.3 19.6 - 45.3 %    Monocyte % 12.7 (H) 5.0 - 12.0 %    Eosinophil % 5.7 0.3 - 6.2 %    Basophil % 0.3 0.0 - 1.5 %    Immature Grans % 0.2 0.0 - 0.5 %    Neutrophils, Absolute 4.29 1.70 - 7.00 10*3/mm3    Lymphocytes, Absolute 2.70 0.70 - 3.10 10*3/mm3    Monocytes, Absolute 1.10 (H) 0.10 - 0.90 10*3/mm3    Eosinophils, Absolute 0.49 (H) 0.00 - 0.40 10*3/mm3    Basophils, Absolute 0.03 0.00 - 0.20 10*3/mm3    Immature Grans, Absolute 0.02 0.00 - 0.05 10*3/mm3    nRBC 0.0 0.0 - 0.2 /100 WBC   Green Top (Gel)    Collection Time: 12/02/24 11:38 PM   Result Value Ref Range    Extra Tube Hold for add-ons.    Lavender Top    Collection Time: 12/02/24 11:38 PM   Result Value Ref Range    Extra Tube hold for add-on    Gold Top - SST    Collection Time:  "12/02/24 11:38 PM   Result Value Ref Range    Extra Tube Hold for add-ons.    Light Blue Top    Collection Time: 12/02/24 11:38 PM   Result Value Ref Range    Extra Tube Hold for add-ons.    ECG 12 Lead Chest Pain    Collection Time: 12/03/24 12:31 AM   Result Value Ref Range    QT Interval 432 ms    QTC Interval 424 ms     XR Chest 1 View   Final Result   Impression:   No acute cardiopulmonary abnormality.            Electronically Signed: Rodrigo Carmona MD     12/3/2024 12:09 AM EST     Workstation ID: [id number]        Medications   sodium chloride 0.9 % flush 10 mL (has no administration in time range)   acetaminophen (TYLENOL) tablet 1,000 mg (has no administration in time range)     /84   Pulse 63   Temp 97.7 °F (36.5 °C)   Resp 20   Ht 152.4 cm (60\")   Wt 66.6 kg (146 lb 13.2 oz)   LMP  (LMP Unknown)   SpO2 93%   BMI 28.68 kg/m²               HEART Score: 6   Shared Decision Making  I discussed the findings with the patient/patient representative who is in agreement with the treatment plan and the final disposition.  Risks and benefits of discharge and/or observation/admission were discussed: Yes                                      Medical Decision Making    Chart review: Patient had a stress Myoview August of this year although I do not see interpretation of results  Comorbidity: As per past history   Differential: Hypertension, angina, pulmonary embolus, dissection, electrolyte abnormality  My EKG interpretation: Sinus bradycardia rate of 58.  Compared to previous no significant change noted.  Rate decreased 7 bpm.  Lab: Comprehensive metabolic panel normal with glucose of 106 CBC essentially normal initial troponin borderline elevated at 14, D-dimer normal 0.8  My Radiology review and interpretation: Chest x-ray reveals clear lung fields normal cardiomediastinal contour.  Lungs are clear  Discussion/treatment: Patient had IV placed.  Her blood pressure has improved to 170/79 without specific " treatment.  Subsequent blood pressure had continued to improved to 154/88.  Patient's blood pressure has improved to 154/64 at this time.  She reports she has slight frontal headache.  She reports her chest and back feel better.  Findings were discussed with her.  With mild elevation of troponin we will bring her in for observation serial troponins.  Patient was discussed with Dr. Holliday will be brought in to their service for further evaluation as needed  Patient was evaluated using appropriate PPE    Final diagnoses:   Chest pain, unspecified type   Resistant hypertension       ED Disposition  ED Disposition       ED Disposition   Decision to Admit    Condition   --    Comment   --               No follow-up provider specified.       Medication List      No changes were made to your prescriptions during this visit.            Horacio Macias MD  12/03/24 0131

## 2024-12-03 NOTE — PROGRESS NOTES
Nonbillable note  Patient seen and examined at bedside, complaining of chest pressure as well as pain between 2 scapulas, cardiology APRN at bedside, discussed case.  Patient may need ischemic workup.  All questions and concerns addressed.

## 2024-12-03 NOTE — PLAN OF CARE
Problem: Adult Inpatient Plan of Care  Goal: Plan of Care Review  Outcome: Progressing  Flowsheets (Taken 12/3/2024 6951)  Progress: no change  Plan of Care Reviewed With: patient  Goal: Patient-Specific Goal (Individualized)  Outcome: Progressing  Goal: Absence of Hospital-Acquired Illness or Injury  Outcome: Progressing  Goal: Optimal Comfort and Wellbeing  Outcome: Progressing  Goal: Readiness for Transition of Care  Outcome: Progressing     Problem: Fall Injury Risk  Goal: Absence of Fall and Fall-Related Injury  Outcome: Progressing   Goal Outcome Evaluation:  Plan of Care Reviewed With: patient        Progress: no change

## 2024-12-03 NOTE — CASE MANAGEMENT/SOCIAL WORK
Discharge Planning Assessment   [facility]     Patient Name: Maria D Fountain  MRN: [id number]  Today's Date: 12/3/2024    Admit Date: 12/2/2024    Plan: DC Plan: Anticipate routine home alone. Family to provide transport.   Discharge Needs Assessment       Row Name 12/03/24 1157       Living Environment    People in Home alone    Current Living Arrangements home    Potentially Unsafe Housing Conditions none    In the past 12 months has the electric, gas, oil, or water company threatened to shut off services in your home? No    Primary Care Provided by self    Provides Primary Care For no one    Family Caregiver if Needed child(raven), adult    Family Caregiver Names Daughter Melva and Son Mauro    Quality of Family Relationships helpful;involved;supportive    Able to Return to Prior Arrangements yes       Resource/Environmental Concerns    Resource/Environmental Concerns none    Transportation Concerns none       Transportation Needs    In the past 12 months, has lack of transportation kept you from medical appointments or from getting medications? no    In the past 12 months, has lack of transportation kept you from meetings, work, or from getting things needed for daily living? No       Food Insecurity    Within the past 12 months, you worried that your food would run out before you got the money to buy more. Never true    Within the past 12 months, the food you bought just didn't last and you didn't have money to get more. Never true       Transition Planning    Patient/Family Anticipates Transition to home    Patient/Family Anticipated Services at Transition none    Transportation Anticipated car, drives self;family or friend will provide       Discharge Needs Assessment    Readmission Within the Last 30 Days no previous admission in last 30 days    Concerns to be Addressed no discharge needs identified;denies needs/concerns at this time    Anticipated Changes Related to Illness none    Equipment Needed After  Discharge none    Provided Post Acute Provider List? N/A    Provided Post Acute Provider Quality & Resource List? N/A    Patient's Choice of Community Agency(s) Has used [facility] services in the past.    Current Discharge Risk lives alone                   Discharge Plan       Row Name 12/03/24 1158       Plan    Plan DC Plan: Anticipate routine home alone. Family to provide transport.    Patient/Family in Agreement with Plan yes    Provided Post Acute Provider List? N/A    Provided Post Acute Provider Quality & Resource List? N/A    Plan Comments CM spoke with patient at bedside to discuss admission assessment and discharge planning. Patient confirms PCP and pharmacy. Patient is already enrolled in meds to bed program. Patient denies any difficulty affording medications or food at this time. Patient denies any additional needs for services or DME at this time. Patient confirms she has used [facility] in the past and would be agreeable to use again if recommended. Patient reports independent with ADL's and drives. Patient family will provide transportation when ready for DC.CM spoke with patient’s nurse and also reviewed chart documentation to obtain clinical updates.CM will continue to follow for any additional needs and adjust DC plan accordingly. DC Barriers: Cardiac monitoring, Pending Stress Test, and Cardiology consult pending.                 Expected Discharge Date and Time       Expected Discharge Date Expected Discharge Time    Dec 4, 2024            Demographic Summary       Row Name 12/03/24 1151       General Information    Admission Type observation    Arrived From emergency department;home    Required Notices Provided Observation Status Notice    Referral Source admission list    Reason for Consult discharge planning    Preferred Language English       Contact Information    Permission Granted to Share Info With                    Functional Status       Row Name 12/03/24 1157       Functional  Status    Usual Activity Tolerance good    Current Activity Tolerance good       Physical Activity    On average, how many days per week do you engage in moderate to strenuous exercise (like a brisk walk)? 0 days    On average, how many minutes do you engage in exercise at this level? 0 min    Number of minutes of exercise per week 0       Functional Status, IADL    Medications independent    Meal Preparation independent    Housekeeping independent    Laundry independent    Shopping independent       Mental Status    General Appearance WDL WDL       Mental Status Summary    Recent Changes in Mental Status/Cognitive Functioning no changes       Employment/    Employment Status retired    Current or Previous Occupation not applicable                 Met with patient in room   Maintain distance greater than six feet and spent less than fifteen minutes in the room.      Carmen Castorena RN     Office Phone: [phone]  Office Cell:     [phone]

## 2024-12-03 NOTE — H&P
Hospitalist Service  History and Physical      Patient Name: Maria D Fountain  : 1940  MRN: [id number]  Primary Care Physician:  Celi Renner MD  Date of admission: 2024      Subjective      Chief Complaint: Elevated blood    History of Present Illness: Maria D Fountain is a 84 y.o. female with past medical history of hypertension, hyperlipidemia who presented to [facility] on 2024 with complaints of elevated blood pressure at home.  Patient says she was eating ham sandwich for dinner and started feeling warm on her face with some numbness to the left cheek area and she also had associated chest discomfort and upper shoulder/back pain.  She checked her blood pressure and was elevated at 228/108.  She took her nighttime carvedilol and lisinopril and tried to lay in the bed.  After 1 hour she took her blood pressure again and it was still elevated in the 200s so she came to the ER.  She denies any associated diaphoresis, headache, vision changes, paresthesia or any extremity weakness.  She reports her blood pressure usually runs around 150s at home.  Denies any recent medication changes.  She had a stress test back in August but results are not available however patient states it was normal.  She follows with Dr. Luna outpatient who also manages her blood pressure.      ROS: Pertinent positives as noted in HPI/subjective.  All other systems were reviewed and are negative.      Personal History     Past Medical History:   Diagnosis Date    Arthritis     Back pain     COVID-19     Diabetes mellitus     DJD (degenerative joint disease)     Gallstones     Heart disease     Heart murmur     Hip pain, bilateral     History of stress test 2011    Stress myoview- neg     Hypertension     Lumbar disc disease     Mitral regurgitation     Multiple lipomas     Pleurisy     Spinal stenosis        Past Surgical History:   Procedure Laterality Date    BREAST BIOPSY Right      CARDIAC CATHETERIZATION  06/21/2017    HEMANGIOMA EXCISION Left 2010    left forearm     HX OVARIAN CYSTECTOMY  1962    HYSTERECTOMY  2004       Family History: family history includes Arthritis in an other family member; Colon cancer in an other family member; Heart disease in her father; Hypertension in an other family member; No Known Problems in her brother, maternal aunt, maternal grandfather, maternal grandmother, maternal uncle, mother, paternal aunt, paternal grandfather, paternal grandmother, paternal uncle, and sister. Otherwise pertinent FHx was reviewed and not pertinent to current issue.    Social History:  reports that she has never smoked. She has never been exposed to tobacco smoke. She has never used smokeless tobacco. She reports that she does not drink alcohol and does not use drugs.    Home Medications:  Prior to Admission Medications       Prescriptions Last Dose Informant Patient Reported? Taking?    acetaminophen (TYLENOL) 650 MG 8 hr tablet   Yes No    Take 1 tablet by mouth Every 8 (Eight) Hours As Needed for Mild Pain. Indications: Pain    amLODIPine (NORVASC) 5 MG tablet   No No    TAKE ONE TABLET BY MOUTH DAILY    ascorbic acid (VITAMIN C) 1000 MG tablet   Yes No    Take 0.5 tablets by mouth 2 (two) times a day. Indications: Inadequate Vitamin C    aspirin (aspirin) 81 MG EC tablet   Yes No    Take 1 tablet by mouth Daily. Indications: prophylactic    atorvastatin (LIPITOR) 20 MG tablet   No No    TAKE ONE TABLET BY MOUTH ONCE NIGHTLY    Biotin 5000 MCG capsule   Yes No    Take 1 capsule by mouth Every Night. Indications: thinning hair    carvedilol (COREG) 12.5 MG tablet   No No    Take 1 tablet by mouth 2 (Two) Times a Day With Meals.    Lidocaine 4 %   Yes No    Place 1 patch on the skin as directed by provider Daily. Remove & Discard patch within 12 hours or as directed by MD  Indications: pain    lisinopril (PRINIVIL,ZESTRIL) 20 MG tablet   No No    TAKE 1 TABLET BY MOUTH TWICE A DAY     loperamide (IMODIUM) 2 MG capsule   Yes No    Take 1 capsule by mouth As Needed for Diarrhea. Indications: Diarrhea    meloxicam (MOBIC) 15 MG tablet   No No    Take 1 tablet by mouth Daily.    metroNIDAZOLE (Flagyl) 500 MG tablet   No No    Take 1 tablet by mouth 2 (Two) Times a Day.    multivitamin-minerals (CENTRUM) tablet   Yes No    Take 1 tablet by mouth Daily. Indications: supplement    nitroglycerin (Nitrostat) 0.4 MG SL tablet   No No    Place 1 tablet under the tongue Every 5 (Five) Minutes As Needed for Chest Pain. Take no more than 3 doses in 15 minutes.    Probiotic Product (PROBIOTIC BLEND PO)   Yes No    Take 1 capsule by mouth Daily. Indications: supplement    vitamin B-12 (CYANOCOBALAMIN) 1000 MCG tablet   Yes No    Take 1 tablet by mouth Daily. Indications: Inadequate Vitamin B12    vitamin D3 (vitamin d) 125 MCG (5000 UT) capsule capsule   Yes No    Take 1 capsule by mouth 2 (Two) Times a Day. Indications: Vitamin D Deficiency              I have utilized all available, immediate resources to obtain, update, or review the patient's current medications including all prescriptions, over-the-counter products, herbals, cannabis/cannabidiol products, and vitamin.mineral/dietary (nutritional) supplements.    Allergies:  Allergies   Allergen Reactions    Pravastatin Dizziness       Objective      Vitals:   Temp:  [97.7 °F (36.5 °C)] 97.7 °F (36.5 °C)  Heart Rate:  [58-64] 63  Resp:  [20] 20  BP: (154-194)/(64-96) 169/84    Physical Exam:    General: Awake, alert, NAD  HEENT: NC/AT, PERRL, EOMI, conjunctivae are clear, mucous membrane moist, oropharynx clear, Trachea midline   Cardiovascular: Regular rate and rhythm, no murmurs  Respiratory: Clear to auscultation bilaterally, no wheezing or rales, unlabored breathing  Abdomen: Soft, nontender, positive bowel sounds, no guarding  Neurologic: A&O, CN grossly intact, moves all extremities spontaneously  Musculoskeletal: Normal range of motion, no other  gross deformities  Skin: Warm, dry      Result Review    Result Review:  I have personally reviewed the results from the time of this admission to 12/3/2024 01:31 EST and agree with these findings:  [x]  Laboratory  []  Microbiology  [x]  Radiology  [x]  EKG/Telemetry   [x]  Cardiology/Vascular   []  Pathology  [x]  Old records  []  Other:      Assessment & Plan        Active Hospital Problems:  Active Hospital Problems    Diagnosis     **HTN (hypertension)        Assessment/Plan:     Uncontrolled hypertension  -Patient reports BP at home was 228/108, initial BP in the ER was 194/96 but came down to 160s/80s without any medications  -Patient on multiple antihypertensives at home, resumed  -Hydralazine as needed added  -BP meds managed by Dr. Luna outpatient, consult placed    Chest pressure, atypical  -Initial troponin negative, repeat ordered  -EKG with sinus bradycardia, no acute ST-T wave changes  -Patient apparently did have stress test back in August but results are not available however patient was told it was normal  -Patient follows with Dr. Luna, cardio consulted    Dyslipidemia  -Continue statin    Anxiety  -Patient does appear to be anxious  -Atarax as needed added    VTE Prophylaxis:  Mechanical VTE prophylaxis orders are present.    CODE STATUS:    Code Status (Patient has no pulse and is not breathing): CPR (Attempt to Resuscitate)  Medical Interventions (Patient has pulse or is breathing): Full Support      Admission Status:  I believe this patient meets obs status.    Signature:   Electronically signed by Mica Holliday DO, 12/03/24, 1:31 AM EST.    Part of this note may be an electronic transcription/translation of spoken language to printed text using the Dragon Dictation System.

## 2024-12-03 NOTE — PLAN OF CARE
Problem: Adult Inpatient Plan of Care  Goal: Optimal Comfort and Wellbeing  Outcome: Progressing  Intervention: Provide Person-Centered Care  Recent Flowsheet Documentation  Taken 12/3/2024 0736 by Vasile Guerrero RN  Trust Relationship/Rapport:   care explained   choices provided   emotional support provided   empathic listening provided   questions answered   questions encouraged   reassurance provided   thoughts/feelings acknowledged     Problem: Fall Injury Risk  Goal: Absence of Fall and Fall-Related Injury  Outcome: Progressing  Intervention: Identify and Manage Contributors  Recent Flowsheet Documentation  Taken 12/3/2024 0736 by Vasile Guerrero RN  Medication Review/Management: medications reviewed  Self-Care Promotion: independence encouraged  Intervention: Promote Injury-Free Environment  Recent Flowsheet Documentation  Taken 12/3/2024 1800 by Vasile Guerrero RN  Safety Promotion/Fall Prevention:   safety round/check completed   room organization consistent   nonskid shoes/slippers when out of bed   fall prevention program maintained   clutter free environment maintained   assistive device/personal items within reach  Taken 12/3/2024 1617 by Vasile Guerrero RN  Safety Promotion/Fall Prevention:   safety round/check completed   room organization consistent   nonskid shoes/slippers when out of bed   fall prevention program maintained   clutter free environment maintained   assistive device/personal items within reach  Taken 12/3/2024 1400 by Vasile Guerrero RN  Safety Promotion/Fall Prevention:   safety round/check completed   room organization consistent   nonskid shoes/slippers when out of bed   fall prevention program maintained   clutter free environment maintained   assistive device/personal items within reach  Taken 12/3/2024 1200 by Vasile Guerrero RN  Safety Promotion/Fall Prevention:   safety round/check completed   room organization consistent   nonskid shoes/slippers when out of bed   fall prevention  program maintained   clutter free environment maintained   assistive device/personal items within reach  Taken 12/3/2024 1000 by Vasile Guerrero RN  Safety Promotion/Fall Prevention:   safety round/check completed   room organization consistent   nonskid shoes/slippers when out of bed   fall prevention program maintained   assistive device/personal items within reach   clutter free environment maintained  Taken 12/3/2024 0800 by Vasile Guerrero, RN  Safety Promotion/Fall Prevention:   safety round/check completed   room organization consistent   nonskid shoes/slippers when out of bed   fall prevention program maintained   assistive device/personal items within reach   clutter free environment maintained  Taken 12/3/2024 0736 by Vasile Guerrero, RN  Safety Promotion/Fall Prevention:   safety round/check completed   room organization consistent   nonskid shoes/slippers when out of bed   fall prevention program maintained   clutter free environment maintained   assistive device/personal items within reach     Goal Outcome Evaluation:  Plan of Care Reviewed With: patient        Progress: improving

## 2024-12-04 ENCOUNTER — READMISSION MANAGEMENT (OUTPATIENT)
Dept: CALL CENTER | Facility: HOSPITAL | Age: 84
End: 2024-12-04
Payer: MEDICARE

## 2024-12-04 VITALS
HEIGHT: 60 IN | WEIGHT: 151 LBS | OXYGEN SATURATION: 90 % | DIASTOLIC BLOOD PRESSURE: 66 MMHG | TEMPERATURE: 97.8 F | HEART RATE: 67 BPM | RESPIRATION RATE: 11 BRPM | SYSTOLIC BLOOD PRESSURE: 121 MMHG | BODY MASS INDEX: 29.64 KG/M2

## 2024-12-04 PROCEDURE — 99214 OFFICE O/P EST MOD 30 MIN: CPT | Performed by: INTERNAL MEDICINE

## 2024-12-04 PROCEDURE — G0378 HOSPITAL OBSERVATION PER HR: HCPCS

## 2024-12-04 RX ORDER — LIDOCAINE 4 G/G
1 PATCH TOPICAL EVERY 24 HOURS
Status: DISCONTINUED | OUTPATIENT
Start: 2024-12-04 | End: 2024-12-04 | Stop reason: HOSPADM

## 2024-12-04 RX ORDER — SODIUM CHLORIDE 9 MG/ML
75 INJECTION, SOLUTION INTRAVENOUS CONTINUOUS
Status: DISCONTINUED | OUTPATIENT
Start: 2024-12-05 | End: 2024-12-04 | Stop reason: HOSPADM

## 2024-12-04 RX ORDER — SERTRALINE HYDROCHLORIDE 25 MG/1
25 TABLET, FILM COATED ORAL DAILY
Qty: 30 TABLET | Refills: 1 | Status: SHIPPED | OUTPATIENT
Start: 2024-12-04 | End: 2024-12-11

## 2024-12-04 RX ORDER — AMLODIPINE BESYLATE 5 MG/1
5 TABLET ORAL DAILY
Status: DISCONTINUED | OUTPATIENT
Start: 2024-12-05 | End: 2024-12-04 | Stop reason: HOSPADM

## 2024-12-04 RX ADMIN — CARVEDILOL 12.5 MG: 6.25 TABLET, FILM COATED ORAL at 08:17

## 2024-12-04 RX ADMIN — ASPIRIN 81 MG: 81 TABLET, COATED ORAL at 08:17

## 2024-12-04 RX ADMIN — MELOXICAM 15 MG: 15 TABLET ORAL at 11:35

## 2024-12-04 RX ADMIN — LISINOPRIL 20 MG: 20 TABLET ORAL at 08:17

## 2024-12-04 RX ADMIN — LIDOCAINE 1 PATCH: 4 PATCH TOPICAL at 08:16

## 2024-12-04 RX ADMIN — AMLODIPINE BESYLATE 10 MG: 5 TABLET ORAL at 08:17

## 2024-12-04 RX ADMIN — Medication 10 ML: at 08:18

## 2024-12-04 NOTE — CASE MANAGEMENT/SOCIAL WORK
Case Management Discharge Note      Final Note: CM spoke with patient’s nurse and also reviewed chart documentation to obtain clinical updates. Patient and family refused Heart Cath evaluation. DC orders in place. No additional service needs identified.        Transportation Services  Private: Car (with family)    Final Discharge Disposition Code: 01 - home or self-care

## 2024-12-04 NOTE — PLAN OF CARE
Goal Outcome Evaluation:         Pt was able to ret during shift. No major complaints.

## 2024-12-04 NOTE — DISCHARGE SUMMARY
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         Hospital Course     HPI:  Per the H&P written by Mica Holliday, dated 12/3/24:  \"Maria D Fountain is a 84 y.o. female with past medical history of hypertension, hyperlipidemia who presented to [facility] on 2024 with complaints of elevated blood pressure at home.  Patient says she was eating ham sandwich for dinner and started feeling warm on her face with some numbness to the left cheek area and she also had associated chest discomfort and upper shoulder/back pain.  She checked her blood pressure and was elevated at 228/108.  She took her nighttime carvedilol and lisinopril and tried to lay in the bed.  After 1 hour she took her blood pressure again and it was still elevated in the 200s so she came to the ER.  She denies any associated diaphoresis, headache, vision changes, paresthesia or any extremity weakness.  She reports her blood pressure usually runs around 150s at home.  Denies any recent medication changes.  She had a stress test back in August but results are not available however patient states it was normal.  She follows " "with Dr. Luna outpatient who also manages her blood pressure. \"    Hospital Course:  Patient admitted as above. Cardiology consulted. Patient resumed home blood pressure medications as well as lidocaine patches/meloxicam for back pain with improvement in blood pressure. Blood pressures soft after receiving medication, and amlodipine was reduced in dose. Patient denies further chest pain. Stress test earlier this year without ischemia and patient declines invasive procedures at this time. Continued medical management. Patient had extensive discussion about stressors in her life and grief since the passing of her . Attends support meetings and has counselor that she feels has helped her overall mood and wellbeing. Denies SH/SI/HI. Does endorse significant stress from worrying about all aspects of her life. Would like to start SSRI with plans to follow up with PCP. Blood pressure controlled at this time and patient appropriate for discharge with outpatient follow up.    DISCHARGE Follow Up Recommendations for labs and diagnostics:   - Follow up with PCP within 2 weeks with re-evaluation of blood pressure and SSRI for generalized anxiety  - Follow up with cardiology within 2 weeks of discharge    Day of Discharge     Vital Signs:  Temp:  [97.7 °F (36.5 °C)-98.1 °F (36.7 °C)] 97.8 °F (36.6 °C)  Heart Rate:  [52-78] 64  Resp:  [11-21] 11  BP: ()/() 121/66    Physical Exam:  General: No acute distress, appears stated age  Neuro: Awake and alert, oriented x3, no focal deficits appreciated  HEENT: EOMI, moist mucus membranes  CV: RRR, no murmurs appreciated, no peripheral edema  Pulm: CTAB, no increased work of breathing  Abd: Soft, nontender, nondistended  Skin: Warm, dry and intact  Psych: [place]9s mood and affect    Pertinent  and/or Most Recent Results     LAB RESULTS:      Lab 12/02/24  2338   WBC 8.63   HEMOGLOBIN 14.1   HEMATOCRIT 42.4   PLATELETS 275   NEUTROS ABS 4.29   IMMATURE GRANS (ABS) " 0.02   LYMPHS ABS 2.70   MONOS ABS 1.10*   EOS ABS 0.49*   MCV 95.9   D DIMER QUANT 0.80         Lab 12/02/24  2338   SODIUM 140   POTASSIUM 3.9   CHLORIDE 103   CO2 27.8   ANION GAP 9.2   BUN 15   CREATININE 0.84   EGFR 68.6   GLUCOSE 106*   CALCIUM 9.6         Lab 12/02/24  2338   TOTAL PROTEIN 7.1   ALBUMIN 4.2   GLOBULIN 2.9   ALT (SGPT) 19   AST (SGOT) 31   BILIRUBIN 0.8   ALK PHOS 82         Lab 12/03/24  0211 12/02/24  2338   PROBNP 197.0  --    HSTROP T 9 14*                 Brief Urine Lab Results  (Last result in the past 365 days)        Color   Clarity   Blood   Leuk Est   Nitrite   Protein   CREAT   Urine HCG        09/23/24 1343 Yellow   Clear   Negative   Negative   Negative   Negative                 Microbiology Results (last 10 days)       ** No results found for the last 240 hours. **          XR Chest 1 View    Result Date: 12/3/2024  Impression: Impression: No acute cardiopulmonary abnormality. Electronically Signed: Rodrigo Carmona MD  12/3/2024 12:09 AM EST  Workstation ID: [id number]   Results for orders placed during the hospital encounter of 07/19/23    Duplex Venous Lower Extremity - Left    Interpretation Summary    Normal left lower extremity venous duplex scan.    Results for orders placed during the hospital encounter of 07/19/23    Duplex Venous Lower Extremity - Left    Interpretation Summary    Normal left lower extremity venous duplex scan.    Results for orders placed during the hospital encounter of 12/02/24    Adult Transthoracic Echo Complete W/ Cont if Necessary Per Protocol    Interpretation Summary    Left ventricular systolic function is normal. Calculated left ventricular EF = 66% Left ventricular ejection fraction appears to be greater than 70%.    Left ventricular wall thickness is consistent with mild concentric hypertrophy.    Estimated right ventricular systolic pressure from tricuspid regurgitation is normal (<35 mmHg).      Labs Pending at Discharge:   Pending Results        Procedure [Order ID] Specimen - Date/Time    Cardiac Catheterization/Vascular Study [[id number]]             Procedures Performed   Procedure(s):       Consults:   Consults       Date and Time Order Name Status Description    12/3/2024  1:31 AM Inpatient Cardiology Consult Completed     12/3/2024  1:20 AM Hospitalist (on-call MD unless specified)      12/3/2024 12:58 AM Hospitalist (on-call MD unless specified)      12/3/2024 12:56 AM Cardiology (on-call MD unless specified)              Discharge Details        Discharge Medications        New Medications        Instructions Start Date   sertraline 25 MG tablet  Commonly known as: Zoloft   25 mg, Oral, Daily             Changes to Medications        Instructions Start Date   amLODIPine 5 MG tablet  Commonly known as: NORVASC  What changed: Another medication with the same name was removed. Continue taking this medication, and follow the directions you see here.   5 mg, Oral, Daily             Continue These Medications        Instructions Start Date   acetaminophen 650 MG 8 hr tablet  Commonly known as: TYLENOL   1 tablet, Every 8 Hours PRN      ascorbic acid 1000 MG tablet  Commonly known as: VITAMIN C   0.5 tablets, 2 times daily      aspirin 81 MG EC tablet   1 tablet, Every 24 Hours      atorvastatin 20 MG tablet  Commonly known as: LIPITOR   20 mg, Oral, Nightly      Biotin 5000 MCG capsule   1 capsule, Nightly      carvedilol 12.5 MG tablet  Commonly known as: COREG   12.5 mg, Oral, 2 Times Daily With Meals      Lidocaine 4 %   1 patch, Every 24 Hours      lisinopril 20 MG tablet  Commonly known as: PRINIVIL,ZESTRIL   20 mg, Oral, 2 Times Daily      loperamide 2 MG capsule  Commonly known as: IMODIUM   2 mg, As Needed      meloxicam 15 MG tablet  Commonly known as: MOBIC   15 mg, Oral, Daily      multivitamin-minerals tablet  Generic drug: multivitamin with minerals   1 tablet, Daily      nitroglycerin 0.4 MG SL tablet  Commonly known as: Nitrostat   0.4  mg, Sublingual, Every 5 Minutes PRN, Take no more than 3 doses in 15 minutes.      PROBIOTIC BLEND PO   1 capsule, Daily      vitamin B-12 1000 MCG tablet  Commonly known as: CYANOCOBALAMIN   1 tablet, Daily      vitamin D3 125 MCG (5000 UT) capsule capsule   5,000 Units, 2 Times Daily               Allergies   Allergen Reactions    Pravastatin Dizziness       Discharge Disposition:   Home    Diet:  Low sodium    Discharge Activity:   As tolerated    CODE STATUS:  Code Status and Medical Interventions: CPR (Attempt to Resuscitate); Full Support   Ordered at: 12/03/24 0131     Code Status (Patient has no pulse and is not breathing):    CPR (Attempt to Resuscitate)     Medical Interventions (Patient has pulse or is breathing):    Full Support       Future Appointments   Date Time Provider Department Center   2/20/2025  2:30 PM Kathy Marshall APRN [facility]   8/21/2025  1:45 PM Matti Luna MD [facility]   11/25/2025 12:30 PM Celi Renner MD [facility]       Additional Instructions for the Follow-ups that You Need to Schedule       Discharge Follow-up with PCP   As directed       Currently Documented PCP:    Celi Renner MD    PCP Phone Number:    [phone]     Follow Up Details: Follow up with PCP within 2 weeks of discharge        Discharge Follow-up with Specified Provider: Cardiology; 2 Weeks   As directed      To: Cardiology   Follow Up: 2 Weeks                Time spent on Discharge including face to face service:  >30 minutes    Signature: Electronically signed by Tere Colin MD, 12/04/24, 11:41 EST.  [facility] Hospitalist Team

## 2024-12-04 NOTE — PROGRESS NOTES
Cardiology Progress Note    Patient Identification:  Name: Maria D Fountain  Age: 84 y.o.  Sex: female  :  1940  MRN: [id number]                 Follow Up / Chief Complaint: uncontrolled hypertension   Chief Complaint   Patient presents with    Hypertension       Interval History: patient presented with uncontrolled hypertension     NP NOTE:  Patient seen with Dr. Luna.  She denies any further chest pressure or shortness of breath.  She has complaints of low back pain and seems as if her blood pressure spikes when she is in pain.  Blood pressure is actually low this morning 103/63 will decrease amlodipine to 5mg daily     Echocardiogram was unremarkable     Follow up at previously schedule visit in Feb.     Electronically signed by BJORN Mauro, 24, 11:56 AM EST.    Cardiology attending addendum :    I have personally performed a face-to-face diagnostic evaluation, physical exam and reviewed data on this patient.  I have reviewed documentation done by me and nurse practitioner  and corrected as needed.  And agree with the different components of documentation.Greater than 50% of the time spent in the care of this patient was provided by attending consultant/me.           Subjective: Patient seen and examined; chart and labs reviewed; discussed with bedside nurse.  Patient is without chest pain or shortness of breath.  Denies any back pain.        Objective:   ED showed HS troponin of 14--9, CMP unremarkable except glucose 106, d-dimer negative, CBC unremarkable CXR negative     History of present illness:      Ms. Maria D Fountain has PMH of      # CAD cardiac cath 2017 showing 50% OM1 disease and small-vessel disease distal LAD  #  mitral regurgitation, mild 2016  #  palpitations  #  hypertension  # LAE  #  diabetes  #?  ACE-inhibitor cough, now improved  # allergy/intolerance to pravastatin  # ALYSA  ? Pancreatic cyst         Presented to the ED with hypertension.  Patient reports  that she felt a wave of sweat and pressure over her chest and started to feel funny as well as having tingling/numbness in her left jaw.  She took her blood pressure and it was 223/108; she took her lisinopril and carvedilol and laid down for about an hour, her symptoms seem to resolve after about an hour however her blood pressure was still up 210 systolic therefore she called a neighbor and had her drive her to the emergency room.  Patient reports for the last few months she has been having pain between her shoulder blades and pressure in her chest that has been intermittent.  Patient also reports that she has had high blood pressure over the past several years since her   and has been anxious.  She reports that she may have had some hallucinations and that she solved bruises on her Mantel when she was laying down.        Labs in the ED showed HS troponin of 14--9, CMP unremarkable except glucose 106, d-dimer negative, CBC unremarkable CXR negative   EKG sinus bradycardia rate 58      Patient had recent stress test in August that was negative for ischemia.     Given her chest pressure short of a pain facial numbness, I advised cardiac cath.   Patient wanted me to speak with her daughter, Melva.   I spoke with Melva and explained all the testing that had already been completed including labs, EKG and recent stress test in August.    Tried to explain these symptoms could be from blockages vs high blood pressure.      Will check echocardiogram to assess LV function and valve abnormalities         Echocardiogram 2023 reveals EF of 60 to 65%      ASSESSMENT:     #Hypertensive urgency  #Chest pain/pressure     #. hypertension,   #  dyslipidemia  # CAD  #  mitral regurgitation  #. prediabetes      PLAN:     Patient says her main complaint is abdominal pain headache but also has chest tightness and elevated blood pressure.  HS troponin is negative.  14--->9  proBNP is normal at 197.  Normal CMP,  CBC.  Chest x-ray 12/3/2024 reveals no acute cardiopulmonary abnormality  EKG done 12/3/2024 reviewed/interpreted by me reveals sinus bradycardia with rate of 58 bpm  Patient had echocardiogram 9/14/23 which revealed normal LV systolic function mild MR.  Will check repeat echo.  Patient had recent stress test on 8/19/2024 which was Negative for ischemia.  Patient does not want any invasive workup.  Patient blood pressure elevation is probably due to the back pain.  Once her back pain got better blood pressure is getting lower.  Will decrease amlodipine from 10 mg to 5 mg because she is developing hypotension.  Will continue medical management with aspirin, amlodipine, lisinopril, atorvastatin, , Carvedilol as tolerated.    Patient was seen was complaining of chest tightness with elevated blood pressure.  Since she had recent stress test considering chest heaviness  recommended cardiac cath.  Patient does not want cardiac cath and want conservative management.  Will continue the same.   Discussed with admitting hospitalist Dr. Tere Colin           Procedures     Lexiscan cardiolite done 8/19/2024 negative for ischemia, LVEF 76%    Copied text in this portion of the note has been reviewed and is accurate as of 12/4/2024    Past Medical History:  Past Medical History:   Diagnosis Date    Arthritis     Back pain     COVID-19     Diabetes mellitus     DJD (degenerative joint disease)     Gallstones     Heart disease     Heart murmur     Hip pain, bilateral     History of stress test 07/2011    Stress myoview- neg     Hypertension     Lumbar disc disease     Mitral regurgitation     Multiple lipomas     Pleurisy     Spinal stenosis      Past Surgical History:  Past Surgical History:   Procedure Laterality Date    BREAST BIOPSY Right 1990    CARDIAC CATHETERIZATION  06/21/2017    HEMANGIOMA EXCISION Left 2010    left forearm     HX OVARIAN CYSTECTOMY  1962    HYSTERECTOMY  2004        Social History:   Social History  "    Tobacco Use    Smoking status: Never     Passive exposure: Never    Smokeless tobacco: Never   Substance Use Topics    Alcohol use: No      Family History:  Family History   Problem Relation Age of Onset    No Known Problems Mother     Heart disease Father         MI    No Known Problems Sister     No Known Problems Brother     No Known Problems Maternal Aunt     No Known Problems Maternal Uncle     No Known Problems Paternal Aunt     No Known Problems Paternal Uncle     No Known Problems Maternal Grandmother     No Known Problems Maternal Grandfather     No Known Problems Paternal Grandmother     No Known Problems Paternal Grandfather     Arthritis Other     Hypertension Other     Colon cancer Other     Anemia Neg Hx     Arrhythmia Neg Hx     Asthma Neg Hx     Clotting disorder Neg Hx     Fainting Neg Hx     Heart attack Neg Hx     Heart failure Neg Hx     Hyperlipidemia Neg Hx           Allergies:  Allergies   Allergen Reactions    Pravastatin Dizziness     Scheduled Meds:  [START ON 12/5/2024] amLODIPine, 5 mg, Daily  aspirin, 81 mg, Q24H  atorvastatin, 20 mg, Nightly  carvedilol, 12.5 mg, BID With Meals  Lidocaine, 1 patch, Q24H  lisinopril, 20 mg, BID  meloxicam, 15 mg, Daily  sodium chloride, 10 mL, Q12H          Review of Systems:   ROS  Review of Systems   Constitution: Negative for chills and fever.   Cardiovascular: Negative for chest pain and palpitations.   Respiratory: Negative for cough and hemoptysis.    Gastrointestinal: Negative for nausea.        Constitutional:  Temp:  [97.7 °F (36.5 °C)-97.8 °F (36.6 °C)] 97.8 °F (36.6 °C)  Heart Rate:  [52-78] 67  Resp:  [11-21] 11  BP: ()/() 121/66    Physical Exam   /66 (BP Location: Right arm, Patient Position: Lying)   Pulse 67   Temp 97.8 °F (36.6 °C) (Oral)   Resp 11   Ht 152.4 cm (60\")   Wt 68.5 kg (151 lb)   LMP  (LMP Unknown)   SpO2 90%   BMI 29.49 kg/m²   General:  Appears in no acute distress  Eyes: Sclera is anicteric,  " conjunctiva is clear   HEENT:  No JVD. Thyroid not visibly enlarged. No mucosal pallor or cyanosis  Respiratory: Respirations regular and unlabored at rest.  Clear to auscultation  Cardiovascular: S1,S2 Regular rate and rhythm. No murmur, rub or gallop auscultated.  . No pretibial pitting edema  Gastrointestinal: Abdomen nondistended.  Musculoskeletal:  No abnormal movements  Extremities: No digital clubbing or cyanosis  Skin: Color pink.   Neuro: Alert and awake.    INTAKE AND OUTPUT:    Intake/Output Summary (Last 24 hours) at 12/4/2024 1655  Last data filed at 12/4/2024 1015  Gross per 24 hour   Intake 740 ml   Output --   Net 740 ml       Cardiographics  Telemetry: Sinus rhythm    ECG:   ECG 12 Lead Chest Pain   Final Result   HEART RATE=58  bpm   RR Bjrkalmm=3571  ms   CO Lvvxwgxv=453  ms   P Horizontal Axis=-4  deg   P Front Axis=52  deg   QRSD Interval=78  ms   QT Arfncxgm=962  ms   DCuT=185  ms   QRS Axis=5  deg   T Wave Axis=31  deg   - OTHERWISE NORMAL ECG -   Sinus bradycardia   When compared with ECG of 21-Feb-2022 22:03:05,   Significant axis, voltage or hypertrophy change   Electronically Signed By: Horacio Macias ([facility]) 2024-12-03 06:01:06   Date and Time of Study:2024-12-03 00:31:25        I have personally reviewed EKG    Echocardiogram: Results for orders placed during the hospital encounter of 12/02/24    Adult Transthoracic Echo Complete W/ Cont if Necessary Per Protocol    Interpretation Summary    Left ventricular systolic function is normal. Calculated left ventricular EF = 66% Left ventricular ejection fraction appears to be greater than 70%.    Left ventricular wall thickness is consistent with mild concentric hypertrophy.    Estimated right ventricular systolic pressure from tricuspid regurgitation is normal (<35 mmHg).      Lab Review   I have reviewed the labs  Results from last 7 days   Lab Units 12/03/24  0211 12/02/24  2338   HSTROP T ng/L 9 14*         Results from last 7 days   Lab  "Units 12/02/24  2338   SODIUM mmol/L 140   POTASSIUM mmol/L 3.9   BUN mg/dL 15   CREATININE mg/dL 0.84   CALCIUM mg/dL 9.6         Results from last 7 days   Lab Units 12/02/24  2338   WBC 10*3/mm3 8.63   HEMOGLOBIN g/dL 14.1   HEMATOCRIT % 42.4   PLATELETS 10*3/mm3 275           RADIOLOGY:  Imaging Results (Last 24 Hours)       ** No results found for the last 24 hours. **                  )12/4/2024  Matti Luna MD      [facility] [place]/Transcription:   \"Dictated utilizing Dragon dictation\".   "

## 2024-12-05 ENCOUNTER — PATIENT OUTREACH (OUTPATIENT)
Dept: CASE MANAGEMENT | Facility: CLINIC | Age: 84
End: 2024-12-05
Payer: MEDICARE

## 2024-12-05 ENCOUNTER — TELEPHONE (OUTPATIENT)
Dept: FAMILY MEDICINE CLINIC | Facility: CLINIC | Age: 84
End: 2024-12-05
Payer: MEDICARE

## 2024-12-05 ENCOUNTER — TRANSITIONAL CARE MANAGEMENT TELEPHONE ENCOUNTER (OUTPATIENT)
Dept: CALL CENTER | Facility: HOSPITAL | Age: 84
End: 2024-12-05
Payer: MEDICARE

## 2024-12-05 DIAGNOSIS — I10 ESSENTIAL HYPERTENSION: Primary | ICD-10-CM

## 2024-12-05 DIAGNOSIS — I25.118 CORONARY ARTERY DISEASE OF NATIVE ARTERY OF NATIVE HEART WITH STABLE ANGINA PECTORIS: ICD-10-CM

## 2024-12-05 NOTE — OUTREACH NOTE
AMBULATORY CASE MANAGEMENT NOTE    Names and Relationships of Patient/Support Persons: Contact: Maria D Fountain; Relationship: Self -     CCM Interim Update    Acm called patient and reviewed hospitalization and Discharge with her. Patient reports that she is feeling ok since being release and BP is starting to normalize she said after DC SBP 89/40 but today it is 119/70 and she is feeling much better. Patient has TCM in office follow up with PCP next week. Reviewed medications with patient and reports compliance. She did report BP was low in hospital but has now normalized and was 119/70 today. All questions was answered and encouraged. Reviewed provider message to patient about her CT question and she voiced understanding. Patient to outreach ACM or office with any needs between outreaches.         Education Documentation  Unresolved/Worsening Symptoms, taught by Carolina Lowry RN at 12/5/2024  3:04 PM.  Learner: Patient  Readiness: Acceptance  Method: Explanation  Response: Verbalizes Understanding    Provider Follow-Up, taught by Carolina Lowry RN at 12/5/2024  3:04 PM.  Learner: Patient  Readiness: Acceptance  Method: Explanation  Response: Verbalizes Understanding    Signs/Symptoms, taught by Carolina Lowry RN at 12/5/2024  3:04 PM.  Learner: Patient  Readiness: Acceptance  Method: Explanation  Response: Verbalizes Understanding          Carolina OLEA  Ambulatory Case Management    12/5/2024, 15:06 EST

## 2024-12-05 NOTE — OUTREACH NOTE
Call Center TCM Note      Flowsheet Row Responses   [facility] patient discharged from? [facility]   Does the patient have one of the following disease processes/diagnoses(primary or secondary)? Other   TCM attempt successful? Yes   Call start time 1452   Call end time 1454   Discharge diagnosis HTN (hypertension), heart cath   Person spoke with today (if not patient) and relationship pt   Meds reviewed with patient/caregiver? Yes   Is the patient having any side effects they believe may be caused by any medication additions or changes? No   Does the patient have all medications ordered at discharge? Yes   Is the patient taking all medications as directed (includes completed medication regime)? Yes   Comments Cardiology 2/20/24   Does the patient have an appointment with their PCP within 7-14 days of discharge? Yes  [12/18/2024 at 8:15 AM]   Psychosocial issues? No   Did the patient receive a copy of their discharge instructions? Yes   Nursing interventions Reviewed instructions with patient   What is the patient's perception of their health status since discharge? Improving   Is the patient/caregiver able to teach back signs and symptoms related to disease process for when to call PCP? Yes   Is the patient/caregiver able to teach back signs and symptoms related to disease process for when to call 911? Yes   Is the patient/caregiver able to teach back the hierarchy of who to call/visit for symptoms/problems? PCP, Specialist, Home health nurse, Urgent Care, ED, 911 Yes   If the patient is a current smoker, are they able to teach back resources for cessation? Not a smoker   TCM call completed? Yes   Wrap up additional comments Pt states her BP is good 119/xx as pt states her BP was low 89/xx. Pt was on other line with a healthcare provider. Pt verified PCP fu appt   Call end time 1454   Would this patient benefit from a Referral to Amb Social Work? No   Is the patient interested in additional calls from an ambulatory  ? No            Sharon Camejo RN    12/5/2024, 14:57 EST

## 2024-12-05 NOTE — TELEPHONE ENCOUNTER
The CT scan she has ordered by Lizzie will show her intestines but it does not show polyps that are inside the intestines which is what the colonoscopy would be able to see

## 2024-12-05 NOTE — TELEPHONE ENCOUNTER
Caller: Denisa Fountain    Relationship: Self    Best call back number: [phone]    What orders are you requesting (i.e. lab or imaging): HAS AN APPOINTMENT FOR [facility]IOR[facility] TOMORROW 12/6/2024. SHE WOULD LIKE TO MAKE SURE IT INCLUDES HER INTESTINES.     [id number] - CT ABDOMEN PELVIS W WO CONTRAST     DENISA DOES NOT WANT TO HAVE A COLONOSCOPY DUE TO HER AGE AND WANTS TO MAKE SURE HER LOWERS INTESTINES ARE CHECKED VIA CT SCAN TOMORROW

## 2024-12-11 ENCOUNTER — OFFICE VISIT (OUTPATIENT)
Dept: FAMILY MEDICINE CLINIC | Facility: CLINIC | Age: 84
End: 2024-12-11
Payer: MEDICARE

## 2024-12-11 VITALS
HEIGHT: 60 IN | HEART RATE: 69 BPM | WEIGHT: 150 LBS | DIASTOLIC BLOOD PRESSURE: 77 MMHG | SYSTOLIC BLOOD PRESSURE: 140 MMHG | BODY MASS INDEX: 29.45 KG/M2 | OXYGEN SATURATION: 95 %

## 2024-12-11 DIAGNOSIS — I10 PRIMARY HYPERTENSION: Primary | ICD-10-CM

## 2024-12-11 DIAGNOSIS — F41.9 ANXIETY: ICD-10-CM

## 2024-12-11 PROCEDURE — 99495 TRANSJ CARE MGMT MOD F2F 14D: CPT | Performed by: FAMILY MEDICINE

## 2024-12-11 PROCEDURE — 3078F DIAST BP <80 MM HG: CPT | Performed by: FAMILY MEDICINE

## 2024-12-11 PROCEDURE — 1111F DSCHRG MED/CURRENT MED MERGE: CPT | Performed by: FAMILY MEDICINE

## 2024-12-11 PROCEDURE — 3077F SYST BP >= 140 MM HG: CPT | Performed by: FAMILY MEDICINE

## 2024-12-11 PROCEDURE — 1125F AMNT PAIN NOTED PAIN PRSNT: CPT | Performed by: FAMILY MEDICINE

## 2024-12-11 NOTE — PROGRESS NOTES
"Transitional Care Follow Up Visit  Subjective     Maria D Fountain is a 84 y.o. female who presents for a transitional care management visit.    Within 48 business hours after discharge our office contacted her via telephone to coordinate her care and needs.      I reviewed and discussed the details of that call along with the discharge summary, hospital problems, inpatient lab results, inpatient diagnostic studies, and consultation reports with Maria D.     Current outpatient and discharge medications have been reconciled for the patient.  Reviewed by: Celi Renner MD          12/4/2024     4:52 PM   Date of TCM Phone Call   [facility]   Date of Admission 12/2/2024   Date of Discharge 12/4/2024   Discharge Disposition Home or Self Care     Risk for Readmission (LACE) Score: 2 (12/4/2024  6:00 AM)      History of Present Illness   Course During Hospital Stay:  \"Patient says she was eating ham sandwich for dinner and started feeling warm on her face with some numbness to the left cheek area and she also had associated chest discomfort and upper shoulder/back pain.  She checked her blood pressure and was elevated at 228/108.  She took her nighttime carvedilol and lisinopril and tried to lay in the bed.  After 1 hour she took her blood pressure again and it was still elevated in the 200s so she came to the ER.  She denies any associated diaphoresis, headache, vision changes, paresthesia or any extremity weakness.  She reports her blood pressure usually runs around 150s at home.  Denies any recent medication changes.  She had a stress test back in August but results are not available however patient states it was normal.  She follows with Dr. Luna outpatient who also manages her blood pressure. \"     Hospital Course:  Patient admitted as above. Cardiology consulted. Patient resumed home blood pressure medications as well as lidocaine patches/meloxicam for back pain with improvement " "in blood pressure. Blood pressures soft after receiving medication, and amlodipine was reduced in dose. Patient denies further chest pain. Stress test earlier this year without ischemia and patient declines invasive procedures at this time. Continued medical management. Patient had extensive discussion about stressors in her life and grief since the passing of her . Attends support meetings and has counselor that she feels has helped her overall mood and wellbeing. Denies SH/SI/HI. Does endorse significant stress from worrying about all aspects of her life. Would like to start SSRI with plans to follow up with PCP. Blood pressure controlled at this time and patient appropriate for discharge with outpatient follow up.\"    She feels better and says she has had no further episodes of facial tingling.  She is taking her blood pressure medications the way they prescribed them at discharge.  She has a follow-up appointment with her cardiologist in January.  She remains anxious.  She is worried about her son who is currently in the hospital.  She says that her daughter who is a nurse were studying to be a nurse told her that Zoloft was addictive and that she should never take it so the patient refused to fill it at the pharmacy.  She feels like she can get her health better and will no longer need medication for anxiety     The following portions of the patient's history were reviewed and updated as appropriate: allergies, current medications, past family history, past medical history, past social history, past surgical history, and problem list.    Review of Systems   Constitutional: Negative.    Respiratory: Negative.     Cardiovascular: Negative.    Neurological:  Negative for numbness.   Psychiatric/Behavioral:  The patient is nervous/anxious.        Objective   /77 (BP Location: Left arm, Patient Position: Sitting, Cuff Size: Large Adult)   Pulse 69   Ht 152.4 cm (60\")   Wt 68 kg (150 lb)   SpO2 95%  "  BMI 29.29 kg/m²   Physical Exam  Vitals and nursing note reviewed.   Constitutional:       Appearance: Normal appearance.   Cardiovascular:      Rate and Rhythm: Normal rate and regular rhythm.      Heart sounds: Normal heart sounds.   Pulmonary:      Effort: Pulmonary effort is normal.      Breath sounds: Normal breath sounds.   Musculoskeletal:      Right lower leg: No edema.      Left lower leg: No edema.   Neurological:      Mental Status: She is alert.   Psychiatric:         Mood and Affect: Mood is anxious.         Assessment & Plan   Problems Addressed this Visit          Cardiac and Vasculature    HTN (hypertension) - Primary       Mental Health    Anxiety     Diagnoses         Codes Comments    Primary hypertension    -  Primary ICD-10-CM: I10  ICD-9-CM: 401.9     Anxiety     ICD-10-CM: F41.9  ICD-9-CM: 300.00           Patient states that her cardiologist wanted her blood pressure to remain between 140 and 150 systolic and that is what staying at home and here.  She will continue her current medications.  Patient refuses to be on the sertraline and feels like she can control her anxiety on her own.  I did explain to her that sertraline is not an addictive drug like her daughter told her.

## 2024-12-31 ENCOUNTER — PATIENT OUTREACH (OUTPATIENT)
Dept: CASE MANAGEMENT | Facility: CLINIC | Age: 84
End: 2024-12-31
Payer: MEDICARE

## 2024-12-31 DIAGNOSIS — I25.118 CORONARY ARTERY DISEASE OF NATIVE ARTERY OF NATIVE HEART WITH STABLE ANGINA PECTORIS: ICD-10-CM

## 2024-12-31 DIAGNOSIS — E78.2 MIXED HYPERLIPIDEMIA: ICD-10-CM

## 2024-12-31 DIAGNOSIS — I10 ESSENTIAL HYPERTENSION: Primary | ICD-10-CM

## 2024-12-31 NOTE — OUTREACH NOTE
[facility] End of Month Documentation    This Chronic Medical Management Care Plan for Maria D Fountain, 84 y.o. female, has been monitored and managed and a new plan of care implemented for the month of December.  A cumulative time of 30  minutes was spent on this patient record this month, including chart review; phone call with patient.    Regarding the patient's problems: has Coronary artery disease of native artery of native heart with stable angina pectoris; Degeneration of lumbar or lumbosacral intervertebral disc; Dyslipidemia; Essential hypertension; Angina pectoris; Chest pain, pleuritic; Lumbar spondylosis; Mitral regurgitation; Prediabetes; Chronic coronary artery disease; Varicose veins of left lower extremity with pain; Mixed hyperlipidemia; Osteopenia after menopause; Rotator cuff arthropathy of left shoulder; Clinical diagnosis of severe acute respiratory syndrome coronavirus 2 (SARS-CoV-2) disease; Encounter for annual wellness exam in Medicare patient; Symptomatic cholelithiasis; Influenza A; HTN (hypertension); Unstable angina; and Anxiety on their problem list., the following items were addressed: medical records; medications and any changes can be found within the plan section of the note.  A detailed listing of time spent for chronic care management is tracked within each outreach encounter.  Current medications include:  has a current medication list which includes the following prescription(s): acetaminophen, amlodipine, ascorbic acid, aspirin, atorvastatin, biotin, carvedilol, lidocaine, lisinopril, loperamide, meloxicam, multivitamin-minerals, nitroglycerin, probiotic product, vitamin b-12, vitamin d3, and [DISCONTINUED] montelukast. and the patient is reported to be patient is compliant with medication protocol,  Medications are reported to be effective.    All notes on chart for PCP to review.    The patient was monitored remotely for blood pressure; pain.    The patient's physical needs  include:  physical healthcare; physician referral.     The patient's mental support needs include:  needs met    The patient's cognitive support needs include:  needs met    The patient's psychosocial support needs include:  needs met    The patient's functional needs include: physical healthcare    The patient's environmental needs include:  not applicable    Care Plan overall comments:  No data recorded    Refer to previous outreach notes for more information on the areas listed above.    Monthly Billing Diagnoses  (I10) Essential hypertension    (E78.2) Mixed hyperlipidemia    (I25.118) Coronary artery disease of native artery of native heart with stable angina pectoris    Medications   Medications have been reconciled    Care Plan progress this month:      Recently Modified Care Plans Updates made since 11/30/2024 12:00 AM      No recently modified care plans.               Current Specialty Plan of Care Status finalized    Instructions   Patient was provided an electronic copy of care plan  CCM services were explained and offered and patient has accepted these services.  Patient has given their written consent to receive CCM services and understands that this includes the authorization of electronic communication of medical information with the other treating providers.  Patient understands that they may stop CCM services at any time and these changes will be effective at the end of the calendar month and will effectively revocate the agreement of CCM services.  Patient understands that only one practitioner can furnish and be paid for CCM services during one calendar month.  Patient also understands that there may be co-payment or deductible fees in association with CCM services.  Patient will continue with at least monthly follow-up calls with the Ambulatory .    Carolina OLEA  Ambulatory Case Management    12/31/2024, 10:10 EST

## 2025-01-03 ENCOUNTER — TELEPHONE (OUTPATIENT)
Dept: CARDIOLOGY | Facility: CLINIC | Age: 85
End: 2025-01-03
Payer: MEDICARE

## 2025-01-03 LAB
BH CV REST NUCLEAR ISOTOPE DOSE: 10.4 MCI
BH CV STRESS COMMENTS STAGE 1: NORMAL
BH CV STRESS DOSE REGADENOSON STAGE 1: 0.4
BH CV STRESS DURATION MIN STAGE 1: 0
BH CV STRESS DURATION SEC STAGE 1: 10
BH CV STRESS NUCLEAR ISOTOPE DOSE: 30.7 MCI
BH CV STRESS PROTOCOL 1: NORMAL
BH CV STRESS RECOVERY BP: NORMAL MMHG
BH CV STRESS RECOVERY HR: 88 BPM
BH CV STRESS STAGE 1: 1
MAXIMAL PREDICTED HEART RATE: 136 BPM
SPECT HRT GATED+EF W RNC IV: 75 %
STRESS BASELINE BP: NORMAL MMHG
STRESS BASELINE HR: 55 BPM
STRESS TARGET HR: 116 BPM

## 2025-01-10 ENCOUNTER — PATIENT OUTREACH (OUTPATIENT)
Dept: CASE MANAGEMENT | Facility: CLINIC | Age: 85
End: 2025-01-10
Payer: MEDICARE

## 2025-01-10 DIAGNOSIS — I25.118 CORONARY ARTERY DISEASE OF NATIVE ARTERY OF NATIVE HEART WITH STABLE ANGINA PECTORIS: ICD-10-CM

## 2025-01-10 DIAGNOSIS — I10 ESSENTIAL HYPERTENSION: Primary | ICD-10-CM

## 2025-01-10 NOTE — OUTREACH NOTE
AMBULATORY CASE MANAGEMENT NOTE    Names and Relationships of Patient/Support Persons: Contact: Maria D Fountain; Relationship: Self -     CCM Interim Update    ACM called patient for monthly CM outreach. Patient reports doing better now. Reports earlier in week she had elevated BP >220/110 during weekend and snow storm and she took Nitro and extra medication to lower BP after speaking to her nurse granddaughter. Patient did verbalize understanding this was no best option and she should have called 911 and go to ER. Patient was suppose to see Cardiology but appointment was rescheduled per provider. Advised patient next time this occurs to call 911 and EMS can bring patient to ER. Patient reports BP is WNL running -140's SBP which is average for patient . Patient verbalized understanding and has Cardiology follow up beginning for February who manages her BP and that was first available appt they had when they rescheduled.  Also discussed when to seek immediate medical treatment and to call 911 for transportation if needed, reviewed s/s strokes.  Patient verbalized she is agreeable to safety plan and that Elevated BP can lead to stroke and death.         Education Documentation  Unresolved/Worsening Symptoms, taught by Carolina Lowry, RN at 1/10/2025  1:03 PM.  Learner: Patient  Readiness: Acceptance  Method: Explanation, Teach Back  Response: Verbalizes Understanding    Self-Care, taught by Carolina Lowry RN at 1/10/2025  1:03 PM.  Learner: Patient  Readiness: Acceptance  Method: Explanation, Teach Back  Response: Verbalizes Understanding    Provider Follow-Up, taught by Carolina Lowry RN at 1/10/2025  1:03 PM.  Learner: Patient  Readiness: Acceptance  Method: Explanation, Teach Back  Response: Verbalizes Understanding    Blood Pressure Monitoring, taught by Carolina Lowry RN at 1/10/2025  1:03 PM.  Learner: Patient  Readiness: Acceptance  Method: Explanation, Teach Back  Response: Verbalizes  Shanice OLEA  Ambulatory Case Management    1/10/2025, 13:03 EST

## 2025-01-30 ENCOUNTER — PATIENT OUTREACH (OUTPATIENT)
Dept: CASE MANAGEMENT | Facility: CLINIC | Age: 85
End: 2025-01-30
Payer: MEDICARE

## 2025-01-30 DIAGNOSIS — I25.118 CORONARY ARTERY DISEASE OF NATIVE ARTERY OF NATIVE HEART WITH STABLE ANGINA PECTORIS: ICD-10-CM

## 2025-01-30 DIAGNOSIS — I10 ESSENTIAL HYPERTENSION: Primary | ICD-10-CM

## 2025-01-30 NOTE — OUTREACH NOTE
[facility] End of Month Documentation    This Chronic Medical Management Care Plan for Maria D Fountain, 84 y.o. female, has been monitored and managed and a new plan of care implemented for the month of January.  A cumulative time of 29  minutes was spent on this patient record this month, including chart review; phone call with patient.    Regarding the patient's problems: has Coronary artery disease of native artery of native heart with stable angina pectoris; Degeneration of lumbar or lumbosacral intervertebral disc; Dyslipidemia; Essential hypertension; Angina pectoris; Chest pain, pleuritic; Lumbar spondylosis; Mitral regurgitation; Prediabetes; Chronic coronary artery disease; Varicose veins of left lower extremity with pain; Mixed hyperlipidemia; Osteopenia after menopause; Rotator cuff arthropathy of left shoulder; Clinical diagnosis of severe acute respiratory syndrome coronavirus 2 (SARS-CoV-2) disease; Encounter for annual wellness exam in Medicare patient; Symptomatic cholelithiasis; Influenza A; HTN (hypertension); Unstable angina; and Anxiety on their problem list., the following items were addressed: medical records; medications and any changes can be found within the plan section of the note.  A detailed listing of time spent for chronic care management is tracked within each outreach encounter.  Current medications include:  has a current medication list which includes the following prescription(s): acetaminophen, amlodipine, ascorbic acid, aspirin, atorvastatin, biotin, carvedilol, lidocaine, lisinopril, loperamide, meloxicam, multivitamin-minerals, nitroglycerin, probiotic product, vitamin b-12, vitamin d3, and [DISCONTINUED] montelukast. and the patient is reported to be patient is compliant with medication protocol,  Medications are reported to be effective.  Regarding these diagnoses, referrals were made to the following provider(s):  GI.  All notes on chart for PCP to review.    The patient was  "monitored remotely for blood pressure; pain.    The patient's physical needs include:  physical healthcare; physician referral.     The patient's mental support needs include:  needs met    The patient's cognitive support needs include:  needs met    The patient's psychosocial support needs include:  needs met    The patient's functional needs include: physical healthcare    The patient's environmental needs include:  not applicable    Care Plan overall comments:  No data recorded    Refer to previous outreach notes for more information on the areas listed above.    Monthly Billing Diagnoses  (I10) Essential hypertension    (I25.118) Coronary artery disease of native artery of native heart with stable angina pectoris    Medications   Medications have been reconciled    Care Plan progress this month:      Recently Modified Care Plans Updates made since 12/30/2024 12:00 AM       Hypertension (Adult)           Problem Priority Last Modified     Hypertension (Hypertension) --  9/30/2024 11:46 AM by Carolina Lowry RN              Goal Recent Progress Last Modified     Hypertension Monitored --  9/30/2024 11:46 AM by Carolina Lowry, RN     Evidence-based guidance:   Promote initial use of ambulatory blood pressure measurements (for 3 days) to rule out \"white-coat\" effect; identify masked hypertension and presence or absence of nocturnal \"dipping\" of blood pressure.    Encourage continued use of home blood pressure monitoring and recording in blood pressure log; include symptoms of hypotension or potential medication side effects in log.   Review blood pressure measurements taken inside and outside of the provider office; establish baseline and monitor trends; compare to target ranges or patient goal.   Share overall cardiovascular risk with patient; encourage changes to lifestyle risk factors, including alcohol consumption, smoking, inadequate exercise, poor dietary habits and stress.    Notes:            Task Due " Date Last Modified     Identify and Monitor Blood Pressure Elevation --  1/10/2025  1:17 PM by Carolina Lowry RN     Care Management Activities:      - home or ambulatory blood pressure monitoring encouraged      Notes: Discussed plan with patient of what to do when she has elevated readings                Problem Priority Last Modified     Disease Progression (Hypertension) --  9/30/2024 11:46 AM by Carolina Lowry RN              Goal Recent Progress Last Modified     Disease Progression Prevented or Minimized --  9/30/2024 11:46 AM by Carolina Lowry RN     Evidence-based guidance:   Tailor lifestyle advice to individual; review progress regularly; give frequent encouragement and respond positively to incremental successes.   Assess for and promote awareness of worsening disease or development of comorbidity.   Prepare patient for laboratory and diagnostic exams based on risk and presentation.   Prepare patient for use of pharmacologic therapy that may include diuretic, beta-blocker, beta-blocker/thiazide combination, angiotensin-converting enzyme inhibitor, renin-angiotensin blocker or calcium-channel blocker.   Expect periodic adjustments to pharmacologic therapy; manage side effects.   Promote a healthy diet that includes primarily plant-based foods, such as fruits, vegetables, whole grains, beans and legumes, low-fat dairy and lean meats.    Consider moderate reduction in sodium intake by avoiding the addition of salt to prepared foods and limiting processed meats, canned soup, frozen meals and salty snacks.    Promote a regular, daily exercise goal of 150 minutes per week of moderate exercise based on tolerance, ability and patient choice; consider referral to physical therapist, community wellness and/or activity program.   Encourage the avoidance of no more than 2 hours per day of sedentary activity, such as recreational screen time.   Review sources of stress; explore current coping strategies  and encourage use of mindfulness, yoga, meditation or exercise to manage stress.    Notes:            Task Due Date Last Modified     Alleviate Barriers to Hypertension Treatment --  1/10/2025  1:18 PM by Carolina Lowry RN     Care Management Activities:      - healthy diet promoted      Notes: trying to lower sodium intake                Problem Priority Last Modified     Resistant Hypertension (Hypertension) --  9/30/2024 11:46 AM by Carolina Lowry RN              Goal Recent Progress Last Modified     Response to Treatment Maximized --  9/30/2024 11:46 AM by Carolina Lowry RN     Evidence-based guidance:   Assess patient response to treatment, including presence or absence of medication side effects, degree of blood pressure control and patient satisfaction.   Assess technique (including cuff size and placement), measurement times, condition and calibration of blood pressure cuff set (both at-home and in-office equipment).   Assess factors that may influence response to treatment, including nonadherence to pharmacologic treatment plan, diet or activity changes and/or presence of pain, stress or sleep disturbance.   Screen for signs and symptoms of depression; if present, refer for or complete a comprehensive assessment.   Evaluate social and economic barriers that may affect adherence to treatment plan   Address pharmacologic nonadherence by simplifying dosing regimen, counseling or support by pharmacist, financial assistance, self-monitoring of blood pressure, use of motivational interviewing, voice or text messages.   Encourage behavioral adherence strategies, like habit-based interventions that link medication taking with existing daily routines.   Assess barriers to regular, daily physical activity; support family or support person-oriented activity changes and utilization of community activity or sports program.   Address barriers to dietary changes, especially sodium restriction, with referrals  to community programs, like cooking classes, meal services or intensive education when available.   Refer to community-based peer support program or nurse home-visiting program.   Assess for chronic pain; when present add additional goals (Chronic Pain Care Plan Guide) as needed.   Provide frequent follow-up by telephone, telemonitoring, patient-practice portal or with home visit.   Review alcohol use screen; address using brief intervention beginning with risk that interferes with blood pressure control; refer for treatment when excessive alcohol use is noted.   Screen for obstructive sleep apnea; prepare patient for polysomnography based on risk and presentation and use of noninvasive ventilation to relieve obstructive sleep apnea when present.    Notes:            Task Due Date Last Modified     Facilitate Adherence to Lifestyle Change --  1/10/2025  1:18 PM by Carolina Lowry RN     Care Management Activities:      - barriers to lifestyle change identified  - support and encouragement provided      Notes:                             Current Specialty Plan of Care Status finalized    Instructions   Patient was provided an electronic copy of care plan  CCM services were explained and offered and patient has accepted these services.  Patient has given their written consent to receive CCM services and understands that this includes the authorization of electronic communication of medical information with the other treating providers.  Patient understands that they may stop CCM services at any time and these changes will be effective at the end of the calendar month and will effectively revocate the agreement of CCM services.  Patient understands that only one practitioner can furnish and be paid for CCM services during one calendar month.  Patient also understands that there may be co-payment or deductible fees in association with CCM services.  Patient will continue with at least monthly follow-up calls with the  Ambulatory .    Carolina OLEA  Ambulatory Case Management    1/30/2025, 09:38 EST

## 2025-02-04 ENCOUNTER — PATIENT OUTREACH (OUTPATIENT)
Dept: CASE MANAGEMENT | Facility: CLINIC | Age: 85
End: 2025-02-04
Payer: MEDICARE

## 2025-02-04 DIAGNOSIS — M17.0 PRIMARY OSTEOARTHRITIS OF KNEES, BILATERAL: ICD-10-CM

## 2025-02-04 DIAGNOSIS — I25.118 CORONARY ARTERY DISEASE OF NATIVE ARTERY OF NATIVE HEART WITH STABLE ANGINA PECTORIS: ICD-10-CM

## 2025-02-04 DIAGNOSIS — I10 ESSENTIAL HYPERTENSION: Primary | ICD-10-CM

## 2025-02-04 NOTE — OUTREACH NOTE
AMBULATORY CASE MANAGEMENT NOTE    Names and Relationships of Patient/Support Persons: Contact: Maria D Fountain; Relationship: Self -     CCM Interim Update    ACM called patient for monthly CCM outreach. Patient has upcoming cardiology appt that she is going to bring BP log for them to review as they have been running 140-150 however last month she did have some higher readings.     Patient current concern in her hip pain. Patient daughter is taking her to a clinic for X ray later this week for ortho. Patient has been trying to get more active but her hip pain is getting in the way of progress. Patient reports that most family members of hers have had joint replacement d/t arthritis and she fears it might be time to consider joint replacement. Fears and concerns acknowledged. Encouragement provided and patient reports after winter she would like a new referral to [facility] for there aqua therapy as she stopped going before they transitioned.  Patient to inform ACM once she decides she would like to resume.         Education Documentation  Unresolved/Worsening Symptoms, taught by Carolina Lowry RN at 2/4/2025  2:14 PM.  Learner: Patient  Readiness: Acceptance  Method: Explanation, Teach Back  Response: Verbalizes Understanding    Self-Care, taught by Carolina Lowry RN at 2/4/2025  2:14 PM.  Learner: Patient  Readiness: Acceptance  Method: Explanation, Teach Back  Response: Verbalizes Understanding    Provider Follow-Up, taught by Carolina Lowry RN at 2/4/2025  2:14 PM.  Learner: Patient  Readiness: Acceptance  Method: Explanation, Teach Back  Response: Verbalizes Understanding    Medication Management, taught by Carolina Lowry RN at 2/4/2025  2:14 PM.  Learner: Patient  Readiness: Acceptance  Method: Explanation, Teach Back  Response: Verbalizes Understanding    Blood Pressure Monitoring, taught by Carolina Lowry RN at 2/4/2025  2:14 PM.  Learner: Patient  Readiness:  Acceptance  Method: Explanation, Teach Back  Response: Verbalizes Understanding          Carolina OLEA  Ambulatory Case Management    2/4/2025, 14:31 EST

## 2025-02-06 ENCOUNTER — PATIENT OUTREACH (OUTPATIENT)
Dept: CASE MANAGEMENT | Facility: CLINIC | Age: 85
End: 2025-02-06
Payer: MEDICARE

## 2025-02-06 DIAGNOSIS — I10 ESSENTIAL HYPERTENSION: Primary | ICD-10-CM

## 2025-02-06 DIAGNOSIS — I25.118 CORONARY ARTERY DISEASE OF NATIVE ARTERY OF NATIVE HEART WITH STABLE ANGINA PECTORIS: ICD-10-CM

## 2025-02-06 NOTE — OUTREACH NOTE
AMBULATORY CASE MANAGEMENT NOTE    Names and Relationships of Patient/Support Persons: Contact: Maria D Fountain; Relationship: Self -     CCM Interim Update    Patient called ACM to Notify she is on way to urgent care to get hip looked at and Xray as hip bothering her and decided against waiting for daughter to take to Clinic for eval. Offered Appt for provider for tomorrow but patient declined and seeking treatment with urgent care. Advised patient if she needs provider follow up after Visit take notify and we can assist her with follow up appt.       Carolina OLEA  Ambulatory Case Management    2/6/2025, 08:10 EST

## 2025-02-12 ENCOUNTER — TELEPHONE (OUTPATIENT)
Dept: CARDIOLOGY | Facility: CLINIC | Age: 85
End: 2025-02-12
Payer: MEDICARE

## 2025-02-12 NOTE — TELEPHONE ENCOUNTER
Patient called in requesting sooner appointment. She is having a pain in middle of her chest. Blood pressure has been up to 200. Has been having symptoms for about 10 days. I made appt tomorrow @ 11:45 with Dr. Luna. I did advise ER if symptoms worsen.

## 2025-02-12 NOTE — TELEPHONE ENCOUNTER
Fatigue for 3 months. Echo completed 12/2024. Chest pressure is intermittent. Pt was on Medrol dose pack, last dose given yesterday. Pt has nitro, but has not taken any. Advised to go to ER if symptoms worsen.

## 2025-02-13 ENCOUNTER — OFFICE VISIT (OUTPATIENT)
Dept: CARDIOLOGY | Facility: CLINIC | Age: 85
End: 2025-02-13
Payer: MEDICARE

## 2025-02-13 VITALS
DIASTOLIC BLOOD PRESSURE: 83 MMHG | HEART RATE: 69 BPM | OXYGEN SATURATION: 98 % | SYSTOLIC BLOOD PRESSURE: 146 MMHG | BODY MASS INDEX: 27.88 KG/M2 | WEIGHT: 142 LBS | HEIGHT: 60 IN

## 2025-02-13 DIAGNOSIS — R07.9 CHEST PAIN, UNSPECIFIED TYPE: Primary | ICD-10-CM

## 2025-02-13 DIAGNOSIS — I25.118 CORONARY ARTERY DISEASE OF NATIVE ARTERY OF NATIVE HEART WITH STABLE ANGINA PECTORIS: ICD-10-CM

## 2025-02-13 DIAGNOSIS — R09.89 LABILE HYPERTENSION: ICD-10-CM

## 2025-02-13 DIAGNOSIS — I20.9 ANGINA PECTORIS: ICD-10-CM

## 2025-02-13 DIAGNOSIS — R00.2 PALPITATIONS: ICD-10-CM

## 2025-02-13 DIAGNOSIS — E78.5 DYSLIPIDEMIA: ICD-10-CM

## 2025-02-13 DIAGNOSIS — R73.03 PREDIABETES: ICD-10-CM

## 2025-02-13 DIAGNOSIS — I10 ESSENTIAL HYPERTENSION: ICD-10-CM

## 2025-02-13 RX ORDER — ASPIRIN 81 MG/1
81 TABLET ORAL EVERY 24 HOURS
Qty: 90 TABLET | Refills: 3 | Status: SHIPPED | OUTPATIENT
Start: 2025-02-13

## 2025-02-13 RX ORDER — LISINOPRIL 20 MG/1
20 TABLET ORAL 2 TIMES DAILY
Qty: 180 TABLET | Refills: 3 | Status: SHIPPED | OUTPATIENT
Start: 2025-02-13

## 2025-02-13 RX ORDER — NITROGLYCERIN 0.4 MG/1
0.4 TABLET SUBLINGUAL
Qty: 30 TABLET | Refills: 5 | Status: SHIPPED | OUTPATIENT
Start: 2025-02-13

## 2025-02-13 RX ORDER — ISOSORBIDE MONONITRATE 30 MG/1
30 TABLET, EXTENDED RELEASE ORAL EVERY MORNING
Qty: 30 TABLET | Refills: 11 | Status: SHIPPED | OUTPATIENT
Start: 2025-02-13

## 2025-02-13 RX ORDER — AMLODIPINE BESYLATE 5 MG/1
5 TABLET ORAL DAILY
Qty: 90 TABLET | Refills: 3 | Status: SHIPPED | OUTPATIENT
Start: 2025-02-13

## 2025-02-13 RX ORDER — ATORVASTATIN CALCIUM 20 MG/1
20 TABLET, FILM COATED ORAL NIGHTLY
Qty: 90 TABLET | Refills: 3 | Status: SHIPPED | OUTPATIENT
Start: 2025-02-13

## 2025-02-13 RX ORDER — CARVEDILOL 12.5 MG/1
12.5 TABLET ORAL 2 TIMES DAILY WITH MEALS
Qty: 180 TABLET | Refills: 3 | Status: SHIPPED | OUTPATIENT
Start: 2025-02-13

## 2025-02-13 NOTE — PROGRESS NOTES
Subjective:     Encounter Date:02/13/2025      Patient ID: Maria D Fountain is a 85 y.o. female.    Chief Complaint and history of present illness:  Follow-up for hypertension, MR, CAD, dyslipidemia        History of present illness:       Ms. Maria D Fountain has PMH of      # CAD cardiac cath 06/21/2017 showing 50% OM1 disease and small-vessel disease distal LAD  #  mitral regurgitation, mild 9/2016  #  palpitations  #  hypertension  # LAE  #  diabetes  #?  ACE-inhibitor cough, now improved  # allergy/intolerance to pravastatin  # ALYSA  ? Pancreatic cyst       Here for follow-up.  Patient was in the hospital 12/3/2024 with accelerated blood pressure of 223/108..  Is here for follow-up.  Is complaining of chest pain, fatigue palpitations and nausea.  No aggravating or relieving factors.  Patient reportedly had hip pain and was on steroids and got some infusion therapy with vitamins in place in [place].  Patient is currently taking aspirin, atorvastatin, amlodipine, carvedilol, lisinopril.    Patient's arterial blood pressure is 146/83, heart rate 69, O2 sat of 98% on room air.    Review of records:    Labs in the ED 12/3/2024 showed HS troponin of 14--9, CMP unremarkable except glucose 106, d-dimer negative, CBC unremarkable.  proBNP normal at 197.  CXR negative   EKG sinus bradycardia rate 58     Echocardiogram 9/14/2023 reveals EF of 60 to 65%  Echocardiogram 12/3/2024 reveals normal LV systolic function EF greater than 70%, mild concentric LVH  Lexiscan Cardiolite 8/19/2024 reveals normal perfusion EF over 70%    Labs from 11/20/2024 reveal lipid profile with cholesterol 122, triglycerides 201, HDL 40, LDL 49.  Normal CMP.  Hemoglobin A1c 8/5/2024 elevated at 6.18      ASSESSMENT:       #Chest pain/pressure  #. hypertension,   #  dyslipidemia  # CAD  #  mitral regurgitation  #. prediabetes      PLAN:     Patient recently was in the hospital with hypertensive urgency.  HS troponin is negative.  14--->9  proBNP  is normal at 197.  Normal CMP, CBC.  Chest x-ray 12/3/2024 reveals no acute cardiopulmonary abnormality  EKG done 12/3/2024 reviewed/interpreted by me reveals sinus bradycardia with rate of 58 bpm  Patient had echocardiogram 9/14/23 which revealed normal LV systolic function mild MR.  Will check repeat echo.  Patient had recent stress test on 8/19/2024 which was Negative for ischemia.  Patient does not want any invasive workup.  Will add long-acting nitrates.  Patient's  reportedly had nitrates and had headache.  Explained about headache.  Will continue medical management with aspirin, amlodipine, lisinopril, atorvastatin, , Carvedilol and isosorbide as tolerated.  Will follow-up closely and if patient does not want invasive workup will at least 2 CT coronary arteriogram if she continues to have pain on maximal medical management  Cautioned on taking NSAIDs.  Advised patient to check blood pressure at home and call me with readings     Procedures     Lexiscan cardiolite done 8/19/2024 negative for ischemia, LVEF 76%         ECG 12 Lead    Date/Time: 2/13/2025 12:14 PM  Performed by: Matti Luna MD    Authorized by: Matti Luna MD  Comparison: compared with previous ECG from 12/3/2024  Comparison to previous ECG: EKG done today reviewed/interpreted by me reveals sinus rhythm with rate of 61 bpm, no significant change compared to EKG from 12/3/2024          Copied text in this portion of the note has been reviewed and is accurate as of 2/13/2025  The following portions of the patient's history were reviewed and updated as appropriate: allergies, current medications, past family history, past medical history, past social history, past surgical history and problem list.    Assessment:         [facility]       Diagnosis Plan   1. Chest pain, unspecified type        2. Angina pectoris  nitroglycerin (Nitrostat) 0.4 MG SL tablet    BP goal <140/90.  LDL goal <100. Continue atorvastatin 20  mg.  Continue PRN nitroglycerin for CP.      3. Labile hypertension        4. Palpitations        5. Coronary artery disease of native artery of native heart with stable angina pectoris        6. Dyslipidemia        7. Prediabetes        8. Essential hypertension  lisinopril (PRINIVIL,ZESTRIL) 20 MG tablet    carvedilol (COREG) 12.5 MG tablet             Plan:               Past Medical History:  Past Medical History:   Diagnosis Date    Arthritis     Back pain     COVID-19     Diabetes mellitus     DJD (degenerative joint disease)     Gallstones     Heart disease     Heart murmur     Hip pain, bilateral     History of stress test 07/2011    Stress myoview- neg     Hypertension     Lumbar disc disease     Mitral regurgitation     Multiple lipomas     Pleurisy     Spinal stenosis      Past Surgical History:  Past Surgical History:   Procedure Laterality Date    BREAST BIOPSY Right 1990    CARDIAC CATHETERIZATION  06/21/2017    HEMANGIOMA EXCISION Left 2010    left forearm     HX OVARIAN CYSTECTOMY  1962    HYSTERECTOMY  2004      Allergies:  Allergies   Allergen Reactions    Pravastatin Dizziness     Home Meds:  Current Meds:     Current Outpatient Medications:     acetaminophen (TYLENOL) 650 MG 8 hr tablet, Take 1 tablet by mouth Every 8 (Eight) Hours As Needed for Mild Pain. Indications: Pain, Disp: , Rfl:     amLODIPine (NORVASC) 5 MG tablet, Take 1 tablet by mouth Daily., Disp: 90 tablet, Rfl: 3    ascorbic acid (VITAMIN C) 1000 MG tablet, Take 0.5 tablets by mouth 2 (two) times a day. Indications: Inadequate Vitamin C, Disp: , Rfl:     aspirin 81 MG EC tablet, Take 1 tablet by mouth Daily. Indications: prophylactic, Disp: 90 tablet, Rfl: 3    atorvastatin (LIPITOR) 20 MG tablet, Take 1 tablet by mouth Every Night., Disp: 90 tablet, Rfl: 3    Biotin 5000 MCG capsule, Take 1 capsule by mouth Every Night. Indications: thinning hair, Disp: , Rfl:     carvedilol (COREG) 12.5 MG tablet, Take 1 tablet by mouth 2 (Two)  Times a Day With Meals., Disp: 180 tablet, Rfl: 3    lisinopril (PRINIVIL,ZESTRIL) 20 MG tablet, Take 1 tablet by mouth 2 (Two) Times a Day. Indications: High Blood Pressure, Disp: 180 tablet, Rfl: 3    loperamide (IMODIUM) 2 MG capsule, Take 1 capsule by mouth As Needed for Diarrhea. Indications: Diarrhea, Disp: , Rfl:     multivitamin-minerals (CENTRUM) tablet, Take 1 tablet by mouth Daily. Indications: supplement, Disp: , Rfl:     nitroglycerin (Nitrostat) 0.4 MG SL tablet, Place 1 tablet under the tongue Every 5 (Five) Minutes As Needed for Chest Pain. Take no more than 3 doses in 15 minutes.  Indications: Acute Angina Pectoris, Disp: 30 tablet, Rfl: 5    NON FORMULARY, Cleanse 24, Disp: , Rfl:     Probiotic Product (PROBIOTIC BLEND PO), Take 1 capsule by mouth Daily. Indications: supplement, Disp: , Rfl:     vitamin B-12 (CYANOCOBALAMIN) 1000 MCG tablet, Take 1 tablet by mouth Daily. Indications: Inadequate Vitamin B12, Disp: , Rfl:     vitamin D3 (vitamin d) 125 MCG (5000 UT) capsule capsule, Take 1 capsule by mouth 2 (Two) Times a Day. Indications: Vitamin D Deficiency, Disp: , Rfl:     isosorbide mononitrate (IMDUR) 30 MG 24 hr tablet, Take 1 tablet by mouth Every Morning., Disp: 30 tablet, Rfl: 11    Lidocaine 4 %, Place 1 patch on the skin as directed by provider Daily. Remove & Discard patch within 12 hours or as directed by MD  Indications: pain, Disp: , Rfl:   Social History:   Social History     Tobacco Use    Smoking status: Never     Passive exposure: Never    Smokeless tobacco: Never   Substance Use Topics    Alcohol use: No      Family History:  Family History   Problem Relation Age of Onset    No Known Problems Mother     Heart disease Father         MI    No Known Problems Sister     No Known Problems Brother     No Known Problems Maternal Aunt     No Known Problems Maternal Uncle     No Known Problems Paternal Aunt     No Known Problems Paternal Uncle     No Known Problems Maternal Grandmother   "   No Known Problems Maternal Grandfather     No Known Problems Paternal Grandmother     No Known Problems Paternal Grandfather     Arthritis Other     Hypertension Other     Colon cancer Other     Anemia Neg Hx     Arrhythmia Neg Hx     Asthma Neg Hx     Clotting disorder Neg Hx     Fainting Neg Hx     Heart attack Neg Hx     Heart failure Neg Hx     Hyperlipidemia Neg Hx               Review of Systems   Constitutional: Positive for malaise/fatigue.   Cardiovascular:  Positive for chest pain and palpitations. Negative for leg swelling.   Respiratory:  Negative for shortness of breath.    Skin:  Negative for rash.   Gastrointestinal:  Positive for nausea.   Neurological:  Negative for dizziness, light-headedness and numbness.     All other systems are negative         Objective:     Physical Exam  /83   Pulse 69   Ht 152.4 cm (60\")   Wt 64.4 kg (142 lb)   LMP  (LMP Unknown)   SpO2 98%   BMI 27.73 kg/m²   General:  Appears in no acute distress  Eyes: Sclera is anicteric,  conjunctiva is clear   HEENT:  No JVD.  No carotid bruits  Respiratory: Respirations regular and unlabored at rest.  Clear to auscultation  Cardiovascular: S1,S2 Regular rate and rhythm. No murmur, rub or gallop auscultated.   Extremities: No digital clubbing or cyanosis, no edema  Skin: Color pink. Skin warm and dry to touch. No rashes  No xanthoma  Neuro: Alert and awake.    Lab Reviewed:         Matti Luna MD  2/13/2025 12:28 EST      EMR Dragon/Transcription:   \"Dictated utilizing Dragon dictation\".        "

## 2025-02-18 ENCOUNTER — TELEPHONE (OUTPATIENT)
Dept: CARDIOLOGY | Facility: CLINIC | Age: 85
End: 2025-02-18
Payer: MEDICARE

## 2025-02-18 RX ORDER — MELOXICAM 15 MG/1
15 TABLET ORAL DAILY
Qty: 90 TABLET | Refills: 0 | Status: SHIPPED | OUTPATIENT
Start: 2025-02-18

## 2025-02-18 NOTE — TELEPHONE ENCOUNTER
Incoming Refill Request      Medication requested (name and dose): Meloxicam 15 mg     Pharmacy where request should be sent: [facility] [address] [place]    Additional details provided by patient: Previously filled by NP but has a hard time getting appts. Wants to know if Dr. Luna will take over Meloxicam rx.     Best call back number: [phone]    Does the patient have less than a 3 day supply:  [x] Yes  [] No

## 2025-02-18 NOTE — TELEPHONE ENCOUNTER
Pt made aware that Dr. Luna will not fill Meloxicam and that I would forward the refill request to her PCP.

## 2025-02-27 ENCOUNTER — PATIENT OUTREACH (OUTPATIENT)
Dept: CASE MANAGEMENT | Facility: CLINIC | Age: 85
End: 2025-02-27
Payer: MEDICARE

## 2025-02-27 DIAGNOSIS — I10 ESSENTIAL HYPERTENSION: Primary | ICD-10-CM

## 2025-02-27 DIAGNOSIS — I25.118 CORONARY ARTERY DISEASE OF NATIVE ARTERY OF NATIVE HEART WITH STABLE ANGINA PECTORIS: ICD-10-CM

## 2025-02-27 NOTE — OUTREACH NOTE
[facility] End of Month Documentation    This Chronic Medical Management Care Plan for Maria D Fountain, 85 y.o. female, has been monitored and managed and a new plan of care implemented for the month of February.  A cumulative time of 26  minutes was spent on this patient record this month, including chart review; phone call with patient.    Regarding the patient's problems: has Coronary artery disease of native artery of native heart with stable angina pectoris; Degeneration of lumbar or lumbosacral intervertebral disc; Dyslipidemia; Essential hypertension; Angina pectoris; Chest pain, pleuritic; Lumbar spondylosis; Mitral regurgitation; Prediabetes; Chronic coronary artery disease; Varicose veins of left lower extremity with pain; Mixed hyperlipidemia; Osteopenia after menopause; Rotator cuff arthropathy of left shoulder; Clinical diagnosis of severe acute respiratory syndrome coronavirus 2 (SARS-CoV-2) disease; Encounter for annual wellness exam in Medicare patient; Symptomatic cholelithiasis; Influenza A; HTN (hypertension); Unstable angina; and Anxiety on their problem list., the following items were addressed: medical records; medications and any changes can be found within the plan section of the note.  A detailed listing of time spent for chronic care management is tracked within each outreach encounter.  Current medications include:  has a current medication list which includes the following prescription(s): acetaminophen, amlodipine, ascorbic acid, aspirin, atorvastatin, biotin, carvedilol, isosorbide mononitrate, lidocaine, lisinopril, loperamide, meloxicam, multivitamin-minerals, nitroglycerin, NON FORMULARY, probiotic product, vitamin b-12, vitamin d3, and [DISCONTINUED] montelukast. and the patient is reported to be patient is compliant with medication protocol,  Medications are reported to be effective.    All notes on chart for PCP to review.    The patient was monitored remotely for blood pressure; pain,  Takes BP twice daily.    The patient's physical needs include:  physical healthcare; physician referral.     The patient's mental support needs include:  needs met    The patient's cognitive support needs include:  needs met    The patient's psychosocial support needs include:  needs met    The patient's functional needs include: physical healthcare    The patient's environmental needs include:  not applicable    Care Plan overall comments:  No data recorded    Refer to previous outreach notes for more information on the areas listed above.    Monthly Billing Diagnoses  (I10) Essential hypertension    (I25.118) Coronary artery disease of native artery of native heart with stable angina pectoris    Medications   Medications have been reconciled    Care Plan progress this month:      Recently Modified Care Plans Updates made since 1/27/2025 12:00 AM      No recently modified care plans.               Current Specialty Plan of Care Status finalized    Instructions   Patient was provided an electronic copy of care plan  CCM services were explained and offered and patient has accepted these services.  Patient has given their written consent to receive CCM services and understands that this includes the authorization of electronic communication of medical information with the other treating providers.  Patient understands that they may stop CCM services at any time and these changes will be effective at the end of the calendar month and will effectively revocate the agreement of CCM services.  Patient understands that only one practitioner can furnish and be paid for CCM services during one calendar month.  Patient also understands that there may be co-payment or deductible fees in association with CCM services.  Patient will continue with at least monthly follow-up calls with the Ambulatory .    Carolina OLEA  Ambulatory Case Management    2/27/2025, 10:40 EST

## 2025-03-18 ENCOUNTER — OFFICE VISIT (OUTPATIENT)
Dept: CARDIOLOGY | Facility: CLINIC | Age: 85
End: 2025-03-18
Payer: MEDICARE

## 2025-03-18 VITALS
BODY MASS INDEX: 28.47 KG/M2 | OXYGEN SATURATION: 92 % | HEART RATE: 68 BPM | DIASTOLIC BLOOD PRESSURE: 78 MMHG | HEIGHT: 60 IN | SYSTOLIC BLOOD PRESSURE: 138 MMHG | WEIGHT: 145 LBS

## 2025-03-18 DIAGNOSIS — S09.90XA CLOSED HEAD INJURY WITHOUT LOSS OF CONSCIOUSNESS, INITIAL ENCOUNTER: Primary | ICD-10-CM

## 2025-03-18 DIAGNOSIS — R51.9 HEADACHE ABOVE THE EYE REGION: ICD-10-CM

## 2025-03-18 DIAGNOSIS — I10 ESSENTIAL HYPERTENSION: ICD-10-CM
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
## 2025-03-18 DIAGNOSIS — I25.10 CHRONIC CORONARY ARTERY DISEASE: ICD-10-CM

## 2025-03-18 NOTE — PROGRESS NOTES
Subjective:     Encounter Date:03/18/2025      Patient ID: Maria D Fountain is a 85 y.o. female.    Chief Complaint: 6 month follow-up CAD, hypertension          History of Present Illness    Ms. Maria D Fountain has PMH of      # CAD cardiac cath 06/21/2017 showing 50% OM1 disease and small-vessel disease distal LAD  #  mitral regurgitation, mild 9/2016  #  palpitations  #  hypertension  # LAE  #  diabetes  #?  ACE-inhibitor cough, now improved  # allergy/intolerance to pravastatin  # ALYSA  ? Pancreatic cyst     Here for 6-month follow-up.  Patient was seen by Dr. Luna last month and started on isosorbide.  She reports that she did have about a 2-week history of a slight headache however it did resolve and her chest pain has resolved.  She reports that she got some ozone therapy/IV infusion and felt better for about a month after getting that and now she feels more fatigued again.    She states that about 10 days ago she had a fall.  Reports that she woke up around 5 AM suddenly to go to the bathroom and missed stepped on the stepstool to her bed hitting the corner of it and it hit her shoulder then she fell into her dresser hitting the right side of her head she had a goose egg immediately.  She did not seek medical attention at that time as she did not want to catch any other viruses etc. in the emergency room.  She reports that she has had a slight headache since then and was not sure if it was due to hitting her head or from her new medication.  She denies any lightheadedness or dizziness chest pain or shortness of breath.    Blood pressure in office today 138/78 heart rate 68 oxygen 92% on room air weight 145 pounds           Review of records:        Echocardiogram 9/14/2023 reveals EF of 60 to 65%    Lexiscan Cardiolite 8/19/2024 reveals normal perfusion EF over 70%  Echocardiogram 12/3/2024 reveals normal LV systolic function EF greater than 70%, mild concentric LVH    Lab Review:      Labs from  "11/20/2024 reveal lipid profile with cholesterol 122, triglycerides 201, HDL 40, LDL 49.  Normal CMP.  Hemoglobin A1c 8/5/2024 elevated at 6.18  Labs from 12-24 high-sensitivity troponin 14--9 CMP unremarkable except glucose 106 D-dimer negative CBC unremarkable    The following portions of the patient's history were reviewed and updated as appropriate: allergies, current medications, past family history, past medical history, past social history, past surgical history, and problem list.    Review of Systems   Constitutional: Positive for malaise/fatigue.   Cardiovascular:  Negative for chest pain, leg swelling and syncope.   Musculoskeletal:  Positive for falls and joint pain.   Neurological:  Positive for headaches. Negative for dizziness, light-headedness and weakness.   All other systems reviewed and are negative.    Past Medical History:   Diagnosis Date   • Arthritis    • Back pain    • COVID-19    • Diabetes mellitus    • DJD (degenerative joint disease)    • Gallstones    • Heart disease    • Heart murmur    • Hip pain, bilateral    • History of stress test 07/2011    Stress myoview- neg    • Hypertension    • Lumbar disc disease    • Mitral regurgitation    • Multiple lipomas    • Pleurisy    • Spinal stenosis      Past Surgical History:   Procedure Laterality Date   • BREAST BIOPSY Right 1990   • CARDIAC CATHETERIZATION  06/21/2017   • HEMANGIOMA EXCISION Left 2010    left forearm    • HX OVARIAN CYSTECTOMY  1962   • HYSTERECTOMY  2004     /78 (BP Location: Left arm, Patient Position: Sitting, Cuff Size: Adult)   Pulse 68   Ht 152.4 cm (60\")   Wt 65.8 kg (145 lb)   LMP  (LMP Unknown)   SpO2 92%   BMI 28.32 kg/m²   Family History   Problem Relation Age of Onset   • No Known Problems Mother    • Heart disease Father         MI   • No Known Problems Sister    • No Known Problems Brother    • No Known Problems Maternal Aunt    • No Known Problems Maternal Uncle    • No Known Problems Paternal Aunt  "   • No Known Problems Paternal Uncle    • No Known Problems Maternal Grandmother    • No Known Problems Maternal Grandfather    • No Known Problems Paternal Grandmother    • No Known Problems Paternal Grandfather    • Arthritis Other    • Hypertension Other    • Colon cancer Other    • Anemia Neg Hx    • Arrhythmia Neg Hx    • Asthma Neg Hx    • Clotting disorder Neg Hx    • Fainting Neg Hx    • Heart attack Neg Hx    • Heart failure Neg Hx    • Hyperlipidemia Neg Hx        Current Outpatient Medications:   •  acetaminophen (TYLENOL) 650 MG 8 hr tablet, Take 1 tablet by mouth Every 8 (Eight) Hours As Needed for Mild Pain. Indications: Pain, Disp: , Rfl:   •  amLODIPine (NORVASC) 5 MG tablet, Take 1 tablet by mouth Daily., Disp: 90 tablet, Rfl: 3  •  ascorbic acid (VITAMIN C) 1000 MG tablet, Take 0.5 tablets by mouth 2 (two) times a day. Indications: Inadequate Vitamin C, Disp: , Rfl:   •  aspirin 81 MG EC tablet, Take 1 tablet by mouth Daily. Indications: prophylactic, Disp: 90 tablet, Rfl: 3  •  atorvastatin (LIPITOR) 20 MG tablet, Take 1 tablet by mouth Every Night., Disp: 90 tablet, Rfl: 3  •  Biotin 5000 MCG capsule, Take 1 capsule by mouth Every Night. Indications: thinning hair, Disp: , Rfl:   •  carvedilol (COREG) 12.5 MG tablet, Take 1 tablet by mouth 2 (Two) Times a Day With Meals., Disp: 180 tablet, Rfl: 3  •  isosorbide mononitrate (IMDUR) 30 MG 24 hr tablet, Take 1 tablet by mouth Every Morning., Disp: 30 tablet, Rfl: 11  •  Lidocaine 4 %, Place 1 patch on the skin as directed by provider Daily. Remove & Discard patch within 12 hours or as directed by MD  Indications: pain, Disp: , Rfl:   •  lisinopril (PRINIVIL,ZESTRIL) 20 MG tablet, Take 1 tablet by mouth 2 (Two) Times a Day. Indications: High Blood Pressure, Disp: 180 tablet, Rfl: 3  •  loperamide (IMODIUM) 2 MG capsule, Take 1 capsule by mouth As Needed for Diarrhea. Indications: Diarrhea, Disp: , Rfl:   •  meloxicam (Mobic) 15 MG tablet, Take 1  tablet by mouth Daily., Disp: 90 tablet, Rfl: 0  •  multivitamin-minerals (CENTRUM) tablet, Take 1 tablet by mouth Daily. Indications: supplement, Disp: , Rfl:   •  nitroglycerin (Nitrostat) 0.4 MG SL tablet, Place 1 tablet under the tongue Every 5 (Five) Minutes As Needed for Chest Pain. Take no more than 3 doses in 15 minutes.  Indications: Acute Angina Pectoris, Disp: 30 tablet, Rfl: 5  •  NON FORMULARY, Cleanse 24, Disp: , Rfl:   •  Probiotic Product (PROBIOTIC BLEND PO), Take 1 capsule by mouth Daily. Indications: supplement, Disp: , Rfl:   •  vitamin B-12 (CYANOCOBALAMIN) 1000 MCG tablet, Take 1 tablet by mouth Daily. Indications: Inadequate Vitamin B12, Disp: , Rfl:   •  vitamin D3 (vitamin d) 125 MCG (5000 UT) capsule capsule, Take 1 capsule by mouth 2 (Two) Times a Day. Indications: Vitamin D Deficiency, Disp: , Rfl:   Allergies   Allergen Reactions   • Pravastatin Dizziness     Social History     Socioeconomic History   • Marital status:    Tobacco Use   • Smoking status: Never     Passive exposure: Never   • Smokeless tobacco: Never   Vaping Use   • Vaping status: Never Used   Substance and Sexual Activity   • Alcohol use: No   • Drug use: No   • Sexual activity: Defer                Objective:     Vitals reviewed.   Constitutional:       Appearance: Healthy appearance. Not in distress. Frail.   HENT:      Head:     Neck:      Vascular: No JVR. JVD normal.   Pulmonary:      Effort: Pulmonary effort is normal.      Breath sounds: Normal breath sounds. No wheezing. No rhonchi. No rales.   Chest:      Chest wall: Not tender to palpatation.   Cardiovascular:      PMI at left midclavicular line. Normal rate. Regular rhythm. Normal S1. Normal S2.       Murmurs: There is no murmur.      No gallop.  No click. No rub.   Pulses:     Intact distal pulses.   Edema:     Peripheral edema absent.   Abdominal:      General: Bowel sounds are normal.      Palpations: Abdomen is soft.      Tenderness: There is no  "abdominal tenderness.   Musculoskeletal: Normal range of motion.         General: No tenderness. Skin:     General: Skin is warm and dry.   Neurological:      General: No focal deficit present.      Mental Status: Alert and oriented to person, place and time.     Procedures                  Assessment:     [facility]       Diagnosis Plan   1. Closed head injury without loss of consciousness, initial encounter  CT Head Without Contrast      2. Chronic coronary artery disease        3. Essential hypertension        4. Fall, initial encounter  CT Head Without Contrast      5. Headache above the eye region  CT Head Without Contrast                     Plan:   Chart reviewed  Labs reviewed from December hospitalization unremarkable  Echocardiogram reviewed and unremarkable  Patient had stress test in August 2024 that was negative  Patient's chest pain improved with isosorbide    Will continue medical management with aspirin statin carvedilol amlodipine and isosorbide    Patient had fall 10 days ago with \"goose egg\" to her right forehead.  She did not go to the emergency room at that time she still has a slight headache  Advised patient to have CT scan    Discussed via secure chat with patient's primary care provider      Advised patient if she gets dizzy lightheaded or headache worsens to go to the emergency room    Follow-up in 6 months with Dr. Luna    Electronically signed by BJORN Mauro, 03/18/25, 4:48 PM EDT.          This document is intended for medical expert use only.  Reading of this document by patients and/or patient's family without participating medical staff guidance may result in misinterpretation and unintended morbidity. Any interpretation of such data is the responsibility of the patient and/or family member responsible for the patient in concert with their primary or specialist providers, not to be left for sources of online search as such as [employer], [employer] or similar queries.  Relying on these " approaches to knowledge may result in misinterpretation, misguided goals of care and even death should patient or family members try recommendations outside of the realm of professional medical care in a supervised inpatient environment.

## 2025-03-19 ENCOUNTER — PATIENT OUTREACH (OUTPATIENT)
Dept: CASE MANAGEMENT | Facility: CLINIC | Age: 85
End: 2025-03-19
Payer: MEDICARE

## 2025-03-19 DIAGNOSIS — I10 ESSENTIAL HYPERTENSION: Primary | ICD-10-CM

## 2025-03-19 DIAGNOSIS — E78.2 MIXED HYPERLIPIDEMIA: ICD-10-CM

## 2025-03-19 NOTE — OUTREACH NOTE
AMBULATORY CASE MANAGEMENT NOTE    Names and Relationships of Patient/Support Persons: Contact: Maria D Fountain; Relationship: Self -     CCM Interim Update    ACM called patient for Monthly CCM outreach. Patient reports She is doing well BP improved. Care gaps for CCM have been closed. Patient reports She now has copay for CCM with her Medicare supplement program and although she found benefit in program she no longer wants d/t copay. Since patient has met program goals and closed caregaps will Graduate patient from program. Instructed patient if anything comes up after today she can still reach out and I can guide her in the correct direction.     Carolina OLEA  Ambulatory Case Management    3/19/2025, 14:59 EDT

## 2025-04-14 ENCOUNTER — TELEPHONE (OUTPATIENT)
Dept: CARDIOLOGY | Facility: CLINIC | Age: 85
End: 2025-04-14
Payer: MEDICARE

## 2025-04-14 ENCOUNTER — OFFICE VISIT (OUTPATIENT)
Dept: FAMILY MEDICINE CLINIC | Facility: CLINIC | Age: 85
End: 2025-04-14
Payer: MEDICARE

## 2025-04-14 VITALS
HEIGHT: 60 IN | DIASTOLIC BLOOD PRESSURE: 93 MMHG | TEMPERATURE: 97.5 F | OXYGEN SATURATION: 96 % | WEIGHT: 144.2 LBS | SYSTOLIC BLOOD PRESSURE: 181 MMHG | HEART RATE: 64 BPM | BODY MASS INDEX: 28.31 KG/M2

## 2025-04-14 DIAGNOSIS — M54.50 LOW BACK PAIN, UNSPECIFIED BACK PAIN LATERALITY, UNSPECIFIED CHRONICITY, UNSPECIFIED WHETHER SCIATICA PRESENT: ICD-10-CM

## 2025-04-14 DIAGNOSIS — M25.552 BILATERAL HIP PAIN: Primary | ICD-10-CM

## 2025-04-14 DIAGNOSIS — M25.551 BILATERAL HIP PAIN: Primary | ICD-10-CM

## 2025-04-14 DIAGNOSIS — M54.2 CERVICAL SPINE PAIN: ICD-10-CM

## 2025-04-14 PROCEDURE — 3080F DIAST BP >= 90 MM HG: CPT | Performed by: NURSE PRACTITIONER

## 2025-04-14 PROCEDURE — 1125F AMNT PAIN NOTED PAIN PRSNT: CPT | Performed by: NURSE PRACTITIONER

## 2025-04-14 PROCEDURE — 3077F SYST BP >= 140 MM HG: CPT | Performed by: NURSE PRACTITIONER

## 2025-04-14 PROCEDURE — 99213 OFFICE O/P EST LOW 20 MIN: CPT | Performed by: NURSE PRACTITIONER

## 2025-04-14 NOTE — TELEPHONE ENCOUNTER
Pt stopped by office, stating that she has had memory loss and headaches, since starting Isosorbide. Seen by PCP today.

## 2025-04-14 NOTE — PROGRESS NOTES
Chief Complaint  Back Pain (Arthritis flare up per pt  ), Neck Pain, and Memory Loss (Thinks its new medication that dr daley put her on few weeks ago )    Subjective        Maria D Fountain presents to [facility]  History of Present Illness  Maria D is an 85-year-old female presenting today with complaints of low back pain, hip pain, and neck pain.  Her back pain has been an ongoing issue.  She fell out of the bed in February due to left hip pain and went to [place] urgent care.  Imaging showed bone on bone, so they referred her to orthopedic specialist in [place].  She had an appointment scheduled, but had to cancel because she had the flu.  She never rescheduled that appointment.  Now her right hip is causing pain.  She also reports pain in her neck that is worse with movement.  She reports a grinding sensation when she moves her neck.      The following portions of the patient's history were reviewed and updated as appropriate: allergies, current medications, past family history, past medical history, past social history, past surgical history and problem list.    Allergies   Allergen Reactions    Pravastatin Dizziness       Patient Active Problem List   Diagnosis    Coronary artery disease of native artery of native heart with stable angina pectoris    Degeneration of lumbar or lumbosacral intervertebral disc    Dyslipidemia    Essential hypertension    Angina pectoris    Chest pain, pleuritic    Lumbar spondylosis    Mitral regurgitation    Prediabetes    Chronic coronary artery disease    Varicose veins of left lower extremity with pain    Mixed hyperlipidemia    Osteopenia after menopause    Rotator cuff arthropathy of left shoulder    Clinical diagnosis of severe acute respiratory syndrome coronavirus 2 (SARS-CoV-2) disease    Encounter for annual wellness exam in Medicare patient    Symptomatic cholelithiasis    Influenza A    HTN (hypertension)    Unstable angina     "Anxiety       Current Outpatient Medications   Medication Instructions    acetaminophen (TYLENOL) 650 MG 8 hr tablet 1 tablet, Every 8 Hours PRN    amLODIPine (NORVASC) 5 mg, Oral, Daily    ascorbic acid (VITAMIN C) 1000 MG tablet 0.5 tablets, 2 times daily    aspirin 81 mg, Oral, Every 24 Hours    atorvastatin (LIPITOR) 20 mg, Oral, Nightly    Biotin 5000 MCG capsule 1 capsule, Nightly    carvedilol (COREG) 12.5 mg, Oral, 2 Times Daily With Meals    isosorbide mononitrate (IMDUR) 30 mg, Oral, Every Morning    Lidocaine 4 % 1 patch, Every 24 Hours    lisinopril (PRINIVIL,ZESTRIL) 20 mg, Oral, 2 Times Daily    loperamide (IMODIUM) 2 mg, As Needed    meloxicam (MOBIC) 15 mg, Oral, Daily    multivitamin-minerals (CENTRUM) tablet 1 tablet, Daily    nitroglycerin (NITROSTAT) 0.4 mg, Sublingual, Every 5 Minutes PRN, Take no more than 3 doses in 15 minutes.    NON FORMULARY Cleanse 24    Probiotic Product (PROBIOTIC BLEND PO) 1 capsule, Daily    vitamin B-12 (CYANOCOBALAMIN) 1000 MCG tablet 1 tablet, Daily    vitamin D3 5,000 Units, 2 Times Daily          Objective   Vital Signs:  BP (!) 181/93 (BP Location: Left arm, Patient Position: Sitting, Cuff Size: Adult)   Pulse 64   Temp 97.5 °F (36.4 °C) (Temporal)   Ht 152.4 cm (60\")   Wt 65.4 kg (144 lb 3.2 oz)   SpO2 96%   BMI 28.16 kg/m²   Estimated body mass index is 28.16 kg/m² as calculated from the following:    Height as of this encounter: 152.4 cm (60\").    Weight as of this encounter: 65.4 kg (144 lb 3.2 oz).               Review of Systems   Constitutional:  Negative for activity change, chills, fatigue, fever and unexpected weight change.   Cardiovascular:  Negative for leg swelling.   Musculoskeletal:  Positive for back pain and neck pain. Negative for arthralgias, gait problem, joint swelling, myalgias and neck stiffness.   Neurological:  Negative for weakness.        Physical Exam  Constitutional:       Appearance: Normal appearance.   Cardiovascular:      " Rate and Rhythm: Normal rate and regular rhythm.   Pulmonary:      Effort: Pulmonary effort is normal.      Breath sounds: Normal breath sounds.   Musculoskeletal:         General: Normal range of motion.      Cervical back: Normal range of motion and neck supple. Crepitus present. Pain with movement present.      Lumbar back: Decreased range of motion.      Right hip: Decreased range of motion.      Left hip: Decreased range of motion.   Skin:     General: Skin is warm and dry.      Capillary Refill: Capillary refill takes less than 2 seconds.   Neurological:      Mental Status: She is alert and oriented to person, place, and time.   Psychiatric:         Mood and Affect: Mood normal.         Behavior: Behavior normal.         Thought Content: Thought content normal.         Judgment: Judgment normal.        Result Review :                   Assessment and Plan   Diagnoses and all orders for this visit:    1. Bilateral hip pain (Primary)  Comments:  Referral to physical therapy.  Patient uninterested in seeing [place] specialist right now due to the likelihood of needing surgery. Patient should follow-up with them when she is ready.   Orders:  -     Ambulatory Referral to Physical Therapy for Evaluation & Treatment    2. Low back pain, unspecified back pain laterality, unspecified chronicity, unspecified whether sciatica present  -     Ambulatory Referral to Physical Therapy for Evaluation & Treatment    3. Cervical spine pain  -     Ambulatory Referral to Physical Therapy for Evaluation & Treatment             Follow Up   No follow-ups on file.  Patient was given instructions and counseling regarding her condition or for health maintenance advice. Please see specific information pulled into the AVS if appropriate.

## 2025-04-15 NOTE — TELEPHONE ENCOUNTER
Pt states that she does not wish to pursue a CT at this time, her granddaughter is a nurse, and doesn't feel that she should have the radiation exposure.     Pt will stop Isosorbide.     Pt states that she had been off of Prevagen, she has restarted it today.

## 2025-05-13 ENCOUNTER — TREATMENT (OUTPATIENT)
Dept: PHYSICAL THERAPY | Facility: CLINIC | Age: 85
End: 2025-05-13
Payer: MEDICARE

## 2025-05-13 DIAGNOSIS — M25.552 BILATERAL HIP PAIN: ICD-10-CM

## 2025-05-13 DIAGNOSIS — M54.2 CERVICAL SPINE PAIN: ICD-10-CM

## 2025-05-13 DIAGNOSIS — M25.551 BILATERAL HIP PAIN: ICD-10-CM

## 2025-05-13 DIAGNOSIS — Z74.09 LIMITED MOBILITY: ICD-10-CM

## 2025-05-13 DIAGNOSIS — M54.50 LOW BACK PAIN, UNSPECIFIED BACK PAIN LATERALITY, UNSPECIFIED CHRONICITY, UNSPECIFIED WHETHER SCIATICA PRESENT: Primary | ICD-10-CM

## 2025-05-13 PROCEDURE — 97535 SELF CARE MNGMENT TRAINING: CPT | Performed by: PHYSICAL THERAPIST

## 2025-05-13 PROCEDURE — 97110 THERAPEUTIC EXERCISES: CPT | Performed by: PHYSICAL THERAPIST

## 2025-05-13 PROCEDURE — 97162 PT EVAL MOD COMPLEX 30 MIN: CPT | Performed by: PHYSICAL THERAPIST

## 2025-05-13 NOTE — PROGRESS NOTES
Physical Therapy Initial Evaluation and Plan of Care    Patient: Maria D Fountain   : 1940  Diagnosis/ICD-10 Code:  Low back pain, unspecified back pain laterality, unspecified chronicity, unspecified whether sciatica present [M54.50]  Referring practitioner: BJORN Cantrell  Date of Initial Visit: 2025  Today's Date: 2025  Patient seen for 1 sessions           Subjective Questionnaire: Oswestry: 50%      Subjective Evaluation    History of Present Illness  Mechanism of injury: Pt is referred to therapy due to chronic LBP.  She fell a few months ago going to the bathroom early morning.   Went to [facility] and x-rays showed bone on bone on her hips and arthritis in her back.  She also has occasional neck pain but her back is the most painful. She wants to concentrate on her back .  She uses pain patches and takes arthritis medication.       Has been told she needs hip replacement and back surgery but she doesn't want to have any surgery.     She went to water therapy for a while and really helped. She has to pay out of pocket if she goes to the .      Onset of symptoms : several years     What makes the symptoms worse: yard work, prolonged standing, walking, lifting, bending, house work.      What makes it better: hot showers and baths, pain meds and pain patches     Functional limitation: yard work, house work.     PMH: DM, spinal stenosis, arthritis,  medical hx was reviewed .  See Epic for detail.           Patient Occupation: retired Quality of life: good    Pain  Current pain ratin  At best pain ratin  At worst pain ratin  Progression: worsening    Social Support  Lives in: one-story house  Lives with: alone    Treatments  Previous treatment: physical therapy  Current treatment: medication  Patient Goals  Patient goals for therapy: decreased pain, increased motion and increased strength  Patient goal: be able to do more with less pain         Precautions: DM, arthritis    Objective           Postural Observations  Seated posture: fair  Standing posture: fair    Additional Postural Observation Details  Increase lumbar lordosis. R lower lumbar scoliosis    Palpation   Left   Tenderness of the lumbar paraspinals.     Right Tenderness of the lumbar paraspinals.     Active Range of Motion     Additional Active Range of Motion Details  Standing lumbar flex: mod limitation , increase LBP    Standing lumbar ext: mod limitation , increase LBP    Supine flex: wfl , no increase pain    Prone ext: not assessed     SLR: L positive for LBP and L leg pain      Strength/Myotome Testing     Left Hip   Planes of Motion   Flexion: 4  Abduction: 4  Adduction: 4    Right Hip   Planes of Motion   Flexion: 4  Abduction: 4  Adduction: 4    Left Knee   Flexion: 4+  Extension: 4+    Right Knee   Flexion: 4+  Extension: 4+    Left Ankle/Foot   Dorsiflexion: 5    Right Ankle/Foot   Dorsiflexion: 5          Assessment & Plan       Assessment  Impairments: abnormal or restricted ROM, activity intolerance, lacks appropriate home exercise program, pain with function and weight-bearing intolerance   Functional limitations: lifting, walking, pulling, pushing, uncomfortable because of pain, sitting, standing and unable to perform repetitive tasks   Assessment details: Pt is a 85 y.o. female referred to therapy due to chronic LBP.   Pt presents with impaired spinal mobility, increase pain with prolonged standing, sitting, physical activities.   Upon initial evaluation pt exhibits the above impairments and functional limitations. Impairments affect performing her normal / daily activities, yard work and house work without pain.   Pt is a good candidate for rehab and will benefit from skilled physical therapy to address impairments, reduce pain and maximize function.    Prognosis: good    Goals  Plan Goals: STGs:  In 4 weeks  1- Pt will  report at least 25 % improvement in functional mobility and pain reduction   2- Pt will be  independent with initial HEP   3- Pt will tolerate progression of her exercise program and HEP without increase symptoms      LTGs: By DC   1- Pt will report at least 75% improvement in functional mobility and pain reduction   2- Pt will be independent with final HEP and self management of her condition   3- Pt will have improved Oswestry score indicating functional improvement , pain and symptom reduction  4- Pt will voice readiness for DC with independent program   5- Pt will demonstrate improved posture and body mechanics     Plan  Therapy options: will be seen for skilled therapy services  Planned modality interventions: high voltage pulsed current (pain management), ultrasound, traction and dry needling  Planned therapy interventions: abdominal trunk stabilization, body mechanics training, functional ROM exercises, home exercise program, manual therapy, neuromuscular re-education, postural training, strengthening, therapeutic activities, stretching and soft tissue mobilization  Frequency: 2x week  Duration in weeks: 12  Treatment plan discussed with: patient        See flow sheet for treatment detail    History # of Personal Factors and/or Comorbidities: MODERATE (1-2)  Examination of Body System(s): # of elements: MODERATE (3)  Clinical Presentation: EVOLVING  Clinical Decision Making: MODERATE          Timed:         Manual Therapy:         mins  37192;     Therapeutic Exercise:   16      mins  57148;     Neuromuscular Helen:        mins  21002;    Therapeutic Activity:          mins  04736;     Gait Training:           mins  79686;     Ultrasound:          mins  08732;    Ionto                                   mins   78961  Self Care                      8      mins   12450        Un-Timed:  Electrical Stimulation:         mins  79700 ( );  Dry Needling          mins self-pay  Traction          mins 13319  Canal repositioning           mins    99209  Low Eval          Mins  74234  Mod Eval    30     Mins  03468  High Eval                            Mins  06468  Re-Eval                               mins  68062        Timed Treatment:  24    mins   Total Treatment:    54    mins    PT SIGNATURE: Toribio Cisneros PT, CLT  [place] License: # [id number]     DATE TREATMENT INITIATED: 5/13/2025    Initial Certification  Certification Period: 5/13/2025 thru 8/10/2025  I certify that the therapy services are furnished while this patient is under my care.  The services outlined above are required by this patient, and will be reviewed every 90 days.     PHYSICIAN: Lizzie Gordon APRN   NPI: [id number]                                      DATE:     Please sign and return via fax to [phone].. Thank you, [facility].

## 2025-05-28 RX ORDER — MELOXICAM 15 MG/1
15 TABLET ORAL DAILY
Qty: 90 TABLET | Refills: 0 | Status: SHIPPED | OUTPATIENT
Start: 2025-05-28

## 2025-05-29 ENCOUNTER — TREATMENT (OUTPATIENT)
Dept: PHYSICAL THERAPY | Facility: CLINIC | Age: 85
End: 2025-05-29
Payer: MEDICARE

## 2025-05-29 DIAGNOSIS — M54.50 LOW BACK PAIN, UNSPECIFIED BACK PAIN LATERALITY, UNSPECIFIED CHRONICITY, UNSPECIFIED WHETHER SCIATICA PRESENT: Primary | ICD-10-CM

## 2025-05-29 DIAGNOSIS — M25.551 BILATERAL HIP PAIN: ICD-10-CM

## 2025-05-29 DIAGNOSIS — M25.552 BILATERAL HIP PAIN: ICD-10-CM

## 2025-05-29 NOTE — PROGRESS NOTES
Physical Therapy Daily Treatment Note    [address] [facility], [address]  [place] [address]  [phone]    Patient: Maria D Fountain  : 1940  Referring practitioner: BJORN Cantrell  Diagnosis/ ICD10 code: Low back pain, unspecified back pain laterality, unspecified chronicity, unspecified whether sciatica present [M54.50]  Today's Date: 2025    VISIT#: 2    Subjective   Pt reports: she couldn't come last week, she had to help out her grand daughter who was having surgery and had to take care of her 3 kids.  She came home last night. She didn't get a chance to do any exercises while she was at her granddaughter's.        Objective     See Exercise, Manual, and Modality Logs for complete treatment.     Patient Education:    Assessment & Plan       Assessment  Assessment details: Good tolerance to today's rx session.            Progress per Plan of Care            Timed:         Manual Therapy:         mins  87876;     Therapeutic Exercise:   15     mins  19073;     Neuromuscular Helen:   15     mins  35646;    Therapeutic Activity:          mins  87108;     Gait Training:           mins  45947;     Ultrasound:          mins  09860;    Ionto                                   mins   17948  Self Care                            mins   77019      Un-Timed:  Electrical Stimulation:         mins  57018 ( );  Traction          mins 60690  Canal repositioning           mins    46887        Timed Treatment:  30    mins   Total Treatment:    30    mins    Toribio Cisneros PT, CLT  Physical Therapist  [place] License:  # [id number]

## 2025-06-03 ENCOUNTER — TELEPHONE (OUTPATIENT)
Dept: PHYSICAL THERAPY | Facility: CLINIC | Age: 85
End: 2025-06-03

## 2025-06-03 NOTE — TELEPHONE ENCOUNTER
Caller: Mari aD Fountain    Relationship: Self    What was the call regarding: HAS HAD A TICK BITE IS GOING TO SEE PRIMARY CARE.

## 2025-06-04 NOTE — PLAN OF CARE
As tolerated   Goal Outcome Evaluation:  Plan of Care Reviewed With: patient  Progress: improving  Outcome Summary: pt rested well and had no complaints of chest pain, NPO since midnight for cardio colnsult today

## 2025-06-05 ENCOUNTER — TREATMENT (OUTPATIENT)
Dept: PHYSICAL THERAPY | Facility: CLINIC | Age: 85
End: 2025-06-05
Payer: MEDICARE

## 2025-06-05 DIAGNOSIS — M25.552 BILATERAL HIP PAIN: ICD-10-CM

## 2025-06-05 DIAGNOSIS — M25.551 BILATERAL HIP PAIN: ICD-10-CM

## 2025-06-05 DIAGNOSIS — Z74.09 LIMITED MOBILITY: ICD-10-CM

## 2025-06-05 DIAGNOSIS — M54.50 LOW BACK PAIN, UNSPECIFIED BACK PAIN LATERALITY, UNSPECIFIED CHRONICITY, UNSPECIFIED WHETHER SCIATICA PRESENT: Primary | ICD-10-CM

## 2025-06-05 DIAGNOSIS — M54.2 CERVICAL SPINE PAIN: ICD-10-CM

## 2025-06-05 NOTE — PROGRESS NOTES
Physical Therapy Daily Treatment Note     [facility], [address]  [place] [address]  [phone]    Patient: Maria D Fountain  : 1940  Referring practitioner: BJORN Cantrell  Diagnosis/ ICD10 code: Low back pain, unspecified back pain laterality, unspecified chronicity, unspecified whether sciatica present [M54.50]  Today's Date: 2025    VISIT#: 3    Subjective   Pt reports: she is really tired today and feels she has been doing too much.  She still has a lot of pain.  Increase pain with prolonged walking and any activities.       Objective     See Exercise, Manual, and Modality Logs for complete treatment. Trial of ES today for pain management .     Patient Education:    Assessment & Plan       Assessment  Assessment details: Good initial respond to ES today.            Progress per Plan of Care            Timed:         Manual Therapy:         mins  94513;     Therapeutic Exercise:   15      mins  60062;     Neuromuscular Helen:   15     mins  13545;    Therapeutic Activity:          mins  41564;     Gait Training:           mins  96382;     Ultrasound:          mins  05177;    Ionto                                   mins   73654  Self Care                            mins   89698      Un-Timed:  Electrical Stimulation:   16      mins  49918 ( );  Traction          mins 10255  Canal repositioning           mins    29434        Timed Treatment:  30    mins   Total Treatment:     46   mins    Toribio Cisneros PT, CLT  Physical Therapist  [place] License:  # [id number]

## 2025-06-10 ENCOUNTER — TREATMENT (OUTPATIENT)
Dept: PHYSICAL THERAPY | Facility: CLINIC | Age: 85
End: 2025-06-10
Payer: MEDICARE

## 2025-06-10 DIAGNOSIS — M25.552 BILATERAL HIP PAIN: ICD-10-CM

## 2025-06-10 DIAGNOSIS — M25.551 BILATERAL HIP PAIN: ICD-10-CM

## 2025-06-10 DIAGNOSIS — M54.50 LOW BACK PAIN, UNSPECIFIED BACK PAIN LATERALITY, UNSPECIFIED CHRONICITY, UNSPECIFIED WHETHER SCIATICA PRESENT: Primary | ICD-10-CM

## 2025-06-10 PROCEDURE — 97110 THERAPEUTIC EXERCISES: CPT | Performed by: PHYSICAL THERAPIST

## 2025-06-10 PROCEDURE — 97112 NEUROMUSCULAR REEDUCATION: CPT | Performed by: PHYSICAL THERAPIST

## 2025-06-10 PROCEDURE — G0283 ELEC STIM OTHER THAN WOUND: HCPCS | Performed by: PHYSICAL THERAPIST

## 2025-06-10 NOTE — PROGRESS NOTES
Physical Therapy Daily Treatment Note    [address] [facility], [address]  [place] [address]  [phone]    Patient: Maria D Fountain  : 1940  Referring practitioner: BJORN Cantrell  Diagnosis/ ICD10 code: Low back pain, unspecified back pain laterality, unspecified chronicity, unspecified whether sciatica present [M54.50]  Today's Date: 6/10/2025    VISIT#: 4    Subjective   Pt reports: doing ok today, she really liked the ES last time . Her back felt better till the next day.        Objective     See Exercise, Manual, and Modality Logs for complete treatment.     Patient Education:    Assessment & Plan       Assessment  Assessment details: Good initial respond to ES for pain management . Demonstrates understanding of her HEP .            Progress per Plan of Care            Timed:         Manual Therapy:         mins  79706;     Therapeutic Exercise:   14      mins  84719;     Neuromuscular Helen:   12     mins  71921;    Therapeutic Activity:          mins  06841;     Gait Training:           mins  38081;     Ultrasound:          mins  24896;    Ionto                                   mins   04313  Self Care                            mins   84304      Un-Timed:  Electrical Stimulation:  15       mins  77683 ( );  Traction          mins 78140  Canal repositioning           mins    02195        Timed Treatment:  26    mins   Total Treatment:     41   mins    Toribio Cisneros, PT, CLT  Physical Therapist  [place] License:  # [id number]

## 2025-06-12 ENCOUNTER — TELEPHONE (OUTPATIENT)
Dept: PHYSICAL THERAPY | Facility: CLINIC | Age: 85
End: 2025-06-12

## 2025-06-17 ENCOUNTER — TREATMENT (OUTPATIENT)
Dept: PHYSICAL THERAPY | Facility: CLINIC | Age: 85
End: 2025-06-17
Payer: MEDICARE

## 2025-06-17 DIAGNOSIS — M54.2 CERVICAL SPINE PAIN: ICD-10-CM

## 2025-06-17 DIAGNOSIS — M54.50 LOW BACK PAIN, UNSPECIFIED BACK PAIN LATERALITY, UNSPECIFIED CHRONICITY, UNSPECIFIED WHETHER SCIATICA PRESENT: Primary | ICD-10-CM

## 2025-06-17 DIAGNOSIS — M25.551 BILATERAL HIP PAIN: ICD-10-CM

## 2025-06-17 DIAGNOSIS — Z74.09 LIMITED MOBILITY: ICD-10-CM

## 2025-06-17 DIAGNOSIS — M25.552 BILATERAL HIP PAIN: ICD-10-CM

## 2025-06-17 PROCEDURE — 97112 NEUROMUSCULAR REEDUCATION: CPT | Performed by: PHYSICAL THERAPIST

## 2025-06-17 PROCEDURE — G0283 ELEC STIM OTHER THAN WOUND: HCPCS | Performed by: PHYSICAL THERAPIST

## 2025-06-17 PROCEDURE — 97110 THERAPEUTIC EXERCISES: CPT | Performed by: PHYSICAL THERAPIST

## 2025-06-17 NOTE — PROGRESS NOTES
Physical Therapy Daily Treatment Note     [facility], [address]  [place] [address]  [phone]    Patient: Maria D Fountain  : 1940  Referring practitioner: BJORN Cantrell  Diagnosis/ ICD10 code: Low back pain, unspecified back pain laterality, unspecified chronicity, unspecified whether sciatica present [M54.50]  Today's Date: 2025    VISIT#: 5    Subjective   Pt reports: just came from a fund raising lunch and ate too much her stomach is too full so she can only do some light exercises.  The ES worked really well for her it made her feel teodoro all over.  Feels the exercises helping her back.       Objective     See Exercise, Manual, and Modality Logs for complete treatment. Continued with there ex as noted. Concluded with ES and MH.     Patient Education:    Assessment & Plan       Assessment  Assessment details: Seems to be responding well to therapy. Demonstrates understanding of her HEP. Good initial respond to ES.           Progress per Plan of Care            Timed:         Manual Therapy:         mins  74858;     Therapeutic Exercise:    14     mins  03834;     Neuromuscular Helen:   10     mins  08751;    Therapeutic Activity:          mins  92054;     Gait Training:           mins  37875;     Ultrasound:          mins  84370;    Ionto                                   mins   57010  Self Care                            mins   63782      Un-Timed:  Electrical Stimulation:   16      mins  01310 ( );  Traction          mins 37503  Canal repositioning           mins    40577        Timed Treatment:  24    mins   Total Treatment:     40   mins    Toribio Cisneros, PT, CLT  Physical Therapist  [place] License:  # [id number]

## 2025-06-19 ENCOUNTER — TREATMENT (OUTPATIENT)
Dept: PHYSICAL THERAPY | Facility: CLINIC | Age: 85
End: 2025-06-19
Payer: MEDICARE

## 2025-06-19 DIAGNOSIS — M54.50 LOW BACK PAIN, UNSPECIFIED BACK PAIN LATERALITY, UNSPECIFIED CHRONICITY, UNSPECIFIED WHETHER SCIATICA PRESENT: Primary | ICD-10-CM

## 2025-06-19 DIAGNOSIS — Z74.09 LIMITED MOBILITY: ICD-10-CM

## 2025-06-19 DIAGNOSIS — M54.2 CERVICAL SPINE PAIN: ICD-10-CM

## 2025-06-19 DIAGNOSIS — M25.552 BILATERAL HIP PAIN: ICD-10-CM

## 2025-06-19 DIAGNOSIS — M25.551 BILATERAL HIP PAIN: ICD-10-CM

## 2025-06-19 NOTE — PROGRESS NOTES
Physical Therapy Daily Treatment Note    [address] [facility], [address]  [place] [address]  [phone]    Patient: Maria D Fountain  : 1940  Referring practitioner: BJORN Cantrell  Diagnosis/ ICD10 code: Low back pain, unspecified back pain laterality, unspecified chronicity, unspecified whether sciatica present [M54.50]  Today's Date: 2025    VISIT#: 6    Subjective Evaluation    Pain  Current pain ratin       Pt reports: doing pretty well today.  Her back is doing better. Feels therapy has been very beneficial especially the ES.  She feels more flexible and her pain hasn't been as intense.       Objective     See Exercise, Manual, and Modality Logs for complete treatment.     Patient Education:    Assessment & Plan       Goals  Plan Goals: STGs:  In 4 weeks- all MET  1- Pt will  report at least 25 % improvement in functional mobility and pain reduction   2- Pt will be independent with initial HEP   3- Pt will tolerate progression of her exercise program and HEP without increase symptoms        LTGs: By DC   1- Pt will report at least 75% improvement in functional mobility and pain reduction   2- Pt will be independent with final HEP and self management of her condition   3- Pt will have improved Oswestry score indicating functional improvement , pain and symptom reduction  4- Pt will voice readiness for DC with independent program   5- Pt will demonstrate improved posture and body mechanics               Progress per Plan of Care            Timed:         Manual Therapy:         mins  21125;     Therapeutic Exercise:   14      mins  80349;     Neuromuscular Helen:  12      mins  65595;    Therapeutic Activity:          mins  78138;     Gait Training:           mins  72876;     Ultrasound:          mins  31602;    Ionto                                   mins   58445  Self Care                            mins   26215      Un-Timed:  Electrical Stimulation:   20      mins  00354 (  );  Traction          mins 51035  Canal repositioning           mins    72819        Timed Treatment: 26     mins   Total Treatment:    46    mins    Toribio Cisneros PT, CLT  Physical Therapist  [place] License:  # [id number]

## 2025-06-25 ENCOUNTER — TELEPHONE (OUTPATIENT)
Dept: ORTHOPEDICS | Facility: OTHER | Age: 85
End: 2025-06-25
Payer: MEDICARE

## 2025-06-25 NOTE — TELEPHONE ENCOUNTER
PATIENT CALLED AND WANTED CHRISTIE TO KNOW SHE HAS THE FLU.  THEY WANT HER TO REST FOR ABOUT A MONTH.

## 2025-08-12 RX ORDER — MELOXICAM 15 MG/1
15 TABLET ORAL DAILY
Qty: 90 TABLET | Refills: 0 | Status: SHIPPED | OUTPATIENT
Start: 2025-08-12

## 2025-08-21 ENCOUNTER — OFFICE VISIT (OUTPATIENT)
Dept: CARDIOLOGY | Facility: CLINIC | Age: 85
End: 2025-08-21
Payer: MEDICARE

## 2025-08-21 VITALS
HEIGHT: 60 IN | SYSTOLIC BLOOD PRESSURE: 151 MMHG | OXYGEN SATURATION: 92 % | BODY MASS INDEX: 28.07 KG/M2 | HEART RATE: 75 BPM | DIASTOLIC BLOOD PRESSURE: 87 MMHG | WEIGHT: 143 LBS

## 2025-08-21 DIAGNOSIS — R53.83 FATIGUE, UNSPECIFIED TYPE: ICD-10-CM

## 2025-08-21 DIAGNOSIS — I10 ESSENTIAL HYPERTENSION: ICD-10-CM

## 2025-08-21 DIAGNOSIS — R09.89 LABILE HYPERTENSION: Primary | ICD-10-CM

## 2025-08-21 DIAGNOSIS — R06.83 SNORES: ICD-10-CM

## 2025-08-21 DIAGNOSIS — R00.2 PALPITATIONS: ICD-10-CM

## 2025-08-21 DIAGNOSIS — Z01.810 PREOPERATIVE CARDIOVASCULAR EXAMINATION: ICD-10-CM

## 2025-08-21 DIAGNOSIS — E78.5 DYSLIPIDEMIA: ICD-10-CM

## 2025-08-21 DIAGNOSIS — I34.0 NONRHEUMATIC MITRAL VALVE REGURGITATION: ICD-10-CM

## 2025-08-21 RX ORDER — CARVEDILOL 25 MG/1
25 TABLET ORAL 2 TIMES DAILY WITH MEALS
Qty: 180 TABLET | Refills: 3 | Status: SHIPPED | OUTPATIENT
Start: 2025-08-21